# Patient Record
Sex: FEMALE | Race: ASIAN | NOT HISPANIC OR LATINO | Employment: FULL TIME | URBAN - METROPOLITAN AREA
[De-identification: names, ages, dates, MRNs, and addresses within clinical notes are randomized per-mention and may not be internally consistent; named-entity substitution may affect disease eponyms.]

---

## 2017-01-10 ENCOUNTER — ALLSCRIPTS OFFICE VISIT (OUTPATIENT)
Dept: OTHER | Facility: OTHER | Age: 47
End: 2017-01-10

## 2017-02-28 ENCOUNTER — HOSPITAL ENCOUNTER (OUTPATIENT)
Dept: RADIOLOGY | Age: 47
Discharge: HOME | End: 2017-02-28
Payer: COMMERCIAL

## 2017-02-28 DIAGNOSIS — Z12.31 ENCOUNTER FOR SCREENING MAMMOGRAM FOR MALIGNANT NEOPLASM OF BREAST: ICD-10-CM

## 2017-02-28 PROCEDURE — G0202 SCR MAMMO BI INCL CAD: HCPCS

## 2017-03-17 ENCOUNTER — GENERIC CONVERSION - ENCOUNTER (OUTPATIENT)
Dept: OTHER | Facility: OTHER | Age: 47
End: 2017-03-17

## 2017-03-28 ENCOUNTER — ALLSCRIPTS OFFICE VISIT (OUTPATIENT)
Dept: OTHER | Facility: OTHER | Age: 47
End: 2017-03-28

## 2017-05-02 ENCOUNTER — ALLSCRIPTS OFFICE VISIT (OUTPATIENT)
Dept: OTHER | Facility: OTHER | Age: 47
End: 2017-05-02

## 2017-05-02 ENCOUNTER — LAB REQUISITION (OUTPATIENT)
Dept: LAB | Facility: HOSPITAL | Age: 47
End: 2017-05-02
Payer: COMMERCIAL

## 2017-05-02 DIAGNOSIS — Z01.419 ENCOUNTER FOR GYNECOLOGICAL EXAMINATION WITHOUT ABNORMAL FINDING: ICD-10-CM

## 2017-05-02 PROCEDURE — 88175 CYTOPATH C/V AUTO FLUID REDO: CPT | Performed by: PHYSICIAN ASSISTANT

## 2017-05-02 PROCEDURE — 87624 HPV HI-RISK TYP POOLED RSLT: CPT | Performed by: PHYSICIAN ASSISTANT

## 2017-05-04 LAB — HPV RRNA GENITAL QL NAA+PROBE: NORMAL

## 2017-05-08 LAB
LAB AP GYN PRIMARY INTERPRETATION: NORMAL
Lab: NORMAL

## 2017-06-23 ENCOUNTER — TRANSCRIBE ORDERS (OUTPATIENT)
Dept: ADMINISTRATIVE | Facility: HOSPITAL | Age: 47
End: 2017-06-23

## 2017-06-23 DIAGNOSIS — M25.552 LEFT HIP PAIN: Primary | ICD-10-CM

## 2017-07-01 DIAGNOSIS — R73.9 HYPERGLYCEMIA: ICD-10-CM

## 2017-07-01 DIAGNOSIS — E78.5 HYPERLIPIDEMIA: ICD-10-CM

## 2017-07-01 DIAGNOSIS — I10 ESSENTIAL (PRIMARY) HYPERTENSION: ICD-10-CM

## 2017-07-01 DIAGNOSIS — M19.90 OSTEOARTHRITIS: ICD-10-CM

## 2017-07-11 ENCOUNTER — HOSPITAL ENCOUNTER (OUTPATIENT)
Dept: RADIOLOGY | Facility: HOSPITAL | Age: 47
Discharge: HOME | End: 2017-07-11
Payer: COMMERCIAL

## 2017-07-11 DIAGNOSIS — M25.552 LEFT HIP PAIN: ICD-10-CM

## 2017-07-11 PROCEDURE — 77002 NEEDLE LOCALIZATION BY XRAY: CPT

## 2017-07-11 PROCEDURE — 20610 DRAIN/INJ JOINT/BURSA W/O US: CPT

## 2017-07-11 RX ORDER — METHYLPREDNISOLONE ACETATE 80 MG/ML
80 INJECTION, SUSPENSION INTRA-ARTICULAR; INTRALESIONAL; INTRAMUSCULAR; SOFT TISSUE
Status: COMPLETED | OUTPATIENT
Start: 2017-07-11 | End: 2017-07-11

## 2017-07-11 RX ORDER — LIDOCAINE HYDROCHLORIDE 10 MG/ML
20 INJECTION, SOLUTION INFILTRATION; PERINEURAL
Status: COMPLETED | OUTPATIENT
Start: 2017-07-11 | End: 2017-07-11

## 2017-07-11 RX ORDER — BUPIVACAINE HYDROCHLORIDE 2.5 MG/ML
30 INJECTION, SOLUTION EPIDURAL; INFILTRATION; INTRACAUDAL
Status: COMPLETED | OUTPATIENT
Start: 2017-07-11 | End: 2017-07-11

## 2017-07-11 RX ADMIN — METHYLPREDNISOLONE ACETATE 80 MG: 80 INJECTION, SUSPENSION INTRA-ARTICULAR; INTRALESIONAL; INTRAMUSCULAR; SOFT TISSUE at 15:45

## 2017-07-11 RX ADMIN — IOHEXOL 3 ML: 300 INJECTION, SOLUTION INTRAVENOUS at 15:45

## 2017-07-11 RX ADMIN — LIDOCAINE HYDROCHLORIDE 2 ML: 10 INJECTION, SOLUTION INFILTRATION; PERINEURAL at 15:45

## 2017-07-11 RX ADMIN — BUPIVACAINE HYDROCHLORIDE 3 ML: 2.5 INJECTION, SOLUTION EPIDURAL; INFILTRATION; INTRACAUDAL at 15:45

## 2017-07-12 ENCOUNTER — GENERIC CONVERSION - ENCOUNTER (OUTPATIENT)
Dept: OTHER | Facility: OTHER | Age: 47
End: 2017-07-12

## 2017-07-26 ENCOUNTER — APPOINTMENT (OUTPATIENT)
Dept: LAB | Facility: HOSPITAL | Age: 47
End: 2017-07-26
Payer: COMMERCIAL

## 2017-07-26 DIAGNOSIS — E78.5 HYPERLIPIDEMIA: ICD-10-CM

## 2017-07-26 DIAGNOSIS — I10 ESSENTIAL (PRIMARY) HYPERTENSION: ICD-10-CM

## 2017-07-26 DIAGNOSIS — R73.9 HYPERGLYCEMIA: ICD-10-CM

## 2017-07-26 LAB
ALBUMIN SERPL BCP-MCNC: 3.5 G/DL (ref 3.5–5)
ALP SERPL-CCNC: 54 U/L (ref 46–116)
ALT SERPL W P-5'-P-CCNC: 88 U/L (ref 12–78)
ANION GAP SERPL CALCULATED.3IONS-SCNC: 7 MMOL/L (ref 4–13)
AST SERPL W P-5'-P-CCNC: 54 U/L (ref 5–45)
BILIRUB SERPL-MCNC: 0.59 MG/DL (ref 0.2–1)
BUN SERPL-MCNC: 10 MG/DL (ref 5–25)
CALCIUM SERPL-MCNC: 9.5 MG/DL (ref 8.3–10.1)
CHLORIDE SERPL-SCNC: 102 MMOL/L (ref 100–108)
CHOLEST SERPL-MCNC: 137 MG/DL (ref 50–200)
CO2 SERPL-SCNC: 29 MMOL/L (ref 21–32)
CREAT SERPL-MCNC: 0.7 MG/DL (ref 0.6–1.3)
EST. AVERAGE GLUCOSE BLD GHB EST-MCNC: 163 MG/DL
GFR SERPL CREATININE-BSD FRML MDRD: 105 ML/MIN/1.73SQ M
GLUCOSE P FAST SERPL-MCNC: 117 MG/DL (ref 65–99)
HBA1C MFR BLD: 7.3 % (ref 4.2–6.3)
HDLC SERPL-MCNC: 28 MG/DL (ref 40–60)
LDLC SERPL DIRECT ASSAY-MCNC: 88 MG/DL (ref 0–100)
POTASSIUM SERPL-SCNC: 3.5 MMOL/L (ref 3.5–5.3)
PROT SERPL-MCNC: 7.7 G/DL (ref 6.4–8.2)
SODIUM SERPL-SCNC: 138 MMOL/L (ref 136–145)
TRIGL SERPL-MCNC: 179 MG/DL

## 2017-07-26 PROCEDURE — 80061 LIPID PANEL: CPT

## 2017-07-26 PROCEDURE — 83036 HEMOGLOBIN GLYCOSYLATED A1C: CPT

## 2017-07-26 PROCEDURE — 36415 COLL VENOUS BLD VENIPUNCTURE: CPT

## 2017-07-26 PROCEDURE — 83721 ASSAY OF BLOOD LIPOPROTEIN: CPT

## 2017-07-26 PROCEDURE — 80053 COMPREHEN METABOLIC PANEL: CPT

## 2017-08-15 ENCOUNTER — ALLSCRIPTS OFFICE VISIT (OUTPATIENT)
Dept: OTHER | Facility: OTHER | Age: 47
End: 2017-08-15

## 2018-01-13 VITALS
HEIGHT: 64 IN | SYSTOLIC BLOOD PRESSURE: 122 MMHG | BODY MASS INDEX: 35.68 KG/M2 | RESPIRATION RATE: 14 BRPM | HEART RATE: 72 BPM | WEIGHT: 209 LBS | DIASTOLIC BLOOD PRESSURE: 82 MMHG

## 2018-01-13 VITALS
DIASTOLIC BLOOD PRESSURE: 72 MMHG | SYSTOLIC BLOOD PRESSURE: 124 MMHG | BODY MASS INDEX: 36.02 KG/M2 | HEIGHT: 64 IN | WEIGHT: 211 LBS

## 2018-01-14 VITALS
HEART RATE: 80 BPM | DIASTOLIC BLOOD PRESSURE: 93 MMHG | WEIGHT: 209 LBS | BODY MASS INDEX: 35.68 KG/M2 | HEIGHT: 64 IN | SYSTOLIC BLOOD PRESSURE: 143 MMHG

## 2018-01-15 VITALS
WEIGHT: 207 LBS | RESPIRATION RATE: 14 BRPM | HEIGHT: 64 IN | SYSTOLIC BLOOD PRESSURE: 120 MMHG | BODY MASS INDEX: 35.34 KG/M2 | DIASTOLIC BLOOD PRESSURE: 82 MMHG | HEART RATE: 72 BPM

## 2018-01-15 NOTE — RESULT NOTES
Verified Results  FL GUIDED NEEDLE PLAC BX/ASP/INJ [id number] 12:53PM Denys Sandy    Order Number: [id number]   Performing Comments: Please do a fluoroscopic guided intra-articular injection of corticosteroid into the left hip joint   - Patient Instructions: TUESDAY, DECEMBER 13, 2016 @ 1:00 PM   [address] 15 MINUTES EARLY  BRING INSURANCE CARD, PHOTO ID AND ORDER  Test Name Result Flag Reference   FL GUIDED NEEDLE PLAC BX/ASP/INJ (Report)     LEFT HIP INJECTION     INDICATION: Labral tear and left hip pain     COMPARISON: MRI dated 11/1/2016     IMAGES: 5     FLUOROSCOPY TIME: 0 57 seconds     FINDINGS:     After the risks and benefits of the procedure were thoroughly explained, informed consent was obtained  The patient was prepped and draped in the usual sterile fashion  1% lidocaine solution was utilized for local anesthesia  Intermittent fluoroscopy    was utilized for placement a 20 gauge 3 5 inch spinal needle within the left hip joint  After positioning was confirmed with 3 mL of Omnipaque 300, a solution comprised of 1 mL 80 mg/mL Depomedrol, 2 mL of 0 25% Sensorcaine and 2 mL 1% Xylocaine  was    injected into the left hip joint  The patient tolerated the procedure well  There were no complications  This procedure was performed by Marcia Sawyer PA-C, under my direct supervision  IMPRESSION:     Technically successful left hip steroid injection         Workstation performed: [id number]     Signed by:   Sami White MD   12/13/16

## 2018-01-23 ENCOUNTER — ALLSCRIPTS OFFICE VISIT (OUTPATIENT)
Dept: OTHER | Facility: OTHER | Age: 48
End: 2018-01-23

## 2018-01-23 DIAGNOSIS — M25.552 PAIN IN LEFT HIP: ICD-10-CM

## 2018-01-23 LAB — HBA1C MFR BLD HPLC: 6.1 %

## 2018-01-24 NOTE — PROGRESS NOTES
Assessment   1  Controlled diabetes mellitus type II without complication (930 17) (Z68 4)   2  Hip pain, left (719 45) (M25 552)   3  Fatty liver (571 8) (K76 0)   4  Hypertension (401 9) (I10)   5  Hyperlipidemia (272 4) (E78 5)      1  Diabetes mellitus-A1c had climbed up to 7 3 although the patient had prior corticosteroid injection  She remains on metformin 2 grams daily  A1c done in the office today 6 1  She knows about annual ophthalmology exam   Urine for microalbumin ordered prior to next visit     2  Left hip pain-prior MRI done a urine a quarter ago showed large degenerative tear of the left superior acetabulum labrum with mild left DJD  Also has bilateral trochanteric bursitis  Had 2 injections in the hip joint in IR with relief of pain  Exercise group wonders if she does not have some weakness of associated muscles  I am concerned with her worsening range of motion, difficulty increasing pain  Rule out progressive DJD  Rule out avascular necrosis  Recommended she have repeat MRI of her hip with repeat evaluation by orthopedic physician     3 hypertension-essential-the cough with an ACE inhibitor  On chlorthalidone supplemental potassium  On higher dose of amlodipine and BP much improved  Also improved with less use of NSAID-because of her diabetes would prefer angiotensin receptor blocker-may want to consider at next visit switching to an angiotensin receptor blocker IN THE FUTURE WITH HER HISTORY OF HYPERTENSION     #4 elevated liver enzymes-likely in the basis of fatty liver documented on CAT scan  Hepatitis C antibody negative  Hepatitis B surface antibody consistent with prior vaccines-for repeat labs prior to next visit     #5 hyperlipidemia with mixed dyslipidemia-no strong family history of vascular disease but she does of hypertension, fatty liver diabetes  Not a smoker  On atorvastatin    Also on Lovaza 3 grams daily-for repeat prior to next visit     #6 arthralgia the second MCP bilaterally and MTP bilaterally  X-ray shows no bony lytic disease  Antinuclear antibody, rheumatoid factor normal  Hepatitis C antibody negative  Hepatitis B surface antibody positive consistent with vaccine  Lyme titer negative  Anti-CCP antibody, anti-SSA and SSB antibody all normal  Has no involvement of large joints other than her hip which may be a DJD issue  Has some elevation of his sedimentation rate and CRP above his can also be influenced by the fatty liver  Prior C-reactive protein was 19 and prior sedimentation rate 34 with normal up to 20  There was some concern about psoriasis as she has some scaling areas of the posterior neck that responded to T-cell shampoo  This could easily represent psoriatic joint disease-as noted that this point which she treats the area of her posterior neck small joint arthralgias of the hand and feet improve  7   Previously noted diffuse rash-dermatology felt some degree of latex allergy     8  History of preeclampsia     9  Carpal tunnel syndrome-monitor     10  Allergies-had used antihistamine and Proventil inhaler in the past               MEDICAL REGIMEN:         Estrogen ring for birth control, Claritin 10 milligrams daily as needed, Proventil inhaler as needed, chlorthalidone 25 milligrams daily, amlodipine 5 milligrams b i d , metformin 2 grams daily, atorvastatin 10 milligrams daily, potassium chloride -10 milliequivalents b i d , Lovaza 3 grams daily          Scheduled for MRI of hip an appointment with orthopedic physician  Appointment in several months with prior chemistry profile cholesterol profile A1c urine for microalbumin-consider repeat inflammatory markers in the future       Plan   Controlled diabetes mellitus type II without complication    · Hemoglobin A1c- POC; Status:Complete;   Done: 90CTV5670 01:50PM  Controlled diabetes mellitus type II without complication, Hyperlipidemia, Hypertension    · (1) CHOLESTEROL, TOTAL; Status:Active;  Requested for:01May2018;    · (1) COMPREHENSIVE METABOLIC PANEL; Status:Active; Requested for:01May2018;    · (1) HDL,DIRECT; Status:Active; Requested for:01May2018;    · (1) HEMOGLOBIN A1C; Status:Active; Requested for:01May2018;    · (1) LDL,DIRECT; Status:Active; Requested for:01May2018;    · (1) MICROALBUMIN CREATININE RATIO, RANDOM URINE; Status:Active; Requested for:01May2018;    · (1) TRIGLYCERIDE; Status:Active; Requested for:01May2018; Hip pain, left    · * MRI HIP LEFT WO CONTRAST; Status:Active; Requested DJA:[id number];       Repeat MRI of the hip and evaluation by orthopedic group  Continue current dose of metformin  Obtain lab work prior to next visit       History of Present Illness   HPI: She is having ongoing issues with her left hip  She has significant difficulty when trying to dress and bring her left leg up  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  As noted history MRI showed a large degenerative tear of the left superior acetabulum with mild left DJD  She also has bilateral trochanteric bursitis  She had an injection of the left hip on 2 occasions with definite improvement  She recently went to an exercise regimen and they were talking about some component related to surrounding muscle issues-this may be the case but she clearly has significant decrease in range of motion of her left hip on exam  She is also having increasing pain which is becoming more of an issue  There was a concern about additional pathology-specifically does this all represent ongoing significant issues with torn labrum verses new pathology  Rule out avascular necrosis  Because this possibility I recommended repeat MRI of her hip and formal evaluation by orthopedic physician   is trying to follow appropriate diet a low physical activity limited because of her left hip issues  She also has separate joint pain as described below   Small joint disease of the MCP MTP most recently improved when she treats the skin area of her posterior scalp which may represent psoriasis  She denies prolonged morning stiffness but has issues with the left hip area as noted   in the office today significantly improved at 6 1  She is on metformin 2 grams daily  Her prior visit A1c was 7 3 of though this may been influenced by corticosteroid injection  We elected at this point to continue 2 grams of metformin and repeat labs prior to her next visit  has known hypertension  BP adequately controlled on current regimen  Avoiding salt and decongestants  Denies hematemesis pounding of her heart sweats and headache      Review of Systems   Complete-Female:      Constitutional: No fever, no chills, feels well, no tiredness, no recent weight gain or weight loss  Eyes: No complaints of eye pain, no red eyes, no eyesight problems, no discharge, no dry eyes, no itching of eyes  ENT: no complaints of earache, no loss of hearing, no nose bleeds, no nasal discharge, no sore throat, no hoarseness  Cardiovascular: No complaints of slow heart rate, no fast heart rate, no chest pain, no palpitations, no leg claudication, no lower extremity edema  Respiratory: No complaints of shortness of breath, no wheezing, no cough, no SOB on exertion, no orthopnea, no PND  Gastrointestinal: diarrhea-- and-- bloody stools, but-- no abdominal pain,-- no nausea,-- no vomiting-- and-- no constipation  Genitourinary: No complaints of dysuria, no incontinence, no pelvic pain, no dysmenorrhea, no vaginal discharge or bleeding  Musculoskeletal: arthralgias,-- limb pain-- and-- Significant issues with use of left hip, but-- no joint swelling,-- no myalgias,-- no joint stiffness-- and-- no limb swelling  Integumentary: No complaints of skin rash or lesions, no itching, no skin wounds, no breast pain or lump        Neurological: No complaints of headache, no confusion, no convulsions, no numbness, no dizziness or fainting, no tingling, no limb weakness, no difficulty walking  Psychiatric: Not suicidal, no sleep disturbance, no anxiety or depression, no change in personality, no emotional problems  Endocrine: No complaints of proptosis, no hot flashes, no muscle weakness, no deepening of the voice, no feelings of weakness  Hematologic/Lymphatic: No complaints of swollen glands, no swollen glands in the neck, does not bleed easily, does not bruise easily  Active Problems   1  Abnormal blood chemistry test (790 6) (R79 9)   2  Arthritis (716 90) (M19 90)   3  Asthma, chronic (493 90) (J45 909)   4  Chronic eczema (692 9) (L30 9)   5  Controlled diabetes mellitus type II without complication (787 59) (H64 4)   6  Elevated ALT measurement (790 4) (R74 0)   7  Elevated AST (SGOT) (790 4) (R74 0)   8  Elevated liver enzymes (790 5) (R74 8)   9  Encounter for annual routine gynecological examination (V72 31) (Z01 419)   10  Encounter for screening mammogram for breast cancer (V76 12) (Z12 31)   11  Encounter for surveillance of other contraceptive (V25 49) (Z30 49)   12  Fatty liver (571 8) (K76 0)   13  Hip pain, left (719 45) (M25 552)   14  Hyperglycemia (790 29) (R73 9)   15  Hyperlipidemia (272 4) (E78 5)   16  Hypertension (401 9) (I10)   17  Pain, joint, hand (719 44) (M25 549)   18  Painful joint (719 40) (M25 50)   19  Psoriasis (696 1) (L40 9)    Past Medical History   1  History of Acute tonsillitis (463) (J03 90)   2  History of PCOS (V13 29) (Z87 42)  Active Problems And Past Medical History Reviewed: The active problems and past medical history were reviewed and updated today  Surgical History   1  History of Tonsillectomy  Surgical History Reviewed: The surgical history was reviewed and updated today  Family History   Mother    1  Family history of hypertension (V17 49) (Z82 49)  Father    2  Family history of hypertension (V17 49) (Z82 49)   3   Family history of Low serum HDL  Sister    4  Family history of hypothyroidism (V18 19) (Z83 49)    Social History    · Denied: History of Being A Social Drinker   · Denied: History of Drug Use   · Never A Smoker  Social History Reviewed: The social history was reviewed and updated today  The social history was reviewed and is unchanged  Current Meds    1  Advair Diskus 100-50 MCG/DOSE Inhalation Aerosol Powder Breath Activated; INHALE 1 PUFF     EVERY 12 HOURS; Therapy: 27USA3710 to (Last Rx:26May2017)  Requested for: 02SHC5365 Ordered   2  AmLODIPine Besylate 5 MG Oral Tablet; TAKE 1 TABLET TWICE DAILY; Therapy: 06KRM6468 to (Evaluate:20Apr2018)  Requested for: 80Onn1495; Last Rx:48Chu2813     Ordered   3  Amoxicillin 500 MG Oral Capsule; TAKE 1 CAPSULE TWICE DAILY; Therapy: 82AOR2295 to (Iveth Del Castillo)  Requested for: 87DZI9095; Last Rx:19May2017     Ordered   4  Atorvastatin Calcium 10 MG Oral Tablet; TAKE 1 TABLET DAILY AS DIRECTED; Therapy: 12SGY8890 to (Evaluate:24Nov2018)  Requested for: 35BCY3412; Last Rx:29Nov2017     Ordered   5  Chlorthalidone 25 MG Oral Tablet; TAKE 1 TABLET ONCE DAILY; Therapy: 11ERY5499 to (Evaluate:24Nov2018)  Requested for: 34TEC4854; Last Rx:29Nov2017     Ordered   6  Klor-Con 10 10 MEQ Oral Tablet Extended Release; TAKE 1 TABLET TWICE DAILY; Therapy: 50EBV5843 to (Vera Peña)  Requested for: 15XHR9282; Last Rx:29Nov2017     Ordered   7  MetFORMIN HCl - 1000 MG Oral Tablet; TAKE 1 TABLET EVERY 12 HOURS DAILY; Therapy: 39JUX2424 to 452 8137)  Requested for: 41NHS8950; Last Rx:22Jan2018     Ordered   8  MetFORMIN HCl - 500 MG Oral Tablet; TAKE 1 TABLET 3 TIMES DAILY WITH MEALS; Therapy: 86PBX4068 to (Evaluate:23Oct2016)  Requested for: 09Duq5075; Last Rx:07Ptc5690 Ordered   9  Montelukast Sodium 10 MG Oral Tablet; TAKE 1 TABLET AT BEDTIME; Therapy: 56LEL5896 to (Evaluate:17Jan2019)  Requested for: 61UXR8602;  Last Rx:22Jan2018     Ordered 10  NuvaRing 0 12-0 015 MG/24HR Vaginal Ring; PATIENT TO USE CONTINUOUSLY, NO WITHDRAWAL      BLEED, WILL NEED REFILL EVERY 3 WEEKS; Therapy: 14HLL8371 to (YZCPLZRV:01IPJ3744)  Requested for: 30Jun2017; Last Rx:29Jun2017      Ordered   11  Omega-3-acid Ethyl Esters 1 GM Oral Capsule; TAKE 2 CAPSULE Daily; Therapy: 08JVD9149 to (Last Rx:82Oxu7870)  Requested for: 70JQA9180 Ordered   12  Proventil  (90 Base) MCG/ACT Inhalation Aerosol Solution; INHALE 2 PUFFS EVERY 4-6      HOURS AS NEEDED; Therapy: 62MEY0752 to (Last Rx:99Bim3655)  Requested for: 54CEG8929 Ordered   13  Triamcinolone Acetonide 0 1 % External Ointment; APPLY SPARINGLY TO AFFECTED AREA(S) 2 TO      3 TIMES DAILY; Therapy: 15MBG7683 to (David Greer)  Requested for: 86BDZ8935; Last Rx:12Oct2016      Ordered  Medication List Reviewed: The medication list was reviewed and updated today  Allergies   1  NuvaRing RING  2  FRUIT   3  Nuts   4  Shellfish    Vitals   Vital Signs    Recorded: 84HBM5861 05:17PM Recorded: 55LLA9801 01:38PM   Heart Rate 68    Respiration 14    Systolic 362, RUE, Sitting    Diastolic 78, RUE, Sitting    BP CUFF SIZE Large    Height  5 ft 4 in   Weight  206 lb 6 oz   BMI Calculated  35 42   BSA Calculated  1 98     Physical Exam        Constitutional      General appearance: No acute distress, well appearing and well nourished  Head and Face      Head and face: Normal        Palpation of the face and sinuses: No sinus tenderness  Eyes      Conjunctiva and lids: No swelling, erythema or discharge  Pupils and irises: Equal, round, reactive to light  Ears, Nose, Mouth, and Throat      External inspection of ears and nose: Normal        Otoscopic examination: Tympanic membranes translucent with normal light reflex  Canals patent without erythema  Hearing: Normal        Nasal mucosa, septum, and turbinates: Normal without edema or erythema         Lips, teeth, and gums: Normal, good dentition  Oropharynx: Normal with no erythema, edema, exudate or lesions  Neck      Neck: Supple, symmetric, trachea midline, no masses  Thyroid: Normal, no thyromegaly  Pulmonary      Respiratory effort: No increased work of breathing or signs of respiratory distress  Percussion of chest: Normal        Palpation of chest: Normal        Auscultation of lungs: Clear to auscultation  Cardiovascular      Palpation of heart: Normal PMI, no thrills  Auscultation of heart: Normal rate and rhythm, normal S1 and S2, no murmurs  Carotid pulses: 2+ bilaterally  Abdominal aorta: Normal        Femoral pulses: 2+ bilaterally  Pedal pulses: 2+ bilaterally  Peripheral vascular exam: Normal        Examination of extremities for edema and/or varicosities: Normal        Chest      Chest: Normal        Abdomen      Abdomen: Non-tender, no masses  Liver and spleen: No hepatomegaly or splenomegaly  Examination for hernias: No hernia appreciated  Anus, perineum, and rectum: Normal sphincter tone, no masses, no prolapse  Stool sample for occult blood: Negative  Lymphatic      Palpation of lymph nodes in neck: No lymphadenopathy  Palpation of lymph nodes in axillae: No lymphadenopathy  Palpation of lymph nodes in groin: No lymphadenopathy  Palpation of lymph nodes in other areas: No lymphadenopathy  Musculoskeletal      Gait and station: Normal        Digits and nails: Normal without clubbing or cyanosis  Joints, bones, and muscles: Abnormal  -- Significant decrease in range of motion of the left hip-no active synovitis  Range of motion: Normal        Stability: Normal        Muscle strength/tone: Normal        Skin      Skin and subcutaneous tissue: Normal without rashes or lesions  Palpation of skin and subcutaneous tissue: Normal turgor         Neurologic      Cranial nerves: Cranial nerves II-XII intact  Reflexes: 2+ and symmetric  Sensation: No sensory loss  Psychiatric      Judgment and insight: Normal        Orientation to person, place, and time: Normal        Recent and remote memory: Intact         Mood and affect: Normal        Results/Data   Hemoglobin A1c- POC 87BCA7176 01:50PM Markos Briscoe      Test Name Result Flag Reference   HEMOGLOBIN A1C 6 1          Signatures    Electronically signed by : ILEANA Ceja ; Jan 23 2018  3:41PM EST                       (Author)     Electronically signed by : ILEANA Ceja ; Jan 23 2018  5:29PM EST                       (Author)

## 2018-02-06 ENCOUNTER — HOSPITAL ENCOUNTER (OUTPATIENT)
Dept: MRI IMAGING | Facility: HOSPITAL | Age: 48
Discharge: HOME | End: 2018-02-06
Payer: COMMERCIAL

## 2018-02-06 DIAGNOSIS — M25.552 PAIN IN LEFT HIP: ICD-10-CM

## 2018-02-06 PROCEDURE — 73721 MRI JNT OF LWR EXTRE W/O DYE: CPT

## 2018-02-11 ENCOUNTER — DOCUMENTATION (OUTPATIENT)
Dept: INTERNAL MEDICINE CLINIC | Facility: CLINIC | Age: 48
End: 2018-02-11

## 2018-03-05 RX ORDER — ATORVASTATIN CALCIUM 10 MG/1
1 TABLET, FILM COATED ORAL DAILY
COMMUNITY
Start: 2016-02-09 | End: 2019-03-03 | Stop reason: SDUPTHER

## 2018-03-05 RX ORDER — AMOXICILLIN 500 MG/1
1 CAPSULE ORAL 2 TIMES DAILY
COMMUNITY
Start: 2017-05-19 | End: 2018-03-06 | Stop reason: ALTCHOICE

## 2018-03-05 RX ORDER — OMEGA-3-ACID ETHYL ESTERS 1 G/1
2 CAPSULE, LIQUID FILLED ORAL DAILY
COMMUNITY
Start: 2017-02-01 | End: 2018-10-23 | Stop reason: SDUPTHER

## 2018-03-05 RX ORDER — MONTELUKAST SODIUM 10 MG/1
1 TABLET ORAL
COMMUNITY
Start: 2015-11-23 | End: 2019-02-13 | Stop reason: SDUPTHER

## 2018-03-05 RX ORDER — CHLORTHALIDONE 25 MG/1
1 TABLET ORAL DAILY
COMMUNITY
Start: 2013-03-12 | End: 2020-02-18

## 2018-03-05 RX ORDER — ALBUTEROL SULFATE 90 UG/1
2 AEROSOL, METERED RESPIRATORY (INHALATION)
COMMUNITY
Start: 2017-05-26 | End: 2020-02-27 | Stop reason: SDUPTHER

## 2018-03-05 RX ORDER — AMLODIPINE BESYLATE 5 MG/1
1 TABLET ORAL 2 TIMES DAILY
COMMUNITY
Start: 2013-05-31 | End: 2018-05-18 | Stop reason: SDUPTHER

## 2018-03-05 RX ORDER — POTASSIUM CHLORIDE 750 MG/1
1 TABLET, FILM COATED, EXTENDED RELEASE ORAL 2 TIMES DAILY
COMMUNITY
Start: 2015-11-23 | End: 2020-02-18

## 2018-03-06 ENCOUNTER — OFFICE VISIT (OUTPATIENT)
Dept: OBGYN CLINIC | Facility: HOSPITAL | Age: 48
End: 2018-03-06
Payer: COMMERCIAL

## 2018-03-06 VITALS
DIASTOLIC BLOOD PRESSURE: 87 MMHG | SYSTOLIC BLOOD PRESSURE: 133 MMHG | BODY MASS INDEX: 35 KG/M2 | HEART RATE: 94 BPM | WEIGHT: 205.03 LBS | HEIGHT: 64 IN

## 2018-03-06 DIAGNOSIS — M25.552 LEFT HIP PAIN: Primary | ICD-10-CM

## 2018-03-06 DIAGNOSIS — M16.12 PRIMARY OSTEOARTHRITIS OF LEFT HIP: ICD-10-CM

## 2018-03-06 PROCEDURE — 99213 OFFICE O/P EST LOW 20 MIN: CPT | Performed by: ORTHOPAEDIC SURGERY

## 2018-03-06 RX ORDER — ETONOGESTREL/ETHINYL ESTRADIOL .12-.015MG
RING, VAGINAL VAGINAL
COMMUNITY
Start: 2018-01-07 | End: 2018-07-12 | Stop reason: SDUPTHER

## 2018-03-06 NOTE — PROGRESS NOTES
41 y o female presents to the office for left groin and lateral hip pain  She struggles to put her shoes on  She had an injection in July 2017 which was beneficial      Review of Systems  Review of systems negative unless otherwise specified in HPI    Past Medical History  History reviewed  No pertinent past medical history  Past Surgical History  History reviewed  No pertinent surgical history  Current Medications  No current outpatient prescriptions on file prior to visit  No current facility-administered medications on file prior to visit  Recent Labs [facility])    0  Lab Value Date/Time   HCT 40 1 12/03/2015 0736   HGB 13 6 12/03/2015 0736   WBC 6 62 12/03/2015 0736   ESR 34 (H) 11/12/2016 0826   ESR 38 (H) 12/03/2015 0736   CRP 19 3 (H) 11/12/2016 0826   GLUCOSE 98 11/12/2016 0826   GLUCOSE 115 12/03/2015 0736   HGBA1C 6 1 01/23/2018 1350         Physical exam  · General: Awake, Alert, Oriented  · Eyes: Pupils equal, round and reactive to light  · Heart: regular rate and rhythm  · Lungs: No audible wheezing  · Abdomen: soft  left Lower extremity  · Patient ambulates without assistance      Imaging  X-rays of left hip were reviewed  MRI of left hip was reviewed    Procedure  None    Assessment/Plan:   41 y  o female will get another steroid injection  She will start physical therapy following the injection  We will see her back in 3 months

## 2018-03-13 ENCOUNTER — TRANSCRIBE ORDERS (OUTPATIENT)
Dept: RADIOLOGY | Facility: HOSPITAL | Age: 48
End: 2018-03-13

## 2018-03-13 ENCOUNTER — HOSPITAL ENCOUNTER (OUTPATIENT)
Dept: RADIOLOGY | Facility: HOSPITAL | Age: 48
Discharge: HOME | End: 2018-03-13
Attending: ORTHOPAEDIC SURGERY
Payer: COMMERCIAL

## 2018-03-13 DIAGNOSIS — M25.552 LEFT HIP PAIN: ICD-10-CM

## 2018-03-13 DIAGNOSIS — M16.12 PRIMARY OSTEOARTHRITIS OF LEFT HIP: ICD-10-CM

## 2018-03-13 PROCEDURE — 20610 DRAIN/INJ JOINT/BURSA W/O US: CPT

## 2018-03-13 PROCEDURE — 77002 NEEDLE LOCALIZATION BY XRAY: CPT

## 2018-03-13 RX ORDER — LIDOCAINE HYDROCHLORIDE 10 MG/ML
20 INJECTION, SOLUTION INFILTRATION; PERINEURAL
Status: COMPLETED | OUTPATIENT
Start: 2018-03-13 | End: 2018-03-13

## 2018-03-13 RX ORDER — METHYLPREDNISOLONE ACETATE 80 MG/ML
80 INJECTION, SUSPENSION INTRA-ARTICULAR; INTRALESIONAL; INTRAMUSCULAR; SOFT TISSUE
Status: COMPLETED | OUTPATIENT
Start: 2018-03-13 | End: 2018-03-13

## 2018-03-13 RX ORDER — BUPIVACAINE HYDROCHLORIDE 2.5 MG/ML
30 INJECTION, SOLUTION EPIDURAL; INFILTRATION; INTRACAUDAL
Status: COMPLETED | OUTPATIENT
Start: 2018-03-13 | End: 2018-03-13

## 2018-03-13 RX ADMIN — METHYLPREDNISOLONE ACETATE 80 MG: 80 INJECTION, SUSPENSION INTRA-ARTICULAR; INTRALESIONAL; INTRAMUSCULAR; SOFT TISSUE at 13:37

## 2018-03-13 RX ADMIN — LIDOCAINE HYDROCHLORIDE 2 ML: 10 INJECTION, SOLUTION INFILTRATION; PERINEURAL at 13:36

## 2018-03-13 RX ADMIN — BUPIVACAINE HYDROCHLORIDE 2 ML: 2.5 INJECTION, SOLUTION EPIDURAL; INFILTRATION; INTRACAUDAL at 13:35

## 2018-03-13 RX ADMIN — IOHEXOL 3 ML: 300 INJECTION, SOLUTION INTRAVENOUS at 13:05

## 2018-03-20 ENCOUNTER — EVALUATION (OUTPATIENT)
Dept: PHYSICAL THERAPY | Facility: CLINIC | Age: 48
End: 2018-03-20
Payer: COMMERCIAL

## 2018-03-20 DIAGNOSIS — M25.552 LEFT HIP PAIN: Primary | ICD-10-CM

## 2018-03-20 DIAGNOSIS — M16.12 PRIMARY OSTEOARTHRITIS OF LEFT HIP: ICD-10-CM

## 2018-03-20 PROCEDURE — G8979 MOBILITY GOAL STATUS: HCPCS | Performed by: PHYSICAL THERAPIST

## 2018-03-20 PROCEDURE — 97161 PT EVAL LOW COMPLEX 20 MIN: CPT | Performed by: PHYSICAL THERAPIST

## 2018-03-20 PROCEDURE — 97110 THERAPEUTIC EXERCISES: CPT | Performed by: PHYSICAL THERAPIST

## 2018-03-20 PROCEDURE — G8978 MOBILITY CURRENT STATUS: HCPCS | Performed by: PHYSICAL THERAPIST

## 2018-03-20 NOTE — PROGRESS NOTES
PT Evaluation     Today's date: 3/20/2018  Patient name: Uriah Murray  : 1970  MRN: [id number]  Referring provider: Russell Merchant MD  Dx:   Encounter Diagnosis     ICD-10-CM    1  Left hip pain M25 552 Ambulatory referral to Physical Therapy   2  Primary osteoarthritis of left hip M16 12 Ambulatory referral to Physical Therapy                  Assessment  Impairments: abnormal or restricted ROM and impaired physical strength    Assessment details: Patient presents with signs and symptoms of referring diagnosis and a movement impairment diagnosis of L hip IR hypomobility due to capsular tightness  She has most limitation in IR followed by ext, Ir, abduction  She also has decreased hip strength due to pain  She was able to improve ROM with mobilizations this session and would benefit from formal therapy to maximize standing/sitting tolerance and improve functional mobility  Thank you again for the kind referral    Understanding of Dx/Px/POC: good   Prognosis: good    Goals  STGs  1  Decrease pain by 20% in 2-4 weeks  2  Improve hip ROM by 10 degrees in 2-4 weeks  3  Improve hip strength by 1/3 grade in 2-4 weeks  LTGs  1  Decrease pain by 60% in 6-8 weeks  2  Improve standing sitting tolerance to >45 minutes in 6-8 weeks  3  Perform job activities without pain in 6-8 weeks  Plan  Patient would benefit from: skilled PT  Planned therapy interventions: manual therapy, therapeutic exercise, stretching, strengthening and neuromuscular re-education  Frequency: 2x week  Duration in weeks: 8  Treatment plan discussed with: patient        Subjective Evaluation    History of Present Illness  Mechanism of injury: She states she has a labral tear in her L hip according to MRI findings and psoriatic arthritis  It started a few years back when she would repetitively lift when sitting and leaning over to the R  She has quite a bit of tightness and stiffness more than pain   She also had a slip on ice a few months ago and her L hip went into abduction  She denies numbness and tingling  She notices some clicking but no giving way  She does have a history of R sciatica >10 years ago and was worse after pregnancy but it has been calmed down since just a bit difficult with lifting laundry basket     Quality of life: good    Pain  At best pain ratin  At worst pain ratin  Location: Quad, lateral hip pain    Treatments  Current treatment: injection treatment        Objective     Active Range of Motion   Left Hip   Flexion: 94 degrees with pain    Additional Active Range of Motion Details  Gait: hips in ER  Squat: Hips in ER and trunk flexion compensation    Myotomes: WNL  Hip flexion 3-/5 L Hamstring 4/5 B  Hip abd: R 4+/5 L 3-/5 pain  Hip ext: R 4/5 L 3-/5 pain  Lumbar ROM: 60 10 ext slight pull in ant hip    Slump:- SLR:50 L +  Merle:L+  Faddir: L+ Scours:+         Log Roll:limited IR    Hip distraction:-   Elys: 90 L + Sacral thrust:-    SG: anterior glide decreased pain with Ir, lateral inferior glide hypomobile decreased pain with flexion  Palpation:post greater trochanter       Passive Range of Motion   Left Hip   Flexion: 90 degrees with pain  Abduction: 14 degrees with pain  Internal rotation (90/90): 0 degrees   Internal rotation (prone): 5 and prone pain degrees with pain          Precautions: HTN, psoriatic arthritis, latex allergy    Daily Treatment Diary     Manual  3/20            L Hip belt lateral/inferior glides grade 3-4  5 min                                                                    Exercise Diary  3/20            Bike             Hip flexor ST 4 x :20            L Hip add ST 4x :20            KTC             Prone press up             Prone quad ST             Hip IR on stool             Total gym             Standing hip abd Modalities              MHP prn

## 2018-03-27 ENCOUNTER — OFFICE VISIT (OUTPATIENT)
Dept: PHYSICAL THERAPY | Facility: CLINIC | Age: 48
End: 2018-03-27
Payer: COMMERCIAL

## 2018-03-27 DIAGNOSIS — M25.552 LEFT HIP PAIN: Primary | ICD-10-CM

## 2018-03-27 DIAGNOSIS — M16.12 PRIMARY OSTEOARTHRITIS OF LEFT HIP: ICD-10-CM

## 2018-03-27 PROCEDURE — 97112 NEUROMUSCULAR REEDUCATION: CPT | Performed by: PHYSICAL THERAPIST

## 2018-03-27 PROCEDURE — 97110 THERAPEUTIC EXERCISES: CPT | Performed by: PHYSICAL THERAPIST

## 2018-03-27 PROCEDURE — 97140 MANUAL THERAPY 1/> REGIONS: CPT | Performed by: PHYSICAL THERAPIST

## 2018-03-27 NOTE — PROGRESS NOTES
Daily Note     Today's date: 3/27/2018  Patient name: Andrey Yates  : 1970  MRN: [id number]  Referring provider: Meagan Suero MD  Dx:   Encounter Diagnosis     ICD-10-CM    1  Left hip pain M25 552    2  Primary osteoarthritis of left hip M16 12                   Subjective: She states she is feeling improved flexibility and that she has not noticed more soreness  Objective: See treatment diary below      Assessment: Tolerated treatment well  Patient exhibited good technique with therapeutic exercises  Patient had some tightness with prayer stretch, but no increase in soreness throughout session  57% WB on LLE c EC  Plan: Continue per plan of care       recautions: HTN, psoriatic arthritis, latex allergy    Daily Treatment Diary     Manual  3/20 3/27           L Hip belt lateral/inferior glides grade 3-4  5 min 10 min                                                                   Exercise Diary  3/20 3/27           Bike  6 min           Hip flexor ST 4 x :20 4x :20           L Hip add ST 4x :20 4x :20           KTC  10x :05           Prone press up  2x10           Prone quad ST  4x :20           Hip IR on stool  2x20           Total gym  L22 2x15           Standing hip abd  2x10           Biodex- WS  2 min           Prayer sT  10x :05                                                                                                                                    Modalities              MHP prn

## 2018-04-03 ENCOUNTER — OFFICE VISIT (OUTPATIENT)
Dept: PHYSICAL THERAPY | Facility: CLINIC | Age: 48
End: 2018-04-03
Payer: COMMERCIAL

## 2018-04-03 DIAGNOSIS — M16.12 PRIMARY OSTEOARTHRITIS OF LEFT HIP: ICD-10-CM

## 2018-04-03 DIAGNOSIS — M25.552 LEFT HIP PAIN: Primary | ICD-10-CM

## 2018-04-03 PROCEDURE — 97140 MANUAL THERAPY 1/> REGIONS: CPT | Performed by: PHYSICAL THERAPIST

## 2018-04-03 PROCEDURE — 97110 THERAPEUTIC EXERCISES: CPT | Performed by: PHYSICAL THERAPIST

## 2018-04-03 NOTE — PROGRESS NOTES
Daily Note     Today's date: 4/3/2018  Patient name: Martell Sparks  : 1970  MRN: [id number]  Referring provider: Nancy Munoz MD  Dx:   Encounter Diagnosis     ICD-10-CM    1  Left hip pain M25 552    2  Primary osteoarthritis of left hip M16 12                   Subjective: Patient stated no significant pain prior to treatment session  Objective: See treatment diary below      Assessment: Patient demonstrated moderate difficulty with A/P weight shifting on Biodex; positive response to manual intervention  Plan: Continue per plan of care       recautions: HTN, psoriatic arthritis, latex allergy    Daily Treatment Diary     Manual  3/20 3/27 4/3          L Hip belt lateral/inferior glides grade 3-4  5 min 10 min KK                                                                  Exercise Diary  3/20 3/27 4/3          Bike  6 min 6 min          Hip flexor ST 4 x :20 4x :20 4x20"          L Hip add ST 4x :20 4x :20 4x20"          KTC  10x :05 10x :05          Prone press up  2x10 2x10          Prone quad ST  4x :20 4x20"          Hip IR on stool  2x20 2x20          Total gym  L22 2x15 L22 2x15          Standing hip abd  2x10 2x10          Biodex- WS  2 min 2' f/b 2' s/s          Prayer sT  10x :05 10x :05                                                                                                                                   Modalities              MHP prn

## 2018-04-10 DIAGNOSIS — R73.09 IMPAIRED GLUCOSE REGULATION: Primary | ICD-10-CM

## 2018-04-26 PROCEDURE — G8979 MOBILITY GOAL STATUS: HCPCS | Performed by: PHYSICAL THERAPIST

## 2018-04-26 PROCEDURE — G8978 MOBILITY CURRENT STATUS: HCPCS | Performed by: PHYSICAL THERAPIST

## 2018-05-01 ENCOUNTER — OFFICE VISIT (OUTPATIENT)
Dept: PHYSICAL THERAPY | Facility: CLINIC | Age: 48
End: 2018-05-01
Payer: COMMERCIAL

## 2018-05-01 DIAGNOSIS — M25.552 LEFT HIP PAIN: Primary | ICD-10-CM

## 2018-05-01 DIAGNOSIS — I10 ESSENTIAL (PRIMARY) HYPERTENSION: ICD-10-CM

## 2018-05-01 DIAGNOSIS — E78.5 HYPERLIPIDEMIA: ICD-10-CM

## 2018-05-01 DIAGNOSIS — M16.12 PRIMARY OSTEOARTHRITIS OF LEFT HIP: ICD-10-CM

## 2018-05-01 DIAGNOSIS — E11.9 TYPE 2 DIABETES MELLITUS WITHOUT COMPLICATIONS (HCC): ICD-10-CM

## 2018-05-01 PROCEDURE — 97112 NEUROMUSCULAR REEDUCATION: CPT | Performed by: PHYSICAL THERAPIST

## 2018-05-01 PROCEDURE — 97140 MANUAL THERAPY 1/> REGIONS: CPT | Performed by: PHYSICAL THERAPIST

## 2018-05-01 PROCEDURE — 97110 THERAPEUTIC EXERCISES: CPT | Performed by: PHYSICAL THERAPIST

## 2018-05-01 PROCEDURE — G8978 MOBILITY CURRENT STATUS: HCPCS | Performed by: PHYSICAL THERAPIST

## 2018-05-01 PROCEDURE — G8979 MOBILITY GOAL STATUS: HCPCS | Performed by: PHYSICAL THERAPIST

## 2018-05-01 PROCEDURE — 97530 THERAPEUTIC ACTIVITIES: CPT | Performed by: PHYSICAL THERAPIST

## 2018-05-01 NOTE — PROGRESS NOTES
PT Evaluation     Today's date: 2018  Patient name: Lucien Oseguera  : 1970  MRN: [id number]  Referring provider: Vianney Adhikari MD  Dx:   No diagnosis found  Assessment  Impairments: abnormal or restricted ROM and impaired physical strength    Assessment details: She has shown good progress with hip ROM, strength and pain  She still does have some capsular mobility deficits and strength deficits which are causing her difficulty standing for longer durations  She was educated in home stretching and mobilization program as she would like to progress towards independent Hep  My recommendation at this time is one more visit to further progress HEP  Thank you again for the kind referral    Understanding of Dx/Px/POC: good   Prognosis: good    Goals  STGs  1  Decrease pain by 20% in 2-4 weeks  - met  2  Improve hip ROM by 10 degrees in 2-4 weeks  -met   3  Improve hip strength by 1/3 grade in 2-4 weeks  -partially met      LTGs  1  Decrease pain by 60% in 6-8 weeks  -partially met  2  Improve standing sitting tolerance to >45 minutes in 6-8 weeks  -partially met  3  Perform job activities without pain in 6-8 weeks  -not met      Plan  Patient would benefit from: skilled PT  Planned therapy interventions: manual therapy, therapeutic exercise, stretching, strengthening and neuromuscular re-education  Frequency: 1x week  Duration in weeks: 6  Treatment plan discussed with: patient        Subjective Evaluation    History of Present Illness  Mechanism of injury: She states she has been very busy at work lately and still has difficulty standing for > 4 hours  She feels she is about 50% better  She has difficulty with getting on hands and knees  She feels she would be better if she was to consistently perform exercises at home  She will be starting to go to gym with her daughter 1-2x/week     Quality of life: good    Pain  At best pain ratin  At worst pain ratin  Location: anterior lateral Quad, lateral hip pain    Treatments  Current treatment: injection treatment        Objective     Active Range of Motion   Left Hip   Flexion: 104 degrees with pain    Right Hip   Flexion: 115 degrees     Additional Active Range of Motion Details  Gait: hips in ER  Squat: Hips in ER and trunk flexion compensation    Myotomes: WNL  Hip flexion 4/5 L Hamstring 4+/5 B  Hip abd: R 4+/5 L 4/5 pain and pull   Hip ext: R 4/5 L 4-/5 pain  Lumbar ROM: 72 15 ext    Slump:- SLR:50 L +  Merle:L+  Faddir: L+ Scour's:-        Log Roll:limited IR      Ely's: 100 L + Sacral thrust:-    SG: anterior glide decreased pain with Ir, lateral inferior glide hypomobile decreased pain with flexion  Palpation:-      Passive Range of Motion   Left Hip   Flexion: 120 degrees with pain  Abduction: 26 degrees with pain  Internal rotation (90/90): 30 degrees   Internal rotation (prone): 10 and prone pain degrees with pain

## 2018-05-01 NOTE — PROGRESS NOTES
Daily Note     Today's date: 2018  Patient name: Lisa Barrios  : 1970  MRN: [id number]  Referring provider: Luís Mathis MD  Dx:   Encounter Diagnosis     ICD-10-CM    1  Left hip pain M25 552 PT plan of care cert/re-cert   2  Primary osteoarthritis of left hip M16 12 PT plan of care cert/re-cert                  Subjective: Patient stated no significant pain prior to treatment session  Objective: See treatment diary below      Assessment: Patient demonstrated moderate difficulty with A/P weight shifting on Biodex; positive response to manual intervention  Plan: Continue per plan of care       recautions: HTN, psoriatic arthritis, latex allergy    Daily Treatment Diary     Manual  3/20 3/27 4/3 5/         L Hip belt lateral/inferior glides grade 3-4  5 min 10 min KK 10'         Hip anterior glides    2'                                                    Exercise Diary  3/20 3/27 4/3 5/1         Bike  6 min 6 min 6'         Hip flexor ST 4 x :20 4x :20 4x20" 4x :20         L Hip add ST 4x :20 4x :20 4x20" 4x :20         KTC  10x :05 10x :05 10x :05         Prone press up  2x10 2x10 2x10         Prone quad ST  4x :20 4x20" 4x :20         Hip IR on stool  2x20 2x20 2x20         Total gym  L22 2x15 L22 2x15          Standing hip abd  2x10 2x10          Biodex- WS  2 min 2' f/b 2' s/s          Prayer sT  10x :05 10x :05          Lunge L    2x 5x         Lumbar ext             Quad alt LE    2x10                                                                                           Modalities              MHP prn

## 2018-05-08 ENCOUNTER — OFFICE VISIT (OUTPATIENT)
Dept: PHYSICAL THERAPY | Facility: CLINIC | Age: 48
End: 2018-05-08
Payer: COMMERCIAL

## 2018-05-08 DIAGNOSIS — M16.12 PRIMARY OSTEOARTHRITIS OF LEFT HIP: ICD-10-CM

## 2018-05-08 DIAGNOSIS — M25.552 LEFT HIP PAIN: Primary | ICD-10-CM

## 2018-05-08 PROCEDURE — 97112 NEUROMUSCULAR REEDUCATION: CPT | Performed by: PHYSICAL THERAPIST

## 2018-05-08 PROCEDURE — 97140 MANUAL THERAPY 1/> REGIONS: CPT | Performed by: PHYSICAL THERAPIST

## 2018-05-08 PROCEDURE — G8980 MOBILITY D/C STATUS: HCPCS | Performed by: PHYSICAL THERAPIST

## 2018-05-08 PROCEDURE — 97110 THERAPEUTIC EXERCISES: CPT | Performed by: PHYSICAL THERAPIST

## 2018-05-08 PROCEDURE — G8979 MOBILITY GOAL STATUS: HCPCS | Performed by: PHYSICAL THERAPIST

## 2018-05-08 NOTE — PROGRESS NOTES
Daily Note     Today's date: 2018  Patient name: Em Vergara  : 1970  MRN: [id number]  Referring provider: Ashley Nielsen MD  Dx:   Encounter Diagnosis     ICD-10-CM    1  Left hip pain M25 552    2  Primary osteoarthritis of left hip M16 12                   Subjective: Patient stated no significant pain prior to treatment session  Objective: See treatment diary below      Assessment: Patient had improved flexion ROM to about 124 flex  She still had difficulty with L LE lunges  Plan: Continue per plan of care  Patient will be d/c to HEP and will follow up as needed       recautions: HTN, psoriatic arthritis, latex allergy    Daily Treatment Diary     Manual  3/20 3/27 4/3 5/1 5/8        L Hip belt lateral/inferior glides grade 3-4  5 min 10 min KK 10' 10'        Hip anterior glides    2' 5'                                                   Exercise Diary  3/20 3/27 4/3 5/1 5/8        Bike  6 min 6 min 6' 6'        Hip flexor ST 4 x :20 4x :20 4x20" 4x :20 4x :20        L Hip add ST 4x :20 4x :20 4x20" 4x :20 4x :20        KTC  10x :05 10x :05 10x :05         Prone press up  2x10 2x10 2x10 2x10        Prone quad ST  4x :20 4x20" 4x :20 4x :20        Hip IR on stool  2x20 2x20 2x20 2x20        Total gym  L22 2x15 L22 2x15          Standing hip abd  2x10 2x10  [id number]        Biodex- WS  2 min 2' f/b 2' s/s          Prayer sT  10x :05 10x :05          Lunge L    2x 5x 2x5        Lumbar ext     10x        Quad alt LE/UE    2x10 2x10                                                                                          Modalities              MHP prn

## 2018-05-18 DIAGNOSIS — I10 HYPERTENSION, UNSPECIFIED TYPE: Primary | ICD-10-CM

## 2018-05-18 RX ORDER — AMLODIPINE BESYLATE 5 MG/1
5 TABLET ORAL 2 TIMES DAILY
Qty: 180 TABLET | Refills: 3 | Status: SHIPPED | OUTPATIENT
Start: 2018-05-18 | End: 2019-05-13 | Stop reason: SDUPTHER

## 2018-05-29 ENCOUNTER — APPOINTMENT (OUTPATIENT)
Dept: LAB | Facility: CLINIC | Age: 48
End: 2018-05-29
Payer: COMMERCIAL

## 2018-05-29 DIAGNOSIS — E11.9 TYPE 2 DIABETES MELLITUS WITHOUT COMPLICATIONS (HCC): ICD-10-CM

## 2018-05-29 DIAGNOSIS — E78.5 HYPERLIPIDEMIA: ICD-10-CM

## 2018-05-29 DIAGNOSIS — I10 ESSENTIAL (PRIMARY) HYPERTENSION: ICD-10-CM

## 2018-05-29 LAB
ALBUMIN SERPL BCP-MCNC: 3.4 G/DL (ref 3.5–5)
ALP SERPL-CCNC: 49 U/L (ref 46–116)
ALT SERPL W P-5'-P-CCNC: 68 U/L (ref 12–78)
ANION GAP SERPL CALCULATED.3IONS-SCNC: 6 MMOL/L (ref 4–13)
AST SERPL W P-5'-P-CCNC: 39 U/L (ref 5–45)
BILIRUB SERPL-MCNC: 0.5 MG/DL (ref 0.2–1)
BUN SERPL-MCNC: 13 MG/DL (ref 5–25)
CALCIUM SERPL-MCNC: 9.2 MG/DL (ref 8.3–10.1)
CHLORIDE SERPL-SCNC: 102 MMOL/L (ref 100–108)
CHOLEST SERPL-MCNC: 117 MG/DL (ref 50–200)
CO2 SERPL-SCNC: 29 MMOL/L (ref 21–32)
CREAT SERPL-MCNC: 0.79 MG/DL (ref 0.6–1.3)
CREAT UR-MCNC: 64.5 MG/DL
EST. AVERAGE GLUCOSE BLD GHB EST-MCNC: 137 MG/DL
GFR SERPL CREATININE-BSD FRML MDRD: 90 ML/MIN/1.73SQ M
GLUCOSE P FAST SERPL-MCNC: 109 MG/DL (ref 65–99)
HBA1C MFR BLD: 6.4 % (ref 4.2–6.3)
HDLC SERPL-MCNC: 27 MG/DL (ref 40–60)
LDLC SERPL DIRECT ASSAY-MCNC: 81 MG/DL (ref 0–100)
MICROALBUMIN UR-MCNC: 5.8 MG/L (ref 0–20)
MICROALBUMIN/CREAT 24H UR: 9 MG/G CREATININE (ref 0–30)
POTASSIUM SERPL-SCNC: 3.6 MMOL/L (ref 3.5–5.3)
PROT SERPL-MCNC: 7.7 G/DL (ref 6.4–8.2)
SODIUM SERPL-SCNC: 137 MMOL/L (ref 136–145)
TRIGL SERPL-MCNC: 135 MG/DL

## 2018-05-29 PROCEDURE — 3061F NEG MICROALBUMINURIA REV: CPT | Performed by: INTERNAL MEDICINE

## 2018-05-29 PROCEDURE — 80061 LIPID PANEL: CPT

## 2018-05-29 PROCEDURE — 83721 ASSAY OF BLOOD LIPOPROTEIN: CPT

## 2018-05-29 PROCEDURE — 36415 COLL VENOUS BLD VENIPUNCTURE: CPT

## 2018-05-29 PROCEDURE — 83036 HEMOGLOBIN GLYCOSYLATED A1C: CPT

## 2018-05-29 PROCEDURE — 82570 ASSAY OF URINE CREATININE: CPT

## 2018-05-29 PROCEDURE — 80053 COMPREHEN METABOLIC PANEL: CPT

## 2018-05-29 PROCEDURE — 82043 UR ALBUMIN QUANTITATIVE: CPT

## 2018-06-04 PROBLEM — J45.909 ASTHMA, CHRONIC: Chronic | Status: ACTIVE | Noted: 2018-01-22

## 2018-06-04 PROBLEM — L40.9 PSORIASIS: Status: ACTIVE | Noted: 2017-05-02

## 2018-06-04 PROBLEM — J45.909 ASTHMA, CHRONIC: Status: ACTIVE | Noted: 2018-01-22

## 2018-06-04 PROBLEM — L40.9 PSORIASIS: Chronic | Status: ACTIVE | Noted: 2017-05-02

## 2018-06-04 PROBLEM — M16.12 PRIMARY OSTEOARTHRITIS OF LEFT HIP: Chronic | Status: ACTIVE | Noted: 2018-03-06

## 2018-06-05 ENCOUNTER — TRANSCRIBE ORDERS (OUTPATIENT)
Dept: ADMINISTRATIVE | Facility: HOSPITAL | Age: 48
End: 2018-06-05

## 2018-06-05 ENCOUNTER — OFFICE VISIT (OUTPATIENT)
Dept: INTERNAL MEDICINE CLINIC | Facility: CLINIC | Age: 48
End: 2018-06-05
Payer: COMMERCIAL

## 2018-06-05 VITALS
SYSTOLIC BLOOD PRESSURE: 128 MMHG | RESPIRATION RATE: 14 BRPM | BODY MASS INDEX: 34.96 KG/M2 | DIASTOLIC BLOOD PRESSURE: 82 MMHG | HEART RATE: 78 BPM | WEIGHT: 203.8 LBS

## 2018-06-05 DIAGNOSIS — E78.01 FAMILIAL HYPERCHOLESTEROLEMIA: ICD-10-CM

## 2018-06-05 DIAGNOSIS — Z12.31 ENCOUNTER FOR SCREENING MAMMOGRAM FOR MALIGNANT NEOPLASM OF BREAST: Primary | ICD-10-CM

## 2018-06-05 DIAGNOSIS — Z57.9 OCCUPATIONAL EXPOSURE IN WORKPLACE: Primary | ICD-10-CM

## 2018-06-05 DIAGNOSIS — E13.9 DIABETES 1.5, MANAGED AS TYPE 1 (HCC): ICD-10-CM

## 2018-06-05 DIAGNOSIS — I10 HYPERTENSION, UNSPECIFIED TYPE: ICD-10-CM

## 2018-06-05 PROCEDURE — 3074F SYST BP LT 130 MM HG: CPT | Performed by: INTERNAL MEDICINE

## 2018-06-05 PROCEDURE — 99214 OFFICE O/P EST MOD 30 MIN: CPT | Performed by: INTERNAL MEDICINE

## 2018-06-05 PROCEDURE — 3079F DIAST BP 80-89 MM HG: CPT | Performed by: INTERNAL MEDICINE

## 2018-06-05 SDOH — HEALTH STABILITY - PHYSICAL HEALTH: OCCUPATIONAL EXPOSURE TO UNSPECIFIED RISK FACTOR: Z57.9

## 2018-06-05 NOTE — PROGRESS NOTES
Assessment/Plan:  1  Diabetes mellitus-A1c now at 6 4  Remains on metformin 2 grams daily  She knows about on oral ophthalmology exam   Urine for microalbumin prior to this visit negative  2   Left hip pain-MRI shows degenerative tear this superior labrum  Also some mild DJD  Has a history of bilateral trochanteric bursitis  Had recent injection of the left hip in IR  Also had physical therapy  Overall improved -she is doing home exercises at this point  3  Hypertension-essential-cough with ACE-inhibitor  She is on chlorthalidone with supplemental potassium  Trying to switch to amlodipine and angiotensin receptor blocker  When she runs out of her chlorthalidone she will switch to amlodipine and losartan 100 milligrams daily  As noted sister small percentage of patients can developed cough with ARB if this happened she will go back to chlorthalidone and potassium supplement  4  Elevated liver enzymes-likely on the basis of fatty liver documented on CT scan  Previous hepatitis-C antibody negative  Hepatitis-B surface antibody consistent with prior vaccines  5   Occupational exposure-serum HIV and hepatitis C ordered prior to next visit a  6  Fatty liver-patient is not significant alcohol user  She knows the importance of losing weight  7  Hyperlipidemia with mixed dyslipidemia  No strong family history of vascular disease but she does have hypertension fatty liver diabetes and as well  Not a smoker  On atorvastatin  I increased her Lovaza 4 grams daily  Hopefully she will exercise and lose weight to help with her triglycerides/HDL ratio  8  Potential for vascular disease-WE DISCUSSED TODAY AND PATIENT WILL HAVE STRESS TEST AND CAROTID DOPPLER ORDERED NEXT VISIT  9  Arthralgias 2nd CP bilaterally in MTP bilaterally  X-ray shows no bony lytic disease  Antinuclear antibody, rheumatoid factor normal   Lyme titer negative  Anti CCP antibody negative    Anti SSA SSB all normal   Has known volume of large joints other than DJD with left hip  Had some elevation of her sed rate and CRP although this may be influenced by fatty liver  Prior C-reactive protein was 19 prior sed rate 34 with normal up to 20  Has known psoriasis in this may all represent psoriatic joint disease  Not an issue at present  SCHEDULE REPEAT INFLAMMATORY MARKERS NEXT VISIT  10  Previously noted diffuse rash-dermatology felt some degree of latex allergy-also felt to have some component of psoriasis  11  Allergies with mild asthma-it use antihistamine and Proventil inhaler intermittently-on Singulair  12  Carpal tunnel syndrome-monitor      MEDICAL REGIMEN:      Estrogen ring for birth control, Claritin 5 milligrams daily p r n , Singulair 10 milligrams daily, calcium 1 dose daily, Proventil inhaler as needed, discontinue chlorthalidone 25 milligrams daily when she runs out as well as potassium chloride 10 milliequivalents b i d  and beginning losartan 100 milligrams daily, metformin 2 grams daily using 500 milligram dosing, atorvastatin 10 milligrams daily, Lovaza 4 grams daily    Appointment in several months with prior chemistry profile, cholesterol profile, A1c, urine for microalbumin  SCHEDULE STRESS TEST AND CAROTID DOPPLER NEXT VISIT  SCHEDULE FOLLOW-UP INFLAMMATORY MARKERS NEXT VISIT  REVIEW ABOUT POTENTIAL PROPHYLACTIC ASPIRIN USE next visit  No problem-specific Assessment & Plan notes found for this encounter  Diagnoses and all orders for this visit:    Occupational exposure in workplace  -     Comprehensive metabolic panel; Future  -     HEMOGLOBIN A1C W/ EAG ESTIMATION; Future  -     Cholesterol, total; Future  -     Triglycerides; Future  -     HDL cholesterol; Future  -     LDL cholesterol, direct; Future  -     Hepatitis C antibody; Future  -     HIV-1 RNA, quantitative, PCR; Future    Familial hypercholesterolemia  -     Comprehensive metabolic panel; Future  -     HEMOGLOBIN A1C W/ EAG ESTIMATION;  Future  - Cholesterol, total; Future  -     Triglycerides; Future  -     HDL cholesterol; Future  -     LDL cholesterol, direct; Future  -     Hepatitis C antibody; Future  -     HIV-1 RNA, quantitative, PCR; Future    Hypertension, unspecified type  -     Comprehensive metabolic panel; Future  -     HEMOGLOBIN A1C W/ EAG ESTIMATION; Future  -     Cholesterol, total; Future  -     Triglycerides; Future  -     HDL cholesterol; Future  -     LDL cholesterol, direct; Future  -     Hepatitis C antibody; Future  -     HIV-1 RNA, quantitative, PCR; Future    Diabetes 1 5, managed as type 1 ([facility] [address] )  -     Comprehensive metabolic panel; Future  -     HEMOGLOBIN A1C W/ EAG ESTIMATION; Future  -     Cholesterol, total; Future  -     Triglycerides; Future  -     HDL cholesterol; Future  -     LDL cholesterol, direct; Future  -     Hepatitis C antibody; Future  -     HIV-1 RNA, quantitative, PCR; Future          Subjective:      Patient ID: Klarissa Yo is a 57 y o  female  Reviewed many issues today  We talked about her potential risk for vascular disease with her history of hypertension hyperlipidemia diabetes  Screening stress test and carotid Doppler recommended  We will be scheduling that next visit  She has known diabetes  Labs prior to this visit show urine for microalbumin negative LDL 81 HDL 27 triglycerides 135 cholesterol 117 A1c 6 4 fascia sugar 109 a creatinine of 0 79  Albumin borderline at 0 4  In reference to hypertension she is on chlorthalidone and amlodipine  We talked about her history of cough with Ace inhibitors  I told because of her diabetes we should substitute an angiotensin receptor blocker for her chlorthalidone  She will do that  If she develops any cough which can happen in a small percentage of patients with a RBCs she will then go back to chlorthalidone    I gave her prescription for losartan 100 milligrams daily    She had repeat MRI of her left hip showing DJD with significant tear had injection and physical therapy is overall improved that is her tear of the left superior acetabulum labrum  She had seen the orthopedic physician previously      This patient denies any systemic symptoms  Specifically there has been no evidence of fever, night sweats, significant weight loss or significant decrease in appetite  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  Repeat HIV and hepatitis Celsius ordered prior to next visit    Previously noted arthralgias of the MCPs not an issue  This may been psoriatic in are gin  She denies major joint pathology other than it  She has significant mixed dyslipidemia  She is ready on atorvastatin  I increased her Lovaza 4 grams daily  She understands the importance of weight loss and exercise  The following portions of the patient's history were reviewed and updated as appropriate: current medications, past family history, past medical history, past social history, past surgical history and problem list     Review of Systems   Constitutional: Negative  Respiratory: Negative  Cardiovascular: Negative  Gastrointestinal: Negative  Endocrine: Negative  Genitourinary: Negative  Musculoskeletal: Negative  Left hip pain   Neurological: Negative  Hematological: Negative  Psychiatric/Behavioral: Negative  Objective:      /82   Pulse 78   Resp 14   Wt 92 4 kg (203 lb 12 8 oz)   BMI 34 96 kg/m²          Physical Exam   Constitutional: She is oriented to person, place, and time  She appears well-developed and well-nourished  No distress  HENT:   Head: Normocephalic and atraumatic  Right Ear: External ear normal    Left Ear: External ear normal    Nose: Nose normal    Mouth/Throat: Oropharynx is clear and moist  No oropharyngeal exudate  Eyes: Conjunctivae and EOM are normal  Pupils are equal, round, and reactive to light  Right eye exhibits no discharge   Left eye exhibits no discharge  No scleral icterus  Neck: Normal range of motion  Neck supple  No JVD present  No tracheal deviation present  No thyromegaly present  Cardiovascular: Normal rate, regular rhythm, normal heart sounds and intact distal pulses  Exam reveals no gallop and no friction rub  No murmur heard  Pulmonary/Chest: Effort normal and breath sounds normal  No stridor  No respiratory distress  She has no wheezes  She has no rales  She exhibits no tenderness  Abdominal: Soft  Bowel sounds are normal  She exhibits no distension and no mass  There is no tenderness  There is no rebound and no guarding  Musculoskeletal: Normal range of motion  She exhibits no edema or deformity  Lymphadenopathy:     She has no cervical adenopathy  Neurological: She is alert and oriented to person, place, and time  She has normal reflexes  No cranial nerve deficit  She exhibits normal muscle tone  Coordination normal    Skin: Skin is warm and dry  No rash noted  She is not diaphoretic  No erythema  Psychiatric: She has a normal mood and affect   Her behavior is normal  Judgment and thought content normal

## 2018-06-07 NOTE — PROGRESS NOTES
Susan Youngblood  has made great functional progress with physical therapy  she was educated and updated in their home exercise program  Sonya Hines will be discharged from formal therapy due to independence in HEP but will follow up as needed

## 2018-06-12 ENCOUNTER — OFFICE VISIT (OUTPATIENT)
Dept: OBGYN CLINIC | Facility: HOSPITAL | Age: 48
End: 2018-06-12
Payer: COMMERCIAL

## 2018-06-12 VITALS
DIASTOLIC BLOOD PRESSURE: 86 MMHG | HEIGHT: 64 IN | BODY MASS INDEX: 34.78 KG/M2 | SYSTOLIC BLOOD PRESSURE: 132 MMHG | WEIGHT: 203.71 LBS | HEART RATE: 89 BPM

## 2018-06-12 DIAGNOSIS — M16.12 PRIMARY OSTEOARTHRITIS OF LEFT HIP: Primary | ICD-10-CM

## 2018-06-12 PROCEDURE — 99213 OFFICE O/P EST LOW 20 MIN: CPT | Performed by: ORTHOPAEDIC SURGERY

## 2018-06-12 NOTE — PROGRESS NOTES
57 y o female returns today for re-evaluation of her left hip, mild OA  She has responded well to an IA injection and formal PT  She notes improvement of her mobility/flexibility and feels there is still room for improvement  She has a pool at home however it is not yet set-up  Review of Systems  Review of systems negative unless otherwise specified in HPI    Past Medical History  Past Medical History:   Diagnosis Date    Hypertension     patient reports    PCOS (polycystic ovarian syndrome)        Past Surgical History  Past Surgical History:   Procedure Laterality Date    TONSILLECTOMY      last assessed: 5/3/16       Current Medications  Current Outpatient Prescriptions on File Prior to Visit   Medication Sig Dispense Refill    albuterol (PROVENTIL HFA) 90 mcg/act inhaler Inhale 2 puffs      amLODIPine (NORVASC) 5 mg tablet Take 1 tablet (5 mg total) by mouth 2 (two) times a day 180 tablet 3    atorvastatin (LIPITOR) 10 mg tablet Take 1 tablet by mouth daily      chlorthalidone 25 mg tablet Take 1 tablet by mouth daily      fluticasone-salmeterol (ADVAIR DISKUS) 100-50 mcg/dose Inhale 1 puff every 12 (twelve) hours      metFORMIN (GLUCOPHAGE) 1000 MG tablet Take 1 tablet (1,000 mg total) by mouth every 12 (twelve) hours 180 tablet 3    montelukast (SINGULAIR) 10 mg tablet Take 1 tablet by mouth      NUVARING 0 12-0 015 MG/24HR vaginal ring       omega-3-acid ethyl esters (LOVAZA) 1 g capsule Take 2 capsules by mouth daily      potassium chloride (KLOR-CON 10) 10 mEq tablet Take 1 tablet by mouth 2 (two) times a day      triamcinolone (KENALOG) 0 1 % ointment Apply topically       No current facility-administered medications on file prior to visit          Recent Labs [facility])    0  Lab Value Date/Time   HCT 40 1 12/03/2015 0736   HGB 13 6 12/03/2015 0736   WBC 6 62 12/03/2015 0736   ESR 34 (H) 11/12/2016 0826   ESR 38 (H) 12/03/2015 0736   CRP 19 3 (H) 11/12/2016 9396 GLUCOSE 98 11/12/2016 0826   GLUCOSE 115 12/03/2015 0736   HGBA1C 6 4 (H) 05/29/2018 0810   HGBA1C 6 1 01/23/2018 1350         Physical exam  · General: Awake, Alert, Oriented  · Eyes: Pupils equal, round and reactive to light  · Heart: regular rate and rhythm  · Lungs: No audible wheezing  · Abdomen: soft    Left hip:  Good STLT  Good ROM with minimal tightness/restriction at end ranges  5/5 strength with mild weakness ABDs  Mildly + pain in her groin with passive IR   NVID      Imaging  None indicated    Procedure  None indicated    Assessment/Plan:   57 y  o female with mild left hip OA  The IA injection can be repeated every 3-4 months as needed  Advised the importance of maintaining a home therapy program for stretching and strengthening    Recommend the use of her pool for therapy this Summer  WBAT  Activities as tolerated  Follow-up as indicated

## 2018-07-03 ENCOUNTER — HOSPITAL ENCOUNTER (OUTPATIENT)
Dept: RADIOLOGY | Age: 48
Discharge: HOME | End: 2018-07-03
Payer: COMMERCIAL

## 2018-07-03 DIAGNOSIS — Z12.31 ENCOUNTER FOR SCREENING MAMMOGRAM FOR MALIGNANT NEOPLASM OF BREAST: ICD-10-CM

## 2018-07-03 PROCEDURE — 77067 SCR MAMMO BI INCL CAD: CPT

## 2018-07-12 DIAGNOSIS — Z30.44 ENCOUNTER FOR SURVEILLANCE OF VAGINAL RING HORMONAL CONTRACEPTIVE DEVICE: Primary | ICD-10-CM

## 2018-07-12 RX ORDER — ETONOGESTREL/ETHINYL ESTRADIOL .12-.015MG
1 RING, VAGINAL VAGINAL
Qty: 3 EACH | Refills: 3 | Status: SHIPPED | OUTPATIENT
Start: 2018-07-12 | End: 2018-07-19 | Stop reason: SDUPTHER

## 2018-07-19 DIAGNOSIS — Z30.44 ENCOUNTER FOR SURVEILLANCE OF VAGINAL RING HORMONAL CONTRACEPTIVE DEVICE: ICD-10-CM

## 2018-07-19 RX ORDER — ETONOGESTREL/ETHINYL ESTRADIOL .12-.015MG
1 RING, VAGINAL VAGINAL
Qty: 4 EACH | Refills: 3 | Status: SHIPPED | OUTPATIENT
Start: 2018-07-19 | End: 2019-06-26 | Stop reason: SDUPTHER

## 2018-10-17 ENCOUNTER — APPOINTMENT (OUTPATIENT)
Dept: LAB | Facility: HOSPITAL | Age: 48
End: 2018-10-17
Payer: COMMERCIAL

## 2018-10-17 DIAGNOSIS — E78.01 FAMILIAL HYPERCHOLESTEROLEMIA: ICD-10-CM

## 2018-10-17 DIAGNOSIS — E13.9 DIABETES 1.5, MANAGED AS TYPE 1 (HCC): ICD-10-CM

## 2018-10-17 DIAGNOSIS — I10 HYPERTENSION, UNSPECIFIED TYPE: ICD-10-CM

## 2018-10-17 DIAGNOSIS — Z57.9 OCCUPATIONAL EXPOSURE IN WORKPLACE: ICD-10-CM

## 2018-10-17 LAB
ALBUMIN SERPL BCP-MCNC: 3.7 G/DL (ref 3.5–5)
ALP SERPL-CCNC: 48 U/L (ref 46–116)
ALT SERPL W P-5'-P-CCNC: 57 U/L (ref 12–78)
ANION GAP SERPL CALCULATED.3IONS-SCNC: 6 MMOL/L (ref 4–13)
AST SERPL W P-5'-P-CCNC: 36 U/L (ref 5–45)
BILIRUB SERPL-MCNC: 0.91 MG/DL (ref 0.2–1)
BUN SERPL-MCNC: 11 MG/DL (ref 5–25)
CALCIUM SERPL-MCNC: 9.2 MG/DL (ref 8.3–10.1)
CHLORIDE SERPL-SCNC: 100 MMOL/L (ref 100–108)
CHOLEST SERPL-MCNC: 103 MG/DL (ref 50–200)
CO2 SERPL-SCNC: 29 MMOL/L (ref 21–32)
CREAT SERPL-MCNC: 0.67 MG/DL (ref 0.6–1.3)
CREAT UR-MCNC: 42 MG/DL
EST. AVERAGE GLUCOSE BLD GHB EST-MCNC: 134 MG/DL
GFR SERPL CREATININE-BSD FRML MDRD: 106 ML/MIN/1.73SQ M
GLUCOSE P FAST SERPL-MCNC: 97 MG/DL (ref 65–99)
HBA1C MFR BLD: 6.3 % (ref 4.2–6.3)
HCV AB SER QL: NORMAL
HDLC SERPL-MCNC: 26 MG/DL (ref 40–60)
LDLC SERPL DIRECT ASSAY-MCNC: 73 MG/DL (ref 0–100)
MICROALBUMIN UR-MCNC: 7.2 MG/L (ref 0–20)
MICROALBUMIN/CREAT 24H UR: 17 MG/G CREATININE (ref 0–30)
POTASSIUM SERPL-SCNC: 3.3 MMOL/L (ref 3.5–5.3)
PROT SERPL-MCNC: 7.9 G/DL (ref 6.4–8.2)
SODIUM SERPL-SCNC: 135 MMOL/L (ref 136–145)
TRIGL SERPL-MCNC: 121 MG/DL

## 2018-10-17 PROCEDURE — 36415 COLL VENOUS BLD VENIPUNCTURE: CPT

## 2018-10-17 PROCEDURE — 83721 ASSAY OF BLOOD LIPOPROTEIN: CPT

## 2018-10-17 PROCEDURE — 86803 HEPATITIS C AB TEST: CPT

## 2018-10-17 PROCEDURE — 83036 HEMOGLOBIN GLYCOSYLATED A1C: CPT

## 2018-10-17 PROCEDURE — 82570 ASSAY OF URINE CREATININE: CPT

## 2018-10-17 PROCEDURE — 80053 COMPREHEN METABOLIC PANEL: CPT

## 2018-10-17 PROCEDURE — 87536 HIV-1 QUANT&REVRSE TRNSCRPJ: CPT

## 2018-10-17 PROCEDURE — 3061F NEG MICROALBUMINURIA REV: CPT | Performed by: INTERNAL MEDICINE

## 2018-10-17 PROCEDURE — 80061 LIPID PANEL: CPT

## 2018-10-17 PROCEDURE — 82043 UR ALBUMIN QUANTITATIVE: CPT

## 2018-10-17 SDOH — HEALTH STABILITY - PHYSICAL HEALTH: OCCUPATIONAL EXPOSURE TO UNSPECIFIED RISK FACTOR: Z57.9

## 2018-10-20 LAB
HIV1 RNA # SERPL NAA+PROBE: <20 COPIES/ML
HIV1 RNA SERPL NAA+PROBE-LOG#: NORMAL LOG10COPY/ML

## 2018-10-22 ENCOUNTER — TELEPHONE (OUTPATIENT)
Dept: INTERNAL MEDICINE CLINIC | Facility: CLINIC | Age: 48
End: 2018-10-22

## 2018-10-22 NOTE — TELEPHONE ENCOUNTER
Pt had to cancel tomorrow's appt due to health issues in the family  Already got her labs done  Asked for next available  Prefers tuesdays  Please advise

## 2018-10-23 DIAGNOSIS — E78.1 HYPERTRIGLYCERIDEMIA: Primary | ICD-10-CM

## 2018-10-23 RX ORDER — OMEGA-3-ACID ETHYL ESTERS 1 G/1
CAPSULE, LIQUID FILLED ORAL
Qty: 90 CAPSULE | Refills: 11 | Status: SHIPPED | OUTPATIENT
Start: 2018-10-23 | End: 2018-11-28 | Stop reason: SDUPTHER

## 2018-11-28 ENCOUNTER — OFFICE VISIT (OUTPATIENT)
Dept: INTERNAL MEDICINE CLINIC | Facility: CLINIC | Age: 48
End: 2018-11-28
Payer: COMMERCIAL

## 2018-11-28 VITALS
DIASTOLIC BLOOD PRESSURE: 90 MMHG | BODY MASS INDEX: 35.03 KG/M2 | HEART RATE: 78 BPM | WEIGHT: 204.2 LBS | SYSTOLIC BLOOD PRESSURE: 130 MMHG | RESPIRATION RATE: 14 BRPM

## 2018-11-28 DIAGNOSIS — I10 HYPERTENSION, UNSPECIFIED TYPE: Primary | ICD-10-CM

## 2018-11-28 DIAGNOSIS — I10 HYPERTENSION, UNSPECIFIED TYPE: ICD-10-CM

## 2018-11-28 DIAGNOSIS — E78.1 HYPERTRIGLYCERIDEMIA: ICD-10-CM

## 2018-11-28 DIAGNOSIS — E11.9 TYPE 2 DIABETES MELLITUS WITHOUT COMPLICATION, WITHOUT LONG-TERM CURRENT USE OF INSULIN (HCC): ICD-10-CM

## 2018-11-28 DIAGNOSIS — E78.01 FAMILIAL HYPERCHOLESTEROLEMIA: Primary | ICD-10-CM

## 2018-11-28 DIAGNOSIS — K76.0 FATTY LIVER: Chronic | ICD-10-CM

## 2018-11-28 DIAGNOSIS — E11.9 CONTROLLED TYPE 2 DIABETES MELLITUS WITHOUT COMPLICATION, WITHOUT LONG-TERM CURRENT USE OF INSULIN (HCC): Chronic | ICD-10-CM

## 2018-11-28 PROCEDURE — 4010F ACE/ARB THERAPY RXD/TAKEN: CPT | Performed by: INTERNAL MEDICINE

## 2018-11-28 PROCEDURE — 1036F TOBACCO NON-USER: CPT | Performed by: INTERNAL MEDICINE

## 2018-11-28 PROCEDURE — 99214 OFFICE O/P EST MOD 30 MIN: CPT | Performed by: INTERNAL MEDICINE

## 2018-11-28 RX ORDER — OMEGA-3-ACID ETHYL ESTERS 1 G/1
1 CAPSULE, LIQUID FILLED ORAL 4 TIMES DAILY
Qty: 360 CAPSULE | Refills: 3 | Status: SHIPPED | OUTPATIENT
Start: 2018-11-28 | End: 2020-04-15 | Stop reason: SDUPTHER

## 2018-11-28 RX ORDER — LOSARTAN POTASSIUM 25 MG/1
25 TABLET ORAL DAILY
Qty: 90 TABLET | Refills: 3 | Status: SHIPPED | OUTPATIENT
Start: 2018-11-28 | End: 2020-02-18 | Stop reason: SDUPTHER

## 2018-11-28 NOTE — PROGRESS NOTES
Procedures  Patient's shoes and socks removed  Right Foot/Ankle   Right Foot Inspection  Skin Exam: skin normal and skin intact no dry skin, no warmth, no callus, no erythema, no maceration, no abnormal color, no pre-ulcer, no ulcer and no callus                          Toe Exam: ROM and strength within normal limitsno swelling, no tenderness, erythema and  no right toe deformity  Sensory   Vibration: intact  Proprioception: intact   Monofilament testing: intact  Vascular  Capillary refills: < 3 seconds  The right DP pulse is 2+  Left Foot/Ankle  Left Foot Inspection  Skin Exam: skin normal and skin intactno dry skin, no warmth, no erythema, no maceration, normal color, no pre-ulcer, no ulcer and no callus                         Toe Exam: ROM and strength within normal limitsno swelling, no tenderness, no erythema and no left toe deformity                   Sensory   Vibration: intact  Proprioception: intact  Monofilament: intact  Vascular  Capillary refills: < 3 seconds  The left DP pulse is 2+  The left PT pulse is 2+  Assign Risk Category:  No deformity present; No loss of protective sensation;  No weak pulses       Risk: 0

## 2018-11-29 ENCOUNTER — TELEPHONE (OUTPATIENT)
Dept: INTERNAL MEDICINE CLINIC | Facility: CLINIC | Age: 48
End: 2018-11-29

## 2018-11-29 DIAGNOSIS — M19.90 ARTHRITIS: Primary | ICD-10-CM

## 2018-11-29 NOTE — TELEPHONE ENCOUNTER
----- Message from Bruce Ibarra MD sent at 11/28/2018  9:12 AM EST -----  She has slip for labs prior to next visit    Please add sed rate C-reactive protein with diagnosis of arthritis

## 2019-02-13 DIAGNOSIS — J45.909 CHRONIC ASTHMA, UNSPECIFIED ASTHMA SEVERITY, UNSPECIFIED WHETHER COMPLICATED, UNSPECIFIED WHETHER PERSISTENT: Primary | Chronic | ICD-10-CM

## 2019-02-13 RX ORDER — MONTELUKAST SODIUM 10 MG/1
TABLET ORAL
Qty: 90 TABLET | Refills: 0 | Status: SHIPPED | OUTPATIENT
Start: 2019-02-13 | End: 2019-05-13 | Stop reason: SDUPTHER

## 2019-03-03 DIAGNOSIS — E78.2 MIXED HYPERLIPIDEMIA: Primary | ICD-10-CM

## 2019-03-04 RX ORDER — ATORVASTATIN CALCIUM 10 MG/1
TABLET, FILM COATED ORAL
Qty: 90 TABLET | Refills: 0 | Status: SHIPPED | OUTPATIENT
Start: 2019-03-04 | End: 2019-07-08 | Stop reason: SDUPTHER

## 2019-03-28 DIAGNOSIS — R73.09 IMPAIRED GLUCOSE REGULATION: ICD-10-CM

## 2019-04-03 ENCOUNTER — APPOINTMENT (OUTPATIENT)
Dept: LAB | Facility: HOSPITAL | Age: 49
End: 2019-04-03
Payer: COMMERCIAL

## 2019-04-03 DIAGNOSIS — M19.90 ARTHRITIS: ICD-10-CM

## 2019-04-03 DIAGNOSIS — I10 HYPERTENSION, UNSPECIFIED TYPE: ICD-10-CM

## 2019-04-03 DIAGNOSIS — E11.9 TYPE 2 DIABETES MELLITUS WITHOUT COMPLICATION, WITHOUT LONG-TERM CURRENT USE OF INSULIN (HCC): ICD-10-CM

## 2019-04-03 DIAGNOSIS — E78.01 FAMILIAL HYPERCHOLESTEROLEMIA: ICD-10-CM

## 2019-04-03 LAB
ALBUMIN SERPL BCP-MCNC: 3.7 G/DL (ref 3.5–5)
ALP SERPL-CCNC: 48 U/L (ref 46–116)
ALT SERPL W P-5'-P-CCNC: 56 U/L (ref 12–78)
ANION GAP SERPL CALCULATED.3IONS-SCNC: 3 MMOL/L (ref 4–13)
AST SERPL W P-5'-P-CCNC: 34 U/L (ref 5–45)
BILIRUB SERPL-MCNC: 0.51 MG/DL (ref 0.2–1)
BUN SERPL-MCNC: 12 MG/DL (ref 5–25)
CALCIUM SERPL-MCNC: 8.7 MG/DL (ref 8.3–10.1)
CHLORIDE SERPL-SCNC: 105 MMOL/L (ref 100–108)
CHOLEST SERPL-MCNC: 128 MG/DL (ref 50–200)
CO2 SERPL-SCNC: 28 MMOL/L (ref 21–32)
CREAT SERPL-MCNC: 0.73 MG/DL (ref 0.6–1.3)
CREAT UR-MCNC: 65 MG/DL
CRP SERPL QL: 13.8 MG/L
ERYTHROCYTE [SEDIMENTATION RATE] IN BLOOD: 23 MM/HOUR (ref 0–20)
EST. AVERAGE GLUCOSE BLD GHB EST-MCNC: 131 MG/DL
GFR SERPL CREATININE-BSD FRML MDRD: 99 ML/MIN/1.73SQ M
GLUCOSE P FAST SERPL-MCNC: 100 MG/DL (ref 65–99)
HBA1C MFR BLD: 6.2 % (ref 4.2–6.3)
HDLC SERPL-MCNC: 30 MG/DL (ref 40–60)
LDLC SERPL DIRECT ASSAY-MCNC: 86 MG/DL (ref 0–100)
MICROALBUMIN UR-MCNC: 14 MG/L (ref 0–20)
MICROALBUMIN/CREAT 24H UR: 22 MG/G CREATININE (ref 0–30)
POTASSIUM SERPL-SCNC: 4.2 MMOL/L (ref 3.5–5.3)
PROT SERPL-MCNC: 7.7 G/DL (ref 6.4–8.2)
SODIUM SERPL-SCNC: 136 MMOL/L (ref 136–145)
TRIGL SERPL-MCNC: 133 MG/DL

## 2019-04-03 PROCEDURE — 82570 ASSAY OF URINE CREATININE: CPT | Performed by: INTERNAL MEDICINE

## 2019-04-03 PROCEDURE — 80053 COMPREHEN METABOLIC PANEL: CPT

## 2019-04-03 PROCEDURE — 3061F NEG MICROALBUMINURIA REV: CPT | Performed by: INTERNAL MEDICINE

## 2019-04-03 PROCEDURE — 83721 ASSAY OF BLOOD LIPOPROTEIN: CPT

## 2019-04-03 PROCEDURE — 36415 COLL VENOUS BLD VENIPUNCTURE: CPT

## 2019-04-03 PROCEDURE — 82043 UR ALBUMIN QUANTITATIVE: CPT | Performed by: INTERNAL MEDICINE

## 2019-04-03 PROCEDURE — 85652 RBC SED RATE AUTOMATED: CPT

## 2019-04-03 PROCEDURE — 86140 C-REACTIVE PROTEIN: CPT

## 2019-04-03 PROCEDURE — 80061 LIPID PANEL: CPT

## 2019-04-03 PROCEDURE — 83036 HEMOGLOBIN GLYCOSYLATED A1C: CPT

## 2019-04-09 ENCOUNTER — OFFICE VISIT (OUTPATIENT)
Dept: INTERNAL MEDICINE CLINIC | Facility: CLINIC | Age: 49
End: 2019-04-09
Payer: COMMERCIAL

## 2019-04-09 VITALS
BODY MASS INDEX: 35.75 KG/M2 | RESPIRATION RATE: 14 BRPM | SYSTOLIC BLOOD PRESSURE: 130 MMHG | DIASTOLIC BLOOD PRESSURE: 80 MMHG | WEIGHT: 209.4 LBS | HEIGHT: 64 IN | HEART RATE: 72 BPM

## 2019-04-09 DIAGNOSIS — I10 ESSENTIAL HYPERTENSION: Chronic | ICD-10-CM

## 2019-04-09 DIAGNOSIS — Z57.9 OCCUPATIONAL EXPOSURE IN WORKPLACE: ICD-10-CM

## 2019-04-09 DIAGNOSIS — Z12.11 COLON CANCER SCREENING: Primary | ICD-10-CM

## 2019-04-09 DIAGNOSIS — E11.9 CONTROLLED TYPE 2 DIABETES MELLITUS WITHOUT COMPLICATION, WITHOUT LONG-TERM CURRENT USE OF INSULIN (HCC): Chronic | ICD-10-CM

## 2019-04-09 DIAGNOSIS — E78.2 MIXED HYPERLIPIDEMIA: Chronic | ICD-10-CM

## 2019-04-09 DIAGNOSIS — M19.90 ARTHRITIS: Chronic | ICD-10-CM

## 2019-04-09 LAB
LEFT EYE DIABETIC RETINOPATHY: NORMAL
RIGHT EYE DIABETIC RETINOPATHY: NORMAL

## 2019-04-09 PROCEDURE — 99214 OFFICE O/P EST MOD 30 MIN: CPT | Performed by: INTERNAL MEDICINE

## 2019-04-09 PROCEDURE — 3072F LOW RISK FOR RETINOPATHY: CPT | Performed by: INTERNAL MEDICINE

## 2019-04-09 PROCEDURE — 1036F TOBACCO NON-USER: CPT | Performed by: INTERNAL MEDICINE

## 2019-04-09 PROCEDURE — 3075F SYST BP GE 130 - 139MM HG: CPT | Performed by: INTERNAL MEDICINE

## 2019-04-09 PROCEDURE — 3079F DIAST BP 80-89 MM HG: CPT | Performed by: INTERNAL MEDICINE

## 2019-04-09 PROCEDURE — 3008F BODY MASS INDEX DOCD: CPT | Performed by: INTERNAL MEDICINE

## 2019-04-09 SDOH — HEALTH STABILITY - PHYSICAL HEALTH: OCCUPATIONAL EXPOSURE TO UNSPECIFIED RISK FACTOR: Z57.9

## 2019-05-13 DIAGNOSIS — J45.909 CHRONIC ASTHMA, UNSPECIFIED ASTHMA SEVERITY, UNSPECIFIED WHETHER COMPLICATED, UNSPECIFIED WHETHER PERSISTENT: Chronic | ICD-10-CM

## 2019-05-13 DIAGNOSIS — I10 HYPERTENSION, UNSPECIFIED TYPE: ICD-10-CM

## 2019-05-13 RX ORDER — AMLODIPINE BESYLATE 5 MG/1
5 TABLET ORAL 2 TIMES DAILY
Qty: 180 TABLET | Refills: 3 | Status: SHIPPED | OUTPATIENT
Start: 2019-05-13 | End: 2020-02-28 | Stop reason: SDUPTHER

## 2019-05-13 RX ORDER — MONTELUKAST SODIUM 10 MG/1
10 TABLET ORAL
Qty: 90 TABLET | Refills: 3 | Status: SHIPPED | OUTPATIENT
Start: 2019-05-13 | End: 2020-02-28 | Stop reason: SDUPTHER

## 2019-06-26 DIAGNOSIS — Z30.44 ENCOUNTER FOR SURVEILLANCE OF VAGINAL RING HORMONAL CONTRACEPTIVE DEVICE: ICD-10-CM

## 2019-06-26 RX ORDER — ETONOGESTREL/ETHINYL ESTRADIOL .12-.015MG
1 RING, VAGINAL VAGINAL
Qty: 4 EACH | Refills: 3 | Status: SHIPPED | OUTPATIENT
Start: 2019-06-26 | End: 2020-04-07 | Stop reason: SDUPTHER

## 2019-07-08 DIAGNOSIS — E78.2 MIXED HYPERLIPIDEMIA: ICD-10-CM

## 2019-07-08 RX ORDER — ATORVASTATIN CALCIUM 10 MG/1
10 TABLET, FILM COATED ORAL DAILY
Qty: 90 TABLET | Refills: 0 | Status: SHIPPED | OUTPATIENT
Start: 2019-07-08 | End: 2019-10-07 | Stop reason: SDUPTHER

## 2019-10-07 DIAGNOSIS — Z12.39 BREAST CANCER SCREENING: Primary | ICD-10-CM

## 2019-10-07 DIAGNOSIS — E78.2 MIXED HYPERLIPIDEMIA: ICD-10-CM

## 2019-10-07 RX ORDER — ATORVASTATIN CALCIUM 10 MG/1
10 TABLET, FILM COATED ORAL DAILY
Qty: 90 TABLET | Refills: 2 | Status: SHIPPED | OUTPATIENT
Start: 2019-10-07 | End: 2020-07-23 | Stop reason: SDUPTHER

## 2019-12-04 ENCOUNTER — APPOINTMENT (OUTPATIENT)
Dept: LAB | Facility: HOSPITAL | Age: 49
End: 2019-12-04
Payer: COMMERCIAL

## 2019-12-04 DIAGNOSIS — I10 ESSENTIAL HYPERTENSION: Chronic | ICD-10-CM

## 2019-12-04 DIAGNOSIS — Z57.9 OCCUPATIONAL EXPOSURE IN WORKPLACE: ICD-10-CM

## 2019-12-04 DIAGNOSIS — M19.90 ARTHRITIS: Chronic | ICD-10-CM

## 2019-12-04 DIAGNOSIS — E11.9 CONTROLLED TYPE 2 DIABETES MELLITUS WITHOUT COMPLICATION, WITHOUT LONG-TERM CURRENT USE OF INSULIN (HCC): Chronic | ICD-10-CM

## 2019-12-04 DIAGNOSIS — E78.2 MIXED HYPERLIPIDEMIA: Chronic | ICD-10-CM

## 2019-12-04 LAB
ALBUMIN SERPL BCP-MCNC: 4 G/DL (ref 3.5–5)
ALP SERPL-CCNC: 54 U/L (ref 46–116)
ALT SERPL W P-5'-P-CCNC: 62 U/L (ref 12–78)
ANION GAP SERPL CALCULATED.3IONS-SCNC: 6 MMOL/L (ref 4–13)
AST SERPL W P-5'-P-CCNC: 43 U/L (ref 5–45)
BILIRUB SERPL-MCNC: 0.53 MG/DL (ref 0.2–1)
BUN SERPL-MCNC: 13 MG/DL (ref 5–25)
CALCIUM SERPL-MCNC: 9.2 MG/DL (ref 8.3–10.1)
CHLORIDE SERPL-SCNC: 107 MMOL/L (ref 100–108)
CHOLEST SERPL-MCNC: 128 MG/DL (ref 50–200)
CO2 SERPL-SCNC: 27 MMOL/L (ref 21–32)
CREAT SERPL-MCNC: 0.62 MG/DL (ref 0.6–1.3)
CREAT UR-MCNC: 24.3 MG/DL
CRP SERPL QL: 15.6 MG/L
ERYTHROCYTE [SEDIMENTATION RATE] IN BLOOD: 16 MM/HOUR (ref 0–20)
EST. AVERAGE GLUCOSE BLD GHB EST-MCNC: 151 MG/DL
GFR SERPL CREATININE-BSD FRML MDRD: 108 ML/MIN/1.73SQ M
GLUCOSE P FAST SERPL-MCNC: 128 MG/DL (ref 65–99)
HBA1C MFR BLD: 6.9 % (ref 4.2–6.3)
HBV SURFACE AG SER QL: NORMAL
HCV AB SER QL: NORMAL
HDLC SERPL-MCNC: 27 MG/DL
LDLC SERPL DIRECT ASSAY-MCNC: 80 MG/DL (ref 0–100)
MICROALBUMIN UR-MCNC: 6.3 MG/L (ref 0–20)
MICROALBUMIN/CREAT 24H UR: 26 MG/G CREATININE (ref 0–30)
POTASSIUM SERPL-SCNC: 4.1 MMOL/L (ref 3.5–5.3)
PROT SERPL-MCNC: 7.7 G/DL (ref 6.4–8.2)
SODIUM SERPL-SCNC: 140 MMOL/L (ref 136–145)
TRIGL SERPL-MCNC: 134 MG/DL

## 2019-12-04 PROCEDURE — 82570 ASSAY OF URINE CREATININE: CPT | Performed by: INTERNAL MEDICINE

## 2019-12-04 PROCEDURE — 80053 COMPREHEN METABOLIC PANEL: CPT

## 2019-12-04 PROCEDURE — 86803 HEPATITIS C AB TEST: CPT

## 2019-12-04 PROCEDURE — 36415 COLL VENOUS BLD VENIPUNCTURE: CPT

## 2019-12-04 PROCEDURE — 82043 UR ALBUMIN QUANTITATIVE: CPT | Performed by: INTERNAL MEDICINE

## 2019-12-04 PROCEDURE — 85652 RBC SED RATE AUTOMATED: CPT

## 2019-12-04 PROCEDURE — 83721 ASSAY OF BLOOD LIPOPROTEIN: CPT

## 2019-12-04 PROCEDURE — 3061F NEG MICROALBUMINURIA REV: CPT | Performed by: INTERNAL MEDICINE

## 2019-12-04 PROCEDURE — 87536 HIV-1 QUANT&REVRSE TRNSCRPJ: CPT

## 2019-12-04 PROCEDURE — 86140 C-REACTIVE PROTEIN: CPT

## 2019-12-04 PROCEDURE — 87340 HEPATITIS B SURFACE AG IA: CPT

## 2019-12-04 PROCEDURE — 80061 LIPID PANEL: CPT

## 2019-12-04 PROCEDURE — 83036 HEMOGLOBIN GLYCOSYLATED A1C: CPT

## 2019-12-04 SDOH — HEALTH STABILITY - PHYSICAL HEALTH: OCCUPATIONAL EXPOSURE TO UNSPECIFIED RISK FACTOR: Z57.9

## 2019-12-09 LAB
HIV1 RNA # SERPL NAA+PROBE: <20 COPIES/ML
HIV1 RNA SERPL NAA+PROBE-LOG#: NORMAL LOG10COPY/ML

## 2019-12-10 ENCOUNTER — OFFICE VISIT (OUTPATIENT)
Dept: INTERNAL MEDICINE CLINIC | Facility: CLINIC | Age: 49
End: 2019-12-10
Payer: COMMERCIAL

## 2019-12-10 VITALS
WEIGHT: 213 LBS | DIASTOLIC BLOOD PRESSURE: 82 MMHG | BODY MASS INDEX: 36.37 KG/M2 | SYSTOLIC BLOOD PRESSURE: 130 MMHG | RESPIRATION RATE: 14 BRPM | HEIGHT: 64 IN

## 2019-12-10 DIAGNOSIS — E11.9 CONTROLLED TYPE 2 DIABETES MELLITUS WITHOUT COMPLICATION, WITHOUT LONG-TERM CURRENT USE OF INSULIN (HCC): Chronic | ICD-10-CM

## 2019-12-10 DIAGNOSIS — G47.19 EXCESSIVE DAYTIME SLEEPINESS: Primary | ICD-10-CM

## 2019-12-10 DIAGNOSIS — E78.01 FAMILIAL HYPERCHOLESTEROLEMIA: Chronic | ICD-10-CM

## 2019-12-10 DIAGNOSIS — R06.83 SNORING: ICD-10-CM

## 2019-12-10 DIAGNOSIS — K76.0 FATTY LIVER: Chronic | ICD-10-CM

## 2019-12-10 DIAGNOSIS — I10 HYPERTENSION, UNSPECIFIED TYPE: Chronic | ICD-10-CM

## 2019-12-10 PROCEDURE — 1036F TOBACCO NON-USER: CPT | Performed by: INTERNAL MEDICINE

## 2019-12-10 PROCEDURE — 3008F BODY MASS INDEX DOCD: CPT | Performed by: INTERNAL MEDICINE

## 2019-12-10 PROCEDURE — 3075F SYST BP GE 130 - 139MM HG: CPT | Performed by: INTERNAL MEDICINE

## 2019-12-10 PROCEDURE — 3079F DIAST BP 80-89 MM HG: CPT | Performed by: INTERNAL MEDICINE

## 2019-12-10 PROCEDURE — 99214 OFFICE O/P EST MOD 30 MIN: CPT | Performed by: INTERNAL MEDICINE

## 2019-12-10 NOTE — PROGRESS NOTES
Procedures  Patient's shoes and socks removed  Right Foot/Ankle   Right Foot Inspection  Skin Exam: skin normal and skin intact no dry skin, no warmth, no callus, no erythema, no maceration, no abnormal color, no pre-ulcer, no ulcer and no callus                          Toe Exam: ROM and strength within normal limits  Sensory   Vibration: intact  Proprioception: intact   Monofilament testing: intact  Vascular  Capillary refills: < 3 seconds  The right DP pulse is 2+  The right PT pulse is 2+  Left Foot/Ankle  Left Foot Inspection  Skin Exam: skin normal and skin intactno dry skin, no warmth, no erythema, no maceration, normal color, no pre-ulcer, no ulcer and no callus                         Toe Exam: ROM and strength within normal limits                   Sensory   Vibration: intact  Proprioception: intact  Monofilament: intact  Vascular  Capillary refills: < 3 seconds  The left DP pulse is 2+  The left PT pulse is 2+  Assign Risk Category:  No deformity present; No loss of protective sensation;  No weak pulses       Risk: 0

## 2019-12-10 NOTE — PROGRESS NOTES
Assessment/Plan:    1  Health maintenance - we had previously talked about colonoscopy is a colon cancer screen with her age of 57- REVIEW NEXT VISIT  2  Symptomatology suggestive of potential sleep apnea -neck circumference 16 inches- scheduled for home sleep study  3  Hypertension-essential -cough with an ACE-inhibitor  Had some edema associated with amlodipine use which is improved with addition of angiotensin receptor blocker- she notes fatigue with 25 milligrams of losartan and  Therefore is only using 12 5 milligrams daily -may need higher dose of meds but hoping see improvement with intensification of conservative therapy  4  Diabetes mellitus -A1c 6 9  Remains on metformin 2 grams daily  She knows about annual ophthalmology exam   Urine for microalbumin negative  She is aware she may need a 2nd agent and we could potentially use glp1  Receptor agonist  5  Hypokalemia -felt related to chlorthalidone -off chlorthalidone and follow-up potassium normal  6  Elevated liver enzymes -likely on the basis of fatty liver documented on CT scan  Hepatitis-B surface antibody consistent with prior vaccine  Hepatitis  C antibody negative  Liver enzymes prior to this visit normal  7 the leg edema -felt related to amlodipine -improved with addition of angiotensin receptor blocker  8  Hyperlipidemia with mixed dyslipidemia - has risk factors of hypertension, diabetes, fatty liver  Not a smoker  On atorvastatin  On Lovaza  9  Potential for vascular disease-consider  SCHEDULE STRESS TEST AND CAROTID DOPPLER NEXT VISIT  10  Arthralgias- 2nd MCP bilaterally MTP bilaterally  X-ray showed no bony lytic disease  Antinuclear antibody, rheumatoid factor normal   Lyme titer negative  Anti CCP antibody negative  Anti SSA and SSB all normal   No issues with large joints other than DJD of her left hip  Has some elevation of her sed rate CRP old this may be influenced by fatty liver    Has improvement in her C-reactive protein at this point monitoring  Has known psoriasis and this could all represent psoriatic joint disease- she has periodic inflammatory markers  11  Previously noted diffuse rash -dermatology felt some component of latex allergy -also felt to have some component of psoriasis  12  Allergies with mild asthma-uses Claritin intermittently in Proventil inhaler intermittently - on Singulair  13  Carpal tunnel syndrome -monitor         MEDICAL REGIMEN:                                                  Amlodipine 5 milligrams daily, losartan 25 milligrams daily in the evening -use half dose on nights when she is on call, estrogen ring for birth control, Claritin 5 milligrams daily p r n , singular 10 milligrams daily, calcium 1 dose daily, Proventil inhaler as needed, metformin 2 grams daily using 500 milligram dosing, atorvastatin 10 milligrams daily and Lovaza 4 grams daily     Await results of sleep study  Appointment in several months with prior chemistry profile cholesterol profile hemoglobin A1c urine for microalbumin    BMI Counseling: Body mass index is 36 56 kg/m²  The BMI is above normal  Nutrition recommendations include reducing portion sizes and decreasing overall calorie intake  Exercise recommendations include moderate aerobic physical activity for 150 minutes/week     addendum-home sleep study shows mild sleep apnea -sleep specialist recommends this be treated- she is scheduled to meet Dr Walls    Addsreeum- patient seen at the Sleep stent her and has been started on treating for mild sleep apnea with APAP 4-20 centimeters  No problem-specific Assessment & Plan notes found for this encounter  Diagnoses and all orders for this visit:    Excessive daytime sleepiness  -     Home Study; Future    Snoring  -     Home Study; Future    Hypertension, unspecified type  -     CBC and differential; Future  -     Comprehensive metabolic panel; Future  -     HEMOGLOBIN A1C W/ EAG ESTIMATION;  Future  - Cholesterol, total; Future  -     Triglycerides; Future  -     HDL cholesterol; Future  -     LDL cholesterol, direct; Future  -     Home Study; Future    Familial hypercholesterolemia  -     CBC and differential; Future  -     Comprehensive metabolic panel; Future  -     HEMOGLOBIN A1C W/ EAG ESTIMATION; Future  -     Cholesterol, total; Future  -     Triglycerides; Future  -     HDL cholesterol; Future  -     LDL cholesterol, direct; Future  -     Home Study; Future    Controlled type 2 diabetes mellitus without complication, without long-term current use of insulin (HCC)  -     HEMOGLOBIN A1C W/ EAG ESTIMATION; Future    Fatty liver          Subjective:      Patient ID: Rory Martinez is a 54 y o  female  She returns for follow-up exam   She has been on-call frequently with medical practice  And has had much less time to exercise cetera  She is trying to reversal of this    The that is spending more time exercising etc Results for orders placed or performed in visit on 12/04/19  -Cholesterol, total       Result                      Value             Ref Range           Cholesterol                 128               50 - 200 mg/*  -Comprehensive metabolic panel       Result                      Value             Ref Range           Sodium                      140               136 - 145 mm*       Potassium                   4 1               3 5 - 5 3 mm*       Chloride                    107               100 - 108 mm*       CO2                         27                21 - 32 mmol*       ANION GAP                   6                 4 - 13 mmol/L       BUN                         13                5 - 25 mg/dL        Creatinine                  0 62              0 60 - 1 30 *       Glucose, Fasting            128 (H)           65 - 99 mg/dL       Calcium                     9 2               8 3 - 10 1 m*       AST                         43                5 - 45 U/L          ALT                         62 12 - 78 U/L         Alkaline Phosphatase        54                46 - 116 U/L        Total Protein               7 7               6 4 - 8 2 g/*       Albumin                     4 0               3 5 - 5 0 g/*       Total Bilirubin             0 53              0 20 - 1 00 *       eGFR                        108               ml/min/1 73s*  -HEMOGLOBIN A1C W/ EAG ESTIMATION       Result                      Value             Ref Range           Hemoglobin A1C              6 9 (H)           4 2 - 6 3 %         EAG                         151               mg/dl          -Triglycerides       Result                      Value             Ref Range           Triglycerides               134               <=150 mg/dL    -HDL cholesterol       Result                      Value             Ref Range           HDL, Direct                 27 (L)            >=40 mg/dL     -LDL cholesterol, direct       Result                      Value             Ref Range           LDL Direct                  80                0 - 100 mg/dl  -Hepatitis B surface antigen       Result                      Value             Ref Range           Hepatitis B Surface Ag      Non-reactive      Non-reactive*  -Hepatitis C antibody       Result                      Value             Ref Range           Hepatitis C Ab              Non-reactive      Non-reactive   -HIV-1 RNA, quantitative, PCR       Result                      Value             Ref Range           HIV-1 RNA by PCR, Qn        <20               copies/mL           HIV-1 RNA Viral Load L*     COMMENT           [id number]   -Sedimentation rate, automated       Result                      Value             Ref Range           Sed Rate                    16                0 - 20 mm/ho*  -C-reactive protein       Result                      Value             Ref Range           CRP                         15 6 (H)          <3 0 mg/L      The    She has known hypertension    BP borderline controlled at present  She knows to avoid salt and decongestants  We are considering the potential for underlying sleep apnea as dictated below Her A1c has climbed  We talked about potential use glp1 receptor agonists    Of therapy but holding off at present  She helps to intensify exercise etc       Her joint pain is about the same  She has mixed dyslipidemia  LDL adequate but she has low HDL and high triglycerides  I told her I am concerned about the potential for sleep apnea  Her neck exercises 16 inches- she has some snoring  She has difficulty losing weight  I felt she should be screened for sleep apnea and we set her up for home study  She is aware that this can make it more difficult for her to lose weight etc         This patient denies any systemic symptoms  Specifically there has been no evidence of fever, night sweats, significant weight loss or significant decrease in appetite  Her previous noted severe rash 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 an issue  She remains on her statin as well as her prescription fish oil for her mixed dyslipidemia  The following portions of the patient's history were reviewed and updated as appropriate: allergies, current medications, past family history, past medical history, past social history, past surgical history and problem list     Review of Systems   Constitutional: Negative  Respiratory: Negative  Cardiovascular: Negative  Gastrointestinal: Negative  Endocrine: Negative  Genitourinary: Negative  Musculoskeletal: Negative  Neurological: Negative  Hematological: Negative  Psychiatric/Behavioral: Negative  Objective:      Ht 5' 4" (1 626 m)   Wt 96 6 kg (213 lb)   BMI 36 56 kg/m²          Physical Exam   Constitutional: She is oriented to person, place, and time  She appears well-developed and well-nourished  No distress  HENT:   Head: Normocephalic and atraumatic     Right Ear: External ear normal    Left Ear: External ear normal    Nose: Nose normal    Mouth/Throat: Oropharynx is clear and moist  No oropharyngeal exudate  Eyes: Pupils are equal, round, and reactive to light  Conjunctivae and EOM are normal  Right eye exhibits no discharge  Left eye exhibits no discharge  No scleral icterus  Neck: Normal range of motion  Neck supple  No JVD present  No tracheal deviation present  No thyromegaly present  Cardiovascular: Normal rate, regular rhythm, normal heart sounds and intact distal pulses  Exam reveals no gallop and no friction rub  No murmur heard  Pulmonary/Chest: Effort normal and breath sounds normal  No respiratory distress  She has no wheezes  She has no rales  She exhibits no tenderness  Abdominal: Soft  Bowel sounds are normal  She exhibits no distension and no mass  There is no tenderness  There is no rebound and no guarding  Musculoskeletal: Normal range of motion  She exhibits no edema or deformity  Lymphadenopathy:     She has no cervical adenopathy  Neurological: She is alert and oriented to person, place, and time  She has normal reflexes  She displays normal reflexes  No cranial nerve deficit  She exhibits normal muscle tone  Coordination normal    Skin: Skin is warm and dry  No rash noted  No erythema  Psychiatric: She has a normal mood and affect  Her behavior is normal  Judgment and thought content normal    Vitals reviewed

## 2019-12-16 ENCOUNTER — TELEPHONE (OUTPATIENT)
Dept: SLEEP CENTER | Facility: CLINIC | Age: 49
End: 2019-12-16

## 2019-12-24 ENCOUNTER — HOSPITAL ENCOUNTER (OUTPATIENT)
Dept: RADIOLOGY | Facility: HOSPITAL | Age: 49
Discharge: HOME | End: 2019-12-24
Payer: COMMERCIAL

## 2019-12-24 VITALS — HEIGHT: 64 IN | BODY MASS INDEX: 36.37 KG/M2 | WEIGHT: 213 LBS

## 2019-12-24 DIAGNOSIS — Z12.39 BREAST CANCER SCREENING: ICD-10-CM

## 2019-12-24 PROCEDURE — 77067 SCR MAMMO BI INCL CAD: CPT

## 2020-02-04 ENCOUNTER — HOSPITAL ENCOUNTER (OUTPATIENT)
Dept: SLEEP CENTER | Facility: CLINIC | Age: 50
Discharge: HOME | End: 2020-02-04
Payer: COMMERCIAL

## 2020-02-04 DIAGNOSIS — I10 HYPERTENSION, UNSPECIFIED TYPE: ICD-10-CM

## 2020-02-04 DIAGNOSIS — R06.83 SNORING: ICD-10-CM

## 2020-02-04 DIAGNOSIS — E78.01 FAMILIAL HYPERCHOLESTEROLEMIA: ICD-10-CM

## 2020-02-04 DIAGNOSIS — G47.19 EXCESSIVE DAYTIME SLEEPINESS: ICD-10-CM

## 2020-02-04 PROCEDURE — G0399 HOME SLEEP TEST/TYPE 3 PORTA: HCPCS

## 2020-02-05 NOTE — PROGRESS NOTES
Home Sleep Study Documentation    Pre-Sleep Home Study:    Set-up and instructions performed by: Serge Betancourt    Technician performed demonstration for Patient: yes    Return demonstration performed by Patient: yes    Written instructions provided to Patient: yes    Patient signed consent form: yes        Post-Sleep Home Study:    Additional comments by Patient: none    Home Sleep Study Failed:no:    Failure reason: N/A    Reported or Detected: N/A    Scored by: Flynn Bartholomew CRT, RPSGT

## 2020-02-07 ENCOUNTER — TELEPHONE (OUTPATIENT)
Dept: SLEEP CENTER | Facility: CLINIC | Age: 50
End: 2020-02-07

## 2020-02-07 DIAGNOSIS — G47.33 OSA (OBSTRUCTIVE SLEEP APNEA): Primary | ICD-10-CM

## 2020-02-07 NOTE — TELEPHONE ENCOUNTER
----- Message from Vinicio Johnson MD sent at 2/7/2020 11:18 AM EST -----  APAP   tx
Advised patient sleep study shows mild AUGUSTO  Dr Shelly Casanova recommends treatment with APAP  APAP order is in Epic    Patient needs to schedule consult with Dr Shelly Casanova and DME set up and compliance appointment
No
Patient scheduled for consult with Dr Jeanne Natarajan and DME set up      DME appointment information emailed to [facility]
The patient is a 52y Female complaining of headache.

## 2020-02-18 DIAGNOSIS — I10 HYPERTENSION, UNSPECIFIED TYPE: ICD-10-CM

## 2020-02-18 RX ORDER — LOSARTAN POTASSIUM 25 MG/1
25 TABLET ORAL DAILY
Qty: 90 TABLET | Refills: 3 | Status: SHIPPED | OUTPATIENT
Start: 2020-02-18 | End: 2021-04-06 | Stop reason: SDUPTHER

## 2020-02-27 ENCOUNTER — DOCUMENTATION (OUTPATIENT)
Dept: OTHER | Facility: HOSPITAL | Age: 50
End: 2020-02-27

## 2020-02-27 DIAGNOSIS — J45.20 MILD INTERMITTENT ASTHMA WITHOUT COMPLICATION: Primary | ICD-10-CM

## 2020-02-27 RX ORDER — ALBUTEROL SULFATE 90 UG/1
2 AEROSOL, METERED RESPIRATORY (INHALATION) EVERY 4 HOURS PRN
Qty: 1 INHALER | Refills: 2 | Status: SHIPPED | OUTPATIENT
Start: 2020-02-27 | End: 2021-02-26

## 2020-02-28 DIAGNOSIS — I10 HYPERTENSION, UNSPECIFIED TYPE: ICD-10-CM

## 2020-02-28 DIAGNOSIS — R73.09 IMPAIRED GLUCOSE REGULATION: ICD-10-CM

## 2020-02-28 DIAGNOSIS — J45.909 CHRONIC ASTHMA, UNSPECIFIED ASTHMA SEVERITY, UNSPECIFIED WHETHER COMPLICATED, UNSPECIFIED WHETHER PERSISTENT: Chronic | ICD-10-CM

## 2020-02-28 RX ORDER — AMLODIPINE BESYLATE 5 MG/1
5 TABLET ORAL 2 TIMES DAILY
Qty: 180 TABLET | Refills: 3 | Status: SHIPPED | OUTPATIENT
Start: 2020-02-28 | End: 2020-05-20 | Stop reason: SDUPTHER

## 2020-02-28 RX ORDER — MONTELUKAST SODIUM 10 MG/1
10 TABLET ORAL
Qty: 90 TABLET | Refills: 3 | Status: SHIPPED | OUTPATIENT
Start: 2020-02-28 | End: 2020-05-20 | Stop reason: SDUPTHER

## 2020-03-31 ENCOUNTER — OFFICE VISIT (OUTPATIENT)
Dept: SLEEP CENTER | Facility: CLINIC | Age: 50
End: 2020-03-31
Payer: COMMERCIAL

## 2020-03-31 VITALS
WEIGHT: 210.6 LBS | DIASTOLIC BLOOD PRESSURE: 62 MMHG | HEIGHT: 64 IN | BODY MASS INDEX: 35.96 KG/M2 | SYSTOLIC BLOOD PRESSURE: 124 MMHG

## 2020-03-31 DIAGNOSIS — G47.33 OSA (OBSTRUCTIVE SLEEP APNEA): Primary | ICD-10-CM

## 2020-03-31 PROCEDURE — 99203 OFFICE O/P NEW LOW 30 MIN: CPT | Performed by: INTERNAL MEDICINE

## 2020-04-07 DIAGNOSIS — Z30.44 ENCOUNTER FOR SURVEILLANCE OF VAGINAL RING HORMONAL CONTRACEPTIVE DEVICE: ICD-10-CM

## 2020-04-10 ENCOUNTER — APPOINTMENT (OUTPATIENT)
Dept: LAB | Facility: CLINIC | Age: 50
End: 2020-04-10
Payer: COMMERCIAL

## 2020-04-10 ENCOUNTER — TRANSCRIBE ORDERS (OUTPATIENT)
Dept: LAB | Facility: CLINIC | Age: 50
End: 2020-04-10

## 2020-04-10 DIAGNOSIS — E11.9 CONTROLLED TYPE 2 DIABETES MELLITUS WITHOUT COMPLICATION, WITHOUT LONG-TERM CURRENT USE OF INSULIN (HCC): Chronic | ICD-10-CM

## 2020-04-10 DIAGNOSIS — I10 HYPERTENSION, UNSPECIFIED TYPE: Chronic | ICD-10-CM

## 2020-04-10 DIAGNOSIS — E78.01 FAMILIAL HYPERCHOLESTEROLEMIA: Chronic | ICD-10-CM

## 2020-04-10 LAB
ALBUMIN SERPL BCP-MCNC: 3.2 G/DL (ref 3.5–5)
ALP SERPL-CCNC: 44 U/L (ref 46–116)
ALT SERPL W P-5'-P-CCNC: 48 U/L (ref 12–78)
ANION GAP SERPL CALCULATED.3IONS-SCNC: 8 MMOL/L (ref 4–13)
AST SERPL W P-5'-P-CCNC: 32 U/L (ref 5–45)
BASOPHILS # BLD AUTO: 0.03 THOUSANDS/ΜL (ref 0–0.1)
BASOPHILS NFR BLD AUTO: 0 % (ref 0–1)
BILIRUB SERPL-MCNC: 0.51 MG/DL (ref 0.2–1)
BUN SERPL-MCNC: 7 MG/DL (ref 5–25)
CALCIUM SERPL-MCNC: 8.8 MG/DL (ref 8.3–10.1)
CHLORIDE SERPL-SCNC: 103 MMOL/L (ref 100–108)
CHOLEST SERPL-MCNC: 117 MG/DL (ref 50–200)
CO2 SERPL-SCNC: 28 MMOL/L (ref 21–32)
CREAT SERPL-MCNC: 0.71 MG/DL (ref 0.6–1.3)
EOSINOPHIL # BLD AUTO: 0.22 THOUSAND/ΜL (ref 0–0.61)
EOSINOPHIL NFR BLD AUTO: 3 % (ref 0–6)
ERYTHROCYTE [DISTWIDTH] IN BLOOD BY AUTOMATED COUNT: 13 % (ref 11.6–15.1)
EST. AVERAGE GLUCOSE BLD GHB EST-MCNC: 143 MG/DL
GFR SERPL CREATININE-BSD FRML MDRD: 102 ML/MIN/1.73SQ M
GLUCOSE P FAST SERPL-MCNC: 115 MG/DL (ref 65–99)
HBA1C MFR BLD: 6.6 %
HCT VFR BLD AUTO: 41.1 % (ref 34.8–46.1)
HDLC SERPL-MCNC: 22 MG/DL
HGB BLD-MCNC: 13.6 G/DL (ref 11.5–15.4)
IMM GRANULOCYTES # BLD AUTO: 0.02 THOUSAND/UL (ref 0–0.2)
IMM GRANULOCYTES NFR BLD AUTO: 0 % (ref 0–2)
LDLC SERPL DIRECT ASSAY-MCNC: 72 MG/DL (ref 0–100)
LYMPHOCYTES # BLD AUTO: 1.72 THOUSANDS/ΜL (ref 0.6–4.47)
LYMPHOCYTES NFR BLD AUTO: 24 % (ref 14–44)
MCH RBC QN AUTO: 30.9 PG (ref 26.8–34.3)
MCHC RBC AUTO-ENTMCNC: 33.1 G/DL (ref 31.4–37.4)
MCV RBC AUTO: 93 FL (ref 82–98)
MONOCYTES # BLD AUTO: 0.48 THOUSAND/ΜL (ref 0.17–1.22)
MONOCYTES NFR BLD AUTO: 7 % (ref 4–12)
NEUTROPHILS # BLD AUTO: 4.64 THOUSANDS/ΜL (ref 1.85–7.62)
NEUTS SEG NFR BLD AUTO: 66 % (ref 43–75)
NRBC BLD AUTO-RTO: 0 /100 WBCS
PLATELET # BLD AUTO: 283 THOUSANDS/UL (ref 149–390)
PMV BLD AUTO: 8.8 FL (ref 8.9–12.7)
POTASSIUM SERPL-SCNC: 4.5 MMOL/L (ref 3.5–5.3)
PROT SERPL-MCNC: 7.3 G/DL (ref 6.4–8.2)
RBC # BLD AUTO: 4.4 MILLION/UL (ref 3.81–5.12)
SODIUM SERPL-SCNC: 139 MMOL/L (ref 136–145)
TRIGL SERPL-MCNC: 128 MG/DL
WBC # BLD AUTO: 7.11 THOUSAND/UL (ref 4.31–10.16)

## 2020-04-10 PROCEDURE — 83036 HEMOGLOBIN GLYCOSYLATED A1C: CPT

## 2020-04-10 PROCEDURE — 36415 COLL VENOUS BLD VENIPUNCTURE: CPT

## 2020-04-10 PROCEDURE — 83721 ASSAY OF BLOOD LIPOPROTEIN: CPT

## 2020-04-10 PROCEDURE — 80053 COMPREHEN METABOLIC PANEL: CPT

## 2020-04-10 PROCEDURE — 80061 LIPID PANEL: CPT

## 2020-04-10 PROCEDURE — 85025 COMPLETE CBC W/AUTO DIFF WBC: CPT

## 2020-04-14 ENCOUNTER — OFFICE VISIT (OUTPATIENT)
Dept: INTERNAL MEDICINE CLINIC | Facility: CLINIC | Age: 50
End: 2020-04-14
Payer: COMMERCIAL

## 2020-04-14 ENCOUNTER — TELEPHONE (OUTPATIENT)
Dept: INTERNAL MEDICINE CLINIC | Facility: CLINIC | Age: 50
End: 2020-04-14

## 2020-04-14 VITALS
SYSTOLIC BLOOD PRESSURE: 128 MMHG | HEIGHT: 64 IN | BODY MASS INDEX: 36.33 KG/M2 | HEART RATE: 68 BPM | RESPIRATION RATE: 14 BRPM | DIASTOLIC BLOOD PRESSURE: 78 MMHG | WEIGHT: 212.8 LBS

## 2020-04-14 DIAGNOSIS — G47.33 OSA (OBSTRUCTIVE SLEEP APNEA): ICD-10-CM

## 2020-04-14 DIAGNOSIS — E78.01 FAMILIAL HYPERCHOLESTEROLEMIA: Chronic | ICD-10-CM

## 2020-04-14 DIAGNOSIS — K76.0 FATTY LIVER: Chronic | ICD-10-CM

## 2020-04-14 DIAGNOSIS — E11.9 CONTROLLED TYPE 2 DIABETES MELLITUS WITHOUT COMPLICATION, WITHOUT LONG-TERM CURRENT USE OF INSULIN (HCC): Chronic | ICD-10-CM

## 2020-04-14 DIAGNOSIS — I10 HYPERTENSION, UNSPECIFIED TYPE: Primary | Chronic | ICD-10-CM

## 2020-04-14 PROCEDURE — 1036F TOBACCO NON-USER: CPT | Performed by: INTERNAL MEDICINE

## 2020-04-14 PROCEDURE — 3074F SYST BP LT 130 MM HG: CPT | Performed by: INTERNAL MEDICINE

## 2020-04-14 PROCEDURE — 99214 OFFICE O/P EST MOD 30 MIN: CPT | Performed by: INTERNAL MEDICINE

## 2020-04-14 PROCEDURE — 3008F BODY MASS INDEX DOCD: CPT | Performed by: INTERNAL MEDICINE

## 2020-04-14 PROCEDURE — 3044F HG A1C LEVEL LT 7.0%: CPT | Performed by: INTERNAL MEDICINE

## 2020-04-14 PROCEDURE — 3078F DIAST BP <80 MM HG: CPT | Performed by: INTERNAL MEDICINE

## 2020-04-14 PROCEDURE — 2022F DILAT RTA XM EVC RTNOPTHY: CPT | Performed by: INTERNAL MEDICINE

## 2020-04-15 DIAGNOSIS — E78.1 HYPERTRIGLYCERIDEMIA: ICD-10-CM

## 2020-04-15 RX ORDER — OMEGA-3-ACID ETHYL ESTERS 1 G/1
1 CAPSULE, LIQUID FILLED ORAL 4 TIMES DAILY
Qty: 360 CAPSULE | Refills: 3 | Status: SHIPPED | OUTPATIENT
Start: 2020-04-15 | End: 2021-05-05 | Stop reason: SDUPTHER

## 2020-05-20 DIAGNOSIS — I10 HYPERTENSION, UNSPECIFIED TYPE: ICD-10-CM

## 2020-05-20 DIAGNOSIS — J45.909 CHRONIC ASTHMA, UNSPECIFIED ASTHMA SEVERITY, UNSPECIFIED WHETHER COMPLICATED, UNSPECIFIED WHETHER PERSISTENT: Chronic | ICD-10-CM

## 2020-05-20 DIAGNOSIS — Z30.44 ENCOUNTER FOR SURVEILLANCE OF VAGINAL RING HORMONAL CONTRACEPTIVE DEVICE: ICD-10-CM

## 2020-05-20 RX ORDER — MONTELUKAST SODIUM 10 MG/1
10 TABLET ORAL
Qty: 90 TABLET | Refills: 3 | Status: SHIPPED | OUTPATIENT
Start: 2020-05-20 | End: 2021-05-24 | Stop reason: SDUPTHER

## 2020-05-20 RX ORDER — AMLODIPINE BESYLATE 5 MG/1
5 TABLET ORAL 2 TIMES DAILY
Qty: 180 TABLET | Refills: 3 | Status: SHIPPED | OUTPATIENT
Start: 2020-05-20 | End: 2021-05-24 | Stop reason: SDUPTHER

## 2020-06-22 DIAGNOSIS — Z30.015 ENCOUNTER FOR INITIAL PRESCRIPTION OF VAGINAL RING HORMONAL CONTRACEPTIVE: Primary | ICD-10-CM

## 2020-06-22 RX ORDER — ETONOGESTREL AND ETHINYL ESTRADIOL 11.7; 2.7 MG/1; MG/1
INSERT, EXTENDED RELEASE VAGINAL
Qty: 4 EACH | Refills: 3 | Status: SHIPPED | OUTPATIENT
Start: 2020-06-22 | End: 2021-03-03 | Stop reason: SDUPTHER

## 2020-06-30 ENCOUNTER — OFFICE VISIT (OUTPATIENT)
Dept: SLEEP CENTER | Facility: CLINIC | Age: 50
End: 2020-06-30
Payer: COMMERCIAL

## 2020-06-30 VITALS
WEIGHT: 216 LBS | DIASTOLIC BLOOD PRESSURE: 72 MMHG | BODY MASS INDEX: 36.88 KG/M2 | SYSTOLIC BLOOD PRESSURE: 118 MMHG | HEIGHT: 64 IN

## 2020-06-30 DIAGNOSIS — G47.33 OSA (OBSTRUCTIVE SLEEP APNEA): Primary | ICD-10-CM

## 2020-06-30 PROCEDURE — 2022F DILAT RTA XM EVC RTNOPTHY: CPT | Performed by: INTERNAL MEDICINE

## 2020-06-30 PROCEDURE — 3074F SYST BP LT 130 MM HG: CPT | Performed by: INTERNAL MEDICINE

## 2020-06-30 PROCEDURE — 1036F TOBACCO NON-USER: CPT | Performed by: INTERNAL MEDICINE

## 2020-06-30 PROCEDURE — 99213 OFFICE O/P EST LOW 20 MIN: CPT | Performed by: INTERNAL MEDICINE

## 2020-06-30 PROCEDURE — 3078F DIAST BP <80 MM HG: CPT | Performed by: INTERNAL MEDICINE

## 2020-06-30 PROCEDURE — 3044F HG A1C LEVEL LT 7.0%: CPT | Performed by: INTERNAL MEDICINE

## 2020-06-30 PROCEDURE — 3008F BODY MASS INDEX DOCD: CPT | Performed by: INTERNAL MEDICINE

## 2020-07-21 ENCOUNTER — TELEPHONE (OUTPATIENT)
Dept: INTERNAL MEDICINE CLINIC | Facility: CLINIC | Age: 50
End: 2020-07-21

## 2020-07-21 DIAGNOSIS — M19.90 ARTHRITIS: Primary | Chronic | ICD-10-CM

## 2020-07-21 NOTE — TELEPHONE ENCOUNTER
----- Message from [employer] sent at 7/21/2020  2:22 PM EDT -----  Regarding: Non-Urgent Medical Question  Contact: [phone]  I am having some troubles with my right fourth  finger metacarpal phalangeal joint  Should I make an appointment or get some testing then come for an appointment?   Thank you,  Miriam Villa

## 2020-07-21 NOTE — TELEPHONE ENCOUNTER
Called patient   She is aware , we moved appt to 08/4 at 630AM , she has an appointment that day you offered

## 2020-07-21 NOTE — TELEPHONE ENCOUNTER
Have her go this week for sed rate C-reactive protein - no fast with diagnosis of arthritis and bring her in on July 28th at 8:30 a m

## 2020-07-23 ENCOUNTER — DOCUMENTATION (OUTPATIENT)
Dept: LABOR AND DELIVERY | Facility: HOSPITAL | Age: 50
End: 2020-07-23

## 2020-07-23 DIAGNOSIS — E78.2 MIXED HYPERLIPIDEMIA: ICD-10-CM

## 2020-07-23 RX ORDER — ATORVASTATIN CALCIUM 10 MG/1
10 TABLET, FILM COATED ORAL DAILY
Qty: 90 TABLET | Refills: 0 | Status: SHIPPED | OUTPATIENT
Start: 2020-07-23 | End: 2020-10-19

## 2020-07-24 DIAGNOSIS — N92.1 BREAKTHROUGH BLEEDING: ICD-10-CM

## 2020-07-24 DIAGNOSIS — N92.1 MENORRHAGIA WITH IRREGULAR CYCLE: ICD-10-CM

## 2020-07-24 DIAGNOSIS — N92.1 MENORRHAGIA WITH IRREGULAR CYCLE: Primary | ICD-10-CM

## 2020-07-24 DIAGNOSIS — N92.1 BREAKTHROUGH BLEEDING WITH NUVARING: ICD-10-CM

## 2020-07-24 DIAGNOSIS — N92.6 IRREGULAR MENSTRUAL BLEEDING: Primary | ICD-10-CM

## 2020-07-24 DIAGNOSIS — Z97.5 BREAKTHROUGH BLEEDING WITH NUVARING: ICD-10-CM

## 2020-07-24 RX ORDER — NORGESTIMATE AND ETHINYL ESTRADIOL 7DAYSX3 LO
1 KIT ORAL DAILY
Qty: 30 TABLET | Refills: 2 | Status: SHIPPED | OUTPATIENT
Start: 2020-07-24 | End: 2021-01-11 | Stop reason: ALTCHOICE

## 2020-07-24 RX ORDER — TRANEXAMIC ACID 650 1/1
2 TABLET ORAL 3 TIMES DAILY
Qty: 30 TABLET | Refills: 3 | Status: SHIPPED | OUTPATIENT
Start: 2020-07-24

## 2020-07-25 ENCOUNTER — APPOINTMENT (OUTPATIENT)
Dept: LAB | Facility: CLINIC | Age: 50
End: 2020-07-25
Payer: COMMERCIAL

## 2020-07-25 DIAGNOSIS — N92.1 MENORRHAGIA WITH IRREGULAR CYCLE: ICD-10-CM

## 2020-07-25 DIAGNOSIS — M19.90 ARTHRITIS: Chronic | ICD-10-CM

## 2020-07-25 DIAGNOSIS — N92.1 BREAKTHROUGH BLEEDING: ICD-10-CM

## 2020-07-25 LAB
ALBUMIN SERPL BCP-MCNC: 3.4 G/DL (ref 3.5–5)
ALP SERPL-CCNC: 42 U/L (ref 46–116)
ALT SERPL W P-5'-P-CCNC: 51 U/L (ref 12–78)
ANION GAP SERPL CALCULATED.3IONS-SCNC: 5 MMOL/L (ref 4–13)
AST SERPL W P-5'-P-CCNC: 47 U/L (ref 5–45)
BASOPHILS # BLD AUTO: 0.04 THOUSANDS/ΜL (ref 0–0.1)
BASOPHILS NFR BLD AUTO: 1 % (ref 0–1)
BILIRUB SERPL-MCNC: 0.58 MG/DL (ref 0.2–1)
BUN SERPL-MCNC: 12 MG/DL (ref 5–25)
CALCIUM SERPL-MCNC: 8.7 MG/DL (ref 8.3–10.1)
CHLORIDE SERPL-SCNC: 104 MMOL/L (ref 100–108)
CO2 SERPL-SCNC: 28 MMOL/L (ref 21–32)
CREAT SERPL-MCNC: 0.66 MG/DL (ref 0.6–1.3)
CRP SERPL QL: 14.9 MG/L
EOSINOPHIL # BLD AUTO: 0.29 THOUSAND/ΜL (ref 0–0.61)
EOSINOPHIL NFR BLD AUTO: 5 % (ref 0–6)
ERYTHROCYTE [DISTWIDTH] IN BLOOD BY AUTOMATED COUNT: 12.9 % (ref 11.6–15.1)
ERYTHROCYTE [SEDIMENTATION RATE] IN BLOOD: 19 MM/HOUR (ref 0–20)
FERRITIN SERPL-MCNC: 176 NG/ML (ref 8–388)
GFR SERPL CREATININE-BSD FRML MDRD: 105 ML/MIN/1.73SQ M
GLUCOSE SERPL-MCNC: 107 MG/DL (ref 65–140)
HCT VFR BLD AUTO: 34.7 % (ref 34.8–46.1)
HGB BLD-MCNC: 11.9 G/DL (ref 11.5–15.4)
IMM GRANULOCYTES # BLD AUTO: 0.02 THOUSAND/UL (ref 0–0.2)
IMM GRANULOCYTES NFR BLD AUTO: 0 % (ref 0–2)
IRON SATN MFR SERPL: 37 %
IRON SERPL-MCNC: 86 UG/DL (ref 50–170)
LYMPHOCYTES # BLD AUTO: 1.62 THOUSANDS/ΜL (ref 0.6–4.47)
LYMPHOCYTES NFR BLD AUTO: 25 % (ref 14–44)
MCH RBC QN AUTO: 31.6 PG (ref 26.8–34.3)
MCHC RBC AUTO-ENTMCNC: 34.3 G/DL (ref 31.4–37.4)
MCV RBC AUTO: 92 FL (ref 82–98)
MONOCYTES # BLD AUTO: 0.51 THOUSAND/ΜL (ref 0.17–1.22)
MONOCYTES NFR BLD AUTO: 8 % (ref 4–12)
NEUTROPHILS # BLD AUTO: 3.91 THOUSANDS/ΜL (ref 1.85–7.62)
NEUTS SEG NFR BLD AUTO: 61 % (ref 43–75)
NRBC BLD AUTO-RTO: 0 /100 WBCS
PLATELET # BLD AUTO: 263 THOUSANDS/UL (ref 149–390)
PMV BLD AUTO: 8.7 FL (ref 8.9–12.7)
POTASSIUM SERPL-SCNC: 4 MMOL/L (ref 3.5–5.3)
PROT SERPL-MCNC: 7.1 G/DL (ref 6.4–8.2)
RBC # BLD AUTO: 3.76 MILLION/UL (ref 3.81–5.12)
SODIUM SERPL-SCNC: 137 MMOL/L (ref 136–145)
T4 FREE SERPL-MCNC: 1.11 NG/DL (ref 0.76–1.46)
TIBC SERPL-MCNC: 232 UG/DL (ref 250–450)
TSH SERPL DL<=0.05 MIU/L-ACNC: 1.81 UIU/ML (ref 0.36–3.74)
WBC # BLD AUTO: 6.39 THOUSAND/UL (ref 4.31–10.16)

## 2020-07-25 PROCEDURE — 83540 ASSAY OF IRON: CPT

## 2020-07-25 PROCEDURE — 82728 ASSAY OF FERRITIN: CPT

## 2020-07-25 PROCEDURE — 86140 C-REACTIVE PROTEIN: CPT

## 2020-07-25 PROCEDURE — 80053 COMPREHEN METABOLIC PANEL: CPT

## 2020-07-25 PROCEDURE — 83550 IRON BINDING TEST: CPT

## 2020-07-25 PROCEDURE — 84443 ASSAY THYROID STIM HORMONE: CPT

## 2020-07-25 PROCEDURE — 36415 COLL VENOUS BLD VENIPUNCTURE: CPT

## 2020-07-25 PROCEDURE — 85652 RBC SED RATE AUTOMATED: CPT

## 2020-07-25 PROCEDURE — 84439 ASSAY OF FREE THYROXINE: CPT

## 2020-07-25 PROCEDURE — 85025 COMPLETE CBC W/AUTO DIFF WBC: CPT

## 2020-07-28 ENCOUNTER — TELEPHONE (OUTPATIENT)
Dept: INTERNAL MEDICINE CLINIC | Facility: CLINIC | Age: 50
End: 2020-07-28

## 2020-07-28 NOTE — TELEPHONE ENCOUNTER
Dr Chavez you gave me paper asking if pt wants to be seen sooner than 09/15/2020 because of lab work she had done recently was similar to before  I called patient and said she was already scheduled for 8/4 @ 630am  I did not see she was scheduled but I placed on her the schedule, she had spoken to [facility]  Please advise if this is okay and if I should cancel 9/15 appt

## 2020-08-04 ENCOUNTER — TELEPHONE (OUTPATIENT)
Dept: OBGYN CLINIC | Facility: HOSPITAL | Age: 50
End: 2020-08-04

## 2020-08-04 ENCOUNTER — OFFICE VISIT (OUTPATIENT)
Dept: INTERNAL MEDICINE CLINIC | Facility: CLINIC | Age: 50
End: 2020-08-04
Payer: COMMERCIAL

## 2020-08-04 VITALS
WEIGHT: 214 LBS | SYSTOLIC BLOOD PRESSURE: 124 MMHG | HEIGHT: 64 IN | TEMPERATURE: 98.6 F | BODY MASS INDEX: 36.54 KG/M2 | DIASTOLIC BLOOD PRESSURE: 82 MMHG | RESPIRATION RATE: 14 BRPM | HEART RATE: 72 BPM

## 2020-08-04 DIAGNOSIS — I10 HYPERTENSION, UNSPECIFIED TYPE: Chronic | ICD-10-CM

## 2020-08-04 DIAGNOSIS — M65.341 TRIGGER RING FINGER OF RIGHT HAND: Primary | ICD-10-CM

## 2020-08-04 PROCEDURE — 3074F SYST BP LT 130 MM HG: CPT | Performed by: INTERNAL MEDICINE

## 2020-08-04 PROCEDURE — 1036F TOBACCO NON-USER: CPT | Performed by: INTERNAL MEDICINE

## 2020-08-04 PROCEDURE — 3044F HG A1C LEVEL LT 7.0%: CPT | Performed by: INTERNAL MEDICINE

## 2020-08-04 PROCEDURE — 99213 OFFICE O/P EST LOW 20 MIN: CPT | Performed by: INTERNAL MEDICINE

## 2020-08-04 PROCEDURE — 3079F DIAST BP 80-89 MM HG: CPT | Performed by: INTERNAL MEDICINE

## 2020-08-04 PROCEDURE — 3008F BODY MASS INDEX DOCD: CPT | Performed by: INTERNAL MEDICINE

## 2020-08-04 PROCEDURE — 2022F DILAT RTA XM EVC RTNOPTHY: CPT | Performed by: INTERNAL MEDICINE

## 2020-08-04 NOTE — PROGRESS NOTES
Assessment/Plan:   1  Right 4th finger pain -appears to be a flexor tendinitis -trigger finger -referred to ortho for potential injection  2  Arthralgias -noted in the 2nd MCP bilaterally -MTP bilaterally  A prior x-ray showed no bony lytic disease  Antinuclear antibody, rheumatoid factor normal   Lyme titer negative  Anti CCP antibody negative  Anti SSA and SSB all normal   No issues with large joints other than DJD of her left hip  Has some elevation of her sed rate CRP but this is influenced by fatty liver  Has known psoriasis and this could all represent psoriatic joint disease  At this point monitoring  All other problems as per note of April 2020  Medical regimen as per baseline  Await opinion of Ortho  This patient will be seen at previously scheduled appointment with previously scheduled labs  No problem-specific Assessment & Plan notes found for this encounter  Diagnoses and all orders for this visit:    Trigger ring finger of right hand  -     Ambulatory referral to Hand Surgery; Future    Hypertension, unspecified type          Subjective:      Patient ID: Jarred Vidal is a 52 y o  female  She is seen today with a chief complaint of pain in the right 4th finger  She notes this when cooking -cutting vegetables etcetera  Flexion of the finger leads to locking  She has a past history of pain in the 2nd MCP bilaterally as well as the MTP bilaterally  Prior x-ray showed no bony lytic disease  Serologies were all negative  She has chronic elevation of her CRP although this may be influenced by her fatty liver  A she has surgeon a had issues with the OR because she typically uses her 1st 3 fingers more and surgery    Clinically she appears to have a trigger finger -flexor tendinitis and I feel potential injection by ortho is warranted     She has known hypertension but BP adequately controlled at present      The following portions of the patient's history were reviewed and updated as appropriate: allergies, current medications, past family history, past medical history, past social history, past surgical history and problem list     Review of Systems   Constitutional: Negative  Respiratory: Negative  Cardiovascular: Negative  Gastrointestinal: Negative  Endocrine: Negative  Genitourinary: Negative  Musculoskeletal: Negative  Pain in the right 4th flexor tendon area   Neurological: Negative  Hematological: Negative  Psychiatric/Behavioral: Negative  Objective:      Temp 98 6 °F (37 °C) (Oral)   Ht 5' 4"   Wt 97 1 kg (214 lb)   BMI 36 73 kg/m²          Physical Exam   Constitutional: She is oriented to person, place, and time  HENT:   Head: Normocephalic and atraumatic  Eyes: Conjunctivae are normal    Abdominal: Normal appearance  Musculoskeletal:         General: Tenderness present  No swelling, deformity or signs of injury  Right lower leg: No edema  Left lower leg: No edema  Comments:  Tenderness of the right 4th flexor tendon   Neurological: She is alert and oriented to person, place, and time  Skin: Skin is warm and dry  Psychiatric: Her behavior is normal  Mood, judgment and thought content normal    Vitals reviewed

## 2020-08-26 ENCOUNTER — OFFICE VISIT (OUTPATIENT)
Dept: OBGYN CLINIC | Facility: HOSPITAL | Age: 50
End: 2020-08-26
Payer: COMMERCIAL

## 2020-08-26 VITALS
WEIGHT: 214 LBS | HEIGHT: 64 IN | SYSTOLIC BLOOD PRESSURE: 130 MMHG | BODY MASS INDEX: 36.54 KG/M2 | HEART RATE: 88 BPM | DIASTOLIC BLOOD PRESSURE: 84 MMHG

## 2020-08-26 DIAGNOSIS — M65.341 TRIGGER RING FINGER OF RIGHT HAND: Primary | ICD-10-CM

## 2020-08-26 PROCEDURE — 1036F TOBACCO NON-USER: CPT | Performed by: ORTHOPAEDIC SURGERY

## 2020-08-26 PROCEDURE — 99213 OFFICE O/P EST LOW 20 MIN: CPT | Performed by: ORTHOPAEDIC SURGERY

## 2020-08-26 PROCEDURE — 20550 NJX 1 TENDON SHEATH/LIGAMENT: CPT | Performed by: ORTHOPAEDIC SURGERY

## 2020-08-26 PROCEDURE — 3008F BODY MASS INDEX DOCD: CPT | Performed by: ORTHOPAEDIC SURGERY

## 2020-08-26 PROCEDURE — 3075F SYST BP GE 130 - 139MM HG: CPT | Performed by: ORTHOPAEDIC SURGERY

## 2020-08-26 PROCEDURE — 3079F DIAST BP 80-89 MM HG: CPT | Performed by: ORTHOPAEDIC SURGERY

## 2020-08-26 PROCEDURE — 3044F HG A1C LEVEL LT 7.0%: CPT | Performed by: ORTHOPAEDIC SURGERY

## 2020-08-26 PROCEDURE — 2022F DILAT RTA XM EVC RTNOPTHY: CPT | Performed by: ORTHOPAEDIC SURGERY

## 2020-08-26 RX ORDER — BETAMETHASONE SODIUM PHOSPHATE AND BETAMETHASONE ACETATE 3; 3 MG/ML; MG/ML
3 INJECTION, SUSPENSION INTRA-ARTICULAR; INTRALESIONAL; INTRAMUSCULAR; SOFT TISSUE
Status: COMPLETED | OUTPATIENT
Start: 2020-08-26 | End: 2020-08-26

## 2020-08-26 RX ORDER — LIDOCAINE HYDROCHLORIDE 10 MG/ML
0.5 INJECTION, SOLUTION INFILTRATION; PERINEURAL
Status: COMPLETED | OUTPATIENT
Start: 2020-08-26 | End: 2020-08-26

## 2020-08-26 RX ADMIN — BETAMETHASONE SODIUM PHOSPHATE AND BETAMETHASONE ACETATE 3 MG: 3; 3 INJECTION, SUSPENSION INTRA-ARTICULAR; INTRALESIONAL; INTRAMUSCULAR; SOFT TISSUE at 10:17

## 2020-08-26 RX ADMIN — LIDOCAINE HYDROCHLORIDE 0.5 ML: 10 INJECTION, SOLUTION INFILTRATION; PERINEURAL at 10:17

## 2020-08-26 NOTE — PROGRESS NOTES
ASSESSMENT/PLAN:    Assessment:   Trigger finger right ring finger    Plan:   Discussed nonoperative management with patient  CS injection provided at time of visit without difficulty  No restrictions imposed at this time  NSAIDs/Tylenol as needed for continued pain or soreness  Return in about 6 weeks (around 10/7/2020) for Re-evaluation  To Do Next Visit:  Re-evaluation of current issue    General Discussions:     Trigger FInger: The anatomy and physiology of trigger finger was discussed with the patient today in the office  Edema and increased contact pressure within the flexor tendons at the A1 pulley can cause pain, crepitation, and limitation of function  Treatment options include resting MP blocking splints to decrease edema, oral anti-inflammatory medications, home or formal therapy exercises, up to 2 steroid injections within the tendon sheath, or surgical release  While majority of patients do respond to conservative treatment, up to 20% may require surgical release          _____________________________________________________  CHIEF COMPLAINT:  Chief Complaint   Patient presents with    Right Ring Finger - Pain, Locking         SUBJECTIVE:  Vidhi Santiago is a 52 y o  RHD female who presents with painful motion and triggering of the right ring finger x2 +months  Patient denies any specific incident of injury  She complains of pain in the right ring finger MCP joint with prolonged flexion, such as gripping the steering wheel or holding a knife to child vegetables  She says that occasionally when trying to release her , her ring finger gets locked in a flexed position  She denies any associated bruising, swelling, numbness, tingling, or feelings of instability  She is not currently taking any medications for pain  She has had no other forms of treatment in regards to this issue up to this point      PAST MEDICAL HISTORY:  Past Medical History:   Diagnosis Date    Hypertension     patient reports    PCOS (polycystic ovarian syndrome)        PAST SURGICAL HISTORY:  Past Surgical History:   Procedure Laterality Date    TONSILLECTOMY      last assessed: 5/3/16       FAMILY HISTORY:  Family History   Problem Relation Age of Onset    Hypertension Mother     Hypertension Father     Other Father         low serum HDL    Hypothyroidism Sister     Breast cancer Maternal Grandmother 61    No Known Problems Daughter     No Known Problems Son     No Known Problems Maternal Grandfather     No Known Problems Paternal Grandmother     No Known Problems Paternal Grandfather     No Known Problems Paternal Aunt     No Known Problems Paternal Aunt     No Known Problems Paternal Aunt        SOCIAL HISTORY:  Social History     Tobacco Use    Smoking status: Never Smoker    Smokeless tobacco: Never Used   Substance Use Topics    Alcohol use: No    Drug use: No       MEDICATIONS:    Current Outpatient Medications:     albuterol (Proventil HFA) 90 mcg/act inhaler, Inhale 2 puffs every 4 (four) hours as needed for wheezing, Disp: 1 Inhaler, Rfl: 2    amLODIPine (NORVASC) 5 mg tablet, Take 1 tablet (5 mg total) by mouth 2 (two) times a day, Disp: 180 tablet, Rfl: 3    atorvastatin (LIPITOR) 10 mg tablet, Take 1 tablet (10 mg total) by mouth daily, Disp: 90 tablet, Rfl: 0    Calcium-Magnesium-Vitamin D ER (Calcium 1200+D3) 600- MG-MG-UNIT TB24, Take 1 tablet by mouth daily, Disp: , Rfl:     etonogestrel-ethinyl estradiol (NUVARING) 0 12-0 015 MG/24HR vaginal ring, Insert vaginally and leave in place for 3 consecutive weeks, then place new ring, no withdrawal bleed, Disp: 4 each, Rfl: 3    losartan (COZAAR) 25 mg tablet, Take 1 tablet (25 mg total) by mouth daily, Disp: 90 tablet, Rfl: 03    metFORMIN (GLUCOPHAGE) 1000 MG tablet, Take 1 tablet (1,000 mg total) by mouth every 12 (twelve) hours, Disp: 180 tablet, Rfl: 3    montelukast (SINGULAIR) 10 mg tablet, Take 1 tablet (10 mg total) by mouth daily at bedtime, Disp: 90 tablet, Rfl: 3    norgestimate-ethinyl estradiol (ORTHO TRI-CYCLEN LO) 0 18/0 215/0 25 MG-25 MCG per tablet, Take 1 tablet by mouth daily, Disp: 30 tablet, Rfl: 2    omega-3-acid ethyl esters (LOVAZA) 1 g capsule, Take 1 capsule (1 g total) by mouth 4 (four) times a day take 3 capsules daily (total of 3 G per day), Disp: 360 capsule, Rfl: 3    Tranexamic Acid (Lysteda) 650 MG TABS, Take 2 tablets by mouth 3 (three) times a day PRN for heavy flow, Disp: 30 tablet, Rfl: 3    triamcinolone (KENALOG) 0 1 % ointment, Apply topically, Disp: , Rfl:     ALLERGIES:  Allergies   Allergen Reactions    Latex      Per patient    Nuts     Shellfish-Derived Products        REVIEW OF SYSTEMS:  Pertinent items are noted in HPI  A comprehensive review of systems was negative      LABS:  HgA1c:   Lab Results   Component Value Date    HGBA1C 6 6 (H) 04/10/2020     BMP:   Lab Results   Component Value Date    GLUCOSE 115 12/03/2015    CALCIUM 8 7 07/25/2020     12/03/2015    K 4 0 07/25/2020    CO2 28 07/25/2020     07/25/2020    BUN 12 07/25/2020    CREATININE 0 66 07/25/2020       _____________________________________________________  PHYSICAL EXAMINATION:  Vital signs: /84   Pulse 88   Ht 5' 4" (1 626 m)   Wt 97 1 kg (214 lb)   BMI 36 73 kg/m²   General: well developed and well nourished, alert, oriented times 3 and appears comfortable  Psychiatric: Normal  HEENT: Trachea Midline, No torticollis  Cardiovascular: No discernable arrhythmia  Pulmonary: No wheezing or stridor  Skin: No masses, erythema, lacerations, fluctation, ulcerations  Neurovascular: Sensation Intact to the Median, Ulnar, Radial Nerve, Motor Intact to the Median, Ulnar, Radial Nerve and Pulses Intact    MUSCULOSKELETAL EXAMINATION:  Right hand/ring finger -   No obvious anatomical deformity  Skin is warm to the touch with no signs of erythema, ecchymosis, or infection  Palpable thickening of flexor tendon with palpable nodule local to A1 pulley  Reproducible triggering on exam  Flexor tendons aligned, no subluxation  Radial, median, and ulnar motor and sensory distribution intact  2+ distal radial pulse with brisk capillary refill to the fingers    _____________________________________________________  STUDIES REVIEWED:  No Studies to review      PROCEDURES PERFORMED:  Hand/upper extremity injection: R ring A1  Date/Time: 8/26/2020 10:17 AM  Consent given by: patient  Site marked: site marked  Supporting Documentation  Indications: diagnostic, tendon swelling and pain   Procedure Details  Condition:trigger finger Location: ring finger - R ring A1   Needle size: 25 G  Ultrasound guidance: no  Approach: volar  Medications administered: 0 5 mL lidocaine 1 %; 3 mg betamethasone acetate-betamethasone sodium phosphate 6 (3-3) mg/mL    Patient tolerance: patient tolerated the procedure well with no immediate complications  Dressing:  Sterile dressing applied               Scribe Attestation    I,:   Solomon De La Garza am acting as a scribe while in the presence of the attending physician :        I,:   Yael Becker MD personally performed the services described in this documentation    as scribed in my presence :          Scribe Attestation    I,:   Solomon De La Garza am acting as a scribe while in the presence of the attending physician :        I,:   Yael Becker MD personally performed the services described in this documentation    as scribed in my presence :

## 2020-09-22 DIAGNOSIS — R73.09 IMPAIRED GLUCOSE REGULATION: ICD-10-CM

## 2020-09-27 ENCOUNTER — TRANSCRIBE ORDERS (OUTPATIENT)
Dept: LAB | Facility: CLINIC | Age: 50
End: 2020-09-27

## 2020-09-27 ENCOUNTER — APPOINTMENT (OUTPATIENT)
Dept: LAB | Facility: CLINIC | Age: 50
End: 2020-09-27
Payer: COMMERCIAL

## 2020-09-27 DIAGNOSIS — E78.01 FAMILIAL HYPERCHOLESTEROLEMIA: Chronic | ICD-10-CM

## 2020-09-27 DIAGNOSIS — E11.9 CONTROLLED TYPE 2 DIABETES MELLITUS WITHOUT COMPLICATION, WITHOUT LONG-TERM CURRENT USE OF INSULIN (HCC): Chronic | ICD-10-CM

## 2020-09-27 DIAGNOSIS — I10 HYPERTENSION, UNSPECIFIED TYPE: Chronic | ICD-10-CM

## 2020-09-27 LAB
ALBUMIN SERPL BCP-MCNC: 3.3 G/DL (ref 3.5–5)
ALP SERPL-CCNC: 40 U/L (ref 46–116)
ALT SERPL W P-5'-P-CCNC: 45 U/L (ref 12–78)
ANION GAP SERPL CALCULATED.3IONS-SCNC: 8 MMOL/L (ref 4–13)
AST SERPL W P-5'-P-CCNC: 24 U/L (ref 5–45)
BILIRUB SERPL-MCNC: 0.45 MG/DL (ref 0.2–1)
BUN SERPL-MCNC: 10 MG/DL (ref 5–25)
CALCIUM ALBUM COR SERPL-MCNC: 9.3 MG/DL (ref 8.3–10.1)
CALCIUM SERPL-MCNC: 8.7 MG/DL (ref 8.3–10.1)
CHLORIDE SERPL-SCNC: 103 MMOL/L (ref 100–108)
CHOLEST SERPL-MCNC: 116 MG/DL (ref 50–200)
CO2 SERPL-SCNC: 26 MMOL/L (ref 21–32)
CREAT SERPL-MCNC: 0.66 MG/DL (ref 0.6–1.3)
EST. AVERAGE GLUCOSE BLD GHB EST-MCNC: 146 MG/DL
GFR SERPL CREATININE-BSD FRML MDRD: 105 ML/MIN/1.73SQ M
GLUCOSE P FAST SERPL-MCNC: 123 MG/DL (ref 65–99)
HBA1C MFR BLD: 6.7 %
HDLC SERPL-MCNC: 21 MG/DL
LDLC SERPL DIRECT ASSAY-MCNC: 69 MG/DL (ref 0–100)
POTASSIUM SERPL-SCNC: 4 MMOL/L (ref 3.5–5.3)
PROT SERPL-MCNC: 7 G/DL (ref 6.4–8.2)
SODIUM SERPL-SCNC: 137 MMOL/L (ref 136–145)
TRIGL SERPL-MCNC: 153 MG/DL

## 2020-09-27 PROCEDURE — 80061 LIPID PANEL: CPT

## 2020-09-27 PROCEDURE — 83036 HEMOGLOBIN GLYCOSYLATED A1C: CPT

## 2020-09-27 PROCEDURE — 80053 COMPREHEN METABOLIC PANEL: CPT

## 2020-09-27 PROCEDURE — 36415 COLL VENOUS BLD VENIPUNCTURE: CPT

## 2020-09-27 PROCEDURE — 83721 ASSAY OF BLOOD LIPOPROTEIN: CPT

## 2020-10-05 ENCOUNTER — OFFICE VISIT (OUTPATIENT)
Dept: INTERNAL MEDICINE CLINIC | Facility: CLINIC | Age: 50
End: 2020-10-05
Payer: COMMERCIAL

## 2020-10-05 VITALS
BODY MASS INDEX: 36.33 KG/M2 | SYSTOLIC BLOOD PRESSURE: 120 MMHG | WEIGHT: 212.8 LBS | DIASTOLIC BLOOD PRESSURE: 78 MMHG | HEIGHT: 64 IN | RESPIRATION RATE: 14 BRPM | HEART RATE: 72 BPM | TEMPERATURE: 98.5 F

## 2020-10-05 DIAGNOSIS — E11.9 CONTROLLED TYPE 2 DIABETES MELLITUS WITHOUT COMPLICATION, WITHOUT LONG-TERM CURRENT USE OF INSULIN (HCC): Chronic | ICD-10-CM

## 2020-10-05 DIAGNOSIS — I65.29 STENOSIS OF CAROTID ARTERY, UNSPECIFIED LATERALITY: Primary | ICD-10-CM

## 2020-10-05 DIAGNOSIS — Z23 ENCOUNTER FOR IMMUNIZATION: ICD-10-CM

## 2020-10-05 DIAGNOSIS — I10 HYPERTENSION, UNSPECIFIED TYPE: Chronic | ICD-10-CM

## 2020-10-05 DIAGNOSIS — E78.01 FAMILIAL HYPERCHOLESTEROLEMIA: Chronic | ICD-10-CM

## 2020-10-05 DIAGNOSIS — G47.33 OSA (OBSTRUCTIVE SLEEP APNEA): Chronic | ICD-10-CM

## 2020-10-05 DIAGNOSIS — R06.00 DYSPNEA, UNSPECIFIED TYPE: ICD-10-CM

## 2020-10-05 PROCEDURE — 99214 OFFICE O/P EST MOD 30 MIN: CPT | Performed by: INTERNAL MEDICINE

## 2020-10-05 PROCEDURE — 90471 IMMUNIZATION ADMIN: CPT

## 2020-10-05 PROCEDURE — 90682 RIV4 VACC RECOMBINANT DNA IM: CPT

## 2020-10-08 ENCOUNTER — TELEPHONE (OUTPATIENT)
Dept: INTERNAL MEDICINE CLINIC | Facility: CLINIC | Age: 50
End: 2020-10-08

## 2020-10-08 DIAGNOSIS — I65.29 STENOSIS OF CAROTID ARTERY, UNSPECIFIED LATERALITY: Primary | ICD-10-CM

## 2020-10-14 DIAGNOSIS — N92.6 IRREGULAR MENSES: Primary | ICD-10-CM

## 2020-10-18 DIAGNOSIS — E78.2 MIXED HYPERLIPIDEMIA: ICD-10-CM

## 2020-10-19 RX ORDER — ATORVASTATIN CALCIUM 10 MG/1
TABLET, FILM COATED ORAL
Qty: 90 TABLET | Refills: 0 | Status: SHIPPED | OUTPATIENT
Start: 2020-10-19 | End: 2021-01-29 | Stop reason: SDUPTHER

## 2020-11-10 ENCOUNTER — HOSPITAL ENCOUNTER (OUTPATIENT)
Dept: ULTRASOUND IMAGING | Facility: HOSPITAL | Age: 50
Discharge: HOME | End: 2020-11-10
Payer: COMMERCIAL

## 2020-11-10 DIAGNOSIS — N92.6 IRREGULAR MENSES: ICD-10-CM

## 2020-11-10 PROCEDURE — 76830 TRANSVAGINAL US NON-OB: CPT

## 2020-11-10 PROCEDURE — 76856 US EXAM PELVIC COMPLETE: CPT

## 2020-11-12 ENCOUNTER — TELEPHONE (OUTPATIENT)
Dept: OBGYN CLINIC | Facility: CLINIC | Age: 50
End: 2020-11-12

## 2020-11-22 DIAGNOSIS — Z78.9 TRAVEL WITHIN LAST 14 DAYS: Primary | ICD-10-CM

## 2020-11-23 DIAGNOSIS — Z78.9 TRAVEL WITHIN LAST 14 DAYS: ICD-10-CM

## 2020-11-23 PROCEDURE — U0003 INFECTIOUS AGENT DETECTION BY NUCLEIC ACID (DNA OR RNA); SEVERE ACUTE RESPIRATORY SYNDROME CORONAVIRUS 2 (SARS-COV-2) (CORONAVIRUS DISEASE [COVID-19]), AMPLIFIED PROBE TECHNIQUE, MAKING USE OF HIGH THROUGHPUT TECHNOLOGIES AS DESCRIBED BY CMS-2020-01-R: HCPCS | Performed by: OBSTETRICS & GYNECOLOGY

## 2020-11-24 LAB — SARS-COV-2 RNA SPEC QL NAA+PROBE: NOT DETECTED

## 2020-12-08 ENCOUNTER — HOSPITAL ENCOUNTER (OUTPATIENT)
Dept: NON INVASIVE DIAGNOSTICS | Facility: CLINIC | Age: 50
Discharge: HOME | End: 2020-12-08
Payer: COMMERCIAL

## 2020-12-08 DIAGNOSIS — R06.00 DYSPNEA, UNSPECIFIED TYPE: ICD-10-CM

## 2020-12-08 DIAGNOSIS — I65.29 STENOSIS OF CAROTID ARTERY, UNSPECIFIED LATERALITY: ICD-10-CM

## 2020-12-08 LAB
CHEST PAIN STATEMENT: NORMAL
MAX DIASTOLIC BP: 96 MMHG
MAX HEART RATE: 164 BPM
MAX PREDICTED HEART RATE: 172 BPM
MAX. SYSTOLIC BP: 220 MMHG
PROTOCOL NAME: NORMAL
REASON FOR TERMINATION: NORMAL
TARGET HR FORMULA: NORMAL
TEST INDICATION: NORMAL
TIME IN EXERCISE PHASE: NORMAL

## 2020-12-08 PROCEDURE — 93017 CV STRESS TEST TRACING ONLY: CPT

## 2020-12-08 PROCEDURE — 93018 CV STRESS TEST I&R ONLY: CPT | Performed by: INTERNAL MEDICINE

## 2020-12-08 PROCEDURE — 93880 EXTRACRANIAL BILAT STUDY: CPT

## 2020-12-08 PROCEDURE — 93016 CV STRESS TEST SUPVJ ONLY: CPT | Performed by: INTERNAL MEDICINE

## 2020-12-08 PROCEDURE — 93880 EXTRACRANIAL BILAT STUDY: CPT | Performed by: SURGERY

## 2020-12-22 ENCOUNTER — IMMUNIZATIONS (OUTPATIENT)
Dept: FAMILY MEDICINE CLINIC | Facility: HOSPITAL | Age: 50
End: 2020-12-22
Payer: COMMERCIAL

## 2020-12-22 DIAGNOSIS — Z23 ENCOUNTER FOR IMMUNIZATION: ICD-10-CM

## 2020-12-22 PROCEDURE — 91300 SARS-COV-2 / COVID-19 MRNA VACCINE (PFIZER-BIONTECH) 30 MCG: CPT

## 2020-12-22 PROCEDURE — 0001A SARS-COV-2 / COVID-19 MRNA VACCINE (PFIZER-BIONTECH) 30 MCG: CPT

## 2021-01-11 ENCOUNTER — OFFICE VISIT (OUTPATIENT)
Dept: INTERNAL MEDICINE CLINIC | Facility: CLINIC | Age: 51
End: 2021-01-11
Payer: COMMERCIAL

## 2021-01-11 VITALS
DIASTOLIC BLOOD PRESSURE: 76 MMHG | SYSTOLIC BLOOD PRESSURE: 128 MMHG | HEIGHT: 64 IN | BODY MASS INDEX: 36.88 KG/M2 | TEMPERATURE: 98.9 F | RESPIRATION RATE: 14 BRPM | WEIGHT: 216 LBS | HEART RATE: 72 BPM

## 2021-01-11 DIAGNOSIS — E78.01 FAMILIAL HYPERCHOLESTEROLEMIA: Chronic | ICD-10-CM

## 2021-01-11 DIAGNOSIS — E11.9 CONTROLLED TYPE 2 DIABETES MELLITUS WITHOUT COMPLICATION, WITHOUT LONG-TERM CURRENT USE OF INSULIN (HCC): Chronic | ICD-10-CM

## 2021-01-11 DIAGNOSIS — Z12.31 ENCOUNTER FOR SCREENING MAMMOGRAM FOR BREAST CANCER: Primary | ICD-10-CM

## 2021-01-11 DIAGNOSIS — I10 ESSENTIAL HYPERTENSION: Chronic | ICD-10-CM

## 2021-01-11 LAB — SL AMB POCT HEMOGLOBIN AIC: 6.6 (ref ?–6.5)

## 2021-01-11 PROCEDURE — 99213 OFFICE O/P EST LOW 20 MIN: CPT | Performed by: INTERNAL MEDICINE

## 2021-01-11 PROCEDURE — 83036 HEMOGLOBIN GLYCOSYLATED A1C: CPT | Performed by: INTERNAL MEDICINE

## 2021-01-11 NOTE — PROGRESS NOTES
BMI Counseling: Body mass index is 37 08 kg/m²  The BMI is above normal  Nutrition recommendations include decreasing portion sizes, encouraging healthy choices of fruits and vegetables and moderation in carbohydrate intake  Exercise recommendations include moderate physical activity 150 minutes/week  Assessment/Plan:  1  Health maintenance-patient will be completing COVID vaccine  2  Recent noted  breakthrough vaginal bleeding-had lower dose of her contraceptive vaginal ring-dose adjusted and symptoms improved-she knows that this remains an issue she will follow through with her gyn-ultrasound of the pelvis had been done showing endometrium measuring about 12 millimeter in thickness and echogenic and heterogeneous without hypervascularity, mildly lobular endometrial myometrial junction  As noted she is premenopausal   3  Diabetes mellitus-hemoglobin today in the office 6 6  She knows about annual ophthalmology exam   Urine for microalbumin negative  We had previously talked about a 2nd agent such as a  receptor agonist but she wants to intensify conservative therapy-she is hoping when her situation with her call schedule improves etcetera she can focus more on exercise weight loss etcetera  4  History of elevated liver enzymes-likely on the basis of fatty liver documented on CT scan  Hepatitis-B surface antibody consistent with prior vaccine  Hepatitis-C antibody negative  Patient states she had hepatitis a vaccine in the past  5   Subclinical carotid stenosis-less than 50% stenosis on the right-will need repeat study in 2-3 years which would be the  of -    All other problems as per note of 2020      MEDICAL REGIMEN:      Nuvaring, amlodipine 5 milligrams daily, losartan 25 milligrams daily in the evening-she uses half dose and nights when she is on call, Claritin 5 milligrams daily p r n , Singulair 10 milligrams daily, calcium 1 dose daily, Proventil inhaler as needed, metformin 2 grams daily using 500 milligram dosing, atorvastatin 10 milligrams daily, Lovaza 4 grams daily,APAP at 4-20 centimeters    Appointment in several months with prior chemistry profile cholesterol profile hemoglobin A1c urine for microalbumin    Addendum-patient has had vaccine x2 for COVID  [place] COVID testing on February 9th and was normal    Addendum- patient had a normal mammogram done in March 2021- [facility] CHART NEXT VISIT    Addendum -insurance plan requests which from Lovaza to Vascepa- also still on 4 grams daily of this  No problem-specific Assessment & Plan notes found for this encounter  Diagnoses and all orders for this visit:    Encounter for screening mammogram for breast cancer  -     Mammo screening bilateral w 3d & cad; Future    Controlled type 2 diabetes mellitus without complication, without long-term current use of insulin (HCC)  -     POCT hemoglobin A1c  -     Comprehensive metabolic panel; Future  -     Hemoglobin A1C; Future  -     Cholesterol, total; Future  -     LDL cholesterol, direct; Future  -     HDL cholesterol; Future  -     Triglycerides; Future  -     Microalbumin / creatinine urine ratio    Essential hypertension  -     Comprehensive metabolic panel; Future  -     Hemoglobin A1C; Future  -     Cholesterol, total; Future  -     LDL cholesterol, direct; Future  -     HDL cholesterol; Future  -     Triglycerides; Future  -     Microalbumin / creatinine urine ratio    Familial hypercholesterolemia  -     Comprehensive metabolic panel; Future  -     Hemoglobin A1C; Future  -     Cholesterol, total; Future  -     LDL cholesterol, direct; Future  -     HDL cholesterol; Future  -     Triglycerides; Future  -     Microalbumin / creatinine urine ratio          Subjective:      Patient ID: Jurgen Guadalupe is a 52 y o  female  She returns for A1c-value today in the office 6 6  Prior value of 6 7  She remains on current dose of metformin    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 and 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 able as much to exercise etcetera  She is hoping to do this in the future  A prior visit we talked about potential addition of a  receptor agonist-for now we will continue to monitor but may need to do that at next visit  She had screening vascular studies  Stress test normal other than resting hypertension although the patient has skipped her antihypertensive therapy that day  Carotid Doppler showed less than 50% stenosis on the right and none on the left without any significant subclavian disease  She needs aggressive control risk factors  Major risk factors are hypertension hyperlipidemia diabetes    In reference to her hypertension BP appears adequately controlled at present  Avoiding salt and decongestants  She does not use frequent nonsteroidals  She did receive part 1 of COVID vaccine will be receiving part 2  This patient denies any systemic symptoms  Specifically there has been no evidence of fever, night sweats, significant weight loss or significant decrease in appetite  There was an issue of some increased vaginal bleeding  She had been on less of a dose of contraceptive vaginal ring-this is been adjusted and her symptoms resolved  She had an ultrasound of her pelvis which showed a abnormal endometrium measuring about 12 millimeters of thickness, endometrium is echogenic and heterogeneous without hypervascularity, mildly lobular endometrial myometrial junction  She says symptoms are improved at present but if it remains an issue she will be seeing her gyn      This patient denies any systemic symptoms  Specifically there has been no evidence of fever, night sweats, significant weight loss or significant decrease in appetite              The following portions of the patient's history were reviewed and updated as appropriate: allergies, current medications, past family history, past medical history, past social history, past surgical history and problem list     Review of Systems   Constitutional: Negative  HENT: Negative  Respiratory: Negative  Cardiovascular: Negative  Gastrointestinal: Negative  Endocrine: Negative  Genitourinary: Negative  Musculoskeletal: Negative  Skin: Negative  Neurological: Negative  Hematological: Negative  Psychiatric/Behavioral: Negative  Objective:      Temp 98 9 °F (37 2 °C) (Oral)   Ht 5' 4" (1 626 m)   Wt 98 kg (216 lb)   BMI 37 08 kg/m²          Physical Exam  Vitals signs reviewed  Constitutional:       General: She is not in acute distress  Appearance: Normal appearance  She is not ill-appearing, toxic-appearing or diaphoretic  HENT:      Head: Normocephalic and atraumatic  Right Ear: Tympanic membrane, ear canal and external ear normal       Left Ear: Tympanic membrane, ear canal and external ear normal       Nose: Nose normal  No congestion or rhinorrhea  Mouth/Throat:      Mouth: Mucous membranes are moist       Pharynx: Oropharynx is clear  No oropharyngeal exudate or posterior oropharyngeal erythema  Eyes:      General: No scleral icterus  Right eye: No discharge  Left eye: No discharge  Extraocular Movements: Extraocular movements intact  Pupils: Pupils are equal, round, and reactive to light  Neck:      Musculoskeletal: Normal range of motion and neck supple  No neck rigidity or muscular tenderness  Vascular: No carotid bruit  Cardiovascular:      Rate and Rhythm: Normal rate and regular rhythm  Pulses: Normal pulses  Heart sounds: Normal heart sounds  No murmur  No friction rub  No gallop  Pulmonary:      Effort: Pulmonary effort is normal  No respiratory distress  Breath sounds: Normal breath sounds  No stridor  No wheezing, rhonchi or rales  Chest:      Chest wall: No tenderness  Abdominal:      General: Abdomen is flat   Bowel sounds are normal  There is no distension  Palpations: Abdomen is soft  There is no mass  Tenderness: There is no abdominal tenderness  There is no right CVA tenderness, left CVA tenderness, guarding or rebound  Hernia: No hernia is present  Musculoskeletal: Normal range of motion  General: No swelling, tenderness, deformity or signs of injury  Right lower leg: No edema  Left lower leg: No edema  Lymphadenopathy:      Cervical: No cervical adenopathy  Skin:     General: Skin is warm and dry  Coloration: Skin is not jaundiced or pale  Findings: No bruising, erythema, lesion or rash  Neurological:      General: No focal deficit present  Mental Status: She is alert and oriented to person, place, and time  Mental status is at baseline  Cranial Nerves: No cranial nerve deficit  Sensory: No sensory deficit  Motor: No weakness  Coordination: Coordination normal       Gait: Gait normal       Deep Tendon Reflexes: Reflexes normal    Psychiatric:         Mood and Affect: Mood normal          Behavior: Behavior normal          Thought Content:  Thought content normal          Judgment: Judgment normal

## 2021-01-12 ENCOUNTER — IMMUNIZATIONS (OUTPATIENT)
Dept: FAMILY MEDICINE CLINIC | Facility: HOSPITAL | Age: 51
End: 2021-01-12

## 2021-01-12 DIAGNOSIS — Z23 ENCOUNTER FOR IMMUNIZATION: ICD-10-CM

## 2021-01-12 PROCEDURE — 0002A SARS-COV-2 / COVID-19 MRNA VACCINE (PFIZER-BIONTECH) 30 MCG: CPT

## 2021-01-12 PROCEDURE — 91300 SARS-COV-2 / COVID-19 MRNA VACCINE (PFIZER-BIONTECH) 30 MCG: CPT

## 2021-01-25 ENCOUNTER — TELEPHONE (OUTPATIENT)
Dept: INTERNAL MEDICINE CLINIC | Facility: CLINIC | Age: 51
End: 2021-01-25

## 2021-01-25 DIAGNOSIS — Z20.822 CONTACT WITH AND (SUSPECTED) EXPOSURE TO COVID-19: Primary | ICD-10-CM

## 2021-01-25 NOTE — TELEPHONE ENCOUNTER
Katey Oreilly -that is fine - please put in an order for February 8thwith diagnosis of covid exposure

## 2021-01-25 NOTE — TELEPHONE ENCOUNTER
----- Message from Lokesh Owens sent at 1/23/2021  8:32 PM EST -----  Regarding: Non-Urgent Medical Question  Contact: [phone]  Could you please put in a covid swab for me to do on February 8? I will be returning from taking my daughter back to school    Thank you,  Lokesh Owens

## 2021-01-29 ENCOUNTER — DOCUMENTATION (OUTPATIENT)
Dept: LABOR AND DELIVERY | Facility: HOSPITAL | Age: 51
End: 2021-01-29

## 2021-01-29 DIAGNOSIS — E78.2 MIXED HYPERLIPIDEMIA: ICD-10-CM

## 2021-01-29 RX ORDER — ATORVASTATIN CALCIUM 10 MG/1
10 TABLET, FILM COATED ORAL DAILY
Qty: 90 TABLET | Refills: 3 | Status: SHIPPED | OUTPATIENT
Start: 2021-01-29 | End: 2021-08-13 | Stop reason: SDUPTHER

## 2021-02-08 DIAGNOSIS — Z20.822 CONTACT WITH AND (SUSPECTED) EXPOSURE TO COVID-19: ICD-10-CM

## 2021-02-08 PROCEDURE — U0003 INFECTIOUS AGENT DETECTION BY NUCLEIC ACID (DNA OR RNA); SEVERE ACUTE RESPIRATORY SYNDROME CORONAVIRUS 2 (SARS-COV-2) (CORONAVIRUS DISEASE [COVID-19]), AMPLIFIED PROBE TECHNIQUE, MAKING USE OF HIGH THROUGHPUT TECHNOLOGIES AS DESCRIBED BY CMS-2020-01-R: HCPCS | Performed by: INTERNAL MEDICINE

## 2021-02-08 PROCEDURE — U0005 INFEC AGEN DETEC AMPLI PROBE: HCPCS | Performed by: INTERNAL MEDICINE

## 2021-02-09 LAB — SARS-COV-2 RNA RESP QL NAA+PROBE: NEGATIVE

## 2021-02-10 ENCOUNTER — TELEPHONE (OUTPATIENT)
Dept: INTERNAL MEDICINE CLINIC | Facility: CLINIC | Age: 51
End: 2021-02-10

## 2021-03-03 DIAGNOSIS — Z30.015 ENCOUNTER FOR INITIAL PRESCRIPTION OF VAGINAL RING HORMONAL CONTRACEPTIVE: ICD-10-CM

## 2021-03-03 RX ORDER — ETONOGESTREL AND ETHINYL ESTRADIOL 11.7; 2.7 MG/1; MG/1
INSERT, EXTENDED RELEASE VAGINAL
Qty: 4 EACH | Refills: 3 | Status: SHIPPED | OUTPATIENT
Start: 2021-03-03 | End: 2021-03-08 | Stop reason: SDUPTHER

## 2021-03-08 DIAGNOSIS — Z30.015 ENCOUNTER FOR INITIAL PRESCRIPTION OF VAGINAL RING HORMONAL CONTRACEPTIVE: ICD-10-CM

## 2021-03-08 RX ORDER — ETONOGESTREL AND ETHINYL ESTRADIOL 11.7; 2.7 MG/1; MG/1
INSERT, EXTENDED RELEASE VAGINAL
Qty: 4 EACH | Refills: 3 | Status: SHIPPED | OUTPATIENT
Start: 2021-03-08 | End: 2021-12-09 | Stop reason: SDUPTHER

## 2021-03-27 ENCOUNTER — HOSPITAL ENCOUNTER (OUTPATIENT)
Dept: MAMMOGRAPHY | Facility: HOSPITAL | Age: 51
Discharge: HOME | End: 2021-03-27
Payer: COMMERCIAL

## 2021-03-27 VITALS — HEIGHT: 64 IN | BODY MASS INDEX: 36.37 KG/M2 | WEIGHT: 213 LBS

## 2021-03-27 DIAGNOSIS — Z12.31 ENCOUNTER FOR SCREENING MAMMOGRAM FOR BREAST CANCER: ICD-10-CM

## 2021-03-27 PROCEDURE — 77063 BREAST TOMOSYNTHESIS BI: CPT

## 2021-03-27 PROCEDURE — 77067 SCR MAMMO BI INCL CAD: CPT

## 2021-04-06 ENCOUNTER — DOCUMENTATION (OUTPATIENT)
Dept: OBGYN CLINIC | Facility: CLINIC | Age: 51
End: 2021-04-06

## 2021-04-06 DIAGNOSIS — I10 HYPERTENSION, UNSPECIFIED TYPE: ICD-10-CM

## 2021-04-06 RX ORDER — LOSARTAN POTASSIUM 25 MG/1
25 TABLET ORAL DAILY
Qty: 90 TABLET | Refills: 3 | Status: SHIPPED | OUTPATIENT
Start: 2021-04-06 | End: 2021-04-09 | Stop reason: SDUPTHER

## 2021-04-09 DIAGNOSIS — I10 HYPERTENSION, UNSPECIFIED TYPE: ICD-10-CM

## 2021-04-09 RX ORDER — LOSARTAN POTASSIUM 25 MG/1
25 TABLET ORAL DAILY
Qty: 90 TABLET | Refills: 3 | Status: SHIPPED | OUTPATIENT
Start: 2021-04-09 | End: 2022-04-26

## 2021-05-05 DIAGNOSIS — E78.1 HYPERTRIGLYCERIDEMIA: ICD-10-CM

## 2021-05-05 RX ORDER — OMEGA-3-ACID ETHYL ESTERS 1 G/1
1 CAPSULE, LIQUID FILLED ORAL 4 TIMES DAILY
Qty: 360 CAPSULE | Refills: 3 | Status: SHIPPED | OUTPATIENT
Start: 2021-05-05 | End: 2021-05-11

## 2021-05-11 DIAGNOSIS — E78.2 MIXED HYPERLIPIDEMIA: Primary | ICD-10-CM

## 2021-05-11 RX ORDER — ICOSAPENT ETHYL 1000 MG/1
1 CAPSULE ORAL 4 TIMES DAILY
Qty: 360 CAPSULE | Refills: 3 | Status: SHIPPED | OUTPATIENT
Start: 2021-05-11 | End: 2022-02-08 | Stop reason: SDUPTHER

## 2021-05-11 NOTE — TELEPHONE ENCOUNTER
PLEASE SIGN OFF ON NEW MEDICATION AND THEN UPDATE PT'S MEDICAL REGIMEN SO THAT IT REFLECTS THE CHANGE WE ARE MAKING DUE TO PT'S INSURANCE

## 2021-05-18 ENCOUNTER — APPOINTMENT (OUTPATIENT)
Dept: LAB | Facility: CLINIC | Age: 51
End: 2021-05-18
Payer: COMMERCIAL

## 2021-05-18 ENCOUNTER — TRANSCRIBE ORDERS (OUTPATIENT)
Dept: LAB | Facility: CLINIC | Age: 51
End: 2021-05-18

## 2021-05-18 DIAGNOSIS — E11.9 CONTROLLED TYPE 2 DIABETES MELLITUS WITHOUT COMPLICATION, WITHOUT LONG-TERM CURRENT USE OF INSULIN (HCC): Chronic | ICD-10-CM

## 2021-05-18 DIAGNOSIS — I10 ESSENTIAL HYPERTENSION: Chronic | ICD-10-CM

## 2021-05-18 DIAGNOSIS — E78.01 FAMILIAL HYPERCHOLESTEROLEMIA: Chronic | ICD-10-CM

## 2021-05-18 LAB
ALBUMIN SERPL BCP-MCNC: 3.4 G/DL (ref 3.5–5)
ALP SERPL-CCNC: 45 U/L (ref 46–116)
ALT SERPL W P-5'-P-CCNC: 53 U/L (ref 12–78)
ANION GAP SERPL CALCULATED.3IONS-SCNC: 10 MMOL/L (ref 4–13)
AST SERPL W P-5'-P-CCNC: 37 U/L (ref 5–45)
BILIRUB SERPL-MCNC: 0.62 MG/DL (ref 0.2–1)
BUN SERPL-MCNC: 10 MG/DL (ref 5–25)
CALCIUM ALBUM COR SERPL-MCNC: 9.2 MG/DL (ref 8.3–10.1)
CALCIUM SERPL-MCNC: 8.7 MG/DL (ref 8.3–10.1)
CHLORIDE SERPL-SCNC: 104 MMOL/L (ref 100–108)
CHOLEST SERPL-MCNC: 109 MG/DL (ref 50–200)
CO2 SERPL-SCNC: 26 MMOL/L (ref 21–32)
CREAT SERPL-MCNC: 0.71 MG/DL (ref 0.6–1.3)
CREAT UR-MCNC: 54.1 MG/DL
EST. AVERAGE GLUCOSE BLD GHB EST-MCNC: 146 MG/DL
GFR SERPL CREATININE-BSD FRML MDRD: 100 ML/MIN/1.73SQ M
GLUCOSE P FAST SERPL-MCNC: 139 MG/DL (ref 65–99)
HBA1C MFR BLD: 6.7 %
HDLC SERPL-MCNC: 23 MG/DL
LDLC SERPL DIRECT ASSAY-MCNC: 63 MG/DL (ref 0–100)
MICROALBUMIN UR-MCNC: 11.1 MG/L (ref 0–20)
MICROALBUMIN/CREAT 24H UR: 21 MG/G CREATININE (ref 0–30)
POTASSIUM SERPL-SCNC: 4 MMOL/L (ref 3.5–5.3)
PROT SERPL-MCNC: 7.2 G/DL (ref 6.4–8.2)
SODIUM SERPL-SCNC: 140 MMOL/L (ref 136–145)
TRIGL SERPL-MCNC: 183 MG/DL

## 2021-05-18 PROCEDURE — 83721 ASSAY OF BLOOD LIPOPROTEIN: CPT

## 2021-05-18 PROCEDURE — 80053 COMPREHEN METABOLIC PANEL: CPT

## 2021-05-18 PROCEDURE — 82570 ASSAY OF URINE CREATININE: CPT | Performed by: INTERNAL MEDICINE

## 2021-05-18 PROCEDURE — 82043 UR ALBUMIN QUANTITATIVE: CPT | Performed by: INTERNAL MEDICINE

## 2021-05-18 PROCEDURE — 83036 HEMOGLOBIN GLYCOSYLATED A1C: CPT

## 2021-05-18 PROCEDURE — 36415 COLL VENOUS BLD VENIPUNCTURE: CPT

## 2021-05-18 PROCEDURE — 80061 LIPID PANEL: CPT

## 2021-05-21 ENCOUNTER — OFFICE VISIT (OUTPATIENT)
Dept: INTERNAL MEDICINE CLINIC | Facility: CLINIC | Age: 51
End: 2021-05-21
Payer: COMMERCIAL

## 2021-05-21 VITALS
BODY MASS INDEX: 36.33 KG/M2 | RESPIRATION RATE: 14 BRPM | HEIGHT: 64 IN | WEIGHT: 212.8 LBS | SYSTOLIC BLOOD PRESSURE: 118 MMHG | HEART RATE: 72 BPM | DIASTOLIC BLOOD PRESSURE: 78 MMHG

## 2021-05-21 DIAGNOSIS — E78.01 FAMILIAL HYPERCHOLESTEROLEMIA: Primary | Chronic | ICD-10-CM

## 2021-05-21 DIAGNOSIS — E11.9 CONTROLLED TYPE 2 DIABETES MELLITUS WITHOUT COMPLICATION, WITHOUT LONG-TERM CURRENT USE OF INSULIN (HCC): Chronic | ICD-10-CM

## 2021-05-21 DIAGNOSIS — R74.8 ELEVATED LIVER ENZYMES: Chronic | ICD-10-CM

## 2021-05-21 DIAGNOSIS — I10 ESSENTIAL HYPERTENSION: Chronic | ICD-10-CM

## 2021-05-21 DIAGNOSIS — G47.33 OSA (OBSTRUCTIVE SLEEP APNEA): Chronic | ICD-10-CM

## 2021-05-21 PROCEDURE — 99214 OFFICE O/P EST MOD 30 MIN: CPT | Performed by: INTERNAL MEDICINE

## 2021-05-21 NOTE — PROGRESS NOTES
Assessment/Plan:    1  Health maintenance-overdue for colonoscopy the patient states she will hopefully be proceeding with this   2  Previously noted breakthrough vaginal bleeding- she had a lower dose of contraceptive vaginal ring  Dose adjusted and much improved  Ultrasound of the pelvis showed endometrium measuring 12 millimeters in thickness and echogenic and heterogeneous without hypervascularity, mildly lobular endometrial myometrial junction  She is premenopausal   Not an issue at present  3  Diabetes mellitus - hemoglobin A1c 6 7  She does but annual ophthalmology exam   Urine for microalbumin negative in order to get prior to next visit  Her schedule will be changing with assistance of other physicians in August   I told her if by next visit we are not improving we need to add a 2nd agent such as Trulicity or Ozempic as  receptor agonist could potentially help with her weight, diabetes, elevated triglycerides  4  History of elevated liver enzymes -likely the basis of fatty liver documented a CT scan  Hepatitis-B surface antibody positive consistent with prior vaccine  Hepatitis-C antibody negative  Patient states she had hepatitis a vaccine in the past   Liver enzymes prior to this visit normal  5  Subclinical carotid stenosis - less than 50% of the right -will need repeat study 2-3 years after her last which would be the  of -  6  Hypertension -adequate control on current regimen  Had cough with ACE-inhibitor  Had some edema social with amlodipine which is improved with addition of angiotensin receptor blocker  BP also improved with treatment of her sleep apnea -continuing current regimen  7  Hyperlipidemia with mixed dyslipidemia  Risk factors are hypertension diabetes  Not a smoker  She is on atorvastatin prescription fish oil  Triglycerides- HDL ratio elevated which will lower with weight loss etcetera    Could consider a fibrate but is noted we will be potentially proceeding with GL P 1 receptor agonist for treatment of her diabetes which would also help with this issue   8  General care-we talked about the addition of vitamin-D prophylactically for various reasons - she will use 2000 units a day   a  All other problems as per note of October 2020       MEDICAL REGIMEN:      Nuvaring,A a amlodipine 5 milligrams b i d , losartan 25 milligrams in the evening using 1/2 of a 50 milligram dose, Claritin 5 milligrams daily p r n , Singulair 10 milligrams daily, calcium 1 dose daily, vitamin D3 / 2000 units a day, Proventil inhaler as needed, metformin 2 grams daily using 1000 milligram dosing, atorvastatin 10 milligrams daily, Vascepa 4 grams daily, APAP at 4-20 centimeters     Appointment several months with prior chemistry profile cholesterol profile A1c urine for microalbumin  No problem-specific Assessment & Plan notes found for this encounter  Diagnoses and all orders for this visit:    Familial hypercholesterolemia  -     Comprehensive metabolic panel; Future  -     Hemoglobin A1C; Future  -     Triglycerides; Future  -     LDL cholesterol, direct; Future  -     HDL cholesterol; Future  -     Cholesterol, total; Future  -     Microalbumin / creatinine urine ratio; Future    Essential hypertension  -     Comprehensive metabolic panel; Future  -     Hemoglobin A1C; Future  -     Triglycerides; Future  -     LDL cholesterol, direct; Future  -     HDL cholesterol; Future  -     Cholesterol, total; Future  -     Microalbumin / creatinine urine ratio; Future    Controlled type 2 diabetes mellitus without complication, without long-term current use of insulin (HCC)  -     Comprehensive metabolic panel; Future  -     Hemoglobin A1C; Future  -     Triglycerides; Future  -     LDL cholesterol, direct; Future  -     HDL cholesterol; Future  -     Cholesterol, total; Future  -     Microalbumin / creatinine urine ratio;  Future    AUGUSTO (obstructive sleep apnea)    Elevated liver enzymes          Subjective:      Patient ID: Yasir Moreland is a 54 y o  female  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  She is on call frequently  She is not able to exercise like she should  A1c is stable at 6 7 liver enzymes are normal fasting sugar 139 BUN 10 with a creatinine of 0 71  She has significant elevation of her triglycerides - HDL ratio with a triglycerides 183 and HDL of 23  She is already on statin therapy with acceptable LDL and 4 grams of fish oil  Prescription plan recently switched her from Lovaza 2 [employer] of  Reviewed that elevated triglycerides- HDL ratio  Can lead to ongoing vascular disease  We talked about addition of  receptor agonist to help with her A1c and weight loss -elevated triglycerides  As of August she will have 2 new physicians helping her with her schedule wants to try another round of seeing how she does on current therapy    She has not had any further vaginal bleeding  She has known hypertension  BP adequately controlled on current regimen  Avoiding salt and decongestants She is not using frequent nonsteroidals  She has known sleep apnea remains on therapy with CPAP    Previously noted vaginal bleeding is not an issue with adjustment of her contraceptive vaginal ring dose  This patient denies any systemic symptoms  Specifically there has been no evidence of fever, night sweats, significant weight loss or significant decrease in appetite        Most recent labs reviewed in detail Results for orders placed or performed in visit on 21  -Comprehensive metabolic panel       Result                      Value             Ref Range           Sodium                      140               136 - 145 mm*       Potassium                   4 0               3 5 - 5 3 mm*       Chloride                    104               100 - 108 mm*       CO2                         26                21 - 32 mmol*       ANION GAP 10                4 - 13 mmol/L       BUN                         10                5 - 25 mg/dL        Creatinine                  0 71              0 60 - 1 30 *       Glucose, Fasting            139 (H)           65 - 99 mg/dL       Calcium                     8 7               8 3 - 10 1 m*       Corrected Calcium           9 2               8 3 - 10 1 m*       AST                         37                5 - 45 U/L          ALT                         53                12 - 78 U/L         Alkaline Phosphatase        45 (L)            46 - 116 U/L        Total Protein               7 2               6 4 - 8 2 g/*       Albumin                     3 4 (L)           3 5 - 5 0 g/*       Total Bilirubin             0 62              0 20 - 1 00 *       eGFR                        100               ml/min/1 73s*  -Hemoglobin A1C       Result                      Value             Ref Range           Hemoglobin A1C              6 7 (H)           Normal 3 8-5*       EAG                         146               mg/dl          -Cholesterol, total       Result                      Value             Ref Range           Cholesterol                 109               50 - 200 mg/*  -LDL cholesterol, direct       Result                      Value             Ref Range           LDL Direct                  63                0 - 100 mg/dl  -HDL cholesterol       Result                      Value             Ref Range           HDL, Direct                 23 (L)            >=40 mg/dL     -Triglycerides       Result                      Value             Ref Range           Triglycerides               183 (H)           <=150 mg/dL      She had a mammogram done in March which was benign  She has yet to proceed with colonoscopy knows she needs to do that  She mentioned that again today  As noted she had vaccination for COVID without issues  Review of Systems   Constitutional: Negative  HENT: Negative  Respiratory: Negative  Cardiovascular: Negative  Gastrointestinal: Negative  Endocrine: Negative  Genitourinary: Negative  Musculoskeletal: Negative  Skin: Negative  Neurological: Negative  Hematological: Negative  Psychiatric/Behavioral: Negative  Objective:      Ht 5' 4" (1 626 m)   Wt 96 5 kg (212 lb 12 8 oz)   BMI 36 53 kg/m²          Physical Exam  Vitals signs reviewed  Constitutional:       General: She is not in acute distress  Appearance: Normal appearance  She is obese  She is not ill-appearing, toxic-appearing or diaphoretic  HENT:      Head: Normocephalic and atraumatic  Right Ear: Ear canal and external ear normal       Left Ear: Ear canal and external ear normal       Nose: Nose normal  No congestion or rhinorrhea  Mouth/Throat:      Mouth: Mucous membranes are moist       Pharynx: Oropharynx is clear  No oropharyngeal exudate or posterior oropharyngeal erythema  Eyes:      General: No scleral icterus  Right eye: No discharge  Left eye: No discharge  Extraocular Movements: Extraocular movements intact  Pupils: Pupils are equal, round, and reactive to light  Neck:      Musculoskeletal: Normal range of motion and neck supple  No neck rigidity or muscular tenderness  Vascular: No carotid bruit  Cardiovascular:      Rate and Rhythm: Normal rate and regular rhythm  Pulses: Normal pulses  Heart sounds: Normal heart sounds  No murmur  No friction rub  No gallop  Pulmonary:      Effort: Pulmonary effort is normal  No respiratory distress  Breath sounds: Normal breath sounds  No stridor  No wheezing, rhonchi or rales  Chest:      Chest wall: No tenderness  Abdominal:      General: Abdomen is flat  Bowel sounds are normal  There is no distension  Palpations: Abdomen is soft  There is no mass  Tenderness: There is no abdominal tenderness   There is no right CVA tenderness, left CVA tenderness, guarding or rebound  Hernia: No hernia is present  Musculoskeletal: Normal range of motion  General: No swelling, tenderness, deformity or signs of injury  Right lower leg: No edema  Left lower leg: No edema  Lymphadenopathy:      Cervical: No cervical adenopathy  Skin:     General: Skin is warm and dry  Coloration: Skin is not jaundiced or pale  Findings: No bruising, erythema, lesion or rash  Comments: Benign nevi   Neurological:      General: No focal deficit present  Mental Status: She is alert and oriented to person, place, and time  Mental status is at baseline  Cranial Nerves: No cranial nerve deficit  Sensory: No sensory deficit  Motor: No weakness  Coordination: Coordination normal       Gait: Gait normal       Deep Tendon Reflexes: Reflexes normal    Psychiatric:         Mood and Affect: Mood normal          Behavior: Behavior normal          Thought Content:  Thought content normal          Judgment: Judgment normal

## 2021-05-24 DIAGNOSIS — J45.909 CHRONIC ASTHMA, UNSPECIFIED ASTHMA SEVERITY, UNSPECIFIED WHETHER COMPLICATED, UNSPECIFIED WHETHER PERSISTENT: Chronic | ICD-10-CM

## 2021-05-24 DIAGNOSIS — I10 HYPERTENSION, UNSPECIFIED TYPE: ICD-10-CM

## 2021-05-24 RX ORDER — AMLODIPINE BESYLATE 5 MG/1
5 TABLET ORAL 2 TIMES DAILY
Qty: 180 TABLET | Refills: 3 | Status: SHIPPED | OUTPATIENT
Start: 2021-05-24 | End: 2022-02-08 | Stop reason: SDUPTHER

## 2021-05-24 RX ORDER — MONTELUKAST SODIUM 10 MG/1
10 TABLET ORAL
Qty: 90 TABLET | Refills: 3 | Status: SHIPPED | OUTPATIENT
Start: 2021-05-24 | End: 2022-02-08 | Stop reason: SDUPTHER

## 2021-06-29 ENCOUNTER — OFFICE VISIT (OUTPATIENT)
Dept: SLEEP CENTER | Facility: CLINIC | Age: 51
End: 2021-06-29
Payer: COMMERCIAL

## 2021-06-29 VITALS
SYSTOLIC BLOOD PRESSURE: 124 MMHG | HEIGHT: 64 IN | HEART RATE: 87 BPM | DIASTOLIC BLOOD PRESSURE: 82 MMHG | BODY MASS INDEX: 36.37 KG/M2 | WEIGHT: 213 LBS

## 2021-06-29 DIAGNOSIS — G47.33 OSA (OBSTRUCTIVE SLEEP APNEA): Primary | ICD-10-CM

## 2021-06-29 PROCEDURE — 99213 OFFICE O/P EST LOW 20 MIN: CPT | Performed by: INTERNAL MEDICINE

## 2021-06-29 NOTE — PROGRESS NOTES
Progress Note - Sleep Center   Vidhi Santiago OCV:[id number] MRN: [id number]      Reason for Visit:  54 y  o female here for annual follow-up    Assessment:  Doing well on current therapy of APAP 4-20  cm for mild AUGUSTO (MARYA=6 0)  Plan:  Continue same  The patient will purchase a travel CPAP for her nights on call  Follow up: One year    History of Present Illness:  History of AUGUSTO on PAP therapy  Fully compliant and deriving benefit  Review of Systems      Genitourinary hot flashes at night   Cardiology none   Gastrointestinal none   Neurology none   Constitutional none   Integumentary none   Psychiatry none   Musculoskeletal none   Pulmonary snoring   ENT none   Endocrine none   Hematological none         I have reviewed and updated the review of systems as necessary      Historical Information    Past Medical History:   Past Medical History:   Diagnosis Date    Hypertension     patient reports    PCOS (polycystic ovarian syndrome)          Past Surgical History:   Past Surgical History:   Procedure Laterality Date    TONSILLECTOMY      last assessed: 5/3/16       Social History:   Social History     Socioeconomic History    Marital status: /Civil Union     Spouse name: None    Number of children: None    Years of education: None    Highest education level: None   Occupational History    None   Tobacco Use    Smoking status: Never Smoker    Smokeless tobacco: Never Used   Substance and Sexual Activity    Alcohol use: No    Drug use: No    Sexual activity: None   Other Topics Concern    None   Social History Narrative    None     Social Determinants of Health     Financial Resource Strain:     Difficulty of Paying Living Expenses:    Food Insecurity:     Worried About Running Out of Food in the Last Year:     Ran Out of Food in the Last Year:    Transportation Needs:     Lack of Transportation (Medical):      Lack of Transportation (Non-Medical):    Physical Activity:     Days of Exercise per Week:     Minutes of Exercise per Session:    Stress:     Feeling of Stress :    Social Connections:     Frequency of Communication with Friends and Family:     Frequency of Social Gatherings with Friends and Family:     Attends Hinduism Services:     Active Member of Clubs or Organizations:     Attends Club or Organization Meetings:     Marital Status:    Intimate Partner Violence:     Fear of Current or Ex-Partner:     Emotionally Abused:     Physically Abused:     Sexually Abused:        Family History:   Family History   Problem Relation Age of Onset    Hypertension Mother     Hypertension Father     Other Father         low serum HDL    Hypothyroidism Sister     Breast cancer Maternal Grandmother 61    No Known Problems Daughter     No Known Problems Son     No Known Problems Maternal Grandfather     No Known Problems Paternal Grandmother     No Known Problems Paternal Grandfather     No Known Problems Paternal Aunt     No Known Problems Paternal Aunt     No Known Problems Paternal Aunt        Medications/Allergies:      Current Outpatient Medications:     amLODIPine (NORVASC) 5 mg tablet, Take 1 tablet (5 mg total) by mouth 2 (two) times a day, Disp: 180 tablet, Rfl: 3    atorvastatin (LIPITOR) 10 mg tablet, Take 1 tablet (10 mg total) by mouth daily, Disp: 90 tablet, Rfl: 3    Calcium-Magnesium-Vitamin D ER (Calcium 1200+D3) 600- MG-MG-UNIT TB24, Take 1 tablet by mouth daily, Disp: , Rfl:     etonogestrel-ethinyl estradiol (NUVARING) 0 12-0 015 MG/24HR vaginal ring, Insert vaginally and leave in place for 3 consecutive weeks, then place new ring, no withdrawal bleed, Disp: 4 each, Rfl: 3    Icosapent Ethyl (Vascepa) 1 g CAPS, Take 1 capsule (1 g total) by mouth 4 (four) times a day, Disp: 360 capsule, Rfl: 3    losartan (COZAAR) 25 mg tablet, Take 1 tablet (25 mg total) by mouth daily, Disp: 90 tablet, Rfl: 03    metFORMIN (GLUCOPHAGE) 1000 MG tablet, Take 1 tablet (1,000 mg total) by mouth every 12 (twelve) hours, Disp: 180 tablet, Rfl: 3    montelukast (SINGULAIR) 10 mg tablet, Take 1 tablet (10 mg total) by mouth daily at bedtime, Disp: 90 tablet, Rfl: 3    triamcinolone (KENALOG) 0 1 % ointment, Apply topically, Disp: , Rfl:     Tranexamic Acid (Lysteda) 650 MG TABS, Take 2 tablets by mouth 3 (three) times a day PRN for heavy flow (Patient not taking: Reported on 6/29/2021), Disp: 30 tablet, Rfl: 3          Objective      Vital Signs:   Vitals:    06/29/21 0926   BP: 124/82   Pulse: 87     [place] Sleepiness Scale: Total score: 4        Physical Exam:    General: Alert, appropriate, cooperative, overweight    Head: NC/AT    Skin: Warm, dry    Neuro: No motor abnormalities, cranial nerves appear intact    Extremity: No clubbing, cyanosis      DME Provider: [facility]        Counseling / Coordination of Care   I have spent  15 minutes with the patient today in which greater than 50% of this time was spent in counseling/coordination of care regarding: equipment and compliance  We discussed the risks and benefits of Respironics Dream Station use in light of the recent report of potentially toxic substances released from the foam in the machine  Board Certified Sleep Specialist    Portions of the record may have been created with voice recognition software  Occasional wrong word or "sound a like" substitutions may have occurred due to the inherent limitations of voice recognition software  Read the chart carefully and recognize, using context, where substitutions have occurred

## 2021-08-01 ENCOUNTER — DOCUMENTATION (OUTPATIENT)
Dept: OBGYN CLINIC | Facility: CLINIC | Age: 51
End: 2021-08-01

## 2021-08-01 DIAGNOSIS — R73.09 IMPAIRED GLUCOSE REGULATION: ICD-10-CM

## 2021-09-17 ENCOUNTER — APPOINTMENT (OUTPATIENT)
Dept: LAB | Facility: CLINIC | Age: 51
End: 2021-09-17
Payer: COMMERCIAL

## 2021-09-17 DIAGNOSIS — E78.01 FAMILIAL HYPERCHOLESTEROLEMIA: Chronic | ICD-10-CM

## 2021-09-17 DIAGNOSIS — E11.9 CONTROLLED TYPE 2 DIABETES MELLITUS WITHOUT COMPLICATION, WITHOUT LONG-TERM CURRENT USE OF INSULIN (HCC): Chronic | ICD-10-CM

## 2021-09-17 DIAGNOSIS — I10 ESSENTIAL HYPERTENSION: Chronic | ICD-10-CM

## 2021-09-17 LAB
ALBUMIN SERPL BCP-MCNC: 3.9 G/DL (ref 3.5–5)
ALP SERPL-CCNC: 42 U/L (ref 46–116)
ALT SERPL W P-5'-P-CCNC: 52 U/L (ref 12–78)
ANION GAP SERPL CALCULATED.3IONS-SCNC: 10 MMOL/L (ref 4–13)
AST SERPL W P-5'-P-CCNC: 35 U/L (ref 5–45)
BILIRUB SERPL-MCNC: 0.5 MG/DL (ref 0.2–1)
BUN SERPL-MCNC: 13 MG/DL (ref 5–25)
CALCIUM SERPL-MCNC: 9 MG/DL (ref 8.3–10.1)
CHLORIDE SERPL-SCNC: 105 MMOL/L (ref 100–108)
CHOLEST SERPL-MCNC: 107 MG/DL (ref 50–200)
CO2 SERPL-SCNC: 27 MMOL/L (ref 21–32)
CREAT SERPL-MCNC: 0.78 MG/DL (ref 0.6–1.3)
CREAT UR-MCNC: 137 MG/DL
EST. AVERAGE GLUCOSE BLD GHB EST-MCNC: 134 MG/DL
GFR SERPL CREATININE-BSD FRML MDRD: 90 ML/MIN/1.73SQ M
GLUCOSE P FAST SERPL-MCNC: 123 MG/DL (ref 65–99)
HBA1C MFR BLD: 6.3 %
HDLC SERPL-MCNC: 21 MG/DL
LDLC SERPL DIRECT ASSAY-MCNC: 53 MG/DL (ref 0–100)
MICROALBUMIN UR-MCNC: 79.3 MG/L (ref 0–20)
MICROALBUMIN/CREAT 24H UR: 58 MG/G CREATININE (ref 0–30)
POTASSIUM SERPL-SCNC: 4.4 MMOL/L (ref 3.5–5.3)
PROT SERPL-MCNC: 7.4 G/DL (ref 6.4–8.2)
SODIUM SERPL-SCNC: 142 MMOL/L (ref 136–145)
TRIGL SERPL-MCNC: 152 MG/DL

## 2021-09-17 PROCEDURE — 80061 LIPID PANEL: CPT

## 2021-09-17 PROCEDURE — 82570 ASSAY OF URINE CREATININE: CPT

## 2021-09-17 PROCEDURE — 83036 HEMOGLOBIN GLYCOSYLATED A1C: CPT

## 2021-09-17 PROCEDURE — 80053 COMPREHEN METABOLIC PANEL: CPT

## 2021-09-17 PROCEDURE — 36415 COLL VENOUS BLD VENIPUNCTURE: CPT

## 2021-09-17 PROCEDURE — 82043 UR ALBUMIN QUANTITATIVE: CPT

## 2021-09-17 PROCEDURE — 83721 ASSAY OF BLOOD LIPOPROTEIN: CPT

## 2021-09-24 ENCOUNTER — OFFICE VISIT (OUTPATIENT)
Dept: INTERNAL MEDICINE CLINIC | Facility: CLINIC | Age: 51
End: 2021-09-24
Payer: COMMERCIAL

## 2021-09-24 VITALS
WEIGHT: 207.4 LBS | SYSTOLIC BLOOD PRESSURE: 128 MMHG | DIASTOLIC BLOOD PRESSURE: 72 MMHG | BODY MASS INDEX: 35.6 KG/M2 | RESPIRATION RATE: 12 BRPM | HEART RATE: 68 BPM

## 2021-09-24 DIAGNOSIS — I10 ESSENTIAL HYPERTENSION: Primary | Chronic | ICD-10-CM

## 2021-09-24 DIAGNOSIS — E11.9 CONTROLLED TYPE 2 DIABETES MELLITUS WITHOUT COMPLICATION, WITHOUT LONG-TERM CURRENT USE OF INSULIN (HCC): Chronic | ICD-10-CM

## 2021-09-24 DIAGNOSIS — E78.01 FAMILIAL HYPERCHOLESTEROLEMIA: Chronic | ICD-10-CM

## 2021-09-24 DIAGNOSIS — G47.33 OSA (OBSTRUCTIVE SLEEP APNEA): Chronic | ICD-10-CM

## 2021-09-24 DIAGNOSIS — K76.0 FATTY LIVER: Chronic | ICD-10-CM

## 2021-09-24 PROCEDURE — 99214 OFFICE O/P EST MOD 30 MIN: CPT | Performed by: INTERNAL MEDICINE

## 2021-09-24 NOTE — PROGRESS NOTES
Assessment/Plan:  1  Health maintenance -patient will be receiving influenza vaccine at her place of appointment  2  Health maintenance -reviewed the literature regarding COVID booster  3  Health maintenance-overdue for colonoscopy and knows she should proceed with this   4  Previously noted breakthrough vaginal bleeding -had a lower dose of contraceptive vaginal ring  Dose adjusted and much improved  Ultrasound of the pelvis showed endometrium measuring 12 millimeters in thickness and echogenic and heterogeneous without hypervascularity duct, mildly lobular endometrial myometrial junction  She is premenopausal   Not an issue at present  5 diabetes mellitus - hemoglobin A1c improved to  6 2  She has eye annual ophthalmology exam   Now with mild microalbuminuria  We again talked about addition of  receptor agonist to aid with this as well as her weight as well as her hyperlipidemia which is very much weight related  She will be reviewing literature on that and make a decision by next visit pending how well she is able to intensify other conservative measures  6  Mixed hyperlipidemia - on atorvastatin prescription fish oil  Triglycerides - HDL ratio still remained significantly   Could consider a fibrate but as noted also consider   receptor agonist   For repeat prior to next visit  7  Subclinical carotid stenosis - less than 50% of the right  Will need repeat studies 3 years after last which would be due in year   8  History of elevated liver enzymes -likely the basis of fatty liver documented on prior imaging  Hepatitis-B surface antibody positive consistent with prior vaccine  Hepatitis-C antibody negative  Patient states she had hepatitis a vaccine in the past   Liver enzymes prior to this visit normal   For repeat prior to next visit    I also reviewed with her literature regarding ultrasound elastase studies and this could be performed in the future should her enzymes again be climbing with potential development of GRIDER  9  Obstructive sleep apnea -mild -recently saw pulmonary in continues on APAP -4-20 centimeters  10  Hypertension -essential had cough with an ACE-inhibitor  But some edema social with amlodipine which was improved with addition of angiotensin receptor blocker  She notes fatigue on higher dose of losartan  11  Microalbuminuria- hope to see improvement with weight loss and continued control of her diabetes  Could consider increasing dose of losartan although as noted she felt fatigue on higher dose previously  12  Flexor tendinitis of the finger -much improved post injection-monitor  13  Arthralgias - noted in the 2nd MCP bilaterally  MTP bilaterally  Prior x-ray showed no bony lytic disease  Antinuclear antibody, rheumatoid factor normal   Lyme titer negative  Anti CCP antibody negative  Anti SSA and SSB all normal   No issues with large joints other than DJD of her left hip  Has some elevation of her sed rate and CRP but this may be influenced by fatty liver  Has known psoriasis in this could always represent psoriatic joint disease -at this  Point monitoring  14  Previously noted diffuse rash -dermatology felt some component of latex allergy -also felt to have some component of psoriasis  15  Allergies with mild asthma-uses Claritin intermittently in Proventil inhaler intermittently - on Singulair  16   Carpal tunnel syndrome -monitor        Medical regimen  Nuvaring, Amlodipine 5 milligrams b i d , losartan 25 milligrams in the evening using 1/2 of the 50 milligram dose, Claritin 5 milligrams daily p r n , Singulair 10 milligrams daily, calcium 1 dose daily, vitamin D3 / 2000 units a day, Proventil inhaler as needed, metformin 2 grams daily using 1000 milligram dosing, atorvastatin 10 milligrams daily, Vascepa  4 grams daily, APAP at 4-20 centimeters     Appointment several months prior chemistry profile cholesterol profile A1c urine for microalbumin CBC Addendum- patient called on October the 19th that she is having intermittent GI symptoms with diarrhea 8- this is likely related to her metformin  She wants to proceed with GLP1 receptor agonist -at this point we will decrease her metformin from 2 grams a day down to 1 gram a day -she will call back in 2 weeks regarding GI status -stable then we will then proceed with adding GLP! Addendum- patient contacted the office on  the GI symptoms are improved- she will continue low dose of metformin we will now add  receptor agonist-will initially try for Trulicity 5 06 milligrams subQ once weekly - pending her prescription plan we may have to switch to Ozempic    Addendum- patient was in touch with the office on a -GI symptoms are not tolerable with fasting sugar the 120+ range- she will continue metformin 1 gram daily and increase her Trulicity to 1 5 milligrams subcu weekly using the 1 5 milligram/ 0 5 milliliters dose     No problem-specific Assessment & Plan notes found for this encounter  There are no diagnoses linked to this encounter  Subjective:      Patient ID: Madyson Pollock is a 54 y o  female  We reviewed several issues  She will be obtaining influenza vaccine  We reviewed the current literature regarding booster for COVID vaccine  She saw Dr Kingsley Estrada follow-up and remains on APAP at 4-20 centimeters for her mild obstructive sleep apnea  Her A1c has dropped from 6 7 down to 6 2  She is trying to exercise more  She remains overweight with significant abnormalities of the triglycerides - HDL axis  She also has a history of fatty liver  We talked about additional therapy including  receptor agonist   We talked about these previously  This would help facilitate weight loss etcetera  We reviewed occasional GI side effects  This point she wants to try 1 distal time see how she does on her current regimen    If she is unimproved she is willing to consider these  I also gave her information on SGLT inhibitors  She has some associated microalbuminuria  It is hoped that further control of her diabetes with weight loss will help this  She is on low-dose angiotensin receptor blocker  This point she prefers not increase that but we will continue to monitor and make a decision pending her overall clinical course    She has known hypertension  BP adequately controlled on current regimen  Avoiding salt and decongestants  Not using frequent nonsteroidals    She is a practicing physician    She feels her work schedule is slightly lighter in hopes to increase conservative therapy for treatment of her weight, diabetes etcetera  Results for orders placed or performed in visit on 09/17/21  -Comprehensive metabolic panel       Result                      Value             Ref Range           Sodium                      142               136 - 145 mm*       Potassium                   4 4               3 5 - 5 3 mm*       Chloride                    105               100 - 108 mm*       CO2                         27                21 - 32 mmol*       ANION GAP                   10                4 - 13 mmol/L       BUN                         13                5 - 25 mg/dL        Creatinine                  0 78              0 60 - 1 30 *       Glucose, Fasting            123 (H)           65 - 99 mg/dL       Calcium                     9 0               8 3 - 10 1 m*       AST                         35                5 - 45 U/L          ALT                         52                12 - 78 U/L         Alkaline Phosphatase        42 (L)            46 - 116 U/L        Total Protein               7 4               6 4 - 8 2 g/*       Albumin                     3 9               3 5 - 5 0 g/*       Total Bilirubin             0 50              0 20 - 1 00 *       eGFR                        90                ml/min/1 73s*  -Hemoglobin A1C       Result Value             Ref Range           Hemoglobin A1C              6 3 (H)           Normal 3 8-5*       EAG                         134               mg/dl          -Triglycerides       Result                      Value             Ref Range           Triglycerides               152 (H)           <=150 mg/dL    -LDL cholesterol, direct       Result                      Value             Ref Range           LDL Direct                  53                0 - 100 mg/dl  -HDL cholesterol       Result                      Value             Ref Range           HDL, Direct                 21 (L)            >=40 mg/dL     -Cholesterol, total       Result                      Value             Ref Range           Cholesterol                 107               50 - 200 mg/*  -Microalbumin / creatinine urine ratio       Result                      Value             Ref Range           Creatinine, Ur              137 0             mg/dL               Microalbum  ,U,Random        79 3 (H)          0 0 - 20 0 m*       Microalb Creat Ratio        58 (H)            0 - 30 mg/g *      This patient denies any systemic symptoms  Specifically there has been no evidence of fever, night sweats, significant weight loss or significant decrease in appetite  The following portions of the patient's history were reviewed and updated as appropriate: allergies, current medications, past family history, past medical history, past social history, past surgical history and problem list     Review of Systems   Constitutional: Negative  HENT: Negative  Respiratory: Negative  Cardiovascular: Negative  Gastrointestinal: Negative  Endocrine: Negative  Genitourinary: Negative  Musculoskeletal: Negative  Skin: Negative  Neurological: Negative  Hematological: Negative  Psychiatric/Behavioral: Negative  Objective: There were no vitals taken for this visit  Physical Exam  Vitals reviewed  Constitutional:       General: She is not in acute distress  Appearance: Normal appearance  She is obese  She is not ill-appearing, toxic-appearing or diaphoretic  HENT:      Head: Normocephalic and atraumatic  Right Ear: Tympanic membrane, ear canal and external ear normal       Left Ear: Tympanic membrane, ear canal and external ear normal       Nose: Nose normal  No congestion or rhinorrhea  Mouth/Throat:      Mouth: Mucous membranes are moist       Pharynx: Oropharynx is clear  No oropharyngeal exudate or posterior oropharyngeal erythema  Eyes:      General: No scleral icterus  Right eye: No discharge  Left eye: No discharge  Extraocular Movements: Extraocular movements intact  Conjunctiva/sclera: Conjunctivae normal       Pupils: Pupils are equal, round, and reactive to light  Neck:      Vascular: No carotid bruit  Cardiovascular:      Rate and Rhythm: Normal rate and regular rhythm  Pulses: Normal pulses  Heart sounds: Normal heart sounds  Pulmonary:      Effort: Pulmonary effort is normal  No respiratory distress  Breath sounds: Normal breath sounds  No stridor  No wheezing, rhonchi or rales  Chest:      Chest wall: No tenderness  Abdominal:      General: Abdomen is flat  Bowel sounds are normal  There is no distension  Palpations: Abdomen is soft  There is no mass  Tenderness: There is no abdominal tenderness  There is no right CVA tenderness, left CVA tenderness, guarding or rebound  Hernia: No hernia is present  Musculoskeletal:         General: No swelling, tenderness, deformity or signs of injury  Normal range of motion  Cervical back: Normal range of motion and neck supple  No rigidity  No muscular tenderness  Right lower leg: No edema  Left lower leg: No edema  Comments: No active synovitis   Lymphadenopathy:      Cervical: No cervical adenopathy  Skin:     General: Skin is warm  Coloration: Skin is not jaundiced or pale  Findings: No bruising, erythema, lesion or rash  Neurological:      General: No focal deficit present  Mental Status: She is alert and oriented to person, place, and time  Mental status is at baseline  Cranial Nerves: No cranial nerve deficit  Sensory: No sensory deficit  Motor: No weakness  Coordination: Coordination normal       Gait: Gait normal       Deep Tendon Reflexes: Reflexes normal    Psychiatric:         Mood and Affect: Mood normal          Behavior: Behavior normal          Thought Content:  Thought content normal

## 2021-10-19 ENCOUNTER — TELEPHONE (OUTPATIENT)
Dept: INTERNAL MEDICINE CLINIC | Facility: CLINIC | Age: 51
End: 2021-10-19

## 2021-10-28 ENCOUNTER — TELEPHONE (OUTPATIENT)
Dept: INTERNAL MEDICINE CLINIC | Facility: CLINIC | Age: 51
End: 2021-10-28

## 2021-10-28 DIAGNOSIS — E11.9 CONTROLLED TYPE 2 DIABETES MELLITUS WITHOUT COMPLICATION, WITHOUT LONG-TERM CURRENT USE OF INSULIN (HCC): Primary | ICD-10-CM

## 2021-10-28 RX ORDER — DULAGLUTIDE 0.75 MG/.5ML
0.75 INJECTION, SOLUTION SUBCUTANEOUS WEEKLY
Qty: 7 ML | Refills: 3 | Status: SHIPPED | OUTPATIENT
Start: 2021-10-28 | End: 2021-11-03

## 2021-11-03 RX ORDER — DULAGLUTIDE 0.75 MG/.5ML
0.75 INJECTION, SOLUTION SUBCUTANEOUS WEEKLY
Qty: 7 ML | Refills: 3 | Status: SHIPPED | OUTPATIENT
Start: 2021-11-03 | End: 2021-12-15 | Stop reason: SDUPTHER

## 2021-12-14 ENCOUNTER — TELEPHONE (OUTPATIENT)
Dept: INTERNAL MEDICINE CLINIC | Facility: CLINIC | Age: 51
End: 2021-12-14

## 2021-12-14 DIAGNOSIS — E11.9 CONTROLLED TYPE 2 DIABETES MELLITUS WITHOUT COMPLICATION, WITHOUT LONG-TERM CURRENT USE OF INSULIN (HCC): Primary | ICD-10-CM

## 2021-12-15 RX ORDER — DULAGLUTIDE 1.5 MG/.5ML
INJECTION, SOLUTION SUBCUTANEOUS
Qty: 3 ML | Refills: 3 | Status: SHIPPED | OUTPATIENT
Start: 2021-12-15 | End: 2022-02-08 | Stop reason: SDUPTHER

## 2022-01-04 ENCOUNTER — IMMUNIZATIONS (OUTPATIENT)
Dept: FAMILY MEDICINE CLINIC | Facility: HOSPITAL | Age: 52
End: 2022-01-04

## 2022-01-04 DIAGNOSIS — Z23 ENCOUNTER FOR IMMUNIZATION: Primary | ICD-10-CM

## 2022-01-04 PROCEDURE — 0064A COVID-19 MODERNA VACC 0.25 ML BOOSTER: CPT

## 2022-01-04 PROCEDURE — 91306 COVID-19 MODERNA VACC 0.25 ML BOOSTER: CPT

## 2022-02-03 ENCOUNTER — APPOINTMENT (OUTPATIENT)
Dept: LAB | Facility: CLINIC | Age: 52
End: 2022-02-03
Payer: COMMERCIAL

## 2022-02-03 DIAGNOSIS — I10 ESSENTIAL HYPERTENSION: Chronic | ICD-10-CM

## 2022-02-03 DIAGNOSIS — E11.9 CONTROLLED TYPE 2 DIABETES MELLITUS WITHOUT COMPLICATION, WITHOUT LONG-TERM CURRENT USE OF INSULIN (HCC): Chronic | ICD-10-CM

## 2022-02-03 DIAGNOSIS — E78.01 FAMILIAL HYPERCHOLESTEROLEMIA: Chronic | ICD-10-CM

## 2022-02-03 LAB
ALBUMIN SERPL BCP-MCNC: 3.6 G/DL (ref 3.5–5)
ALP SERPL-CCNC: 37 U/L (ref 46–116)
ALT SERPL W P-5'-P-CCNC: 38 U/L (ref 12–78)
ANION GAP SERPL CALCULATED.3IONS-SCNC: 6 MMOL/L (ref 4–13)
AST SERPL W P-5'-P-CCNC: 30 U/L (ref 5–45)
BASOPHILS # BLD AUTO: 0.03 THOUSANDS/ΜL (ref 0–0.1)
BASOPHILS NFR BLD AUTO: 1 % (ref 0–1)
BILIRUB SERPL-MCNC: 0.64 MG/DL (ref 0.2–1)
BUN SERPL-MCNC: 11 MG/DL (ref 5–25)
CALCIUM SERPL-MCNC: 9 MG/DL (ref 8.3–10.1)
CHLORIDE SERPL-SCNC: 102 MMOL/L (ref 100–108)
CHOLEST SERPL-MCNC: 107 MG/DL
CO2 SERPL-SCNC: 28 MMOL/L (ref 21–32)
CREAT SERPL-MCNC: 0.73 MG/DL (ref 0.6–1.3)
CREAT UR-MCNC: 65.2 MG/DL
EOSINOPHIL # BLD AUTO: 0.13 THOUSAND/ΜL (ref 0–0.61)
EOSINOPHIL NFR BLD AUTO: 2 % (ref 0–6)
ERYTHROCYTE [DISTWIDTH] IN BLOOD BY AUTOMATED COUNT: 13.2 % (ref 11.6–15.1)
EST. AVERAGE GLUCOSE BLD GHB EST-MCNC: 117 MG/DL
GFR SERPL CREATININE-BSD FRML MDRD: 97 ML/MIN/1.73SQ M
GLUCOSE P FAST SERPL-MCNC: 112 MG/DL (ref 65–99)
HBA1C MFR BLD: 5.7 %
HCT VFR BLD AUTO: 40.5 % (ref 34.8–46.1)
HDLC SERPL-MCNC: 25 MG/DL
HGB BLD-MCNC: 13.2 G/DL (ref 11.5–15.4)
IMM GRANULOCYTES # BLD AUTO: 0.04 THOUSAND/UL (ref 0–0.2)
IMM GRANULOCYTES NFR BLD AUTO: 1 % (ref 0–2)
LDLC SERPL DIRECT ASSAY-MCNC: 63 MG/DL (ref 0–100)
LYMPHOCYTES # BLD AUTO: 1.26 THOUSANDS/ΜL (ref 0.6–4.47)
LYMPHOCYTES NFR BLD AUTO: 20 % (ref 14–44)
MCH RBC QN AUTO: 30.3 PG (ref 26.8–34.3)
MCHC RBC AUTO-ENTMCNC: 32.6 G/DL (ref 31.4–37.4)
MCV RBC AUTO: 93 FL (ref 82–98)
MICROALBUMIN UR-MCNC: 13.8 MG/L (ref 0–20)
MICROALBUMIN/CREAT 24H UR: 21 MG/G CREATININE (ref 0–30)
MONOCYTES # BLD AUTO: 0.5 THOUSAND/ΜL (ref 0.17–1.22)
MONOCYTES NFR BLD AUTO: 8 % (ref 4–12)
NEUTROPHILS # BLD AUTO: 4.4 THOUSANDS/ΜL (ref 1.85–7.62)
NEUTS SEG NFR BLD AUTO: 68 % (ref 43–75)
NRBC BLD AUTO-RTO: 0 /100 WBCS
PLATELET # BLD AUTO: 299 THOUSANDS/UL (ref 149–390)
PMV BLD AUTO: 8.9 FL (ref 8.9–12.7)
POTASSIUM SERPL-SCNC: 3.9 MMOL/L (ref 3.5–5.3)
PROT SERPL-MCNC: 7.7 G/DL (ref 6.4–8.2)
RBC # BLD AUTO: 4.36 MILLION/UL (ref 3.81–5.12)
SODIUM SERPL-SCNC: 136 MMOL/L (ref 136–145)
TRIGL SERPL-MCNC: 121 MG/DL
WBC # BLD AUTO: 6.36 THOUSAND/UL (ref 4.31–10.16)

## 2022-02-03 PROCEDURE — 83036 HEMOGLOBIN GLYCOSYLATED A1C: CPT

## 2022-02-03 PROCEDURE — 83721 ASSAY OF BLOOD LIPOPROTEIN: CPT

## 2022-02-03 PROCEDURE — 80053 COMPREHEN METABOLIC PANEL: CPT

## 2022-02-03 PROCEDURE — 82043 UR ALBUMIN QUANTITATIVE: CPT

## 2022-02-03 PROCEDURE — 82570 ASSAY OF URINE CREATININE: CPT

## 2022-02-03 PROCEDURE — 36415 COLL VENOUS BLD VENIPUNCTURE: CPT

## 2022-02-03 PROCEDURE — 85025 COMPLETE CBC W/AUTO DIFF WBC: CPT

## 2022-02-03 PROCEDURE — 80061 LIPID PANEL: CPT

## 2022-02-08 ENCOUNTER — OFFICE VISIT (OUTPATIENT)
Dept: INTERNAL MEDICINE CLINIC | Facility: CLINIC | Age: 52
End: 2022-02-08
Payer: COMMERCIAL

## 2022-02-08 VITALS
OXYGEN SATURATION: 97 % | DIASTOLIC BLOOD PRESSURE: 82 MMHG | RESPIRATION RATE: 15 BRPM | WEIGHT: 205.2 LBS | BODY MASS INDEX: 35.03 KG/M2 | SYSTOLIC BLOOD PRESSURE: 126 MMHG | HEART RATE: 102 BPM | HEIGHT: 64 IN

## 2022-02-08 DIAGNOSIS — I10 HYPERTENSION, UNSPECIFIED TYPE: ICD-10-CM

## 2022-02-08 DIAGNOSIS — E11.9 CONTROLLED TYPE 2 DIABETES MELLITUS WITHOUT COMPLICATION, WITHOUT LONG-TERM CURRENT USE OF INSULIN (HCC): ICD-10-CM

## 2022-02-08 DIAGNOSIS — R73.09 IMPAIRED GLUCOSE REGULATION: ICD-10-CM

## 2022-02-08 DIAGNOSIS — G47.33 OSA (OBSTRUCTIVE SLEEP APNEA): Chronic | ICD-10-CM

## 2022-02-08 DIAGNOSIS — Z23 ENCOUNTER FOR IMMUNIZATION: ICD-10-CM

## 2022-02-08 DIAGNOSIS — Z13.29 SCREENING FOR THYROID DISORDER: ICD-10-CM

## 2022-02-08 DIAGNOSIS — J45.909 CHRONIC ASTHMA, UNSPECIFIED ASTHMA SEVERITY, UNSPECIFIED WHETHER COMPLICATED, UNSPECIFIED WHETHER PERSISTENT: Chronic | ICD-10-CM

## 2022-02-08 DIAGNOSIS — Z12.11 SCREENING FOR COLON CANCER: Primary | ICD-10-CM

## 2022-02-08 DIAGNOSIS — E78.2 MIXED HYPERLIPIDEMIA: ICD-10-CM

## 2022-02-08 DIAGNOSIS — E55.9 VITAMIN D DEFICIENCY: ICD-10-CM

## 2022-02-08 PROCEDURE — 99396 PREV VISIT EST AGE 40-64: CPT | Performed by: INTERNAL MEDICINE

## 2022-02-08 RX ORDER — AMLODIPINE BESYLATE 5 MG/1
5 TABLET ORAL 2 TIMES DAILY
Qty: 180 TABLET | Refills: 3 | Status: SHIPPED | OUTPATIENT
Start: 2022-02-08

## 2022-02-08 RX ORDER — ATORVASTATIN CALCIUM 10 MG/1
10 TABLET, FILM COATED ORAL DAILY
Qty: 90 TABLET | Refills: 3 | Status: SHIPPED | OUTPATIENT
Start: 2022-02-08

## 2022-02-08 RX ORDER — ICOSAPENT ETHYL 1000 MG/1
1 CAPSULE ORAL 4 TIMES DAILY
Qty: 360 CAPSULE | Refills: 3 | Status: SHIPPED | OUTPATIENT
Start: 2022-02-08

## 2022-02-08 RX ORDER — DULAGLUTIDE 1.5 MG/.5ML
INJECTION, SOLUTION SUBCUTANEOUS
Qty: 3 ML | Refills: 3 | Status: SHIPPED | OUTPATIENT
Start: 2022-02-08 | End: 2022-06-15 | Stop reason: SDUPTHER

## 2022-02-08 RX ORDER — MONTELUKAST SODIUM 10 MG/1
10 TABLET ORAL
Qty: 90 TABLET | Refills: 3 | Status: SHIPPED | OUTPATIENT
Start: 2022-02-08

## 2022-02-18 ENCOUNTER — DOCUMENTATION (OUTPATIENT)
Dept: LABOR AND DELIVERY | Facility: HOSPITAL | Age: 52
End: 2022-02-18

## 2022-02-18 DIAGNOSIS — J01.90 ACUTE BACTERIAL SINUSITIS: Primary | ICD-10-CM

## 2022-02-18 DIAGNOSIS — B96.89 ACUTE BACTERIAL SINUSITIS: Primary | ICD-10-CM

## 2022-02-18 RX ORDER — AZITHROMYCIN 250 MG/1
TABLET, FILM COATED ORAL
Qty: 6 TABLET | Refills: 0 | Status: SHIPPED | OUTPATIENT
Start: 2022-02-18 | End: 2022-02-22

## 2022-05-04 ENCOUNTER — TELEPHONE (OUTPATIENT)
Dept: GASTROENTEROLOGY | Facility: CLINIC | Age: 52
End: 2022-05-04

## 2022-05-04 NOTE — TELEPHONE ENCOUNTER
Called patient regarding her 7/12 appointment with Dr Supriya Cook  Due to change in provider's schedule, we need to reschedule her 7/12 appointment   Patient said to cancel the 7/12 appointment, she will call back to reschedule her appointment, because she was driving and had no access to her schedule

## 2022-05-12 ENCOUNTER — HOSPITAL ENCOUNTER (OUTPATIENT)
Dept: MAMMOGRAPHY | Facility: HOSPITAL | Age: 52
Discharge: HOME | End: 2022-05-12
Payer: COMMERCIAL

## 2022-05-12 VITALS — HEIGHT: 64 IN | WEIGHT: 205.25 LBS | BODY MASS INDEX: 35.04 KG/M2

## 2022-05-12 DIAGNOSIS — Z12.31 ENCOUNTER FOR SCREENING MAMMOGRAM FOR MALIGNANT NEOPLASM OF BREAST: ICD-10-CM

## 2022-05-12 PROCEDURE — 77063 BREAST TOMOSYNTHESIS BI: CPT

## 2022-05-12 PROCEDURE — 77067 SCR MAMMO BI INCL CAD: CPT

## 2022-06-15 DIAGNOSIS — E11.9 CONTROLLED TYPE 2 DIABETES MELLITUS WITHOUT COMPLICATION, WITHOUT LONG-TERM CURRENT USE OF INSULIN (HCC): ICD-10-CM

## 2022-06-15 RX ORDER — DULAGLUTIDE 1.5 MG/.5ML
INJECTION, SOLUTION SUBCUTANEOUS
Qty: 3 ML | Refills: 3 | Status: SHIPPED | OUTPATIENT
Start: 2022-06-15

## 2022-06-28 DIAGNOSIS — U07.1 COVID: Primary | ICD-10-CM

## 2022-06-28 RX ORDER — METHYLPREDNISOLONE 4 MG/1
TABLET ORAL
Qty: 21 TABLET | Refills: 0 | Status: SHIPPED | OUTPATIENT
Start: 2022-06-28 | End: 2022-09-26

## 2022-06-30 ENCOUNTER — DOCUMENTATION (OUTPATIENT)
Dept: OBGYN CLINIC | Facility: CLINIC | Age: 52
End: 2022-06-30

## 2022-06-30 ENCOUNTER — DOCUMENTATION (OUTPATIENT)
Dept: LABOR AND DELIVERY | Facility: HOSPITAL | Age: 52
End: 2022-06-30

## 2022-06-30 DIAGNOSIS — J06.9 UPPER RESPIRATORY TRACT INFECTION, UNSPECIFIED TYPE: Primary | ICD-10-CM

## 2022-06-30 RX ORDER — AZITHROMYCIN 250 MG/1
TABLET, FILM COATED ORAL
Qty: 6 TABLET | Refills: 0 | Status: SHIPPED | OUTPATIENT
Start: 2022-06-30 | End: 2022-07-04

## 2022-09-17 ENCOUNTER — APPOINTMENT (OUTPATIENT)
Dept: LAB | Facility: CLINIC | Age: 52
End: 2022-09-17
Payer: COMMERCIAL

## 2022-09-17 DIAGNOSIS — I10 HYPERTENSION, UNSPECIFIED TYPE: ICD-10-CM

## 2022-09-17 DIAGNOSIS — R73.09 IMPAIRED GLUCOSE REGULATION: ICD-10-CM

## 2022-09-17 DIAGNOSIS — Z12.11 SCREENING FOR COLON CANCER: ICD-10-CM

## 2022-09-17 DIAGNOSIS — E55.9 VITAMIN D DEFICIENCY: ICD-10-CM

## 2022-09-17 DIAGNOSIS — Z13.29 SCREENING FOR THYROID DISORDER: ICD-10-CM

## 2022-09-17 DIAGNOSIS — Z23 ENCOUNTER FOR IMMUNIZATION: ICD-10-CM

## 2022-09-17 DIAGNOSIS — E11.9 CONTROLLED TYPE 2 DIABETES MELLITUS WITHOUT COMPLICATION, WITHOUT LONG-TERM CURRENT USE OF INSULIN (HCC): ICD-10-CM

## 2022-09-17 DIAGNOSIS — J45.909 CHRONIC ASTHMA, UNSPECIFIED ASTHMA SEVERITY, UNSPECIFIED WHETHER COMPLICATED, UNSPECIFIED WHETHER PERSISTENT: Chronic | ICD-10-CM

## 2022-09-17 DIAGNOSIS — E78.2 MIXED HYPERLIPIDEMIA: ICD-10-CM

## 2022-09-17 LAB
25(OH)D3 SERPL-MCNC: 45 NG/ML (ref 30–100)
ALBUMIN SERPL BCP-MCNC: 3.9 G/DL (ref 3.5–5)
ALP SERPL-CCNC: 33 U/L (ref 34–104)
ALT SERPL W P-5'-P-CCNC: 28 U/L (ref 7–52)
ANION GAP SERPL CALCULATED.3IONS-SCNC: 6 MMOL/L (ref 4–13)
AST SERPL W P-5'-P-CCNC: 24 U/L (ref 13–39)
BASOPHILS # BLD AUTO: 0.03 THOUSANDS/ΜL (ref 0–0.1)
BASOPHILS NFR BLD AUTO: 1 % (ref 0–1)
BILIRUB SERPL-MCNC: 0.48 MG/DL (ref 0.2–1)
BUN SERPL-MCNC: 13 MG/DL (ref 5–25)
CALCIUM SERPL-MCNC: 9.1 MG/DL (ref 8.4–10.2)
CHLORIDE SERPL-SCNC: 104 MMOL/L (ref 96–108)
CHOLEST SERPL-MCNC: 93 MG/DL
CO2 SERPL-SCNC: 27 MMOL/L (ref 21–32)
CREAT SERPL-MCNC: 0.64 MG/DL (ref 0.6–1.3)
EOSINOPHIL # BLD AUTO: 0.22 THOUSAND/ΜL (ref 0–0.61)
EOSINOPHIL NFR BLD AUTO: 4 % (ref 0–6)
ERYTHROCYTE [DISTWIDTH] IN BLOOD BY AUTOMATED COUNT: 13.2 % (ref 11.6–15.1)
EST. AVERAGE GLUCOSE BLD GHB EST-MCNC: 126 MG/DL
GFR SERPL CREATININE-BSD FRML MDRD: 104 ML/MIN/1.73SQ M
GLUCOSE P FAST SERPL-MCNC: 121 MG/DL (ref 65–99)
HBA1C MFR BLD: 6 %
HCT VFR BLD AUTO: 39 % (ref 34.8–46.1)
HDLC SERPL-MCNC: 21 MG/DL
HGB BLD-MCNC: 13 G/DL (ref 11.5–15.4)
IMM GRANULOCYTES # BLD AUTO: 0.02 THOUSAND/UL (ref 0–0.2)
IMM GRANULOCYTES NFR BLD AUTO: 0 % (ref 0–2)
LDLC SERPL CALC-MCNC: 33 MG/DL (ref 0–100)
LYMPHOCYTES # BLD AUTO: 1.3 THOUSANDS/ΜL (ref 0.6–4.47)
LYMPHOCYTES NFR BLD AUTO: 22 % (ref 14–44)
MCH RBC QN AUTO: 30.7 PG (ref 26.8–34.3)
MCHC RBC AUTO-ENTMCNC: 33.3 G/DL (ref 31.4–37.4)
MCV RBC AUTO: 92 FL (ref 82–98)
MONOCYTES # BLD AUTO: 0.41 THOUSAND/ΜL (ref 0.17–1.22)
MONOCYTES NFR BLD AUTO: 7 % (ref 4–12)
NEUTROPHILS # BLD AUTO: 3.83 THOUSANDS/ΜL (ref 1.85–7.62)
NEUTS SEG NFR BLD AUTO: 66 % (ref 43–75)
NRBC BLD AUTO-RTO: 0 /100 WBCS
PLATELET # BLD AUTO: 261 THOUSANDS/UL (ref 149–390)
PMV BLD AUTO: 8.9 FL (ref 8.9–12.7)
POTASSIUM SERPL-SCNC: 3.9 MMOL/L (ref 3.5–5.3)
PROT SERPL-MCNC: 7 G/DL (ref 6.4–8.4)
RBC # BLD AUTO: 4.24 MILLION/UL (ref 3.81–5.12)
SODIUM SERPL-SCNC: 137 MMOL/L (ref 135–147)
TRIGL SERPL-MCNC: 194 MG/DL
TSH SERPL DL<=0.05 MIU/L-ACNC: 2.77 UIU/ML (ref 0.45–4.5)
WBC # BLD AUTO: 5.81 THOUSAND/UL (ref 4.31–10.16)

## 2022-09-17 PROCEDURE — 85025 COMPLETE CBC W/AUTO DIFF WBC: CPT

## 2022-09-17 PROCEDURE — 80053 COMPREHEN METABOLIC PANEL: CPT

## 2022-09-17 PROCEDURE — 83036 HEMOGLOBIN GLYCOSYLATED A1C: CPT

## 2022-09-17 PROCEDURE — 80061 LIPID PANEL: CPT

## 2022-09-17 PROCEDURE — 82306 VITAMIN D 25 HYDROXY: CPT

## 2022-09-17 PROCEDURE — 84443 ASSAY THYROID STIM HORMONE: CPT

## 2022-09-17 PROCEDURE — 36415 COLL VENOUS BLD VENIPUNCTURE: CPT

## 2022-09-26 ENCOUNTER — OFFICE VISIT (OUTPATIENT)
Dept: INTERNAL MEDICINE CLINIC | Facility: CLINIC | Age: 52
End: 2022-09-26
Payer: COMMERCIAL

## 2022-09-26 VITALS
HEIGHT: 64 IN | HEART RATE: 87 BPM | DIASTOLIC BLOOD PRESSURE: 78 MMHG | RESPIRATION RATE: 15 BRPM | SYSTOLIC BLOOD PRESSURE: 126 MMHG | OXYGEN SATURATION: 98 % | WEIGHT: 200 LBS | BODY MASS INDEX: 34.15 KG/M2

## 2022-09-26 DIAGNOSIS — I73.00 RAYNAUD'S PHENOMENON WITHOUT GANGRENE: Primary | ICD-10-CM

## 2022-09-26 DIAGNOSIS — E61.2 MAGNESIUM DEFICIENCY: ICD-10-CM

## 2022-09-26 DIAGNOSIS — E11.9 CONTROLLED TYPE 2 DIABETES MELLITUS WITHOUT COMPLICATION, WITHOUT LONG-TERM CURRENT USE OF INSULIN (HCC): ICD-10-CM

## 2022-09-26 DIAGNOSIS — I10 HYPERTENSION, UNSPECIFIED TYPE: ICD-10-CM

## 2022-09-26 DIAGNOSIS — E78.01 FAMILIAL HYPERCHOLESTEROLEMIA: ICD-10-CM

## 2022-09-26 PROCEDURE — 99214 OFFICE O/P EST MOD 30 MIN: CPT | Performed by: INTERNAL MEDICINE

## 2022-09-26 RX ORDER — CALCIUM CARBONATE 300MG(750)
1 TABLET,CHEWABLE ORAL DAILY
COMMUNITY

## 2022-09-26 NOTE — PROGRESS NOTES
Assessment/Plan:    #Raynauds  -reports cold intolerence and discoloration in hands nad feet  -will obtain ESR, CRP, iron panel    #DM  -a1c at 6 and trending up  -remains on metformin and trulicity and will continue current dosing  -encouraged to increase cardiac exercise  -seeing Dr Gabriel Jiang for annual diabetic eye exam    #HLD  -LDL LDL 33 HDL 21  -low HDL on vascepa  -remains on atorvastatin  -2020 carotid US with <50% stenosis right carotid artery  -2020 stress test normal     #Submucosal Fibroid  -on nuvaring for bleeding and seeing gynecology  -using tranexamic acid if bleeding is severe  -bleeding has remained stable without issues    #PCOS  -will continue metformin    #AUGUSTO  -on APAP 4-20cm     #Left Hip Labrum Tear  -noted on MRI and underwent previous injection with relief  -uses alieve for flares on occasion  -has done PT in the past  -received an injection about 1 year ago and will reconsider repeat if symptoms worsen  -encouraged her to do PT exercises taught to her  -has been loosing weight and lost 5lbs     #HTN  -BP stable on amlodipine, losartan and will continbue    #Asthma/Eczema  -on singulair as needed  -has albuterol inhaler as needed    #COVID  -infection in June and now resolved  -did not require paxlovid     #Health Maintenance  -routine labs and followup 6 months  -covid bivalent booster to consider in December or later  -TDAP pending and script given, gets every 5 years at work  -is a gynecologist  -mammogram up to date 2022  -colonoscopy pending    No problem-specific Assessment & Plan notes found for this encounter  Diagnoses and all orders for this visit:    Raynaud's phenomenon without gangrene  -     CBC and differential; Future  -     Comprehensive metabolic panel; Future  -     Iron, TIBC and Ferritin Panel; Future  -     Sedimentation rate, automated; Future  -     C-reactive protein; Future  -     JAYESH Screen w/ Reflex to Titer/Pattern;  Future    Controlled type 2 diabetes mellitus without complication, without long-term current use of insulin (HCC)  -     CBC and differential; Future  -     Comprehensive metabolic panel; Future  -     Hemoglobin A1C; Future    Hypertension, unspecified type  -     CBC and differential; Future  -     Comprehensive metabolic panel; Future    Familial hypercholesterolemia  -     CBC and differential; Future  -     Comprehensive metabolic panel; Future  -     Lipid Panel with Direct LDL reflex; Future    Magnesium deficiency  -     Magnesium;  Future    Other orders  -     Magnesium 400 MG TABS; Take 1 tablet by mouth daily            Current Outpatient Medications:     Magnesium 400 MG TABS, Take 1 tablet by mouth daily, Disp: , Rfl:     amLODIPine (NORVASC) 5 mg tablet, Take 1 tablet (5 mg total) by mouth 2 (two) times a day, Disp: 180 tablet, Rfl: 3    atorvastatin (LIPITOR) 10 mg tablet, Take 1 tablet (10 mg total) by mouth daily, Disp: 90 tablet, Rfl: 3    Calcium-Magnesium-Vitamin D ER (Calcium 1200+D3) 600- MG-MG-UNIT TB24, Take 1 tablet by mouth daily, Disp: , Rfl:     Dulaglutide (Trulicity) 1 5 ST/9 6AR SOPN, inject 0 5 milliliters subcu weekly-dispense 3 milliliters with 3 refills, Disp: 3 mL, Rfl: 3    etonogestrel-ethinyl estradiol (NUVARING) 0 12-0 015 MG/24HR vaginal ring, Insert vaginally and leave in place for 3 consecutive weeks, then place new ring, no withdrawal bleed, Disp: 4 each, Rfl: 3    Icosapent Ethyl (Vascepa) 1 g CAPS, Take 1 capsule (1 g total) by mouth 4 (four) times a day, Disp: 360 capsule, Rfl: 3    losartan (COZAAR) 25 mg tablet, TAKE ONE TABLET BY MOUTH EVERY DAY, Disp: 90 tablet, Rfl: 3    metFORMIN (GLUCOPHAGE) 500 mg tablet, Take 1 tablet (500 mg total) by mouth every 12 (twelve) hours, Disp: 180 tablet, Rfl: 3    montelukast (SINGULAIR) 10 mg tablet, Take 1 tablet (10 mg total) by mouth daily at bedtime, Disp: 90 tablet, Rfl: 3    triamcinolone (KENALOG) 0 1 % ointment, Apply topically, Disp: , Rfl: Subjective:      Patient ID: Berna Roberson is a 50 y o  female  HPI    Patient presents for routine checkup  Denies any recent hospitalizations or surgeries  States that she has been exercising and lost approximately 5 lb  She will try to refine her diet and eat healthier  She has been eating some red meat  Her LDL is 33 and HDL 21 with triglycerides of 194  She will continue with atorvastatin and has see above  Her TSH is stable at 2 77 and vitamin-D was 45  Her A1c is now up to 6  She remains on metformin and Trulicity  She will increase her cardiac exercise  She reports that she has been stationary due to long hours at work  She reports that she is feeling cold intolerance as well as Raynaud's in her fingers and toes  We will obtain an ESR as well as a CRP and an JAYESH and an iron panel  She had only chronic back in June and has since recovered without any need for medications  She has a colonoscopy appointment scheduled  She will return to care in 6 months  The following portions of the patient's history were reviewed and updated as appropriate: allergies, current medications, past family history, past medical history, past social history, past surgical history and problem list     Review of Systems   Constitutional: Negative for activity change, appetite change, chills, fatigue and fever  HENT: Negative for congestion, ear pain, facial swelling, hearing loss, sore throat, tinnitus and trouble swallowing  Eyes: Negative for photophobia and visual disturbance  Respiratory: Negative for cough, shortness of breath and wheezing  Cardiovascular: Negative for chest pain, palpitations and leg swelling  Gastrointestinal: Negative for abdominal distention, abdominal pain, blood in stool, constipation, diarrhea, nausea and vomiting  Endocrine: Positive for cold intolerance  Genitourinary: Negative for difficulty urinating, dysuria and pelvic pain     Musculoskeletal: Negative for arthralgias, back pain, gait problem, joint swelling, myalgias, neck pain and neck stiffness  Skin: Positive for color change (raynauds in hands and feet)  Negative for rash and wound  Neurological: Negative for dizziness, tremors, light-headedness and headaches  Objective:      /78   Pulse 87   Resp 15   Ht 5' 4" (1 626 m)   Wt 90 7 kg (200 lb)   SpO2 98%   BMI 34 33 kg/m²          Physical Exam  Vitals reviewed  Constitutional:       Appearance: Normal appearance  She is well-developed  She is obese  HENT:      Head: Normocephalic and atraumatic  Right Ear: Tympanic membrane, ear canal and external ear normal  There is no impacted cerumen  Left Ear: Tympanic membrane, ear canal and external ear normal  There is no impacted cerumen  Nose: Nose normal  No congestion or rhinorrhea  Mouth/Throat:      Pharynx: Oropharynx is clear  No oropharyngeal exudate or posterior oropharyngeal erythema  Eyes:      Conjunctiva/sclera: Conjunctivae normal       Pupils: Pupils are equal, round, and reactive to light  Neck:      Thyroid: No thyromegaly  Vascular: No JVD  Cardiovascular:      Rate and Rhythm: Normal rate and regular rhythm  Pulses: Normal pulses  Heart sounds: Normal heart sounds  No murmur heard  Pulmonary:      Effort: Pulmonary effort is normal  No respiratory distress  Breath sounds: Normal breath sounds  No stridor  No wheezing, rhonchi or rales  Abdominal:      General: Bowel sounds are normal  There is no distension  Palpations: Abdomen is soft  There is no mass  Tenderness: There is no abdominal tenderness  There is no guarding or rebound  Musculoskeletal:         General: Tenderness (left hip) present  Normal range of motion  Cervical back: Normal range of motion and neck supple  Right lower leg: No edema  Left lower leg: No edema  Lymphadenopathy:      Cervical: No cervical adenopathy     Skin: General: Skin is warm and dry  Findings: No erythema or rash  Neurological:      Mental Status: She is alert and oriented to person, place, and time  Deep Tendon Reflexes: Reflexes are normal and symmetric  This note was completed in part utilizing [employer] fluency direct voice recognition software  Grammatical errors, random word insertion, spelling mistakes, and incomplete sentences may be an occasional consequence of the system secondary to software limitations, ambient noise and hardware issues  At the time of dictation, efforts were made to edit, clarify and /or correct errors  Please read the chart carefully and recognize, using context, where substitutions have occurred  If you have any questions or concerns about the context, text or information contained within the body of this dictation, please contact myself, the provider, for further clarification

## 2022-10-10 DIAGNOSIS — Z30.015 ENCOUNTER FOR INITIAL PRESCRIPTION OF VAGINAL RING HORMONAL CONTRACEPTIVE: ICD-10-CM

## 2022-10-11 DIAGNOSIS — Z30.015 ENCOUNTER FOR INITIAL PRESCRIPTION OF VAGINAL RING HORMONAL CONTRACEPTIVE: ICD-10-CM

## 2022-10-11 RX ORDER — ETONOGESTREL AND ETHINYL ESTRADIOL 11.7; 2.7 MG/1; MG/1
INSERT, EXTENDED RELEASE VAGINAL
Qty: 4 EACH | Refills: 0 | Status: SHIPPED | OUTPATIENT
Start: 2022-10-11 | End: 2022-10-11

## 2022-10-11 RX ORDER — ETONOGESTREL AND ETHINYL ESTRADIOL 11.7; 2.7 MG/1; MG/1
INSERT, EXTENDED RELEASE VAGINAL
Qty: 4 EACH | Refills: 0 | Status: SHIPPED | OUTPATIENT
Start: 2022-10-11

## 2022-11-12 DIAGNOSIS — E11.9 CONTROLLED TYPE 2 DIABETES MELLITUS WITHOUT COMPLICATION, WITHOUT LONG-TERM CURRENT USE OF INSULIN (HCC): ICD-10-CM

## 2022-11-14 DIAGNOSIS — N92.0 MENORRHAGIA WITH REGULAR CYCLE: Primary | ICD-10-CM

## 2022-11-14 RX ORDER — DULAGLUTIDE 1.5 MG/.5ML
INJECTION, SOLUTION SUBCUTANEOUS
Qty: 3 ML | Refills: 0 | Status: SHIPPED | OUTPATIENT
Start: 2022-11-14 | End: 2022-11-18 | Stop reason: SDUPTHER

## 2022-11-14 RX ORDER — TRANEXAMIC ACID 650 MG/1
1300 TABLET ORAL 3 TIMES DAILY
Qty: 15 TABLET | Refills: 2 | Status: SHIPPED | OUTPATIENT
Start: 2022-11-14 | End: 2022-11-19

## 2022-11-18 ENCOUNTER — CONSULT (OUTPATIENT)
Dept: GASTROENTEROLOGY | Facility: CLINIC | Age: 52
End: 2022-11-18

## 2022-11-18 VITALS
SYSTOLIC BLOOD PRESSURE: 120 MMHG | HEIGHT: 64 IN | WEIGHT: 149 LBS | DIASTOLIC BLOOD PRESSURE: 82 MMHG | TEMPERATURE: 98.8 F | BODY MASS INDEX: 25.44 KG/M2

## 2022-11-18 DIAGNOSIS — K76.0 FATTY LIVER: Chronic | ICD-10-CM

## 2022-11-18 DIAGNOSIS — E11.9 CONTROLLED TYPE 2 DIABETES MELLITUS WITHOUT COMPLICATION, WITHOUT LONG-TERM CURRENT USE OF INSULIN (HCC): ICD-10-CM

## 2022-11-18 DIAGNOSIS — Z12.11 COLON CANCER SCREENING: Primary | ICD-10-CM

## 2022-11-18 DIAGNOSIS — E11.9 CONTROLLED TYPE 2 DIABETES MELLITUS WITHOUT COMPLICATION, WITHOUT LONG-TERM CURRENT USE OF INSULIN (HCC): Chronic | ICD-10-CM

## 2022-11-18 PROBLEM — M25.552 LEFT HIP PAIN: Status: RESOLVED | Noted: 2018-03-06 | Resolved: 2022-11-18

## 2022-11-18 RX ORDER — DULAGLUTIDE 1.5 MG/.5ML
INJECTION, SOLUTION SUBCUTANEOUS
Qty: 12 ML | Refills: 3 | Status: SHIPPED | OUTPATIENT
Start: 2022-11-18 | End: 2023-04-11

## 2022-11-18 NOTE — PROGRESS NOTES
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  She has diabetes, dyslipidemia, hypertension  1  Colon cancer screening  -average risk for colonoscopy so will arrange    2  Fatty liver  - with history of DM dyslipidemia  - FIB 4 score low suggestive of no advanced fibrosis  -monitor LFTs annually and if become elevated should see us    3  Controlled type 2 diabetes mellitus without complication, without long-term current use of insulin (HCC)    Follow-up as needed      ___________________________________________________    HPI:  50year old female referred for colonoscopy  Denies family history of colon cancer  She reports long standing IBS, diet controlled    Reports being lactose intolerance  Never had a colonoscopy  Denies any GI symptoms  She has fatty liver and diabetes and dyslipidemia      She had tonsillectomy a few years ago  Neg cigs and no alcohol  She has DM- once a week medicine  REVIEW OF SYSTEMS:    CONSTITUTIONAL: Denies any fever, chills, rigors, and weight loss  HEENT: No earache or tinnitus  Denies hearing loss or visual disturbances  CARDIOVASCULAR: No chest pain or palpitations  RESPIRATORY: Denies any cough, hemoptysis, shortness of breath or dyspnea on exertion  GASTROINTESTINAL: As noted in the History of Present Illness  GENITOURINARY: No problems with urination  Denies any hematuria or dysuria  NEUROLOGIC: No dizziness or vertigo, denies headaches  MUSCULOSKELETAL: Denies any muscle or joint pain  SKIN: Denies skin rashes or itching  ENDOCRINE: Denies excessive thirst  Denies intolerance to heat or cold  PSYCHOSOCIAL: Denies depression or anxiety  Denies any recent memory loss         Historical Information   Past Medical History:   Diagnosis Date   • Allergic     latex, some tree nuts, seasonal • Arthritis     psoriatic arthritis   • Diabetes mellitus ([facility] [address] )     gestational   • Hypertension     patient reports   • Obesity    • PCOS (polycystic ovarian syndrome)      Past Surgical History:   Procedure Laterality Date   • TONSILLECTOMY      last assessed: 5/3/16     Social History   Social History     Substance and Sexual Activity   Alcohol Use No     Social History     Substance and Sexual Activity   Drug Use No     Social History     Tobacco Use   Smoking Status Never   Smokeless Tobacco Never     Family History   Problem Relation Age of Onset   • Hypertension Mother    • Hyperlipidemia Mother    • Hypertension Father    • Other Father         low serum HDL   • Hyperlipidemia Father    • Hypothyroidism Sister    • Breast cancer Maternal Grandmother 60   • No Known Problems Daughter    • No Known Problems Son    • Stroke Maternal Grandfather    • No Known Problems Paternal Grandmother    • No Known Problems Paternal Grandfather    • No Known Problems Paternal Aunt    • No Known Problems Paternal Aunt    • No Known Problems Paternal Aunt    • Asthma Sister        Meds/Allergies       Current Outpatient Medications:   •  amLODIPine (NORVASC) 5 mg tablet  •  atorvastatin (LIPITOR) 10 mg tablet  •  Calcium-Magnesium-Vitamin D - MG-MG-UNIT TB24  •  Dulaglutide (Trulicity) 1 5 NF/8 9WE SOPN  •  etonogestrel-ethinyl estradiol (NuvaRing) 0 12-0 015 MG/24HR vaginal ring  •  Icosapent Ethyl (Vascepa) 1 g CAPS  •  losartan (COZAAR) 25 mg tablet  •  Magnesium 400 MG TABS  •  metFORMIN (GLUCOPHAGE) 500 mg tablet  •  montelukast (SINGULAIR) 10 mg tablet  •  Tranexamic Acid 650 MG TABS  •  triamcinolone (KENALOG) 0 1 % ointment    Allergies   Allergen Reactions   • Latex      Per patient   • Nuts - Food Allergy    • Shellfish-Derived Products - Food Allergy            Objective     Blood pressure 120/82, temperature 98 8 °F (37 1 °C), temperature source Tympanic, height 5' 4" (1 626 m), weight 67 6 kg (149 lb)  Body mass index is 25 58 kg/m²  PHYSICAL EXAM:      General Appearance:   Alert, cooperative, no distress   HEENT:   Normocephalic, atraumatic, anicteric      Neck:  Supple, symmetrical, trachea midline   Lungs:   Clear to auscultation bilaterally; no rales, rhonchi or wheezing; respirations unlabored    Heart[de-identified]   Regular rate and rhythm; no murmur, rub, or gallop  Abdomen:   Soft, non-tender, non-distended; normal bowel sounds; no masses, no organomegaly    Genitalia:   Deferred    Rectal:   Deferred    Extremities:  No cyanosis, clubbing or edema    Pulses:  2+ and symmetric    Skin:  No jaundice, rashes, or lesions    Lymph nodes:  No palpable cervical lymphadenopathy        Lab Results:   No visits with results within 1 Day(s) from this visit     Latest known visit with results is:   Appointment on 09/17/2022   Component Date Value   • WBC 09/17/2022 5 81    • RBC 09/17/2022 4 24    • Hemoglobin 09/17/2022 13 0    • Hematocrit 09/17/2022 39 0    • MCV 09/17/2022 92    • MCH 09/17/2022 30 7    • MCHC 09/17/2022 33 3    • RDW 09/17/2022 13 2    • MPV 09/17/2022 8 9    • Platelets 25/47/1589 261    • nRBC 09/17/2022 0    • Neutrophils Relative 09/17/2022 66    • Immat GRANS % 09/17/2022 0    • Lymphocytes Relative 09/17/2022 22    • Monocytes Relative 09/17/2022 7    • Eosinophils Relative 09/17/2022 4    • Basophils Relative 09/17/2022 1    • Neutrophils Absolute 09/17/2022 3 83    • Immature Grans Absolute 09/17/2022 0 02    • Lymphocytes Absolute 09/17/2022 1 30    • Monocytes Absolute 09/17/2022 0 41    • Eosinophils Absolute 09/17/2022 0 22    • Basophils Absolute 09/17/2022 0 03    • Hemoglobin A1C 09/17/2022 6 0 (H)    • EAG 09/17/2022 126    • Sodium 09/17/2022 137    • Potassium 09/17/2022 3 9    • Chloride 09/17/2022 104    • CO2 09/17/2022 27    • ANION GAP 09/17/2022 6    • BUN 09/17/2022 13    • Creatinine 09/17/2022 0 64    • Glucose, Fasting 09/17/2022 121 (H)    • Calcium 09/17/2022 9 1 • AST 09/17/2022 24    • ALT 09/17/2022 28    • Alkaline Phosphatase 09/17/2022 33 (L)    • Total Protein 09/17/2022 7 0    • Albumin 09/17/2022 3 9    • Total Bilirubin 09/17/2022 0 48    • eGFR 09/17/2022 104    • TSH 3RD GENERATON 09/17/2022 2 770    • Vit D, 25-Hydroxy 09/17/2022 45 0    • Cholesterol 09/17/2022 93    • Triglycerides 09/17/2022 194 (H)    • HDL, Direct 09/17/2022 21 (L)    • LDL Calculated 09/17/2022 33        Fibrosis-4 (FIB-4) Score is:  87    Indication for testing Absence of advanced fibrosis Presence of advanced fibrosis Indeterminate result   NAFLD <1 30 >2 67 1 30-2 67   Hepatitis C <1 45 >3 25 1 45-3 25   Hepatitis B <1 00 >2 65 1 00-2 65     FIB-4 Score Component Values:  Component Value Date   Age: 50 y o  AST: 24 U/L 9/17/2022   Platelet: 821 Thousands/uL 9/17/2022   ALT: 28 U/L 9/17/2022     Radiology Results:   No results found

## 2022-11-18 NOTE — PATIENT INSTRUCTIONS
Scheduled date of colonoscopy (as of today):  2/7/23  Physician performing colonoscopy:  Dr Medrano [place]  Location of colonoscopy:   [facility]  Bowel prep reviewed with patient:  Dulcolax/Miralax, diabetic  Instructions reviewed with patient by:  Clearances:  n/a

## 2022-12-14 DIAGNOSIS — Z30.015 ENCOUNTER FOR INITIAL PRESCRIPTION OF VAGINAL RING HORMONAL CONTRACEPTIVE: ICD-10-CM

## 2022-12-15 RX ORDER — ETONOGESTREL AND ETHINYL ESTRADIOL 11.7; 2.7 MG/1; MG/1
INSERT, EXTENDED RELEASE VAGINAL
Qty: 4 EACH | Refills: 0 | Status: SHIPPED | OUTPATIENT
Start: 2022-12-15

## 2022-12-22 DIAGNOSIS — J06.9 UPPER RESPIRATORY TRACT INFECTION, UNSPECIFIED TYPE: Primary | ICD-10-CM

## 2022-12-22 RX ORDER — AZITHROMYCIN 250 MG/1
TABLET, FILM COATED ORAL
Qty: 6 TABLET | Refills: 0 | Status: SHIPPED | OUTPATIENT
Start: 2022-12-22 | End: 2022-12-26

## 2023-01-31 DIAGNOSIS — E78.2 MIXED HYPERLIPIDEMIA: ICD-10-CM

## 2023-02-01 RX ORDER — ATORVASTATIN CALCIUM 10 MG/1
TABLET, FILM COATED ORAL
Qty: 90 TABLET | Refills: 0 | Status: SHIPPED | OUTPATIENT
Start: 2023-02-01

## 2023-02-06 DIAGNOSIS — Z30.015 ENCOUNTER FOR INITIAL PRESCRIPTION OF VAGINAL RING HORMONAL CONTRACEPTIVE: ICD-10-CM

## 2023-02-06 RX ORDER — ETONOGESTREL AND ETHINYL ESTRADIOL 11.7; 2.7 MG/1; MG/1
INSERT, EXTENDED RELEASE VAGINAL
Qty: 4 EACH | Refills: 4 | Status: SHIPPED | OUTPATIENT
Start: 2023-02-06 | End: 2023-03-21 | Stop reason: SDUPTHER

## 2023-02-07 ENCOUNTER — ANESTHESIA (OUTPATIENT)
Dept: GASTROENTEROLOGY | Facility: AMBULARY SURGERY CENTER | Age: 53
End: 2023-02-07

## 2023-02-07 ENCOUNTER — HOSPITAL ENCOUNTER (OUTPATIENT)
Dept: GASTROENTEROLOGY | Facility: AMBULARY SURGERY CENTER | Age: 53
Setting detail: OUTPATIENT SURGERY
Discharge: HOME | End: 2023-02-07
Attending: INTERNAL MEDICINE

## 2023-02-07 ENCOUNTER — ANESTHESIA EVENT (OUTPATIENT)
Dept: GASTROENTEROLOGY | Facility: AMBULARY SURGERY CENTER | Age: 53
End: 2023-02-07

## 2023-02-07 VITALS
BODY MASS INDEX: 32.44 KG/M2 | RESPIRATION RATE: 18 BRPM | HEIGHT: 64 IN | TEMPERATURE: 97.3 F | DIASTOLIC BLOOD PRESSURE: 77 MMHG | SYSTOLIC BLOOD PRESSURE: 120 MMHG | HEART RATE: 81 BPM | OXYGEN SATURATION: 100 % | WEIGHT: 190 LBS

## 2023-02-07 DIAGNOSIS — Z12.11 COLON CANCER SCREENING: ICD-10-CM

## 2023-02-07 LAB
EXT PREGNANCY TEST URINE: NEGATIVE
EXT. CONTROL: NORMAL
GLUCOSE SERPL-MCNC: 98 MG/DL (ref 65–140)

## 2023-02-07 RX ORDER — SODIUM CHLORIDE, SODIUM LACTATE, POTASSIUM CHLORIDE, CALCIUM CHLORIDE 600; 310; 30; 20 MG/100ML; MG/100ML; MG/100ML; MG/100ML
INJECTION, SOLUTION INTRAVENOUS CONTINUOUS PRN
Status: DISCONTINUED | OUTPATIENT
Start: 2023-02-07 | End: 2023-02-07

## 2023-02-07 RX ORDER — PROPOFOL 10 MG/ML
INJECTION, EMULSION INTRAVENOUS AS NEEDED
Status: DISCONTINUED | OUTPATIENT
Start: 2023-02-07 | End: 2023-02-07

## 2023-02-07 RX ORDER — SODIUM CHLORIDE, SODIUM LACTATE, POTASSIUM CHLORIDE, CALCIUM CHLORIDE 600; 310; 30; 20 MG/100ML; MG/100ML; MG/100ML; MG/100ML
50 INJECTION, SOLUTION INTRAVENOUS CONTINUOUS
Status: DISCONTINUED | OUTPATIENT
Start: 2023-02-07 | End: 2023-02-11 | Stop reason: HOSPADM

## 2023-02-07 RX ADMIN — PROPOFOL 30 MG: 10 INJECTION, EMULSION INTRAVENOUS at 09:15

## 2023-02-07 RX ADMIN — PROPOFOL 20 MG: 10 INJECTION, EMULSION INTRAVENOUS at 09:18

## 2023-02-07 RX ADMIN — PROPOFOL 30 MG: 10 INJECTION, EMULSION INTRAVENOUS at 09:13

## 2023-02-07 RX ADMIN — PROPOFOL 30 MG: 10 INJECTION, EMULSION INTRAVENOUS at 09:06

## 2023-02-07 RX ADMIN — PROPOFOL 30 MG: 10 INJECTION, EMULSION INTRAVENOUS at 09:10

## 2023-02-07 RX ADMIN — PROPOFOL 20 MG: 10 INJECTION, EMULSION INTRAVENOUS at 09:20

## 2023-02-07 RX ADMIN — PROPOFOL 20 MG: 10 INJECTION, EMULSION INTRAVENOUS at 09:22

## 2023-02-07 RX ADMIN — SODIUM CHLORIDE, SODIUM LACTATE, POTASSIUM CHLORIDE, AND CALCIUM CHLORIDE: .6; .31; .03; .02 INJECTION, SOLUTION INTRAVENOUS at 08:55

## 2023-02-07 RX ADMIN — PROPOFOL 30 MG: 10 INJECTION, EMULSION INTRAVENOUS at 09:07

## 2023-02-07 RX ADMIN — PROPOFOL 100 MG: 10 INJECTION, EMULSION INTRAVENOUS at 09:05

## 2023-02-07 NOTE — ANESTHESIA PREPROCEDURE EVALUATION
Procedure:  COLONOSCOPY    Relevant Problems   CARDIO   (+) Hyperlipidemia   (+) Hypertension      ENDO   (+) Controlled diabetes mellitus type II without complication (HCC)      GI/HEPATIC   (+) Fatty liver      MUSCULOSKELETAL   (+) Arthritis   (+) Primary osteoarthritis of left hip      PULMONARY   (+) Asthma, chronic   (+) AUGUSTO (obstructive sleep apnea)        Physical Exam    Airway    Mallampati score: II  TM Distance: >3 FB  Neck ROM: full     Dental       Cardiovascular      Pulmonary      Other Findings        Anesthesia Plan  ASA Score- 2     Anesthesia Type- IV sedation with anesthesia with ASA Monitors  Additional Monitors:   Airway Plan:           Plan Factors-    Chart reviewed  Induction- intravenous  Postoperative Plan-     Informed Consent- Anesthetic plan and risks discussed with patient  I personally reviewed this patient with the CRNA  Discussed and agreed on the Anesthesia Plan with the CRNA  Rubens Aguilar

## 2023-02-07 NOTE — ANESTHESIA POSTPROCEDURE EVALUATION
Post-Op Assessment Note    CV Status:  Stable    Pain management: adequate     Mental Status:  Alert and awake   Hydration Status:  Euvolemic   PONV Controlled:  Controlled   Airway Patency:  Patent      Post Op Vitals Reviewed: Yes      Staff: CRNA         No notable events documented      BP   132/78   Temp      Pulse  72   Resp 14   SpO2 98

## 2023-02-07 NOTE — H&P
History and Physical - [facility]  Ramandeep Willis 50 y o  female MRN: [id number]        HPI: Ramandeep Willis is a 50y o  year old female who presents for colonoscopy for colon cancer screening  REVIEW OF SYSTEMS: Per the HPI, and otherwise unremarkable      Historical Information   Past Medical History:   Diagnosis Date   • Allergic     latex, some tree nuts, seasonal   • Arthritis     psoriatic arthritis   • CPAP (continuous positive airway pressure) dependence    • Diabetes mellitus (HCC)     gestational   • Hypertension     patient reports   • Obesity    • PCOS (polycystic ovarian syndrome)    • Sleep apnea      Past Surgical History:   Procedure Laterality Date   • TONSILLECTOMY      last assessed: 5/3/16     Social History   Social History     Substance and Sexual Activity   Alcohol Use No     Social History     Substance and Sexual Activity   Drug Use No     Social History     Tobacco Use   Smoking Status Never   Smokeless Tobacco Never     Family History   Problem Relation Age of Onset   • Hypertension Mother    • Hyperlipidemia Mother    • Hypertension Father    • Other Father         low serum HDL   • Hyperlipidemia Father    • Hypothyroidism Sister    • Breast cancer Maternal Grandmother 60   • No Known Problems Daughter    • No Known Problems Son    • Stroke Maternal Grandfather    • No Known Problems Paternal Grandmother    • No Known Problems Paternal Grandfather    • No Known Problems Paternal Aunt    • No Known Problems Paternal Aunt    • No Known Problems Paternal Aunt    • Asthma Sister        Meds/Allergies       Current Outpatient Medications:   •  amLODIPine (NORVASC) 5 mg tablet  •  atorvastatin (LIPITOR) 10 mg tablet  •  Calcium-Magnesium-Vitamin D - MG-MG-UNIT TB24  •  Dulaglutide (Trulicity) 1 5 AP/5 8KM SOPN  •  Icosapent Ethyl (Vascepa) 1 g CAPS  •  losartan (COZAAR) 25 mg tablet  •  Magnesium 400 MG TABS  •  metFORMIN (GLUCOPHAGE) 500 mg tablet  • montelukast (SINGULAIR) 10 mg tablet  •  etonogestrel-ethinyl estradiol (NuvaRing) 0 12-0 015 MG/24HR vaginal ring  •  triamcinolone (KENALOG) 0 1 % ointment    Current Facility-Administered Medications:   •  lactated ringers infusion, 50 mL/hr, Intravenous, Continuous    Allergies   Allergen Reactions   • Latex      Per patient   • Nuts - Food Allergy    • Shellfish-Derived Products - Food Allergy        Objective     /75   Pulse 85   Temp 98 4 °F (36 9 °C) (Temporal)   Resp 16   Ht 5' 3 75" (1 619 m)   Wt 86 2 kg (190 lb)   SpO2 100%   BMI 32 87 kg/m²       PHYSICAL EXAM    Gen: NAD  Head: NCAT  CV: RRR  CHEST: Clear  ABD: soft, NT/ND  EXT: no edema      ASSESSMENT/PLAN:  This is a 50y o  year old female here for colonoscopy, and she is stable and optimized for her procedure

## 2023-02-18 DIAGNOSIS — I10 HYPERTENSION, UNSPECIFIED TYPE: ICD-10-CM

## 2023-02-20 RX ORDER — AMLODIPINE BESYLATE 5 MG/1
TABLET ORAL
Qty: 180 TABLET | Refills: 0 | Status: SHIPPED | OUTPATIENT
Start: 2023-02-20

## 2023-03-09 DIAGNOSIS — L65.9 HAIR LOSS: Primary | ICD-10-CM

## 2023-03-09 DIAGNOSIS — I73.00 RAYNAUD'S PHENOMENON WITHOUT GANGRENE: ICD-10-CM

## 2023-03-15 ENCOUNTER — ESTABLISHED COMPREHENSIVE EXAM (OUTPATIENT)
Dept: URBAN - METROPOLITAN AREA CLINIC 6 | Facility: CLINIC | Age: 53
End: 2023-03-15

## 2023-03-15 DIAGNOSIS — E11.9: ICD-10-CM

## 2023-03-15 DIAGNOSIS — H43.391: ICD-10-CM

## 2023-03-15 LAB
LEFT EYE DIABETIC RETINOPATHY: NORMAL
RIGHT EYE DIABETIC RETINOPATHY: NORMAL

## 2023-03-15 PROCEDURE — 92014 COMPRE OPH EXAM EST PT 1/>: CPT

## 2023-03-15 ASSESSMENT — VISUAL ACUITY
OU_CC: J1+
OD_CC: 20/30+2
OS_CC: 20/25

## 2023-03-15 ASSESSMENT — TONOMETRY
OD_IOP_MMHG: 17
OS_IOP_MMHG: 17

## 2023-03-18 ENCOUNTER — APPOINTMENT (OUTPATIENT)
Dept: LAB | Facility: CLINIC | Age: 53
End: 2023-03-18

## 2023-03-18 DIAGNOSIS — I10 HYPERTENSION, UNSPECIFIED TYPE: ICD-10-CM

## 2023-03-18 DIAGNOSIS — E78.01 FAMILIAL HYPERCHOLESTEROLEMIA: ICD-10-CM

## 2023-03-18 DIAGNOSIS — L65.9 HAIR LOSS: ICD-10-CM

## 2023-03-18 DIAGNOSIS — I73.00 RAYNAUD'S PHENOMENON WITHOUT GANGRENE: ICD-10-CM

## 2023-03-18 DIAGNOSIS — E61.2 MAGNESIUM DEFICIENCY: ICD-10-CM

## 2023-03-18 DIAGNOSIS — E11.9 CONTROLLED TYPE 2 DIABETES MELLITUS WITHOUT COMPLICATION, WITHOUT LONG-TERM CURRENT USE OF INSULIN (HCC): ICD-10-CM

## 2023-03-18 LAB
ALBUMIN SERPL BCP-MCNC: 3.7 G/DL (ref 3.5–5)
ALP SERPL-CCNC: 34 U/L (ref 34–104)
ALT SERPL W P-5'-P-CCNC: 31 U/L (ref 7–52)
ANION GAP SERPL CALCULATED.3IONS-SCNC: 7 MMOL/L (ref 4–13)
AST SERPL W P-5'-P-CCNC: 30 U/L (ref 13–39)
BASOPHILS # BLD AUTO: 0.02 THOUSANDS/ÂΜL (ref 0–0.1)
BASOPHILS NFR BLD AUTO: 0 % (ref 0–1)
BILIRUB SERPL-MCNC: 0.58 MG/DL (ref 0.2–1)
BUN SERPL-MCNC: 12 MG/DL (ref 5–25)
CALCIUM SERPL-MCNC: 8.9 MG/DL (ref 8.4–10.2)
CHLORIDE SERPL-SCNC: 105 MMOL/L (ref 96–108)
CHOLEST SERPL-MCNC: 92 MG/DL
CO2 SERPL-SCNC: 24 MMOL/L (ref 21–32)
CREAT SERPL-MCNC: 0.62 MG/DL (ref 0.6–1.3)
CRP SERPL QL: 10.2 MG/L
EOSINOPHIL # BLD AUTO: 0.17 THOUSAND/ÂΜL (ref 0–0.61)
EOSINOPHIL NFR BLD AUTO: 3 % (ref 0–6)
ERYTHROCYTE [DISTWIDTH] IN BLOOD BY AUTOMATED COUNT: 13.5 % (ref 11.6–15.1)
ERYTHROCYTE [SEDIMENTATION RATE] IN BLOOD: 24 MM/HOUR (ref 0–29)
EST. AVERAGE GLUCOSE BLD GHB EST-MCNC: 117 MG/DL
FERRITIN SERPL-MCNC: 111 NG/ML (ref 8–388)
GFR SERPL CREATININE-BSD FRML MDRD: 105 ML/MIN/1.73SQ M
GLUCOSE P FAST SERPL-MCNC: 105 MG/DL (ref 65–99)
HBA1C MFR BLD: 5.7 %
HCT VFR BLD AUTO: 38.7 % (ref 34.8–46.1)
HDLC SERPL-MCNC: 23 MG/DL
HGB BLD-MCNC: 12.8 G/DL (ref 11.5–15.4)
IMM GRANULOCYTES # BLD AUTO: 0.01 THOUSAND/UL (ref 0–0.2)
IMM GRANULOCYTES NFR BLD AUTO: 0 % (ref 0–2)
IRON SERPL-MCNC: 76 UG/DL (ref 50–170)
LDLC SERPL CALC-MCNC: 38 MG/DL (ref 0–100)
LYMPHOCYTES # BLD AUTO: 1.21 THOUSANDS/ÂΜL (ref 0.6–4.47)
LYMPHOCYTES NFR BLD AUTO: 24 % (ref 14–44)
MAGNESIUM SERPL-MCNC: 2.2 MG/DL (ref 1.9–2.7)
MCH RBC QN AUTO: 30.1 PG (ref 26.8–34.3)
MCHC RBC AUTO-ENTMCNC: 33.1 G/DL (ref 31.4–37.4)
MCV RBC AUTO: 91 FL (ref 82–98)
MONOCYTES # BLD AUTO: 0.41 THOUSAND/ÂΜL (ref 0.17–1.22)
MONOCYTES NFR BLD AUTO: 8 % (ref 4–12)
NEUTROPHILS # BLD AUTO: 3.21 THOUSANDS/ÂΜL (ref 1.85–7.62)
NEUTS SEG NFR BLD AUTO: 65 % (ref 43–75)
NRBC BLD AUTO-RTO: 0 /100 WBCS
PLATELET # BLD AUTO: 257 THOUSANDS/UL (ref 149–390)
PMV BLD AUTO: 9 FL (ref 8.9–12.7)
POTASSIUM SERPL-SCNC: 4 MMOL/L (ref 3.5–5.3)
PROT SERPL-MCNC: 7.1 G/DL (ref 6.4–8.4)
RBC # BLD AUTO: 4.25 MILLION/UL (ref 3.81–5.12)
SODIUM SERPL-SCNC: 136 MMOL/L (ref 135–147)
T4 FREE SERPL-MCNC: 1.16 NG/DL (ref 0.76–1.46)
TIBC SERPL-MCNC: 278 UG/DL (ref 250–450)
TRIGL SERPL-MCNC: 157 MG/DL
TSH SERPL DL<=0.05 MIU/L-ACNC: 2.7 UIU/ML (ref 0.45–4.5)
URATE SERPL-MCNC: 4.1 MG/DL (ref 2–7.5)
WBC # BLD AUTO: 5.03 THOUSAND/UL (ref 4.31–10.16)

## 2023-03-19 LAB
ANA SER QL IA: NEGATIVE
B BURGDOR IGG+IGM SER-ACNC: <0.2 AI
RHEUMATOID FACT SER QL LA: NEGATIVE

## 2023-03-20 NOTE — PROGRESS NOTES
Assessment/Plan   Problem List Items Addressed This Visit        Respiratory    Asthma, chronic (Chronic)    Relevant Medications    montelukast (SINGULAIR) 10 mg tablet   Other Visit Diagnoses     Encounter for initial prescription of vaginal ring hormonal contraceptive        Relevant Medications    etonogestrel-ethinyl estradiol (NuvaRing) 0 12-0 015 MG/24HR vaginal ring          Discussion    All questions have been answered to her satisfaction  RTO for APE or sooner if needed  Pap done  Up to date on mammo and colonoscopy  We did review UV prolapse which pt notes is currently asx  She does not have the current desire to trial pelvic floor PT  She is having some limited ROM in her hip due to arthritis and will plan to manage that prior to commitment to PT for pelvic floor  Subjective     HPI   Berna Roberson is a 50 y o  female who presents for annual well woman exam    Pt uses nuve ring continuously and does not get regular menstrual cycle  She did have an episode a few years back with significant hemorrhaging and BTB but that has not recurred since  We did review BP today and estrogen use  Pt notes BP is typically more well controlled than it is today  Pt does note some BETTY, although denies dysuria or other urinary complaints  (+) SBEs - no breast masses, asymmetry, nipple discharge or bleeding, changes in skin of breast, or breast tenderness bilaterally    No abd/pelvic pain or HAs; No problematic menopausal symptoms, she feels like they are well controlled on the Nuva ring  Pt is sexually active in a mutually monog/ sexual relationship; She declines sti/hiv/hep testing; Feels safe at home    Contraception: Nuva ring  (+) PCP for routine Bw/care; Last Pap - 5/2/17 WNL neg HPV   History of abnormal Pap smear: denies   Last mammo - 5/12/22 BIRADS 1   Last colonoscopy - 2/7/23 f/u 7 years    Review of Systems   Constitutional: Negative  Respiratory: Negative  Gastrointestinal: Negative  Endocrine: Negative  Genitourinary: Negative          The following portions of the patient's history were reviewed and updated as appropriate: allergies, current medications, past family history, past medical history, past social history, past surgical history and problem list          OB History        2    Para   2    Term   2            AB        Living           SAB        IAB        Ectopic        Multiple        Live Births                     Past Medical History:   Diagnosis Date   • Allergic     latex, some tree nuts, seasonal   • Arthritis     psoriatic arthritis   • Asthma    • CPAP (continuous positive airway pressure) dependence    • Diabetes mellitus ([facility] [address] )     gestational   • Gestational diabetes    • Hypertension     patient reports   • Obesity    • PCOS (polycystic ovarian syndrome)    • Sleep apnea    • Varicella        Past Surgical History:   Procedure Laterality Date   • TONSILLECTOMY      last assessed: 5/3/16       Family History   Problem Relation Age of Onset   • Hypertension Mother    • Hyperlipidemia Mother    • Hypertension Father    • Other Father         low serum HDL   • Hyperlipidemia Father    • Hypothyroidism Sister    • Breast cancer Maternal Grandmother    • No Known Problems Daughter    • No Known Problems Son    • Stroke Maternal Grandfather    • No Known Problems Paternal Grandmother    • No Known Problems Paternal Grandfather    • No Known Problems Paternal Aunt    • No Known Problems Paternal Aunt    • No Known Problems Paternal Aunt    • Asthma Sister    • Diabetes Sister    • Thyroid disease Sister        Social History     Socioeconomic History   • Marital status: /Civil Union     Spouse name: Not on file   • Number of children: Not on file   • Years of education: Not on file   • Highest education level: Not on file   Occupational History   • Not on file   Tobacco Use   • Smoking status: Never   • Smokeless tobacco: Never   Vaping Use   • Vaping Use: Never used   Substance and Sexual Activity   • Alcohol use: No   • Drug use: No   • Sexual activity: Yes     Partners: Male     Birth control/protection: Ring   Other Topics Concern   • Not on file   Social History Narrative   • Not on file     Social Determinants of Health     Financial Resource Strain: Not on file   Food Insecurity: Not on file   Transportation Needs: Not on file   Physical Activity: Not on file   Stress: Not on file   Social Connections: Not on file   Intimate Partner Violence: Not on file   Housing Stability: Not on file         Current Outpatient Medications:   •  amLODIPine (NORVASC) 5 mg tablet, TAKE ONE TABLET BY MOUTH 2 TIMES A DAY, Disp: 180 tablet, Rfl: 0  •  atorvastatin (LIPITOR) 10 mg tablet, TAKE ONE TABLET BY MOUTH DAILY, Disp: 90 tablet, Rfl: 0  •  Calcium-Magnesium-Vitamin D - MG-MG-UNIT TB24, Take 1 tablet by mouth daily, Disp: , Rfl:   •  Dulaglutide (Trulicity) 1 5 IR/2 9ZL SOPN, inject 0 5 milliliters subcu weekly-dispense 3 milliliters with 3 refills, Disp: 12 mL, Rfl: 3  •  etonogestrel-ethinyl estradiol (NuvaRing) 0 12-0 015 MG/24HR vaginal ring, INSERT 1 RING VAGINALLY LEAVE IN FOR 3 WEEKS REPLACE RING WITH NO WITHDRAWL BLEED, Disp: 4 each, Rfl: 4  •  Icosapent Ethyl (Vascepa) 1 g CAPS, Take 1 capsule (1 g total) by mouth 4 (four) times a day, Disp: 360 capsule, Rfl: 3  •  losartan (COZAAR) 25 mg tablet, TAKE ONE TABLET BY MOUTH EVERY DAY (Patient taking differently: Pt taking 1/2 tab daily), Disp: 90 tablet, Rfl: 3  •  Magnesium 400 MG TABS, Take 1 tablet by mouth daily, Disp: , Rfl:   •  metFORMIN (GLUCOPHAGE) 500 mg tablet, Take 1 tablet (500 mg total) by mouth every 12 (twelve) hours, Disp: 180 tablet, Rfl: 3  •  montelukast (SINGULAIR) 10 mg tablet, Take 1 tablet (10 mg total) by mouth daily at bedtime, Disp: 90 tablet, Rfl: 3  •  triamcinolone (KENALOG) 0 1 % ointment, Apply topically (Patient not taking: Reported on 11/18/2022), Disp: , Rfl:     Allergies   Allergen Reactions   • Latex      Per patient   • Nuts - Food Allergy    • Shellfish-Derived Products - Food Allergy        Objective   Vitals:    03/21/23 1021   BP: 148/82   BP Location: Left arm   Patient Position: Sitting   Cuff Size: Large   Weight: 88 9 kg (196 lb)   Height: 5' 4" (1 626 m)     Physical Exam  Vitals reviewed  HENT:      Head: Normocephalic and atraumatic  Cardiovascular:      Rate and Rhythm: Normal rate and regular rhythm  Pulmonary:      Effort: Pulmonary effort is normal       Breath sounds: Normal breath sounds  Chest:   Breasts:     Breasts are symmetrical       Right: No swelling, bleeding, inverted nipple, mass, nipple discharge, skin change or tenderness  Left: No swelling, bleeding, inverted nipple, mass, nipple discharge, skin change or tenderness  Abdominal:      General: Abdomen is flat  Bowel sounds are normal       Palpations: Abdomen is soft  Tenderness: There is no abdominal tenderness  There is no right CVA tenderness, left CVA tenderness or guarding  Genitourinary:     General: Normal vulva  Pubic Area: No rash  Labia:         Right: No rash, tenderness, lesion or injury  Left: No rash, tenderness, lesion or injury  Urethra: No prolapse, urethral pain, urethral swelling or urethral lesion  Vagina: Normal  No signs of injury and foreign body  No vaginal discharge or erythema  Cervix: Friability present  Uterus: Normal        Adnexa: Right adnexa normal and left adnexa normal       Comments: Grade 2 UV prolapse noted with cervix approx 1-2 inches from the vaginal introitus   Musculoskeletal:      Cervical back: Neck supple  Lymphadenopathy:      Upper Body:      Right upper body: No axillary adenopathy  Left upper body: No axillary adenopathy  Skin:     General: Skin is warm and dry     Neurological:      Mental Status: She is alert and oriented to person, place, and time    Psychiatric:         Mood and Affect: Mood normal          Behavior: Behavior normal          Thought Content: Thought content normal          Judgment: Judgment normal          There are no Patient Instructions on file for this visit

## 2023-03-21 ENCOUNTER — OFFICE VISIT (OUTPATIENT)
Dept: OBGYN CLINIC | Facility: CLINIC | Age: 53
End: 2023-03-21

## 2023-03-21 VITALS
DIASTOLIC BLOOD PRESSURE: 82 MMHG | SYSTOLIC BLOOD PRESSURE: 148 MMHG | WEIGHT: 196 LBS | HEIGHT: 64 IN | BODY MASS INDEX: 33.46 KG/M2

## 2023-03-21 DIAGNOSIS — Z30.015 ENCOUNTER FOR INITIAL PRESCRIPTION OF VAGINAL RING HORMONAL CONTRACEPTIVE: ICD-10-CM

## 2023-03-21 DIAGNOSIS — J45.909 CHRONIC ASTHMA, UNSPECIFIED ASTHMA SEVERITY, UNSPECIFIED WHETHER COMPLICATED, UNSPECIFIED WHETHER PERSISTENT: Chronic | ICD-10-CM

## 2023-03-21 DIAGNOSIS — Z01.419 ROUTINE GYNECOLOGICAL EXAMINATION: Primary | ICD-10-CM

## 2023-03-21 LAB — CCP AB SER IA-ACNC: 2.3

## 2023-03-21 RX ORDER — MONTELUKAST SODIUM 10 MG/1
10 TABLET ORAL
Qty: 90 TABLET | Refills: 3 | Status: SHIPPED | OUTPATIENT
Start: 2023-03-21

## 2023-03-21 RX ORDER — ETONOGESTREL AND ETHINYL ESTRADIOL 11.7; 2.7 MG/1; MG/1
INSERT, EXTENDED RELEASE VAGINAL
Qty: 4 EACH | Refills: 4 | Status: SHIPPED | OUTPATIENT
Start: 2023-03-21

## 2023-03-23 LAB
HPV HR 12 DNA CVX QL NAA+PROBE: NEGATIVE
HPV16 DNA CVX QL NAA+PROBE: NEGATIVE
HPV18 DNA CVX QL NAA+PROBE: NEGATIVE

## 2023-03-25 LAB
LAB AP GYN PRIMARY INTERPRETATION: NORMAL
Lab: NORMAL

## 2023-03-26 ENCOUNTER — DOCUMENTATION (OUTPATIENT)
Dept: LABOR AND DELIVERY | Facility: HOSPITAL | Age: 53
End: 2023-03-26

## 2023-03-26 DIAGNOSIS — R73.09 IMPAIRED GLUCOSE REGULATION: Primary | ICD-10-CM

## 2023-05-17 ENCOUNTER — HOSPITAL ENCOUNTER (OUTPATIENT)
Dept: RADIOLOGY | Age: 53
Discharge: HOME | End: 2023-05-17

## 2023-05-17 VITALS — BODY MASS INDEX: 33.8 KG/M2 | WEIGHT: 198 LBS | HEIGHT: 64 IN

## 2023-05-17 DIAGNOSIS — Z12.31 ENCOUNTER FOR SCREENING MAMMOGRAM FOR MALIGNANT NEOPLASM OF BREAST: ICD-10-CM

## 2023-08-04 DIAGNOSIS — Z30.015 ENCOUNTER FOR INITIAL PRESCRIPTION OF VAGINAL RING HORMONAL CONTRACEPTIVE: ICD-10-CM

## 2023-08-04 RX ORDER — ETONOGESTREL AND ETHINYL ESTRADIOL 11.7; 2.7 MG/1; MG/1
INSERT, EXTENDED RELEASE VAGINAL
Qty: 4 EACH | Refills: 4 | Status: SHIPPED | OUTPATIENT
Start: 2023-08-04

## 2023-09-06 ENCOUNTER — APPOINTMENT (OUTPATIENT)
Dept: PHYSICAL THERAPY | Facility: CLINIC | Age: 53
End: 2023-09-06
Payer: COMMERCIAL

## 2023-09-15 ENCOUNTER — OFFICE VISIT (OUTPATIENT)
Dept: PHYSICAL THERAPY | Facility: CLINIC | Age: 53
End: 2023-09-15
Payer: COMMERCIAL

## 2023-09-15 DIAGNOSIS — M16.12 PRIMARY OSTEOARTHRITIS OF LEFT HIP: Primary | Chronic | ICD-10-CM

## 2023-09-15 PROCEDURE — 97110 THERAPEUTIC EXERCISES: CPT | Performed by: PHYSICAL THERAPIST

## 2023-09-15 PROCEDURE — 97161 PT EVAL LOW COMPLEX 20 MIN: CPT | Performed by: PHYSICAL THERAPIST

## 2023-09-15 NOTE — PROGRESS NOTES
PT Evaluation        Today's date: 9/15/2023  Patient name: Nick Rome  : 1970  MRN: [id number]  Referring provider: Han Bojorquez PT  Dx:   Encounter Diagnosis   Name Primary? • Primary osteoarthritis of left hip Yes                   Assessment  Impairments: abnormal or restricted ROM and impaired physical strength    Assessment details: Patient presents with signs and symptoms L hip OA and LBP a movement impairment diagnosis of L hip IR/ext hypomobility due to capsular/myofascial tightness. This is why she did have improved gait, pain and mobility after hip flexor stretching. She also does have deficits in lumbar extension, WB tolerance,  And decreased hip strength. Once her mechanics are improved this should be able to help her gait. She was able to improve ROM with mobilizations this session and would benefit from formal therapy to maximize standing/sitting tolerance and improve functional mobility. Thank you again for the kind referral.   Understanding of Dx/Px/POC: good   Prognosis: good    Goals  STGs  1. Decrease pain by 20% in 2-4 weeks. 2. Improve hip ROM by 10 degrees in 2-4 weeks. 3. Improve hip strength by 1/3 grade in 2-4 weeks. LTGs  1. Decrease pain by 60% in 6-8 weeks. 2. Improve standing sitting tolerance to >45 minutes in 6-8 weeks. 3. Perform job activities without pain in 6-8 weeks. Plan  She was evaluated as a direct access patient so will not need an MD script for the first 30 days, if subsequent treatment is needed we can get signed POC. Patient would benefit from: skilled PT and would benefit from 2x/week but can only do 1x/week. Since this is the case and her exercise performance would be most in the beginning of the morning and at the end of the day it would benefit to teach her family how to perform manuals.    Planned therapy interventions: manual therapy, therapeutic exercise, stretching, strengthening and neuromuscular re-education  Frequency: 1x week  Duration in weeks: 8  Treatment plan discussed with: patient        Subjective Evaluation    History of Present Illness    She states she strains it and she puts more pressure on the R foot. It aches c lying on the R side  She is also getting back pain and she doesn't stand upright. Lying supine is uncomfortable. She finds herself leaning forwards quite a bit. It feels more stiff. If she gets up to walk she feels hunched over. 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 She states she feels like she limps and doesn't lift the leg up as much. Hx of injury: She had an old labral tear in her L hip according to MRI findings and psoriatic arthritis. It started a >7 years back when she would repetitively lift when sitting and leaning over to the R pulling backpack. She denies numbness and tingling.   Quality of life: good    Pain  At best pain ratin  At worst pain ratin  Location: across the back, L lateral hip pain and anterior thigh  quality: fatigue, stiffness, burning  Better: sleeping on L side, moving- walking, be upright- doorway stretches, motrin  Worse: sleeping on R side, walking uneven ground, uphill, sleeping at bed in hospital, sitting long times, sit to stand, AM stiffness, after work  - prolonged standing- back    Treatments  Current treatment: injection treatment - many years        Objective    Sensation:-  Myotomes:-    Slump: -SLR:  L painful to perform Maylin Calender: L+  Faddir: L+       Log Roll:L+  Hip distraction: + felt better   Ely’s:L 85 + R 90       Observation: Less WB on LLE  Gait: L foot in ER, poor hip ext, forwards lean  Squat: Hips an feet in ER and trunk flexion compensation, , L early heel rise    SG: anterior glide decreased pain with Ir, lateral inferior glide hypomobile decreased pain with flexion  Palpation:post greater trochanter   Lumbar AROM L R Strength L R   Flex  Min conway  Flex 5 5   Ext 5x groin pain max conway  Ext 5 5   rotation Min  conway [facility] Hip A/PROM        Flex 80 /  / Flex 2p 4-   ER at 90   ER     IR at 80 0/ (prone) 5  IR     Abd 10p 20tight Abd     Ext Unable to lie supine knee straight  Ext             Knee A/PROM   Ankle     Flex   DF 5 5   Ext   PF                   Precautions: HTN, psoriatic arthritis, latex allergy, DM    Daily Treatment Diary     Manuals 9/15            L LAD             L hip inf/lateral glide belt gr 3-4             Exercise Diary              Hip abd supine table nv            bike             L Hip add ST             KTC             Prone press up             Prone quad ST 4x :20            Hip IR on stool 10x            Total gym when atilio              lumbar ext 10x                                       Therapeutic act             squats Form nv                         Neuro re-ed             Thoracic ext c rot                                       LTR 10x

## 2023-09-18 ENCOUNTER — OFFICE VISIT (OUTPATIENT)
Dept: PHYSICAL THERAPY | Facility: CLINIC | Age: 53
End: 2023-09-18
Payer: COMMERCIAL

## 2023-09-18 DIAGNOSIS — M16.12 PRIMARY OSTEOARTHRITIS OF LEFT HIP: Primary | ICD-10-CM

## 2023-09-18 PROCEDURE — 97110 THERAPEUTIC EXERCISES: CPT | Performed by: PHYSICAL THERAPIST

## 2023-09-18 PROCEDURE — 97112 NEUROMUSCULAR REEDUCATION: CPT | Performed by: PHYSICAL THERAPIST

## 2023-09-18 PROCEDURE — 97140 MANUAL THERAPY 1/> REGIONS: CPT | Performed by: PHYSICAL THERAPIST

## 2023-09-18 NOTE — PROGRESS NOTES
Daily Note     Today's date: 2023  Patient name: Kavya Day  : 1970  MRN: [id number]  Referring provider: Jenelle Ruelas, PT  Dx:   Encounter Diagnosis     ICD-10-CM    1. Primary osteoarthritis of left hip  M16.12                      Subjective: She states she walked quite a lot at [place] and she felt better than she did last year. Objective: See treatment diary below      Assessment: Tolerated treatment well. She did have discomfort c R LTR. She felt looser after joint mobility. Will assess if SG is needed nv. Patient would benefit from continued PT      Plan: Continue per plan of care.       Precautions: HTN, psoriatic arthritis, latex allergy, DM    Daily Treatment Diary     Manuals 9/15 9/18           L LAD  5'           L hip inf/lateral glide belt gr 3-4  5'           Exercise Diary              Hip abd supine table nv 30x           bike             L Hip add ST  3x;20           KTC             Prone press up  10x           Prone quad ST 4x :20 6x :20           Hip IR on stool 10x 20x           Total gym when atilio              lumbar ext 10x  20x                                     Therapeutic act             squats Form nv 10x UE support                        Neuro re-ed             Thoracic ext c rot  10x :10                                     LTR 10x 10x

## 2023-09-26 ENCOUNTER — APPOINTMENT (OUTPATIENT)
Dept: PHYSICAL THERAPY | Facility: CLINIC | Age: 53
End: 2023-09-26
Payer: COMMERCIAL

## 2023-10-04 ENCOUNTER — OFFICE VISIT (OUTPATIENT)
Dept: PHYSICAL THERAPY | Facility: CLINIC | Age: 53
End: 2023-10-04
Payer: COMMERCIAL

## 2023-10-04 DIAGNOSIS — M16.12 PRIMARY OSTEOARTHRITIS OF LEFT HIP: Primary | ICD-10-CM

## 2023-10-04 PROCEDURE — 97110 THERAPEUTIC EXERCISES: CPT | Performed by: PHYSICAL THERAPIST

## 2023-10-04 PROCEDURE — 97140 MANUAL THERAPY 1/> REGIONS: CPT | Performed by: PHYSICAL THERAPIST

## 2023-10-04 PROCEDURE — 97112 NEUROMUSCULAR REEDUCATION: CPT | Performed by: PHYSICAL THERAPIST

## 2023-10-04 NOTE — PROGRESS NOTES
Daily Note     Today's date: 10/4/2023  Patient name: Meg Fontana  : 1970  MRN: [id number]  Referring provider: Umm Brumfield, PT  Dx:   Encounter Diagnosis     ICD-10-CM    1. Primary osteoarthritis of left hip  M16.12                      Subjective: She states she was working on squats but gave more R knee pain. She finds standing a lot makes it worse c surgeries. she walked quite a lot at [employer] and she felt better than she did last year. Objective: See treatment diary below      Assessment: Tolerated treatment well. She states R LTR. She felt looser after hip abd mobs. She was sore after LAD today. We tried to add in L SG to help posture and will continue to progress c less WB exercises as needed. Will see if R hip flexor stretches can help her knee pain. Patient would benefit from continued PT      Plan: Continue per plan of care.       Precautions: HTN, psoriatic arthritis, latex allergy, DM    Daily Treatment Diary     Manuals 9/15 9/18 10/4          L LAD  5' 5'          L hip inf/lateral glide belt gr 3-4  5' 5'          Exercise Diary              Hip abd supine table nv 30x           bike             L Hip add ST  3x;20 3x :20          KTC   10x :05          Prone press up  10x 2x10          Prone quad ST 4x :20 6x :20 supine 4x :20          Hip IR on stool 10x 20x 20x          Total gym when atilio              lumbar ext 10x  20x 20x          L SG at wall   10x :05                       Therapeutic act             squats Form nv 10x UE support held                       Neuro re-ed             Thoracic ext c rot  10x :10 10x :10                                    LTR 10x 10x 10x

## 2023-10-07 ENCOUNTER — APPOINTMENT (OUTPATIENT)
Dept: LAB | Facility: CLINIC | Age: 53
End: 2023-10-07
Payer: COMMERCIAL

## 2023-10-07 DIAGNOSIS — I10 HYPERTENSION, UNSPECIFIED TYPE: ICD-10-CM

## 2023-10-07 DIAGNOSIS — E11.9 CONTROLLED TYPE 2 DIABETES MELLITUS WITHOUT COMPLICATION, WITHOUT LONG-TERM CURRENT USE OF INSULIN (HCC): ICD-10-CM

## 2023-10-07 DIAGNOSIS — E78.2 MIXED HYPERLIPIDEMIA: ICD-10-CM

## 2023-10-07 DIAGNOSIS — J45.909 CHRONIC ASTHMA, UNSPECIFIED ASTHMA SEVERITY, UNSPECIFIED WHETHER COMPLICATED, UNSPECIFIED WHETHER PERSISTENT: Chronic | ICD-10-CM

## 2023-10-07 DIAGNOSIS — R73.09 IMPAIRED GLUCOSE REGULATION: ICD-10-CM

## 2023-10-07 LAB
ALBUMIN SERPL BCP-MCNC: 3.9 G/DL (ref 3.5–5)
ALP SERPL-CCNC: 37 U/L (ref 34–104)
ALT SERPL W P-5'-P-CCNC: 32 U/L (ref 7–52)
ANION GAP SERPL CALCULATED.3IONS-SCNC: 6 MMOL/L
AST SERPL W P-5'-P-CCNC: 33 U/L (ref 13–39)
BASOPHILS # BLD AUTO: 0.04 THOUSANDS/ÂΜL (ref 0–0.1)
BASOPHILS NFR BLD AUTO: 1 % (ref 0–1)
BILIRUB SERPL-MCNC: 0.7 MG/DL (ref 0.2–1)
BUN SERPL-MCNC: 12 MG/DL (ref 5–25)
CALCIUM SERPL-MCNC: 8.9 MG/DL (ref 8.4–10.2)
CHLORIDE SERPL-SCNC: 103 MMOL/L (ref 96–108)
CHOLEST SERPL-MCNC: 86 MG/DL
CO2 SERPL-SCNC: 27 MMOL/L (ref 21–32)
CREAT SERPL-MCNC: 0.71 MG/DL (ref 0.6–1.3)
EOSINOPHIL # BLD AUTO: 0.21 THOUSAND/ÂΜL (ref 0–0.61)
EOSINOPHIL NFR BLD AUTO: 3 % (ref 0–6)
ERYTHROCYTE [DISTWIDTH] IN BLOOD BY AUTOMATED COUNT: 13.3 % (ref 11.6–15.1)
EST. AVERAGE GLUCOSE BLD GHB EST-MCNC: 131 MG/DL
GFR SERPL CREATININE-BSD FRML MDRD: 98 ML/MIN/1.73SQ M
GLUCOSE P FAST SERPL-MCNC: 104 MG/DL (ref 65–99)
HBA1C MFR BLD: 6.2 %
HCT VFR BLD AUTO: 40.2 % (ref 34.8–46.1)
HDLC SERPL-MCNC: 23 MG/DL
HGB BLD-MCNC: 13.3 G/DL (ref 11.5–15.4)
IMM GRANULOCYTES # BLD AUTO: 0.03 THOUSAND/UL (ref 0–0.2)
IMM GRANULOCYTES NFR BLD AUTO: 0 % (ref 0–2)
LDLC SERPL CALC-MCNC: 32 MG/DL (ref 0–100)
LYMPHOCYTES # BLD AUTO: 1.28 THOUSANDS/ÂΜL (ref 0.6–4.47)
LYMPHOCYTES NFR BLD AUTO: 19 % (ref 14–44)
MCH RBC QN AUTO: 30.6 PG (ref 26.8–34.3)
MCHC RBC AUTO-ENTMCNC: 33.1 G/DL (ref 31.4–37.4)
MCV RBC AUTO: 93 FL (ref 82–98)
MONOCYTES # BLD AUTO: 0.55 THOUSAND/ÂΜL (ref 0.17–1.22)
MONOCYTES NFR BLD AUTO: 8 % (ref 4–12)
NEUTROPHILS # BLD AUTO: 4.57 THOUSANDS/ÂΜL (ref 1.85–7.62)
NEUTS SEG NFR BLD AUTO: 69 % (ref 43–75)
NRBC BLD AUTO-RTO: 0 /100 WBCS
PLATELET # BLD AUTO: 272 THOUSANDS/UL (ref 149–390)
PMV BLD AUTO: 8.8 FL (ref 8.9–12.7)
POTASSIUM SERPL-SCNC: 4.1 MMOL/L (ref 3.5–5.3)
PROT SERPL-MCNC: 7.2 G/DL (ref 6.4–8.4)
RBC # BLD AUTO: 4.34 MILLION/UL (ref 3.81–5.12)
SODIUM SERPL-SCNC: 136 MMOL/L (ref 135–147)
TRIGL SERPL-MCNC: 154 MG/DL
WBC # BLD AUTO: 6.68 THOUSAND/UL (ref 4.31–10.16)

## 2023-10-07 PROCEDURE — 83036 HEMOGLOBIN GLYCOSYLATED A1C: CPT

## 2023-10-07 PROCEDURE — 80061 LIPID PANEL: CPT

## 2023-10-07 PROCEDURE — 36415 COLL VENOUS BLD VENIPUNCTURE: CPT

## 2023-10-07 PROCEDURE — 85025 COMPLETE CBC W/AUTO DIFF WBC: CPT

## 2023-10-07 PROCEDURE — 80053 COMPREHEN METABOLIC PANEL: CPT

## 2023-10-13 ENCOUNTER — OFFICE VISIT (OUTPATIENT)
Dept: PHYSICAL THERAPY | Facility: CLINIC | Age: 53
End: 2023-10-13
Payer: COMMERCIAL

## 2023-10-13 DIAGNOSIS — M16.12 PRIMARY OSTEOARTHRITIS OF LEFT HIP: Primary | ICD-10-CM

## 2023-10-13 PROCEDURE — 97110 THERAPEUTIC EXERCISES: CPT | Performed by: PHYSICAL THERAPIST

## 2023-10-13 PROCEDURE — 97140 MANUAL THERAPY 1/> REGIONS: CPT | Performed by: PHYSICAL THERAPIST

## 2023-10-16 ENCOUNTER — OFFICE VISIT (OUTPATIENT)
Dept: PHYSICAL THERAPY | Facility: CLINIC | Age: 53
End: 2023-10-16
Payer: COMMERCIAL

## 2023-10-16 ENCOUNTER — OFFICE VISIT (OUTPATIENT)
Dept: INTERNAL MEDICINE CLINIC | Facility: CLINIC | Age: 53
End: 2023-10-16
Payer: COMMERCIAL

## 2023-10-16 VITALS
TEMPERATURE: 98.8 F | OXYGEN SATURATION: 98 % | HEART RATE: 101 BPM | BODY MASS INDEX: 33.47 KG/M2 | SYSTOLIC BLOOD PRESSURE: 132 MMHG | DIASTOLIC BLOOD PRESSURE: 84 MMHG | WEIGHT: 195 LBS

## 2023-10-16 DIAGNOSIS — E78.2 MIXED HYPERLIPIDEMIA: ICD-10-CM

## 2023-10-16 DIAGNOSIS — I65.23 CAROTID STENOSIS, ASYMPTOMATIC, BILATERAL: ICD-10-CM

## 2023-10-16 DIAGNOSIS — E55.9 VITAMIN D DEFICIENCY: ICD-10-CM

## 2023-10-16 DIAGNOSIS — Z23 FLU VACCINE NEED: ICD-10-CM

## 2023-10-16 DIAGNOSIS — E53.8 VITAMIN B12 DEFICIENCY: ICD-10-CM

## 2023-10-16 DIAGNOSIS — E11.9 CONTROLLED TYPE 2 DIABETES MELLITUS WITHOUT COMPLICATION, WITHOUT LONG-TERM CURRENT USE OF INSULIN (HCC): Primary | ICD-10-CM

## 2023-10-16 DIAGNOSIS — G47.33 OSA (OBSTRUCTIVE SLEEP APNEA): ICD-10-CM

## 2023-10-16 DIAGNOSIS — M16.12 PRIMARY OSTEOARTHRITIS OF LEFT HIP: Primary | ICD-10-CM

## 2023-10-16 DIAGNOSIS — I10 HYPERTENSION, UNSPECIFIED TYPE: ICD-10-CM

## 2023-10-16 PROCEDURE — 97140 MANUAL THERAPY 1/> REGIONS: CPT | Performed by: PHYSICAL THERAPIST

## 2023-10-16 PROCEDURE — 97110 THERAPEUTIC EXERCISES: CPT | Performed by: PHYSICAL THERAPIST

## 2023-10-16 PROCEDURE — 99214 OFFICE O/P EST MOD 30 MIN: CPT | Performed by: INTERNAL MEDICINE

## 2023-10-16 PROCEDURE — 90471 IMMUNIZATION ADMIN: CPT | Performed by: INTERNAL MEDICINE

## 2023-10-16 PROCEDURE — 90686 IIV4 VACC NO PRSV 0.5 ML IM: CPT | Performed by: INTERNAL MEDICINE

## 2023-10-16 PROCEDURE — 97112 NEUROMUSCULAR REEDUCATION: CPT | Performed by: PHYSICAL THERAPIST

## 2023-10-16 RX ORDER — DULAGLUTIDE 4.5 MG/.5ML
4.5 INJECTION, SOLUTION SUBCUTANEOUS
Qty: 6 ML | Refills: 3 | Status: SHIPPED | OUTPATIENT
Start: 2023-10-16

## 2023-10-16 NOTE — PROGRESS NOTES
Assessment/Plan:    #Raynauds  -reports cold intolerence and discoloration in hands nad feet  -ESR, iron panel, JAYESH, RF, CCP, lyme all normal  -CRP at 10.2 but likely due to weight    #Psoriasis  -flares on occasion and using triamcinolone prn  -defers seeing dermatology for now     #Onychomycosis  -treated with penlac     #DM  -a1c at 6.2 and trending up due to going back on sugar instead of sugar substitute  -remains on metformin and trulicity  -increase trulicity to loose weight to max weight, to consider ozempic or wegovy in the future if weight if she would like to loose more weight  -encouraged to increase cardiac exercise  -seeing Dr Jack Todd for annual diabetic eye exam    #HLD  -LDL 32 HDL 23 triglycerides 154  -low HDL on vascepa  -remains on atorvastatin  -will increase cardiac exercise  -denies alcohol  -2020 carotid US with <50% stenosis right carotid artery and will repeat  -2020 stress test normal  -obtain ECHO     #Submucosal Fibroid  -on nuvaring for bleeding and seeing gynecology  -using tranexamic acid if bleeding is severe  -bleeding has remained stable without issues  -2020 pelvic US with thickened endometrial lining and hopeful that this is will improve with menopause    #PCOS  -remains on metformin and will cotninue    #AUGUSTO  -on APAP 4-20cm  -is compliant with it every night  -seeing Dr Isael Brandon  -is using fluid chamber and humidifier but still with dry mouth and nose     #Left Hip Labrum Tear  -noted on MRI and underwent previous injection with relief  -uses alieve for flares on occasion  -has done PT in the past  -received an injection about 1 year ago and will reconsider repeat if symptoms worsen  -encouraged her to do PT exercises taught to her  -has been having pain in the hip and will resume stretching again     #HTN  -BP stable on amlodipine, losartan    #Asthma/Eczema  -on singulair as needed  -has albuterol inhaler as needed     #COVID  -infection in June 2022 and now resolved  -did not require paxlovid     #Health Maintenance  -routine labs and followup 6 months  -covid vaccine to consider booster at [address] pending and script given, gets every 5 years at work  -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 Diagnoses and all orders for this visit:    Controlled type 2 diabetes mellitus without complication, without long-term current use of insulin (HCC)  -     dulaglutide (Trulicity) 4.5 JW/0.5GG injection; Inject 0.5 mL (4.5 mg total) under the skin every 7 days  -     CBC and differential; Future  -     Comprehensive metabolic panel; Future  -     Hemoglobin A1C; Future    Hypertension, unspecified type  -     CBC and differential; Future  -     Comprehensive metabolic panel; Future  -     Echo complete w/ contrast if indicated; Future    Mixed hyperlipidemia  -     CBC and differential; Future  -     Comprehensive metabolic panel; Future  -     Lipid Panel with Direct LDL reflex; Future    Vitamin D deficiency  -     CBC and differential; Future  -     Comprehensive metabolic panel; Future  -     Vitamin D 25 hydroxy; Future    AUGUSTO (obstructive sleep apnea)  -     CBC and differential; Future  -     Comprehensive metabolic panel; Future    Vitamin B12 deficiency  -     Vitamin B12; Future    Carotid stenosis, asymptomatic, bilateral  -     VAS carotid complete study;  Future    Flu vaccine need  -     influenza vaccine, quadrivalent, 0.5 mL, preservative-free, for adult and pediatric patients 6 mos+ (AFLURIA, FLUARIX, FLULAVAL, FLUZONE)            Current Outpatient Medications:     amLODIPine (NORVASC) 5 mg tablet, Take 1 tablet (5 mg total) by mouth 2 (two) times a day, Disp: 180 tablet, Rfl: 3    atorvastatin (LIPITOR) 10 mg tablet, Take 1 tablet (10 mg total) by mouth daily, Disp: 90 tablet, Rfl: 3    Calcium-Magnesium-Vitamin D - MG-MG-UNIT TB24, Take 1 tablet by mouth daily, Disp: , Rfl:     ciclopirox (PENLAC) 8 % solution, Apply topically daily at bedtime, Disp: 6.6 mL, Rfl: 6    dulaglutide (Trulicity) 4.5 BP/2.8HU injection, Inject 0.5 mL (4.5 mg total) under the skin every 7 days, Disp: 6 mL, Rfl: 3    etonogestrel-ethinyl estradiol (NuvaRing) 0.12-0.015 MG/24HR vaginal ring, INSERT 1 RING VAGINALLY LEAVE IN FOR 3 WEEKS REPLACE RING WITH NO WITHDRAWL BLEED, Disp: 4 each, Rfl: 4    Icosapent Ethyl (Vascepa) 1 g CAPS, Take 1 capsule (1 g total) by mouth 4 (four) times a day, Disp: 360 capsule, Rfl: 3    losartan (COZAAR) 25 mg tablet, Take 0.5 tablets (12.5 mg total) by mouth daily Pt taking 1/2 tab daily, Disp: 90 tablet, Rfl: 1    Magnesium 400 MG TABS, Take 1 tablet by mouth daily, Disp: , Rfl:     metFORMIN (GLUCOPHAGE) 500 mg tablet, Take 1 tablet (500 mg total) by mouth every 12 (twelve) hours, Disp: 180 tablet, Rfl: 3    montelukast (SINGULAIR) 10 mg tablet, Take 1 tablet (10 mg total) by mouth daily at bedtime, Disp: 90 tablet, Rfl: 3    triamcinolone (KENALOG) 0.1 % ointment, Apply topically, Disp: , Rfl:     Subjective:      Patient ID: Sonya Ordaz is a 48 y.o. female. HPI    Patient presents for routine checkup. Denies any recent hospitalizations or surgeries. Her A1c is trending up to 6.2. She remains on metformin and Trulicity. We will increase her Trulicity to 4.5 mg. She has been cutting out sugar substitutes and has resolved her sugars went up a little bit. He still well controlled. She will keep up with cardiac exercise however she has been having left hip labrum tear pain. She is working with physical therapy gradual improvement. She will receive the flu vaccine today. She is due for carotid Doppler due to carotid stenosis previously. We will obtain an echo due to her history of hypertension and hyperlipidemia. Her LDL is 32 and HDL 23 with triglycerides 154.   She remains on Vascepa as well as atorvastatin. She will continue with this. She has obstructive sleep apnea and is compliant with her CPAP machine. She will return to care in 6 months. The following portions of the patient's history were reviewed and updated as appropriate: allergies, current medications, past family history, past medical history, past social history, past surgical history and problem list.    Review of Systems   Constitutional:  Negative for activity change, appetite change, chills, fatigue and fever. HENT:  Negative for congestion, ear pain, facial swelling, hearing loss, sore throat, tinnitus and trouble swallowing. Eyes:  Negative for photophobia and visual disturbance. Respiratory:  Negative for cough, shortness of breath and wheezing. Cardiovascular:  Negative for chest pain and leg swelling. Gastrointestinal:  Negative for abdominal distention, abdominal pain, blood in stool, constipation, diarrhea, nausea and vomiting. Genitourinary:  Negative for difficulty urinating, dysuria and pelvic pain. Musculoskeletal:  Positive for arthralgias (left hip) and back pain. Negative for gait problem, joint swelling, myalgias, neck pain and neck stiffness. Skin:  Negative for rash and wound. Neurological:  Negative for dizziness, tremors, light-headedness and headaches. Objective:      /84 (BP Location: Left arm, Patient Position: Sitting, Cuff Size: Standard)   Pulse 101   Temp 98.8 °F (37.1 °C)   Wt 88.5 kg (195 lb)   SpO2 98%   BMI 33.47 kg/m²          Physical Exam  Vitals reviewed. Constitutional:       Appearance: Normal appearance. She is well-developed. HENT:      Head: Normocephalic and atraumatic. Right Ear: Tympanic membrane, ear canal and external ear normal. There is no impacted cerumen. Left Ear: Tympanic membrane, ear canal and external ear normal. There is no impacted cerumen. Nose: Nose normal. No congestion.       Mouth/Throat:      Pharynx: Oropharynx is clear. No oropharyngeal exudate or posterior oropharyngeal erythema. Eyes:      Conjunctiva/sclera: Conjunctivae normal.      Pupils: Pupils are equal, round, and reactive to light. Neck:      Thyroid: No thyromegaly. Vascular: No JVD. Cardiovascular:      Rate and Rhythm: Normal rate and regular rhythm. Pulses: Normal pulses. Heart sounds: Normal heart sounds. No murmur heard. Pulmonary:      Effort: Pulmonary effort is normal. No respiratory distress. Breath sounds: Normal breath sounds. No stridor. No wheezing or rhonchi. Abdominal:      General: Bowel sounds are normal. There is no distension. Palpations: Abdomen is soft. There is no mass. Tenderness: There is no abdominal tenderness. There is no guarding or rebound. Musculoskeletal:         General: Tenderness (lower back, left hip) present. Normal range of motion. Cervical back: Normal range of motion and neck supple. No rigidity or tenderness. Right lower leg: No edema. Left lower leg: No edema. Lymphadenopathy:      Cervical: No cervical adenopathy. Skin:     General: Skin is warm and dry. Findings: No erythema or rash. Neurological:      Mental Status: She is alert and oriented to person, place, and time. Mental status is at baseline. Deep Tendon Reflexes: Reflexes are normal and symmetric. Psychiatric:         Mood and Affect: Mood normal.         Behavior: Behavior normal.         Thought Content: Thought content normal.         Judgment: Judgment normal.           This note was completed in part utilizing [employer] direct voice recognition software. Grammatical errors, random word insertion, spelling mistakes, and incomplete sentences may be an occasional consequence of the system secondary to software limitations, ambient noise and hardware issues. At the time of dictation, efforts were made to edit, clarify and /or correct errors.   Please read the chart carefully and recognize, using context, where substitutions have occurred. If you have any questions or concerns about the context, text or information contained within the body of this dictation, please contact myself, the provider, for further clarification.

## 2023-10-16 NOTE — PROGRESS NOTES
Daily Note     Today's date: 10/16/2023  Patient name: Ariella Palma  : 1970  MRN: [id number]  Referring provider: Salima Stafford, PT  Dx:   Encounter Diagnosis     ICD-10-CM    1. Primary osteoarthritis of left hip  M16.12                      Subjective: She still feels steady progress and she did do better walking this year at her son's birthday than last year and was able to dance for a bit at a wedding . Objective: See treatment diary below      Assessment: Tolerated treatment well. She had limited hip extension still and she was progressed c squats on TG c good tolerance. Consider modified pigeon st. Patient would benefit from continued PT      Plan: Continue per plan of care.       Precautions: HTN, psoriatic arthritis, latex allergy, DM    Daily Treatment Diary     Manuals 9/15 9/18 10/4 10/13 10/16        L LAD  5' 5' 7' 5'        Prone anterior glide gr 4 c IR ROM    7' 5'        L hip inf/lateral glide belt gr 3-4  5' 5'          Exercise Diary              Hip abd supine table nv 30x           bike             L Hip add ST  3x;20 3x :20           KTC   10x :05          Prone press up  10x 2x10          Prone quad ST 4x :20 6x :20 supine 4x :20 5x :20 5x :20        Hip IR on stool 10x 20x 20x 20x 10x        Total gym when atilio    nv L 18 40x         lumbar ext 10x  20x 20x 20x 20x        L SG at wall   10x :05 10x :05 10x :05        Hip ext standing and prone    2x10 2x10        Therapeutic act             squats Form nv 10x UE support held                       Neuro re-ed             Thoracic ext c rot  10x :10 10x :10                                    LTR 10x 10x 10x 10x 10x

## 2023-10-24 ENCOUNTER — OFFICE VISIT (OUTPATIENT)
Dept: PHYSICAL THERAPY | Facility: CLINIC | Age: 53
End: 2023-10-24
Payer: COMMERCIAL

## 2023-10-24 DIAGNOSIS — M16.12 PRIMARY OSTEOARTHRITIS OF LEFT HIP: Primary | ICD-10-CM

## 2023-10-24 PROCEDURE — 97110 THERAPEUTIC EXERCISES: CPT | Performed by: PHYSICAL THERAPIST

## 2023-10-24 PROCEDURE — 97140 MANUAL THERAPY 1/> REGIONS: CPT | Performed by: PHYSICAL THERAPIST

## 2023-10-24 PROCEDURE — 97112 NEUROMUSCULAR REEDUCATION: CPT | Performed by: PHYSICAL THERAPIST

## 2023-10-24 NOTE — PROGRESS NOTES
PT Re-Evaluation        Today's date: 10/24/2023  Patient name: Casie Meraz  : 1970  MRN: [id number]  Referring provider: Davy Garcia PT  Dx:   Encounter Diagnosis   Name Primary? Primary osteoarthritis of left hip Yes                   Assessment    Casie Meraz has demonstrated overall improvement in ROM, strength, function, and a reduction in pain. Currently, she has made steady progress towards her goals, but continues to remain limited with restricted ROM and impaired physical strength, decreased squatting form, decreased lumbar mobility. At this time, skilled physical therapy is warranted to address the remaining impairments and aide in return to squatting s pain, sitting for longer tolerance s pain. Limiting factors to therapy demands of work and prolonged positioning. and she  demonstrates good motivation. Goals  STGs  1. Decrease pain by 20% in 2-4 weeks. - met  2. Improve hip ROM by 10 degrees in 2-4 weeks. - met  3. Improve hip strength by 1/3 grade in 2-4 weeks. - met      LTGs  1. Decrease pain by 60% in 6-8 weeks. 2. Improve standing sitting tolerance to >45 minutes in 6-8 weeks. 3. Perform job activities without pain in 6-8 weeks. Plan    Patient would benefit from: skilled PT and would benefit from 2x/week but can only do 1x/week. Since this is the case and her exercise performance would be most in the beginning of the morning and at the end of the day it would benefit to teach her family how to perform manuals. Planned therapy interventions: manual therapy, therapeutic exercise, stretching, strengthening and neuromuscular re-education  Frequency: 1x week  Duration in weeks: 8  Treatment plan discussed with: patient        Subjective Evaluation    History of Present Illness    She states overall she is better. She did go to a conference and sat for long periods.  She states overall she has been better since starting but does have difficulty sitting in the car for prolonged periods. She may benefit from using a pillow to be more upright. Hx of injury:  She states she strains it and she puts more pressure on the R foot. It aches c lying on the R side  She is also getting back pain and she doesn't stand upright. Lying supine is uncomfortable. She finds herself leaning forwards quite a bit. It feels more stiff. If she gets up to walk she feels hunched over. 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 She states she feels like she limps and doesn't lift the leg up as much.        Pain  At best pain ratin  At worst pain ratin  Location: across the back, L lateral hip pain and anterior thigh  quality: fatigue, stiffness, burning  Better: sleeping on L side, moving- walking, be upright- doorway stretches, motrin  Worse: sleeping on R side, walking uneven ground, uphill, sleeping at bed in hospital, sitting long times, sit to stand, AM stiffness, after work  - prolonged standing- back    Treatments  Current treatment: injection treatment - many years        Objective    Sensation:-  Myotomes:-    Slump: -SLR:  L painful to perform Lynder Gear: L+  Faddir: L+       Log Roll:L+  Hip distraction: + felt better   Ely’s:L 85 + R 90       Observation: Less WB on LLE  Gait: L foot in ER, poor hip ext, forwards lean  Squat: form cues to bend backwards  without pain       SG: anterior glide decreased pain with Ir, lateral inferior glide hypomobile decreased pain with flexion  Palpation:post greater trochanter   Lumbar AROM L R Strength L R   Flex  Min conway  Flex 5 5   Ext 5x groin pain max conway  Ext 5 5   rotation Min  conway WFL      Hip A/PROM        Flex 90 /  / Flex 4 4   ER at 90 60  ER     IR at 90 0/ (prone) 5  IR     Abd 15 20tight Abd     Ext Unable to lie supine knee straight  Ext             Knee A/PROM   Ankle     Flex   DF 5 5   Ext   PF                      Precautions: HTN, psoriatic arthritis, latex allergy, DM    Daily Treatment Diary Manuals 9/15 9/18 10/4 10/13 10/16 10/24       L LAD  5' 11' 7' 5' 5'       Prone anterior glide gr 4 c IR ROM    7' 5' 5'       L hip inf/lateral glide belt gr 3-4  5' 5'          Exercise Diary              Hip abd supine table nv 30x           bike             L Hip add ST  3x;20 3x :20           KTC   10x :05          Prone press up  10x 2x10          Prone quad ST 4x :20 6x :20 supine 4x :20 5x :20 5x :20 5x :20       Hip IR on stool 10x 20x 20x 20x 10x 10x       Total gym when atilio    nv L 18 40x L 18 40x        lumbar ext 10x  20x 20x 20x 20x 20x       L SG at wall   10x :05 10x :05 10x :05 10x :05       Hip ext standing and prone    2x10 2x10 2x10       Therapeutic act             squats Form nv 10x UE support held                       Neuro re-ed             Thoracic ext c rot  10x :10 10x :10                                    LTR 10x 10x 10x 10x 10x 10x

## 2023-11-01 ENCOUNTER — OFFICE VISIT (OUTPATIENT)
Dept: PHYSICAL THERAPY | Facility: CLINIC | Age: 53
End: 2023-11-01
Payer: COMMERCIAL

## 2023-11-01 DIAGNOSIS — M16.12 PRIMARY OSTEOARTHRITIS OF LEFT HIP: Primary | ICD-10-CM

## 2023-11-01 PROCEDURE — 97110 THERAPEUTIC EXERCISES: CPT | Performed by: PHYSICAL THERAPIST

## 2023-11-01 PROCEDURE — 97140 MANUAL THERAPY 1/> REGIONS: CPT | Performed by: PHYSICAL THERAPIST

## 2023-11-01 NOTE — PROGRESS NOTES
Daily Note     Today's date: 2023  Patient name: Haylie Larsen  : 1970  MRN: [id number]  Referring provider: Ramandeep Nino PT  Dx:   Encounter Diagnosis     ICD-10-CM    1. Primary osteoarthritis of left hip  M16.12                      Subjective: She states she was sitting a long time in the last few days but she notices her recovery is not as long. Objective: See treatment diary below      Assessment: Tolerated treatment well. Worked on hip IR ROM and hip ext ROM and she had improved motion c anterior hip glide. Will progress c this as tolerable. Consider lumbar mobs nv as well. Patient would benefit from continued PT      Plan: Continue per plan of care.       Precautions: HTN, psoriatic arthritis, latex allergy, DM    Daily Treatment Diary     Manuals 9/15 9/18 10/4 10/13 10/16 10/24 11/1      L LAD  5' 5' 7' 5' 5       Prone anterior glide gr 4 c IR ROM    7' 5' 5 10      L hip inf/lateral glide belt gr 3-4  5' 5'          Exercise Diary              Hip abd supine table nv 30x           bike             L Hip add ST  3x;20 3x :20           KTC   10x :05          Prone press up  10x 2x10          Prone quad ST 4x :20 6x :20 supine 4x :20 5x :20 5x :20 5x :20       Hip IR on stool c ant glide belt 10x 20x 20x 20x 10x 10x 3'      Total gym L 18 when atilio    nv L 18 40x L 18 40x L18 40x       lumbar ext 10x  20x 20x 20x 20x 20x 20x      L SG at wall   10x :05 10x :05 10x :05 10x :05       Hip ext standing and prone    2x10 2x10 2x10 2x10      Therapeutic act             squats Form nv 10x UE support held    10x pain                   Neuro re-ed             Thoracic ext c rot  10x :10 10x :10    10x :10                                LTR 10x 10x 10x 10x 10x 10x 10x

## 2023-11-10 ENCOUNTER — OFFICE VISIT (OUTPATIENT)
Dept: PHYSICAL THERAPY | Facility: CLINIC | Age: 53
End: 2023-11-10
Payer: COMMERCIAL

## 2023-11-10 DIAGNOSIS — M16.12 PRIMARY OSTEOARTHRITIS OF LEFT HIP: Primary | ICD-10-CM

## 2023-11-10 PROCEDURE — 97110 THERAPEUTIC EXERCISES: CPT | Performed by: PHYSICAL THERAPIST

## 2023-11-10 PROCEDURE — 97140 MANUAL THERAPY 1/> REGIONS: CPT | Performed by: PHYSICAL THERAPIST

## 2023-11-10 NOTE — PROGRESS NOTES
Daily Note     Today's date: 11/10/2023  Patient name: Lino Hernández  : 1970  MRN: [id number]  Referring provider: Jacque Flores, PT  Dx:   Encounter Diagnosis     ICD-10-CM    1. Primary osteoarthritis of left hip  M16.12                      Subjective: She states she had to sleep in a hospital bed last night and felt stiff after this. She does feel gradual improvement of ROM and her son performed mobs at home which did help. Objective: See treatment diary below      Assessment: Tolerated treatment well. Added in lumbar mobs to see if this could help her overall mobility. Will keep progressing as tolerable. Consider sustained lumbar ext on table nv. Patient would benefit from continued PT      Plan: Continue per plan of care.       Precautions: HTN, psoriatic arthritis, latex allergy, DM    Daily Treatment Diary     Manuals 9/15 9/18 10/4 10/13 10/16 10/24 11/1 11/10     L LAD  5' 5' 7' 5' 5       Prone PA glide lumbar gr 3-4        5'     Prone anterior glide gr 4 c IR ROM    7' 5' 5 10 8'     L hip inf/lateral glide belt gr 3-4  5' 5'          Exercise Diary              Sustained lumbar ext             bike        5 min     L Hip add ST  3x;20 3x :20           KTC   10x :05          Prone press up  10x 2x10     2x10     Prone quad ST 4x :20 6x :20 supine 4x :20 5x :20 5x :20 5x :20       Hip IR on stool c ant glide belt 10x 20x 20x 20x 10x 10x 3' 30x     L Hip abd and IR on stool        20x     Total gym L 18 when atilio    nv L 18 40x L 18 40x L18 40x L 20 40x      lumbar ext 10x  20x 20x 20x 20x 20x 20x 20x     L SG at wall   10x :05 10x :05 10x :05 10x :05       Hip ext standing and prone    2x10 2x10 2x10 2x10 2x10     Therapeutic act             squats Form nv 10x UE support held    10x pain 10x                  Neuro re-ed             Thoracic ext c rot  10x :10 10x :10    10x :10 10x :10                               LTR 10x 10x 10x 10x 10x 10x 10x 10x

## 2023-11-13 ENCOUNTER — HOSPITAL ENCOUNTER (OUTPATIENT)
Dept: VASCULAR ULTRASOUND | Facility: HOSPITAL | Age: 53
Discharge: HOME | End: 2023-11-13
Payer: COMMERCIAL

## 2023-11-13 DIAGNOSIS — I65.23 CAROTID STENOSIS, ASYMPTOMATIC, BILATERAL: ICD-10-CM

## 2023-11-13 PROCEDURE — 93880 EXTRACRANIAL BILAT STUDY: CPT | Performed by: SURGERY

## 2023-11-13 PROCEDURE — 93880 EXTRACRANIAL BILAT STUDY: CPT

## 2023-11-16 ENCOUNTER — APPOINTMENT (OUTPATIENT)
Dept: PHYSICAL THERAPY | Facility: CLINIC | Age: 53
End: 2023-11-16
Payer: COMMERCIAL

## 2023-11-21 ENCOUNTER — HOSPITAL ENCOUNTER (OUTPATIENT)
Dept: NON INVASIVE DIAGNOSTICS | Facility: CLINIC | Age: 53
Discharge: HOME | End: 2023-11-21
Payer: COMMERCIAL

## 2023-11-21 VITALS
HEART RATE: 80 BPM | SYSTOLIC BLOOD PRESSURE: 132 MMHG | WEIGHT: 195 LBS | HEIGHT: 64 IN | DIASTOLIC BLOOD PRESSURE: 84 MMHG | BODY MASS INDEX: 33.29 KG/M2

## 2023-11-21 DIAGNOSIS — I10 HYPERTENSION, UNSPECIFIED TYPE: ICD-10-CM

## 2023-11-21 LAB
AORTIC ROOT: 2.9 CM
APICAL FOUR CHAMBER EJECTION FRACTION: 65 %
ASCENDING AORTA: 2.7 CM
E WAVE DECELERATION TIME: 210 MS
E/A RATIO: 1.01
FRACTIONAL SHORTENING: 32 (ref 28–44)
INTERVENTRICULAR SEPTUM IN DIASTOLE (PARASTERNAL SHORT AXIS VIEW): 1.2 CM
INTERVENTRICULAR SEPTUM: 1.2 CM (ref 0.6–1.1)
LAAS-AP2: 15.9 CM2
LAAS-AP4: 14.5 CM2
LEFT ATRIUM SIZE: 3.4 CM
LEFT ATRIUM VOLUME (MOD BIPLANE): 47 ML
LEFT ATRIUM VOLUME INDEX (MOD BIPLANE): 24.2 ML/M2
LEFT INTERNAL DIMENSION IN SYSTOLE: 2.6 CM (ref 2.1–4)
LEFT VENTRICULAR INTERNAL DIMENSION IN DIASTOLE: 3.8 CM (ref 3.5–6)
LEFT VENTRICULAR POSTERIOR WALL IN END DIASTOLE: 1.2 CM
LEFT VENTRICULAR STROKE VOLUME: 36 ML
LVSV (TEICH): 36 ML
MV E'TISSUE VEL-SEP: 11 CM/S
MV PEAK A VEL: 0.73 M/S
MV PEAK E VEL: 74 CM/S
MV STENOSIS PRESSURE HALF TIME: 61 MS
MV VALVE AREA P 1/2 METHOD: 3.61
RA PRESSURE ESTIMATED: 3 MMHG
RIGHT ATRIUM AREA SYSTOLE A4C: 8.6 CM2
RIGHT VENTRICLE ID DIMENSION: 3.1 CM
RV PSP: 35 MMHG
SL CV LEFT ATRIUM LENGTH A2C: 4.7 CM
SL CV LV EF: 65
SL CV PED ECHO LEFT VENTRICLE DIASTOLIC VOLUME (MOD BIPLANE) 2D: 61 ML
SL CV PED ECHO LEFT VENTRICLE SYSTOLIC VOLUME (MOD BIPLANE) 2D: 25 ML
TR MAX PG: 32 MMHG
TR PEAK VELOCITY: 2.8 M/S
TRICUSPID ANNULAR PLANE SYSTOLIC EXCURSION: 2 CM
TRICUSPID VALVE PEAK REGURGITATION VELOCITY: 2.81 M/S

## 2023-11-21 PROCEDURE — 93306 TTE W/DOPPLER COMPLETE: CPT

## 2023-11-21 PROCEDURE — 93306 TTE W/DOPPLER COMPLETE: CPT | Performed by: INTERNAL MEDICINE

## 2023-11-29 ENCOUNTER — OFFICE VISIT (OUTPATIENT)
Dept: PHYSICAL THERAPY | Facility: CLINIC | Age: 53
End: 2023-11-29
Payer: COMMERCIAL

## 2023-11-29 DIAGNOSIS — M16.12 PRIMARY OSTEOARTHRITIS OF LEFT HIP: Primary | ICD-10-CM

## 2023-11-29 DIAGNOSIS — I10 HYPERTENSION, UNSPECIFIED TYPE: ICD-10-CM

## 2023-11-29 PROCEDURE — 97112 NEUROMUSCULAR REEDUCATION: CPT | Performed by: PHYSICAL THERAPIST

## 2023-11-29 PROCEDURE — 97140 MANUAL THERAPY 1/> REGIONS: CPT | Performed by: PHYSICAL THERAPIST

## 2023-11-29 PROCEDURE — 97110 THERAPEUTIC EXERCISES: CPT | Performed by: PHYSICAL THERAPIST

## 2023-11-29 RX ORDER — LOSARTAN POTASSIUM 25 MG/1
25 TABLET ORAL DAILY
Qty: 90 TABLET | Refills: 3 | Status: SHIPPED | OUTPATIENT
Start: 2023-11-29 | End: 2023-12-04 | Stop reason: SDUPTHER

## 2023-11-29 NOTE — PROGRESS NOTES
PT Re-Evaluation        Today's date: 2023  Patient name: Casie Meraz  : 1970  MRN: [id number]  Referring provider: Davy Garcia PT  Dx:   Encounter Diagnosis   Name Primary? Primary osteoarthritis of left hip Yes                   Assessment    Casie Meraz has demonstrated overall improvement in hip IR ROM, strength, function, and a reduction in pain. Currently, she has made steady progress towards her goals, but continues to remain limited with restricted ROM and impaired physical strength, decreased squatting form, decreased lumbar mobility. At this time, skilled physical therapy is warranted to address the remaining impairments and aide in return to squatting s pain, sitting for longer tolerance s pain. We would also hope to improve her walking tolerance during this time. We reviewed proper positioning and compensations during stretching which proved to be very helpful for her posture. Limiting factors to therapy demands of work and prolonged positioning. and she  demonstrates good motivation. Goals  STGs  1. Decrease pain by 20% in 2-4 weeks. - met  2. Improve hip ROM by 10 degrees in 2-4 weeks. - met  3. Improve hip strength by 1/3 grade in 2-4 weeks. - met      LTGs  1. Decrease pain by 60% in 6-8 weeks. 2. Improve standing sitting tolerance to >45 minutes in 6-8 weeks. 3. Perform job activities without pain in 6-8 weeks. - partially met  4. Walking tolerance to 25 mins in 8 weeks. Plan    Patient would benefit from: skilled PT and would benefit from 2x/week but can only do 1x/week. We will focus more on end ROM stretching since her pain is lessened.    Planned therapy interventions: manual therapy, therapeutic exercise, stretching, strengthening and neuromuscular re-education  Frequency: 1x week  Duration in weeks: 8  Treatment plan discussed with: patient        Subjective Evaluation    History of Present Illness    She states she gets stiffness when she does several loads of laundry. She states her knee feels pretty good. She overall feels better c her gait and endurance. She is actually able to sleep on her R side. Hx of injury:  She states she strains it and she puts more pressure on the R foot. It aches c lying on the R side  She is also getting back pain and she doesn't stand upright. Lying supine is uncomfortable. She finds herself leaning forwards quite a bit. It feels more stiff. If she gets up to walk she feels hunched over. 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 She states she feels like she limps and doesn't lift the leg up as much.        Pain  At best pain ratin  At worst pain ratin  Location: across the back, L lateral hip pain and anterior thigh  quality: fatigue, stiffness, muscle ache  Better: sleeping on L side, moving- walking, be upright- doorway stretches, motrin  Worse: sleeping on R side- can spend longer, walking uneven ground- better, uphill- better, sleeping at bed in hospital, sitting long times, sit to stand, AM stiffness, after work  - prolonged standing- back    Treatments  Current treatment: injection treatment - many years        Objective    Sensation:-  Myotomes:-    Slump: -SLR:  L painful to perform Angella Guard: L+  Faddir: L+       Log Roll:L+  Hip distraction: + felt better   Ely’s:L 85 + R 90       Observation: Less WB on LLE  Gait: L foot in ER, poor hip ext, forwards lean  Squat: good stance and no pain slightly limited depth       SG: anterior glide decreased pain with Ir, lateral inferior glide hypomobile decreased pain with flexion  Palpation:post greater trochanter   Unable to perform standing pigeon st modified  Lumbar AROM L R Strength L R   Flex  Min conway  Flex 5 5   Ext 5x groin pain max conway  Ext 5 5   rotation WFL Mod conway       Hip A/PROM        Flex 90 /  / Flex 4+ 4+   ER at 90 60  ER     IR at 90 5/ (prone) 5  IR     Abd 12 24tight Abd     Ext Able to lie 75 supine quad  Ext Knee A/PROM   Ankle     Flex   DF 5 5   Ext   PF                  Plan: Continue per plan of care.       Precautions: HTN, psoriatic arthritis, latex allergy, DM    Daily Treatment Diary     Manuals 9/15 9/18 10/4 10/13 10/16 10/24 11/1 11/10 11/29    L LAD  5' 5' 7' 5' 5       Prone PA glide lumbar gr 3-4        5' 5'    Prone anterior glide gr 4 c IR ROM and quad st    7' 5' 5 10 8' 8'    L hip inf/lateral glide belt gr 3-4  5' 5'          Exercise Diary              Sustained lumbar ext         8'    bike        5 min 5 min    L Hip add ST  3x;20 3x :20           Seated table piriformis st         3x :20    Prone press up  10x 2x10     2x10 2x10    Prone quad ST 4x :20 6x :20 supine 4x :20 5x :20 5x :20 5x :20       Hip IR on stool c ant glide belt 10x 20x 20x 20x 10x 10x 3' 30x     L Hip abd and IR on stool        20x     Total gym L 18 when atilio    nv L 18 40x L 18 40x L18 40x L 20 40x      lumbar ext 10x  20x 20x 20x 20x 20x 20x 20x 20x    L SG at wall   10x :05 10x :05 10x :05 10x :05       Hip ext standing and prone    2x10 2x10 2x10 2x10 2x10 20x    Therapeutic act             squats Form nv 10x UE support held    10x pain 10x 10x                 Neuro re-ed             Thoracic ext c rot  10x :10 10x :10    10x :10 10x :10                               LTR 10x 10x 10x 10x 10x 10x 10x 10x

## 2023-12-04 DIAGNOSIS — I10 HYPERTENSION, UNSPECIFIED TYPE: ICD-10-CM

## 2023-12-04 RX ORDER — LOSARTAN POTASSIUM 25 MG/1
25 TABLET ORAL DAILY
Qty: 90 TABLET | Refills: 3 | Status: SHIPPED | OUTPATIENT
Start: 2023-12-04

## 2023-12-08 ENCOUNTER — OFFICE VISIT (OUTPATIENT)
Dept: PHYSICAL THERAPY | Facility: CLINIC | Age: 53
End: 2023-12-08
Payer: COMMERCIAL

## 2023-12-08 DIAGNOSIS — M16.12 PRIMARY OSTEOARTHRITIS OF LEFT HIP: Primary | ICD-10-CM

## 2023-12-08 PROCEDURE — 97140 MANUAL THERAPY 1/> REGIONS: CPT | Performed by: PHYSICAL THERAPIST

## 2023-12-08 PROCEDURE — 97112 NEUROMUSCULAR REEDUCATION: CPT | Performed by: PHYSICAL THERAPIST

## 2023-12-08 PROCEDURE — 97110 THERAPEUTIC EXERCISES: CPT | Performed by: PHYSICAL THERAPIST

## 2023-12-08 NOTE — PROGRESS NOTES
Daily Note     Today's date: 2023  Patient name: Samantha Rodriguez  : 1970  MRN: [id number]  Referring provider: Braxton Gregory, PT  Dx:   Encounter Diagnosis     ICD-10-CM    1. Primary osteoarthritis of left hip  M16.12                      Subjective: She states she did not have pain after doing the bike last visit in her thigh. She also notes her squats are improving. Objective: See treatment diary below      Assessment: Tolerated treatment well. She still had significant hip stretch c lumbar ext in prolonged hold. She was progressed c SL LP c good tolerance. Patient would benefit from continued PT      Plan: Continue per plan of care.       Precautions: HTN, psoriatic arthritis, latex allergy, DM    Daily Treatment Diary     Manuals 9/15 9/18 10/4 10/13 10/16 10/24 11/1 11/10 11/29 12/8   L LAD  5' 5' 7' 5' 5       Prone PA glide lumbar gr 3-4        5' 5' 5'   Prone anterior glide gr 4 c IR ROM and quad st    7' 5' 5 10 8' 8' 8'   L hip inf/lateral glide belt gr 3-4  5' 5'          Exercise Diary              Sustained lumbar ext         8' 8'   bike        5 min 5 min 6 min   L Hip add ST  3x;20 3x :20           Seated table piriformis st         3x :20 3x :20 standing   Prone press up  10x 2x10     2x10 2x10 2x10   Prone quad ST 4x :20 6x :20 supine 4x :20 5x :20 5x :20 5x :20    4x :20   Hip IR on stool c ant glide belt 10x 20x 20x 20x 10x 10x 3' 30x     L Hip abd and IR on stool        20x     SL LP #50          30x    lumbar ext 10x  20x 20x 20x 20x 20x 20x 20x 20x 20x   L SG at wall   10x :05 10x :05 10x :05 10x :05       Hip ext standing and prone    2x10 2x10 2x10 2x10 2x10 20x 20x   Therapeutic act             squats Form nv 10x UE support held    10x pain 10x 10x 10x                Neuro re-ed             Thoracic ext c rot  10x :10 10x :10    10x :10 10x :10  10x :10                             LTR 10x 10x 10x 10x 10x 10x 10x 10x

## 2023-12-14 ENCOUNTER — APPOINTMENT (OUTPATIENT)
Dept: PHYSICAL THERAPY | Facility: CLINIC | Age: 53
End: 2023-12-14
Payer: COMMERCIAL

## 2023-12-14 DIAGNOSIS — J06.9 UPPER RESPIRATORY INFECTION, ACUTE: Primary | ICD-10-CM

## 2023-12-14 DIAGNOSIS — M16.12 PRIMARY OSTEOARTHRITIS OF LEFT HIP: Primary | ICD-10-CM

## 2023-12-14 RX ORDER — AZITHROMYCIN 250 MG/1
TABLET, FILM COATED ORAL
Qty: 6 TABLET | Refills: 0 | Status: SHIPPED | OUTPATIENT
Start: 2023-12-14 | End: 2023-12-18

## 2023-12-14 NOTE — PROGRESS NOTES
Daily Note     Today's date: 2023  Patient name: Kari Erazo  : 1970  MRN: [id number]  Referring provider: Young Ferrer, PT  Dx:   Encounter Diagnosis     ICD-10-CM    1. Primary osteoarthritis of left hip  M16.12                      Subjective: She states she did not have pain after doing the bike last visit in her thigh. She also notes her squats are improving.       Objective: See treatment diary below      Assessment: Tolerated treatment well. She still had significant hip stretch c lumbar ext in prolonged hold. She was progressed c SL LP c good tolerance. Patient would benefit from continued PT      Plan: Continue per plan of care.      Precautions: HTN, psoriatic arthritis, latex allergy, DM    Daily Treatment Diary     Manuals 9/15 9/18 10/4 10/13 10/16 10/24 11/1 11/10 11/29 12   L LAD  5' 5' 7' 5' 5       Prone PA glide lumbar gr 3-4        5' 5' 5'   Prone anterior glide gr 4 c IR ROM and quad st    7' 5' 5 10 8' 8' 8'   L hip inf/lateral glide belt gr 3-4  5' 5'          Exercise Diary              Sustained lumbar ext         8' 8'   bike        5 min 5 min 6 min   L Hip add ST  3x;20 3x :20           Seated table piriformis st         3x :20 3x :20 standing   Prone press up  10x 2x10     2x10 2x10 2x10   Prone quad ST 4x :20 6x :20 supine 4x :20 5x :20 5x :20 5x :20    4x :20   Hip IR on stool c ant glide belt 10x 20x 20x 20x 10x 10x 3' 30x     L Hip abd and IR on stool        20x     SL LP #50          30x    lumbar ext 10x  20x 20x 20x 20x 20x 20x 20x 20x 20x   L SG at wall   10x :05 10x :05 10x :05 10x :05       Hip ext standing and prone    2x10 2x10 2x10 2x10 2x10 20x 20x   Therapeutic act             squats Form nv 10x UE support held    10x pain 10x 10x 10x                Neuro re-ed             Thoracic ext c rot  10x :10 10x :10    10x :10 10x :10  10x :10                             LTR 10x 10x 10x 10x 10x 10x 10x 10x

## 2023-12-27 ENCOUNTER — OFFICE VISIT (OUTPATIENT)
Dept: PHYSICAL THERAPY | Facility: CLINIC | Age: 53
End: 2023-12-27
Payer: COMMERCIAL

## 2023-12-27 DIAGNOSIS — M16.12 PRIMARY OSTEOARTHRITIS OF LEFT HIP: Primary | ICD-10-CM

## 2023-12-27 PROCEDURE — 97110 THERAPEUTIC EXERCISES: CPT | Performed by: PHYSICAL THERAPIST

## 2023-12-27 PROCEDURE — 97112 NEUROMUSCULAR REEDUCATION: CPT | Performed by: PHYSICAL THERAPIST

## 2023-12-27 PROCEDURE — 97140 MANUAL THERAPY 1/> REGIONS: CPT | Performed by: PHYSICAL THERAPIST

## 2023-12-27 NOTE — PROGRESS NOTES
Daily Note     Today's date: 2023  Patient name: Kari Erazo  : 1970  MRN: [id number]  Referring provider: Young Ferrer, PT  Dx:   Encounter Diagnosis     ICD-10-CM    1. Primary osteoarthritis of left hip  M16.12                      Subjective: She states she felt more stretch c prone prolonged stretch and has been trying this at home. She does feel some tightness in her lumbar spine still as she was sick recently.       Objective: See treatment diary below      Assessment: Tolerated treatment well. Will RE nv. She felt better after prone prolonged ext. Patient would benefit from continued PT      Plan: Continue per plan of care.      Precautions: HTN, psoriatic arthritis, latex allergy, DM    Daily Treatment Diary     Manuals 12/27 9/18 10/4 10/13 10/16 10/24 11/1 11/10 11/29 12   L LAD  5' 5' 7' 5' 5       Prone PA glide lumbar gr 3-4 5'/ 10x c glide       5' 5' 5'   Prone anterior glide gr 4 c IR ROM and quad st 8'   7' 5' 5 10 8' 8' 8'   L hip inf/lateral glide belt gr 3-4  5' 5'          Exercise Diary              Sustained lumbar ext 8'        8' 8'   bike 6'       5 min 5 min 6 min   L Hip add ST  3x;20 3x :20           Seated table piriformis st 3x :20        3x :20 3x :20 standing   Prone press up 2x10 10x 2x10     2x10 2x10 2x10   Prone quad ST 4x :20 6x :20 supine 4x :20 5x :20 5x :20 5x :20    4x :20   Hip IR on stool c ant glide belt  20x 20x 20x 10x 10x 3' 30x     L Hip abd and IR on stool        20x     SL LP #50 30x         30x    lumbar ext 20x  20x 20x 20x 20x 20x 20x 20x 20x 20x   L SG at wall   10x :05 10x :05 10x :05 10x :05       Hip ext standing and prone 10x   2x10 2x10 2x10 2x10 2x10 20x 20x   Therapeutic act             squats  10x UE support held    10x pain 10x 10x 10x                Neuro re-ed             Thoracic ext c rot 10x :10 10x :10 10x :10    10x :10 10x :10  10x :10                             LTR 10x 10x 10x 10x 10x 10x 10x 10x

## 2024-01-03 DIAGNOSIS — N93.8 DUB (DYSFUNCTIONAL UTERINE BLEEDING): Primary | ICD-10-CM

## 2024-01-08 ENCOUNTER — EVALUATION (OUTPATIENT)
Dept: PHYSICAL THERAPY | Facility: CLINIC | Age: 54
End: 2024-01-08
Payer: COMMERCIAL

## 2024-01-08 DIAGNOSIS — M16.12 PRIMARY OSTEOARTHRITIS OF LEFT HIP: Primary | ICD-10-CM

## 2024-01-08 PROCEDURE — 97110 THERAPEUTIC EXERCISES: CPT | Performed by: PHYSICAL THERAPIST

## 2024-01-08 PROCEDURE — 97530 THERAPEUTIC ACTIVITIES: CPT | Performed by: PHYSICAL THERAPIST

## 2024-01-08 PROCEDURE — 97112 NEUROMUSCULAR REEDUCATION: CPT | Performed by: PHYSICAL THERAPIST

## 2024-01-08 PROCEDURE — 97140 MANUAL THERAPY 1/> REGIONS: CPT | Performed by: PHYSICAL THERAPIST

## 2024-01-08 NOTE — PROGRESS NOTES
PT Re-Evaluation        Today's date: 2024  Patient name: Kari Erazo  : 1970  MRN: [id number]  Referring provider: Young Ferrer, PT  Dx:   Encounter Diagnosis   Name Primary?    Primary osteoarthritis of left hip Yes                   Assessment    Kari Erazo has demonstrated gradual improvement in hip IR/abd  ROM, strength, sleep and function. Currently, she has made steady progress towards her goals, but continues to remain limited with restricted ROM and impaired physical strength, decreased squatting form, decreased lumbar mobility. At this time, skilled physical therapy is warranted to address the remaining impairments and aide in return to squatting s pain, sitting for longer tolerance s pain.  She has also been progressing c her abdominal strength and she would like to transition to independent exercise program. However she is still benefitting from manual stretching, mobilizations, and skilled exercise progression to maximize safety and limit flare ups in her function.  Limiting factors to therapy demands of work and prolonged positioning.  and she  demonstrates good motivation.      Goals  STGs  1. Decrease pain by 20% in 2-4 weeks. - met  2. Improve hip ROM by 10 degrees in 2-4 weeks.  - met  3. Improve hip strength by 1/3 grade in 2-4 weeks.- met      LTGs  1. Decrease pain by 60% in 6-8 weeks.  2. Improve standing sitting tolerance to >45 minutes in 6-8 weeks.   3. Perform job activities without pain in 6-8 weeks.  - partially met  4. Walking tolerance to 25 mins in 8 weeks. - partially met      Plan    Patient would benefit from: skilled PT and would benefit from 2x/week but can only do 1x/week. We will focus more on end ROM stretching since her pain is lessened.   Planned therapy interventions: manual therapy, therapeutic exercise, stretching, strengthening and neuromuscular re-education  Frequency: 1x week  Duration in weeks: 8  Treatment plan discussed with:  patient        Subjective Evaluation    History of Present Illness      She states she gets L anterior thigh tightness after shoveling. She states it is better c sleeping on her side and can stay there longer. She feels she is exercising more and still does feel benefit from PT and stretching. She does still think she is not walking fully upright.            Hx of injury:  She states she strains it and she puts more pressure on the R foot. It aches c lying on the R side  She is also getting back pain and she doesn't stand upright. Lying supine is uncomfortable. She finds herself leaning forwards quite a bit. It feels more stiff. If she gets up to walk she feels hunched over. 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 She states she feels like she limps and doesn't lift the leg up as much.       Pain  At best pain ratin  At worst pain ratin  Location: across the back, L lateral hip pain and anterior thigh  quality: fatigue, stiffness, muscle ache  Better: sleeping on L side, moving- walking, be upright- doorway stretches, motrin  Worse: sleeping on R side- can spend longer, walking uneven ground- better, uphill- better, sleeping at bed in hospital, sitting long times, sit to stand, AM stiffness, after work  - prolonged standing- back    Treatments  Current treatment: injection treatment - many years        Objective    Sensation:-  Myotomes:-    Slump: -SLR:  L painful to perform Merle: L+  Faddir: L+       Log Roll:L+  Hip distraction: + felt better   Ely’s:L 85 + R 90       Observation: Less WB on LLE  Gait: L foot in ER, poor hip ext, forwards lean  Squat: good stance and good depth s R knee pain       SG: anterior glide decreased pain with Ir, lateral inferior glide hypomobile decreased pain with flexion  Palpation:post greater trochanter     She was able to perform standing pigeon st modified- improved  Lumbar AROM L R Strength L R   Flex  Min conway  Flex 5 5   Ext 5x groin  pain max conway  Ext 5 5   sb Min conway  Mod conway      rotation WFL Mod conway       Hip A/PROM        Flex 95 /95  / Flex 4+ 4+   ER at 90 47  ER     IR at 90 -6/ (prone) 5  IR     Abd 23 24tight Abd     Ext   Ext             Knee A/PROM   Ankle     Flex   DF 5 5   Ext   PF                  Plan: Continue per plan of care.      Precautions: HTN, psoriatic arthritis, latex allergy, DM  Daily Treatment Diary     Manuals 12/27 1/8 10/4 10/13 10/16 10/24 11/1 11/10 11/29 12/8   L LAD   5' 7' 5' 5       Prone PA glide lumbar gr 3-4 5'/ 10x c glide 5'/10x c glide      5' 5' 5'   Prone anterior glide gr 4 c IR ROM and quad st 8' 6'  7' 5' 5 10 8' 8' 8'   L hip inf/lateral glide belt gr 3-4   5'          Exercise Diary              Sustained lumbar ext 8' 8'       8' 8'   bike 6' 6'      5 min 5 min 6 min   L Hip add ST  3x;20 3x :20           Seated table piriformis st 3x :20        3x :20 3x :20 standing   Prone press up 2x10 10x2 2x10     2x10 2x10 2x10   Prone quad ST 4x :20 6x :20 supine 4x :20 5x :20 5x :20 5x :20    4x :20   Hip IR on stool c ant glide belt   20x 20x 10x 10x 3' 30x     L Hip abd and IR on stool  20x      20x     SL LP #50 30x 20x #45        30x    lumbar ext 20x  20x 20x 20x 20x 20x 20x 20x 20x 20x   R SB c arm overhead  10x :05           Hip ext standing and prone 10x 10x  2x10 2x10 2x10 2x10 2x10 20x 20x   Therapeutic act             squats  10x  held    10x pain 10x 10x 10x   TG 22  40x           Neuro re-ed             Thoracic ext c rot 10x :10 10x :10 10x :10    10x :10 10x :10  10x :10                             LTR 10x 10x 10x 10x 10x 10x 10x 10x

## 2024-01-16 ENCOUNTER — APPOINTMENT (OUTPATIENT)
Dept: PHYSICAL THERAPY | Facility: CLINIC | Age: 54
End: 2024-01-16
Payer: COMMERCIAL

## 2024-01-24 ENCOUNTER — HOSPITAL ENCOUNTER (OUTPATIENT)
Dept: ULTRASOUND IMAGING | Facility: HOSPITAL | Age: 54
Discharge: HOME | End: 2024-01-24
Payer: COMMERCIAL

## 2024-01-24 ENCOUNTER — OFFICE VISIT (OUTPATIENT)
Dept: PHYSICAL THERAPY | Facility: CLINIC | Age: 54
End: 2024-01-24
Payer: COMMERCIAL

## 2024-01-24 DIAGNOSIS — M16.12 PRIMARY OSTEOARTHRITIS OF LEFT HIP: Primary | ICD-10-CM

## 2024-01-24 DIAGNOSIS — N93.8 DUB (DYSFUNCTIONAL UTERINE BLEEDING): ICD-10-CM

## 2024-01-24 PROCEDURE — 97140 MANUAL THERAPY 1/> REGIONS: CPT | Performed by: PHYSICAL THERAPIST

## 2024-01-24 PROCEDURE — 97110 THERAPEUTIC EXERCISES: CPT | Performed by: PHYSICAL THERAPIST

## 2024-01-24 PROCEDURE — 76830 TRANSVAGINAL US NON-OB: CPT

## 2024-01-24 PROCEDURE — 76856 US EXAM PELVIC COMPLETE: CPT

## 2024-01-24 PROCEDURE — 97112 NEUROMUSCULAR REEDUCATION: CPT | Performed by: PHYSICAL THERAPIST

## 2024-01-24 NOTE — PROGRESS NOTES
Daily Note     Today's date: 2024  Patient name: Kari Erazo  : 1970  MRN: [id number]  Referring provider: Young Ferrer, PT  Dx:   Encounter Diagnosis     ICD-10-CM    1. Primary osteoarthritis of left hip  M16.12                      Subjective: She did have to do repetitive bending which did bother her. She does feel she is gradually stretching c her crunches and is getting more motion.       Objective: See treatment diary below      Assessment: Tolerated treatment well. She did have decreased ROM c prone press up and we emphasized this stretch c hip IR stretch.  Patient would benefit from continued PT      Plan: Continue per plan of care.      Precautions: HTN, psoriatic arthritis, latex allergy, DM  Daily Treatment Diary     Manuals 12/27 1/8 1/24 10/13 10/16 10/24 11/1 11/10 11/29 12   L LAD    7' 5' 5       Prone PA glide lumbar gr 3-4 5'/ 10x c glide 5'/10x c glide 5'     5' 5' 5'   Prone anterior glide gr 4 c IR ROM and quad st 8' 6' 6' 7' 5' 5 10 8' 8' 8'   L hip inf/lateral glide belt gr 3-4             Exercise Diary              Sustained lumbar ext 8' 8' nv      8' 8'   bike 6' 6' 6'     5 min 5 min 6 min   L Hip add ST  3x;20 3x :20           Seated table piriformis st 3x :20        3x :20 3x :20 standing   Prone press up 2x10 10x2 2x10     2x10 2x10 2x10   Prone quad ST 4x :20 6x :20 supine 4x :20 5x :20 5x :20 5x :20    4x :20   Hip IR on stool c ant glide belt   20x 20x 10x 10x 3' 30x     L Hip abd and IR on stool  20x      20x     SL LP #50 30x 20x #45 20x #50       30x    lumbar ext 20x  20x 20x 20x 20x 20x 20x 20x 20x 20x   R SB c arm overhead  10x :05 10x :05          Hip ext standing and prone 10x 10x 10x 2x10 2x10 2x10 2x10 2x10 20x 20x   Therapeutic act             squats  10x  nv    10x pain 10x 10x 10x   TG 22  40x 40x          Neuro re-ed             Thoracic ext c rot 10x :10 10x :10     10x :10 10x :10  10x :10                             LTR 10x 10x 10x 10x 10x 10x  10x 10x

## 2024-02-02 ENCOUNTER — OFFICE VISIT (OUTPATIENT)
Dept: PHYSICAL THERAPY | Facility: CLINIC | Age: 54
End: 2024-02-02
Payer: COMMERCIAL

## 2024-02-02 DIAGNOSIS — M16.12 PRIMARY OSTEOARTHRITIS OF LEFT HIP: Primary | ICD-10-CM

## 2024-02-02 PROCEDURE — 97140 MANUAL THERAPY 1/> REGIONS: CPT | Performed by: PHYSICAL THERAPIST

## 2024-02-02 PROCEDURE — 97112 NEUROMUSCULAR REEDUCATION: CPT | Performed by: PHYSICAL THERAPIST

## 2024-02-02 PROCEDURE — 97110 THERAPEUTIC EXERCISES: CPT | Performed by: PHYSICAL THERAPIST

## 2024-02-02 NOTE — PROGRESS NOTES
Daily Note     Today's date: 2024  Patient name: Kari Erazo  : 1970  MRN: [id number]  Referring provider: Young Ferrer, PT  Dx:   Encounter Diagnosis     ICD-10-CM    1. Primary osteoarthritis of left hip  M16.12                      Subjective: She did have to stand quite a lot in a procedure and had more hip pain due to this.       Objective: See treatment diary below      Assessment: Tolerated treatment well. Found that she had post trochanter tenderness so STM performed and then stretched hip flexor and then performed IR stretch in prone which seemed to help.  She was shown how to perform stm for this as well.  Patient would benefit from continued PT      Plan: Continue per plan of care.      Precautions: HTN, psoriatic arthritis, latex allergy, DM  Daily Treatment Diary     Manuals 12/27 1/8 1/24 2/2 10/16 10/24 11/1 11/10 11/29 12/8   R s/l L hip ext stretch    5' 5' 5       Prone PA glide lumbar gr 3-4 5'/ 10x c glide 5'/10x c glide      5' 5' 5'   Prone anterior glide gr 4 c IR ROM and quad st 8' 6' 6' 6' medial glide on trochanter 5' 5 10 8' 8' 8'   L Post trochanter stm in s/l    4'          L hip inf/lateral glide belt gr 3-4             Exercise Diary              Sustained lumbar ext 8' 8' nv 8'     8' 8'   bike 6' 6' 6' 6'    5 min 5 min 6 min   L Hip add ST  3x;20 3x :20  3x :20         Seated table piriformis st 3x :20        3x :20 3x :20 standing   Prone press up 2x10 10x2 2x10 2x10    2x10 2x10 2x10   Ball stm to post troch    2'         Prone quad ST 4x :20 6x :20 supine 4x :20 5x :20 5x :20 5x :20    4x :20   Hip IR on stool c ant glide belt   20x 20x 10x 10x 3' 30x     L Hip abd and IR on stool  20x      20x     SL LP #50 30x 20x #45 20x #50 20x #50      30x    lumbar ext 20x  20x 20x 20x 20x 20x 20x 20x 20x 20x   R SB c arm overhead  10x :05 10x :05          Hip ext standing and prone 10x 10x 10x 2x10 2x10 2x10 2x10 2x10 20x 20x   Therapeutic act             squats  10x  nv  10x   10x pain 10x 10x 10x   TG 22  40x 40x 40x         Neuro re-ed             Thoracic ext c rot 10x :10 10x :10  10x :10   10x :10 10x :10  10x :10                             LTR 10x 10x 10x  10x 10x 10x 10x

## 2024-02-08 ENCOUNTER — OFFICE VISIT (OUTPATIENT)
Dept: PHYSICAL THERAPY | Facility: CLINIC | Age: 54
End: 2024-02-08
Payer: COMMERCIAL

## 2024-02-08 DIAGNOSIS — M16.12 PRIMARY OSTEOARTHRITIS OF LEFT HIP: Primary | ICD-10-CM

## 2024-02-08 PROCEDURE — 97140 MANUAL THERAPY 1/> REGIONS: CPT | Performed by: PHYSICAL THERAPIST

## 2024-02-08 PROCEDURE — 97110 THERAPEUTIC EXERCISES: CPT | Performed by: PHYSICAL THERAPIST

## 2024-02-08 PROCEDURE — 97112 NEUROMUSCULAR REEDUCATION: CPT | Performed by: PHYSICAL THERAPIST

## 2024-02-08 NOTE — PROGRESS NOTES
PT Re-Evaluation and discharge note       Today's date: 2024  Patient name: Kari Erazo  : 1970  MRN: [id number]  Referring provider: Young Ferrer, PT  Dx:   Encounter Diagnosis   Name Primary?    Primary osteoarthritis of left hip Yes                   Assessment    Kari Erazo has demonstrated gradual improvement in hip ER ROM, improved myofascial tension, walking tolerance, and improved dressing independently. Currently, she has made steady progress towards her goals, but continues to remain limited with restricted ROM in hip, decreased strength, postural deficits decreased lumbar mobility. At this time, skilled physical therapy could be helpful, however she is able to transition to an independent HEP at this time.  Limiting factors to therapy demands of work and prolonged positioning.  and she  demonstrates good motivation.      Goals  STGs  1. Decrease pain by 20% in 2-4 weeks. - met  2. Improve hip ROM by 10 degrees in 2-4 weeks.  - met  3. Improve hip strength by 1/3 grade in 2-4 weeks.- met      LTGs  1. Decrease pain by 60% in 6-8 weeks.  2. Improve standing sitting tolerance to >45 minutes in 6-8 weeks.   3. Perform job activities without pain in 6-8 weeks.  - partially met  4. Walking tolerance to 25 mins in 8 weeks. - partially met      Plan    Patient would benefit from: skilled PT and would benefit from 2x/week but can only do 1x/week. We will focus more on end ROM stretching since her pain is lessened.   Planned therapy interventions: manual therapy, therapeutic exercise, stretching, strengthening and neuromuscular re-education  Frequency: 1x week  Duration in weeks: 8  Treatment plan discussed with: patient        Subjective Evaluation    History of Present Illness      She states she still gets stiffness in her hip but was able to go to the gym with her family on Saturday. She would still like to reach her foot to put on sock and shoe. Overall she feels 85% since beginning PT.  She does get some pain c lying on the R side.            Hx of injury:  She states she strains it and she puts more pressure on the R foot. It aches c lying on the R side  She is also getting back pain and she doesn't stand upright. Lying supine is uncomfortable. She finds herself leaning forwards quite a bit. It feels more stiff. If she gets up to walk she feels hunched over. 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 She states she feels like she limps and doesn't lift the leg up as much.       Pain  At best pain ratin  At worst pain ratin  Location: across the back, L lateral hip pain and anterior thigh  quality: fatigue, stiffness, muscle ache  Better: sleeping on L side, moving- walking, be upright- doorway stretches, motrin  Worse: walking uneven ground- better, uphill- better, sleeping at bed in hospital, sitting long times, sit to stand, AM stiffness, after work  - prolonged standing- back    Treatments  Current treatment: injection treatment - many years        Objective    Sensation:-  Myotomes:-    Slump: -SLR:  L 10 reps Merle: L+  Faddir: L+       Log Roll:L+  Hip distraction: + felt better   Ely’s:L 85 + R 90       Observation: Less WB on LLE  Gait: L foot in ER, poor hip ext, forwards lean  Squat: good stance and        SG: anterior glide decreased pain with Ir, lateral inferior glide hypomobile decreased pain with flexion  Palpation:post greater trochanter  better- minimal ttp    She was able to perform standing pigeon st modified- improved  Lumbar AROM L R Strength L R   Flex  Min conway  Flex 4 5   Ext 5x groin pain max conway  Ext 4+ 5   sb Min conway  Mod conway      rotation WFL Mod conway       Hip A/PROM        Flex 107 /90  / Flex 4 4+   ER at 90 55  ER     IR at 90 -21/ (prone) 5  IR     Abd 23 24tight Abd 4    Ext   Ext Poor Rom 2-            Knee A/PROM   Ankle     Flex   DF 5 5   Ext   PF            Precautions: HTN, psoriatic arthritis, latex allergy, DM  Daily  Treatment Diary       Manuals 12/27 1/8 1/24 2/2 2/8 10/24 11/1 11/10 11/29 12/8   R s/l L hip ext stretch    5'  5       LAD     5'        Prone anterior glide gr 4 c IR ROM and quad st 8' 6' 6' 6' medial glide on trochanter  5 10 8' 8' 8'   L Post trochanter stm in s/l    4'  3'        L hip inf/lateral glide belt gr 3-4             Exercise Diary              Sustained lumbar ext 8' 8' nv 8' 8'    8' 8'   bike 6' 6' 6' 6' 6'   5 min 5 min 6 min   L Hip add ST  3x;20 3x :20  3x :20         SLR     2x10    3x :20 3x :20 standing   Prone press up 2x10 10x2 2x10 2x10 2x10   2x10 2x10 2x10   Ball stm to post troch    2'         Prone quad ST 4x :20 6x :20 supine 4x :20 5x :20 5x :20 5x :20    4x :20   Hip IR on stool c ant glide belt   20x 20x 10x 10x 3' 30x     L Hip abd and IR on stool  20x      20x     SL LP #50 30x 20x #45 20x #50 20x #50 20 #55     30x    lumbar ext 20x  20x 20x 20x 20x 20x 20x 20x 20x 20x   R SB c arm overhead  10x :05 10x :05          Hip ext standing and prone 10x 10x 10x 2x10 2x10 2x10 2x10 2x10 20x 20x   Therapeutic act             squats  10x  nv 10x   10x pain 10x 10x 10x   TG 22  40x 40x 40x         Neuro re-ed             Thoracic ext c rot 10x :10 10x :10  10x :10   10x :10 10x :10  10x :10                             LTR 10x 10x 10x  10x 10x 10x 10x

## 2024-02-13 ENCOUNTER — APPOINTMENT (OUTPATIENT)
Dept: PHYSICAL THERAPY | Facility: CLINIC | Age: 54
End: 2024-02-13
Payer: COMMERCIAL

## 2024-02-21 ENCOUNTER — APPOINTMENT (OUTPATIENT)
Dept: PHYSICAL THERAPY | Facility: CLINIC | Age: 54
End: 2024-02-21
Payer: COMMERCIAL

## 2024-03-04 ENCOUNTER — OFFICE VISIT (OUTPATIENT)
Dept: INTERNAL MEDICINE CLINIC | Facility: CLINIC | Age: 54
End: 2024-03-04
Payer: COMMERCIAL

## 2024-03-04 VITALS
WEIGHT: 192 LBS | OXYGEN SATURATION: 98 % | DIASTOLIC BLOOD PRESSURE: 80 MMHG | BODY MASS INDEX: 32.78 KG/M2 | RESPIRATION RATE: 16 BRPM | HEIGHT: 64 IN | HEART RATE: 91 BPM | TEMPERATURE: 97.8 F | SYSTOLIC BLOOD PRESSURE: 140 MMHG

## 2024-03-04 DIAGNOSIS — J45.20 MILD INTERMITTENT CHRONIC ASTHMA WITHOUT COMPLICATION: Chronic | ICD-10-CM

## 2024-03-04 DIAGNOSIS — G47.33 OSA (OBSTRUCTIVE SLEEP APNEA): Chronic | ICD-10-CM

## 2024-03-04 DIAGNOSIS — I10 PRIMARY HYPERTENSION: Chronic | ICD-10-CM

## 2024-03-04 DIAGNOSIS — E11.9 CONTROLLED TYPE 2 DIABETES MELLITUS WITHOUT COMPLICATION, WITHOUT LONG-TERM CURRENT USE OF INSULIN (HCC): Chronic | ICD-10-CM

## 2024-03-04 DIAGNOSIS — E78.01 FAMILIAL HYPERCHOLESTEROLEMIA: Primary | Chronic | ICD-10-CM

## 2024-03-04 DIAGNOSIS — L40.9 PSORIASIS: Chronic | ICD-10-CM

## 2024-03-04 DIAGNOSIS — S73.192S TEAR OF LEFT ACETABULAR LABRUM, SEQUELA: ICD-10-CM

## 2024-03-04 DIAGNOSIS — Z71.9 HEALTH COUNSELING: ICD-10-CM

## 2024-03-04 DIAGNOSIS — Z79.899 ENCOUNTER FOR LONG-TERM CURRENT USE OF MEDICATION: ICD-10-CM

## 2024-03-04 PROBLEM — M65.341 TRIGGER RING FINGER OF RIGHT HAND: Status: RESOLVED | Noted: 2020-08-04 | Resolved: 2024-03-04

## 2024-03-04 PROBLEM — I77.9 MILD CAROTID ARTERY DISEASE (HCC): Status: ACTIVE | Noted: 2024-03-04

## 2024-03-04 PROBLEM — D21.9 FIBROID: Status: ACTIVE | Noted: 2024-03-04

## 2024-03-04 PROBLEM — S73.192A TEAR OF LEFT ACETABULAR LABRUM: Status: ACTIVE | Noted: 2024-03-04

## 2024-03-04 PROBLEM — E66.09 CLASS 1 OBESITY DUE TO EXCESS CALORIES WITH SERIOUS COMORBIDITY AND BODY MASS INDEX (BMI) OF 32.0 TO 32.9 IN ADULT: Status: ACTIVE | Noted: 2024-03-04

## 2024-03-04 PROBLEM — U07.1 COVID-19 VIRUS INFECTION: Status: ACTIVE | Noted: 2024-03-04

## 2024-03-04 PROBLEM — U07.1 COVID-19 VIRUS INFECTION: Status: RESOLVED | Noted: 2024-03-04 | Resolved: 2024-03-04

## 2024-03-04 PROBLEM — E66.811 CLASS 1 OBESITY DUE TO EXCESS CALORIES WITH SERIOUS COMORBIDITY AND BODY MASS INDEX (BMI) OF 32.0 TO 32.9 IN ADULT: Status: ACTIVE | Noted: 2024-03-04

## 2024-03-04 PROBLEM — E28.2 PCOS (POLYCYSTIC OVARIAN SYNDROME): Status: ACTIVE | Noted: 2024-03-04

## 2024-03-04 PROCEDURE — 99214 OFFICE O/P EST MOD 30 MIN: CPT | Performed by: INTERNAL MEDICINE

## 2024-03-04 NOTE — ASSESSMENT & PLAN NOTE
Lab Results   Component Value Date    HGBA1C 6.2 (H) 10/07/2023     On metformin 500 mg bid and Trulicity 4.5 mg weekly.

## 2024-03-04 NOTE — PROGRESS NOTES
Assessment/Plan:    Hypertension  BP controlled, on amlodipine 5 mg bid and losartan 25 mg daily.    Hyperlipidemia  TG remains elevated. On atorvastatin 10 mg, Vascepa.    Controlled diabetes mellitus type II without complication (HCC)    Lab Results   Component Value Date    HGBA1C 6.2 (H) 10/07/2023     On metformin 500 mg bid and Trulicity 4.5 mg weekly.    Psoriasis  Uses steroid cream prn.    AUGUSTO (obstructive sleep apnea)  Using CPAP.  Sees sleep.    Asthma, chronic  No recent exacerbation.  On daily Singulair.    Tear of left acetabular labrum  Goes to physical therapy, follow by Ortho.    Mild carotid artery disease (HCC)  Reviewed ultrasound.    Fibroid  On Nuvaring.    PCOS (polycystic ovarian syndrome)  On metformin.    Class 1 obesity due to excess calories with serious comorbidity and body mass index (BMI) of 32.0 to 32.9 in adult  Recommend strengthening, core and toning exercises.     Diagnoses and all orders for this visit:    Familial hypercholesterolemia  -     Comprehensive metabolic panel; Future  -     Lipid panel; Future    Controlled type 2 diabetes mellitus without complication, without long-term current use of insulin (HCC)  -     Albumin / creatinine urine ratio; Future  -     Hemoglobin A1C; Future    Primary hypertension  -     CBC and differential; Future  -     TSH, 3rd generation with Free T4 reflex; Future    AUGUSTO (obstructive sleep apnea)    Psoriasis  -     Vitamin D 25 hydroxy; Future    Encounter for long-term current use of medication  -     Vitamin B12; Future    Health counseling  Comments:  Updated.    Tear of left acetabular labrum, sequela    Mild intermittent chronic asthma without complication      Follow up in 5 months or as needed.      Subjective:      Patient ID: Kari Erazo is a 53 y.o. female here to establish care.    She is feeling well, no new complaints.  She had completed physical therapy for her hip.  She has had this issue for several years.  She is planning  "to go to the gym regularly.    She does not drink coffee, drinks tea and decaf iced tea regularly.    She reports no asthma symptoms, does not use an inhaler.  She does take Singulair for her allergies and eczema.    She reports fasting sugars have been good.  She uses her Trulicity regularly, on metformin.      The following portions of the patient's history were reviewed and updated as appropriate: allergies, current medications, past family history, past medical history, past social history, past surgical history, and problem list.    Review of Systems   Constitutional:  Negative for activity change, appetite change and fatigue.   HENT:  Negative for congestion, ear pain and postnasal drip.    Eyes:  Negative for visual disturbance.   Respiratory:  Negative for cough and shortness of breath.    Cardiovascular:  Negative for chest pain and leg swelling.   Gastrointestinal:  Negative for abdominal pain, constipation and diarrhea.   Genitourinary:  Negative for dysuria, frequency and urgency.   Musculoskeletal:  Negative for arthralgias and myalgias.   Skin:  Negative for rash and wound.   Neurological:  Negative for dizziness, numbness and headaches.   Hematological:  Does not bruise/bleed easily.   Psychiatric/Behavioral:  Negative for confusion. The patient is not nervous/anxious.          Objective:      /80 (BP Location: Left arm, Patient Position: Sitting, Cuff Size: Standard)   Pulse 91   Temp 97.8 °F (36.6 °C) (Tympanic)   Resp 16   Ht 5' 4\" (1.626 m)   Wt 87.1 kg (192 lb)   SpO2 98%   BMI 32.96 kg/m²          Physical Exam  Vitals and nursing note reviewed.   Constitutional:       General: She is not in acute distress.     Appearance: She is well-developed.   HENT:      Head: Normocephalic and atraumatic.      Right Ear: Tympanic membrane, ear canal and external ear normal.      Left Ear: Tympanic membrane, ear canal and external ear normal.      Mouth/Throat:      Mouth: Mucous membranes are " moist.   Eyes:      Pupils: Pupils are equal, round, and reactive to light.   Cardiovascular:      Rate and Rhythm: Normal rate and regular rhythm.      Heart sounds: Normal heart sounds.   Pulmonary:      Effort: Pulmonary effort is normal.      Breath sounds: Normal breath sounds. No wheezing.   Abdominal:      General: Bowel sounds are normal.      Palpations: Abdomen is soft.   Musculoskeletal:         General: No swelling.      Right lower leg: No edema.      Left lower leg: No edema.   Skin:     General: Skin is warm.      Findings: No rash.   Neurological:      General: No focal deficit present.      Mental Status: She is alert and oriented to person, place, and time.   Psychiatric:         Mood and Affect: Mood and affect normal. Mood is not anxious or depressed.         Behavior: Behavior normal.           Labs & imaging results reviewed with patient.

## 2024-03-15 ENCOUNTER — APPOINTMENT (OUTPATIENT)
Dept: LAB | Facility: CLINIC | Age: 54
End: 2024-03-15
Payer: COMMERCIAL

## 2024-03-15 DIAGNOSIS — E78.2 MIXED HYPERLIPIDEMIA: ICD-10-CM

## 2024-03-15 DIAGNOSIS — E11.9 CONTROLLED TYPE 2 DIABETES MELLITUS WITHOUT COMPLICATION, WITHOUT LONG-TERM CURRENT USE OF INSULIN (HCC): Chronic | ICD-10-CM

## 2024-03-15 DIAGNOSIS — I10 PRIMARY HYPERTENSION: Chronic | ICD-10-CM

## 2024-03-15 DIAGNOSIS — Z79.899 ENCOUNTER FOR LONG-TERM CURRENT USE OF MEDICATION: ICD-10-CM

## 2024-03-15 DIAGNOSIS — E78.01 FAMILIAL HYPERCHOLESTEROLEMIA: Chronic | ICD-10-CM

## 2024-03-15 DIAGNOSIS — L40.9 PSORIASIS: Chronic | ICD-10-CM

## 2024-03-15 LAB
25(OH)D3 SERPL-MCNC: 90.8 NG/ML (ref 30–100)
ALBUMIN SERPL BCP-MCNC: 4 G/DL (ref 3.5–5)
ALP SERPL-CCNC: 38 U/L (ref 34–104)
ALT SERPL W P-5'-P-CCNC: 25 U/L (ref 7–52)
ANION GAP SERPL CALCULATED.3IONS-SCNC: 7 MMOL/L (ref 4–13)
AST SERPL W P-5'-P-CCNC: 25 U/L (ref 13–39)
BASOPHILS # BLD AUTO: 0.03 THOUSANDS/ÂΜL (ref 0–0.1)
BASOPHILS NFR BLD AUTO: 1 % (ref 0–1)
BILIRUB SERPL-MCNC: 0.51 MG/DL (ref 0.2–1)
BUN SERPL-MCNC: 14 MG/DL (ref 5–25)
CALCIUM SERPL-MCNC: 9 MG/DL (ref 8.4–10.2)
CHLORIDE SERPL-SCNC: 104 MMOL/L (ref 96–108)
CHOLEST SERPL-MCNC: 82 MG/DL
CO2 SERPL-SCNC: 26 MMOL/L (ref 21–32)
CREAT SERPL-MCNC: 0.67 MG/DL (ref 0.6–1.3)
CREAT UR-MCNC: 46.4 MG/DL
EOSINOPHIL # BLD AUTO: 0.17 THOUSAND/ÂΜL (ref 0–0.61)
EOSINOPHIL NFR BLD AUTO: 3 % (ref 0–6)
ERYTHROCYTE [DISTWIDTH] IN BLOOD BY AUTOMATED COUNT: 13.2 % (ref 11.6–15.1)
EST. AVERAGE GLUCOSE BLD GHB EST-MCNC: 126 MG/DL
GFR SERPL CREATININE-BSD FRML MDRD: 102 ML/MIN/1.73SQ M
GLUCOSE P FAST SERPL-MCNC: 108 MG/DL (ref 65–99)
HBA1C MFR BLD: 6 %
HCT VFR BLD AUTO: 41.6 % (ref 34.8–46.1)
HDLC SERPL-MCNC: 24 MG/DL
HGB BLD-MCNC: 13.7 G/DL (ref 11.5–15.4)
IMM GRANULOCYTES # BLD AUTO: 0.03 THOUSAND/UL (ref 0–0.2)
IMM GRANULOCYTES NFR BLD AUTO: 1 % (ref 0–2)
LDLC SERPL CALC-MCNC: 32 MG/DL (ref 0–100)
LYMPHOCYTES # BLD AUTO: 1.2 THOUSANDS/ÂΜL (ref 0.6–4.47)
LYMPHOCYTES NFR BLD AUTO: 20 % (ref 14–44)
MCH RBC QN AUTO: 30 PG (ref 26.8–34.3)
MCHC RBC AUTO-ENTMCNC: 32.9 G/DL (ref 31.4–37.4)
MCV RBC AUTO: 91 FL (ref 82–98)
MICROALBUMIN UR-MCNC: 30.2 MG/L
MICROALBUMIN/CREAT 24H UR: 65 MG/G CREATININE (ref 0–30)
MONOCYTES # BLD AUTO: 0.46 THOUSAND/ÂΜL (ref 0.17–1.22)
MONOCYTES NFR BLD AUTO: 8 % (ref 4–12)
NEUTROPHILS # BLD AUTO: 4.25 THOUSANDS/ÂΜL (ref 1.85–7.62)
NEUTS SEG NFR BLD AUTO: 67 % (ref 43–75)
NRBC BLD AUTO-RTO: 0 /100 WBCS
PLATELET # BLD AUTO: 284 THOUSANDS/UL (ref 149–390)
PMV BLD AUTO: 9 FL (ref 8.9–12.7)
POTASSIUM SERPL-SCNC: 3.9 MMOL/L (ref 3.5–5.3)
PROT SERPL-MCNC: 7.5 G/DL (ref 6.4–8.4)
RBC # BLD AUTO: 4.56 MILLION/UL (ref 3.81–5.12)
SODIUM SERPL-SCNC: 137 MMOL/L (ref 135–147)
TRIGL SERPL-MCNC: 129 MG/DL
TSH SERPL DL<=0.05 MIU/L-ACNC: 2.33 UIU/ML (ref 0.45–4.5)
VIT B12 SERPL-MCNC: 322 PG/ML (ref 180–914)
WBC # BLD AUTO: 6.14 THOUSAND/UL (ref 4.31–10.16)

## 2024-03-15 PROCEDURE — 80061 LIPID PANEL: CPT

## 2024-03-15 PROCEDURE — 85025 COMPLETE CBC W/AUTO DIFF WBC: CPT

## 2024-03-15 PROCEDURE — 80053 COMPREHEN METABOLIC PANEL: CPT

## 2024-03-15 PROCEDURE — 82306 VITAMIN D 25 HYDROXY: CPT

## 2024-03-15 PROCEDURE — 83036 HEMOGLOBIN GLYCOSYLATED A1C: CPT

## 2024-03-15 PROCEDURE — 82570 ASSAY OF URINE CREATININE: CPT

## 2024-03-15 PROCEDURE — 82043 UR ALBUMIN QUANTITATIVE: CPT

## 2024-03-15 PROCEDURE — 82607 VITAMIN B-12: CPT

## 2024-03-15 PROCEDURE — 36415 COLL VENOUS BLD VENIPUNCTURE: CPT

## 2024-03-15 PROCEDURE — 84443 ASSAY THYROID STIM HORMONE: CPT

## 2024-04-02 ENCOUNTER — ESTABLISHED COMPREHENSIVE EXAM (OUTPATIENT)
Dept: URBAN - METROPOLITAN AREA CLINIC 6 | Facility: CLINIC | Age: 54
End: 2024-04-02

## 2024-04-02 DIAGNOSIS — Z01.00: ICD-10-CM

## 2024-04-02 DIAGNOSIS — H52.13: ICD-10-CM

## 2024-04-02 PROCEDURE — 92015 DETERMINE REFRACTIVE STATE: CPT

## 2024-04-02 PROCEDURE — 92014 COMPRE OPH EXAM EST PT 1/>: CPT

## 2024-04-02 ASSESSMENT — VISUAL ACUITY
OU_CC: J2
OS_CC: 20/25
OD_CC: 20/25

## 2024-04-02 ASSESSMENT — TONOMETRY
OS_IOP_MMHG: 19
OD_IOP_MMHG: 18

## 2024-04-26 DIAGNOSIS — J06.9 UPPER RESPIRATORY TRACT INFECTION, UNSPECIFIED TYPE: Primary | ICD-10-CM

## 2024-04-26 RX ORDER — AZITHROMYCIN 250 MG/1
TABLET, FILM COATED ORAL
Qty: 6 TABLET | Refills: 0 | Status: SHIPPED | OUTPATIENT
Start: 2024-04-26 | End: 2024-04-30

## 2024-05-14 DIAGNOSIS — E78.2 MIXED HYPERLIPIDEMIA: ICD-10-CM

## 2024-05-14 RX ORDER — ATORVASTATIN CALCIUM 10 MG/1
10 TABLET, FILM COATED ORAL DAILY
Qty: 90 TABLET | Refills: 3 | Status: SHIPPED | OUTPATIENT
Start: 2024-05-14

## 2024-05-14 RX ORDER — ICOSAPENT ETHYL 0.5 G/1
1 CAPSULE ORAL 4 TIMES DAILY
Qty: 180 CAPSULE | Refills: 3 | Status: SHIPPED | OUTPATIENT
Start: 2024-05-14

## 2024-05-20 ENCOUNTER — HOSPITAL ENCOUNTER (OUTPATIENT)
Dept: RADIOLOGY | Age: 54
Discharge: HOME | End: 2024-05-20
Payer: COMMERCIAL

## 2024-05-20 VITALS — WEIGHT: 192 LBS | HEIGHT: 64 IN | BODY MASS INDEX: 32.78 KG/M2

## 2024-05-20 DIAGNOSIS — Z12.31 VISIT FOR SCREENING MAMMOGRAM: ICD-10-CM

## 2024-05-20 PROCEDURE — 77063 BREAST TOMOSYNTHESIS BI: CPT

## 2024-05-20 PROCEDURE — 77067 SCR MAMMO BI INCL CAD: CPT

## 2024-06-10 DIAGNOSIS — I10 HYPERTENSION, UNSPECIFIED TYPE: ICD-10-CM

## 2024-06-10 DIAGNOSIS — E78.2 MIXED HYPERLIPIDEMIA: ICD-10-CM

## 2024-06-10 DIAGNOSIS — E11.9 CONTROLLED TYPE 2 DIABETES MELLITUS WITHOUT COMPLICATION, WITHOUT LONG-TERM CURRENT USE OF INSULIN (HCC): ICD-10-CM

## 2024-06-10 RX ORDER — ICOSAPENT ETHYL 1 G/1
1 CAPSULE ORAL 4 TIMES DAILY
Qty: 360 CAPSULE | Refills: 4 | Status: SHIPPED | OUTPATIENT
Start: 2024-06-10

## 2024-06-10 RX ORDER — AMLODIPINE BESYLATE 5 MG/1
5 TABLET ORAL 2 TIMES DAILY
Qty: 180 TABLET | Refills: 3 | Status: SHIPPED | OUTPATIENT
Start: 2024-06-10

## 2024-06-15 ENCOUNTER — PATIENT MESSAGE (OUTPATIENT)
Dept: INTERNAL MEDICINE CLINIC | Facility: CLINIC | Age: 54
End: 2024-06-15

## 2024-06-15 DIAGNOSIS — R73.09 IMPAIRED GLUCOSE REGULATION: ICD-10-CM

## 2024-06-15 DIAGNOSIS — J45.909 CHRONIC ASTHMA, UNSPECIFIED ASTHMA SEVERITY, UNSPECIFIED WHETHER COMPLICATED, UNSPECIFIED WHETHER PERSISTENT: Chronic | ICD-10-CM

## 2024-06-17 RX ORDER — MONTELUKAST SODIUM 10 MG/1
10 TABLET ORAL
Qty: 90 TABLET | Refills: 0 | Status: SHIPPED | OUTPATIENT
Start: 2024-06-17

## 2024-07-30 PROBLEM — G47.33 OSA (OBSTRUCTIVE SLEEP APNEA): Status: ACTIVE | Noted: 2024-07-30

## 2024-08-11 ENCOUNTER — APPOINTMENT (OUTPATIENT)
Dept: LAB | Facility: CLINIC | Age: 54
End: 2024-08-11

## 2024-08-11 DIAGNOSIS — Z00.8 HEALTH EXAMINATION IN POPULATION SURVEY: ICD-10-CM

## 2024-08-11 LAB
CHOLEST SERPL-MCNC: 89 MG/DL
EST. AVERAGE GLUCOSE BLD GHB EST-MCNC: 128 MG/DL
HBA1C MFR BLD: 6.1 %
HDLC SERPL-MCNC: 23 MG/DL
LDLC SERPL CALC-MCNC: 24 MG/DL (ref 0–100)
NONHDLC SERPL-MCNC: 66 MG/DL
TRIGL SERPL-MCNC: 210 MG/DL

## 2024-08-11 PROCEDURE — 80061 LIPID PANEL: CPT

## 2024-08-11 PROCEDURE — 83036 HEMOGLOBIN GLYCOSYLATED A1C: CPT

## 2024-08-11 PROCEDURE — 36415 COLL VENOUS BLD VENIPUNCTURE: CPT

## 2024-08-19 ENCOUNTER — OFFICE VISIT (OUTPATIENT)
Dept: INTERNAL MEDICINE CLINIC | Facility: CLINIC | Age: 54
End: 2024-08-19
Payer: COMMERCIAL

## 2024-08-19 VITALS
WEIGHT: 195.6 LBS | DIASTOLIC BLOOD PRESSURE: 90 MMHG | SYSTOLIC BLOOD PRESSURE: 152 MMHG | HEART RATE: 60 BPM | TEMPERATURE: 96.4 F | OXYGEN SATURATION: 98 % | HEIGHT: 64 IN | BODY MASS INDEX: 33.39 KG/M2

## 2024-08-19 DIAGNOSIS — E78.01 FAMILIAL HYPERCHOLESTEROLEMIA: Chronic | ICD-10-CM

## 2024-08-19 DIAGNOSIS — E53.8 VITAMIN B12 DEFICIENCY: ICD-10-CM

## 2024-08-19 DIAGNOSIS — J45.20 MILD INTERMITTENT CHRONIC ASTHMA WITHOUT COMPLICATION: Chronic | ICD-10-CM

## 2024-08-19 DIAGNOSIS — E66.09 CLASS 1 OBESITY DUE TO EXCESS CALORIES WITH SERIOUS COMORBIDITY AND BODY MASS INDEX (BMI) OF 32.0 TO 32.9 IN ADULT: ICD-10-CM

## 2024-08-19 DIAGNOSIS — I10 PRIMARY HYPERTENSION: Chronic | ICD-10-CM

## 2024-08-19 DIAGNOSIS — D21.9 FIBROID: ICD-10-CM

## 2024-08-19 DIAGNOSIS — I10 HYPERTENSION, UNSPECIFIED TYPE: ICD-10-CM

## 2024-08-19 DIAGNOSIS — Z00.00 HEALTH MAINTENANCE EXAMINATION: ICD-10-CM

## 2024-08-19 DIAGNOSIS — E11.9 CONTROLLED TYPE 2 DIABETES MELLITUS WITHOUT COMPLICATION, WITHOUT LONG-TERM CURRENT USE OF INSULIN (HCC): Primary | Chronic | ICD-10-CM

## 2024-08-19 DIAGNOSIS — S73.192S TEAR OF LEFT ACETABULAR LABRUM, SEQUELA: ICD-10-CM

## 2024-08-19 DIAGNOSIS — G47.33 OSA ON CPAP: ICD-10-CM

## 2024-08-19 PROCEDURE — 99396 PREV VISIT EST AGE 40-64: CPT | Performed by: INTERNAL MEDICINE

## 2024-08-19 PROCEDURE — 99214 OFFICE O/P EST MOD 30 MIN: CPT | Performed by: INTERNAL MEDICINE

## 2024-08-19 RX ORDER — AMLODIPINE BESYLATE 10 MG/1
10 TABLET ORAL DAILY
Status: SHIPPED
Start: 2024-08-19

## 2024-08-19 RX ORDER — 1.1% SODIUM FLUORIDE PRESCRIPTION DENTAL CREAM 5 MG/G
CREAM DENTAL
COMMUNITY
Start: 2024-08-02

## 2024-08-19 NOTE — PROGRESS NOTES
Diabetic Foot Exam    Patient's shoes and socks removed.    Right Foot/Ankle   Right Foot Inspection  Skin Exam: skin normal and skin intact. No dry skin, no warmth, no callus, no erythema, no maceration, no abnormal color, no pre-ulcer, no ulcer and no callus.     Toe Exam: ROM and strength within normal limits.     Sensory   Monofilament testing: intact    Vascular  Capillary refills: < 3 seconds  The right DP pulse is 2+.     Left Foot/Ankle  Left Foot Inspection  Skin Exam: skin normal and skin intact. No dry skin, no warmth, no erythema, no maceration, normal color, no pre-ulcer, no ulcer and no callus.     Toe Exam: ROM and strength within normal limits.     Sensory   Monofilament testing: intact    Vascular  Capillary refills: < 3 seconds  The left DP pulse is 2+.     Assign Risk Category  No deformity present  No loss of protective sensation  No weak pulses  Risk: 0        Ambulatory Visit  Name: Kari Erazo      : 1970      MRN: [id number]  Encounter Provider: Melissa Matthew MD  Encounter Date: 2024   Encounter department: [facility] INTERNAL MEDICINE    Assessment & Plan   1. Controlled type 2 diabetes mellitus without complication, without long-term current use of insulin (HCC)  Assessment & Plan:    Lab Results   Component Value Date    HGBA1C 6.1 (H) 2024     Restarted Trulicity a few weeks ago, did not have it for a few months due to lack of availability.  Taking metformin 500 mg bid. A1c remains stable.    Instructed to stop Mg, may improve loose stools.  Orders:  -     Hemoglobin A1C; Future; Expected date: 2025  2. Primary hypertension  Assessment & Plan:  Repeat BP remains elevated. Monitor at home/work.  Instructed to take amlodipine 10 mg in AM (instead of bid) and losartan 25 mg in PM.  Orders:  -     TSH, 3rd generation with Free T4 reflex; Future; Expected date: 2025  3. Familial hypercholesterolemia  Assessment & Plan:  TG remains elevated,  LDL very low.  On Vascepa, atorvastatin 10 mg.  Orders:  -     Comprehensive metabolic panel; Future; Expected date: 01/06/2025  -     Lipid panel; Future; Expected date: 01/06/2025  4. AUGUSTO on CPAP  Assessment & Plan:  Using CPAP.  Per sleep.  5. Tear of left acetabular labrum, sequela  Assessment & Plan:  Improved.  6. Mild intermittent chronic asthma without complication  Assessment & Plan:  On Singulair.  7. Class 1 obesity due to excess calories with serious comorbidity and body mass index (BMI) of 32.0 to 32.9 in adult  Assessment & Plan:  Stable weight.  8. Vitamin B12 deficiency  Assessment & Plan:  Start daily B12.  Orders:  -     CBC and differential; Future; Expected date: 01/06/2025  -     Vitamin B12; Future; Expected date: 01/06/2025  9. Hypertension, unspecified type  Assessment & Plan:  Repeat BP remains elevated. Monitor at home/work.  Instructed to take amlodipine 10 mg in AM (instead of bid) and losartan 25 mg in PM.  Orders:  -     amLODIPine (NORVASC) 10 mg tablet; Take 1 tablet (10 mg total) by mouth daily  10. Fibroid  Assessment & Plan:  On Nuvaring.  11. Health maintenance examination  Comments:  Updated.    Follow up in 5 months or as needed.       History of Present Illness     She reports restarting Trulicity a few weeks ago, did not have it for several months since it was not available.  Writer to increase her metformin to 1500 a day but developed increased diarrhea.  She reports loose stools have improved since she returned to her 500 mg twice a day.  She does take a magnesium daily.  She noticed eating raw salads cause loose stools.  Denies any nausea, vomiting, abdominal pain or cramping.  She had noticed that her fasting sugars improved from 1 30-1 40s to 90s to 100s since restarting Trulicity.    She reports she was on call last night, thinks that is why her blood pressure is high.    She continues to experience occasional breakthrough bleeding with the Nuvaring, not yet in  "menopause.        Review of Systems   Constitutional:  Negative for appetite change and fatigue.   HENT:  Negative for congestion, ear pain and postnasal drip.    Eyes:  Negative for visual disturbance.   Respiratory:  Negative for cough and shortness of breath.    Cardiovascular:  Negative for chest pain and leg swelling.   Gastrointestinal:  Positive for diarrhea. Negative for abdominal pain, constipation and nausea.   Genitourinary:  Negative for dysuria, frequency and urgency.   Musculoskeletal:  Negative for arthralgias and myalgias.   Skin:  Negative for rash and wound.   Neurological:  Negative for dizziness, numbness and headaches.   Hematological:  Does not bruise/bleed easily.   Psychiatric/Behavioral:  Negative for confusion. The patient is not nervous/anxious.        Objective     /90   Pulse 60   Temp (!) 96.4 °F (35.8 °C)   Ht 5' 4\" (1.626 m)   Wt 88.7 kg (195 lb 9.6 oz)   SpO2 98%   BMI 33.57 kg/m²     Physical Exam  Vitals and nursing note reviewed.   Constitutional:       General: She is not in acute distress.     Appearance: She is well-developed.   HENT:      Head: Normocephalic and atraumatic.      Mouth/Throat:      Mouth: Mucous membranes are moist.   Eyes:      Pupils: Pupils are equal, round, and reactive to light.   Cardiovascular:      Rate and Rhythm: Normal rate and regular rhythm.      Pulses: no weak pulses.           Dorsalis pedis pulses are 2+ on the right side and 2+ on the left side.      Heart sounds: Normal heart sounds.   Pulmonary:      Effort: Pulmonary effort is normal.      Breath sounds: Normal breath sounds. No wheezing.   Abdominal:      General: Bowel sounds are normal.      Palpations: Abdomen is soft.   Musculoskeletal:         General: No swelling.      Right lower leg: No edema.      Left lower leg: No edema.   Feet:      Right foot:      Skin integrity: No ulcer, skin breakdown, erythema, warmth, callus or dry skin.      Left foot:      Skin integrity: " No ulcer, skin breakdown, erythema, warmth, callus or dry skin.   Skin:     General: Skin is warm.      Findings: No rash.   Neurological:      General: No focal deficit present.      Mental Status: She is alert and oriented to person, place, and time.   Psychiatric:         Mood and Affect: Mood and affect normal. Mood is not anxious or depressed.         Behavior: Behavior normal.       Administrative Statements       Labs & imaging results reviewed with patient.

## 2024-08-19 NOTE — ASSESSMENT & PLAN NOTE
Lab Results   Component Value Date    HGBA1C 6.1 (H) 08/11/2024     Restarted Trulicity a few weeks ago, did not have it for a few months due to lack of availability.  Taking metformin 500 mg bid. A1c remains stable.    Instructed to stop Mg, may improve loose stools.

## 2024-08-19 NOTE — ASSESSMENT & PLAN NOTE
Repeat BP remains elevated. Monitor at home/work.  Instructed to take amlodipine 10 mg in AM (instead of bid) and losartan 25 mg in PM.

## 2024-08-22 DIAGNOSIS — Z30.015 ENCOUNTER FOR INITIAL PRESCRIPTION OF VAGINAL RING HORMONAL CONTRACEPTIVE: ICD-10-CM

## 2024-08-22 RX ORDER — ETONOGESTREL AND ETHINYL ESTRADIOL VAGINAL RING .015; .12 MG/D; MG/D
RING VAGINAL
Qty: 4 EACH | Refills: 4 | Status: SHIPPED | OUTPATIENT
Start: 2024-08-22

## 2024-09-01 DIAGNOSIS — J45.909 CHRONIC ASTHMA, UNSPECIFIED ASTHMA SEVERITY, UNSPECIFIED WHETHER COMPLICATED, UNSPECIFIED WHETHER PERSISTENT: Chronic | ICD-10-CM

## 2024-09-01 DIAGNOSIS — R73.09 IMPAIRED GLUCOSE REGULATION: ICD-10-CM

## 2024-09-02 RX ORDER — MONTELUKAST SODIUM 10 MG/1
10 TABLET ORAL
Qty: 90 TABLET | Refills: 1 | Status: SHIPPED | OUTPATIENT
Start: 2024-09-02

## 2024-10-21 ENCOUNTER — DOCUMENTATION (OUTPATIENT)
Dept: OBGYN CLINIC | Facility: CLINIC | Age: 54
End: 2024-10-21

## 2024-10-21 DIAGNOSIS — J06.9 UPPER RESPIRATORY TRACT INFECTION, UNSPECIFIED TYPE: Primary | ICD-10-CM

## 2024-10-21 RX ORDER — AZITHROMYCIN 250 MG/1
TABLET, FILM COATED ORAL
Qty: 6 TABLET | Refills: 0 | Status: SHIPPED | OUTPATIENT
Start: 2024-10-21 | End: 2024-10-25

## 2024-10-28 NOTE — PROGRESS NOTES
Assessment & Plan   Problem List Items Addressed This Visit    None  Visit Diagnoses       Well woman exam    -  Primary    Adenomyosis                Discussion    All questions have been answered to her satisfaction  RTO for APE or sooner if needed  Pap deferred.   Mammo up to date.   Continue ring for now, consider stopping to assess menopausal state.         Subjective     HPI   Kari Erazo is a 54 y.o. female who presents for annual well woman exam.   Pt uses the BC ring continuously to limit menorrhagia.   Had a recent pelvic US done which showed findings c/w adenomyosis.   She notes some spotting if she is late changing her ring.   We did reviewed risks/benefits.   BP up this am-pt notes her MIL is ill in the hospital. Notes her BP typically better controlled.     No vulvar itch/burn; No vaginal itch/burn; No abn discharge or odor;     Pt had URI last week and did note some BETTY. Declines desire for pelvic lizett PT at this point but aware it is an option.     No concerning breast masses, asymmetry, nipple discharge or bleeding, changes in skin of breast, or breast tenderness bilaterally    48 year old sister with new diagnosis of DCIS and LCIS. She has not yet had genetic testing.     No abd/pelvic pain or HAs;     No problematic menopausal symptoms at this point.    Pt is sexually active in a mutually monog/ sexual relationship; No issues with intercourse; She declines sti/hiv/hep testing; Feels safe at home  Current contraception: nuva ring    (+) PCP for routine Bw/care;    Last Pap : 3/21/23 WNL neg HPV   History of abnormal Pap smear: denies   Mammo:05/20/24 BIRADs 1   Abnormal mammo: denies   Colonoscopy:02/23 f/u 7 years     Review of Systems   Constitutional: Negative.    Respiratory: Negative.     Gastrointestinal: Negative.    Endocrine: Negative.    Genitourinary: Negative.        The following portions of the patient's history were reviewed and updated as appropriate: allergies, current  medications, past family history, past medical history, past social history, past surgical history, and problem list.         OB History          2    Para   2    Term   2            AB        Living   2         SAB        IAB        Ectopic        Multiple        Live Births   2                 Past Medical History:   Diagnosis Date    Allergic     latex, some tree nuts, seasonal    Arthritis     psoriatic arthritis    Asthma     CPAP (continuous positive airway pressure) dependence     Diabetes mellitus (HCC)     gestational    Gestational diabetes     Hypertension     patient reports    Obesity     PCOS (polycystic ovarian syndrome)     Sleep apnea     Varicella        Past Surgical History:   Procedure Laterality Date    TONSILLECTOMY      last assessed: 5/3/16       Family History   Problem Relation Age of Onset    Hypertension Mother     Hyperlipidemia Mother     Hypertension Father     Other Father         low serum HDL    Hyperlipidemia Father     Hypothyroidism Sister     Asthma Sister     Diabetes Sister     Thyroid disease Sister     Breast cancer Sister 48    No Known Problems Daughter     No Known Problems Son     Breast cancer Maternal Grandmother 79    Stroke Maternal Grandfather     No Known Problems Paternal Grandmother     No Known Problems Paternal Grandfather     No Known Problems Paternal Aunt     No Known Problems Paternal Aunt     No Known Problems Paternal Aunt        Social History     Socioeconomic History    Marital status: /Civil Union     Spouse name: Not on file    Number of children: Not on file    Years of education: Not on file    Highest education level: Not on file   Occupational History    Not on file   Tobacco Use    Smoking status: Never    Smokeless tobacco: Never   Vaping Use    Vaping status: Never Used   Substance and Sexual Activity    Alcohol use: No    Drug use: No    Sexual activity: Yes     Partners: Male     Birth control/protection: Ring   Other  Topics Concern    Not on file   Social History Narrative        2 children son in college, daughter finished college    MD -OB/gyne     Social Determinants of Health     Financial Resource Strain: Not on file   Food Insecurity: Not on file   Transportation Needs: Not on file   Physical Activity: Not on file   Stress: Not on file   Social Connections: Not on file   Intimate Partner Violence: Not on file   Housing Stability: Not on file         Current Outpatient Medications:     amLODIPine (NORVASC) 10 mg tablet, Take 1 tablet (10 mg total) by mouth daily, Disp: , Rfl:     atorvastatin (LIPITOR) 10 mg tablet, Take 1 tablet (10 mg total) by mouth daily, Disp: 90 tablet, Rfl: 3    Calcium-Magnesium-Vitamin D - MG-MG-UNIT TB24, Take 1 tablet by mouth daily, Disp: , Rfl:     dulaglutide (Trulicity) 4.5 MG/0.5ML injection, Inject 0.5 mL (4.5 mg total) under the skin every 7 days, Disp: 6 mL, Rfl: 3    etonogestrel-ethinyl estradiol (NuvaRing) 0.12-0.015 MG/24HR vaginal ring, INSERT 1 RING VAGINALLY LEAVE IN FOR 3 WEEKS REPLACE RING WITH NO WITHDRAWL BLEED, Disp: 4 each, Rfl: 4    Icosapent Ethyl (Vascepa) 1 g CAPS, Take 1 capsule (1 g total) by mouth 4 (four) times a day, Disp: 360 capsule, Rfl: 4    losartan (COZAAR) 25 mg tablet, Take 1 tablet (25 mg total) by mouth daily, Disp: 90 tablet, Rfl: 3    Magnesium 400 MG TABS, Take 1 tablet by mouth daily, Disp: , Rfl:     metFORMIN (GLUCOPHAGE) 500 mg tablet, Take 1 tablet (500 mg total) by mouth every 12 (twelve) hours, Disp: 180 tablet, Rfl: 1    montelukast (SINGULAIR) 10 mg tablet, Take 1 tablet (10 mg total) by mouth daily at bedtime, Disp: 90 tablet, Rfl: 1    SF 5000 Plus 1.1 % CREA, , Disp: , Rfl:     triamcinolone (KENALOG) 0.1 % ointment, Apply topically (Patient not taking: Reported on 10/29/2024), Disp: , Rfl:     Allergies   Allergen Reactions    Latex      Per patient    Nuts - Food Allergy     Shellfish-Derived Products - Food Allergy   "      Objective   Vitals:    10/29/24 0723   BP: 152/96   BP Location: Left arm   Patient Position: Sitting   Cuff Size: Standard   Weight: 88.9 kg (196 lb)   Height: 5' 4\" (1.626 m)     Physical Exam  Vitals reviewed.   HENT:      Head: Normocephalic and atraumatic.   Cardiovascular:      Rate and Rhythm: Normal rate and regular rhythm.   Pulmonary:      Effort: Pulmonary effort is normal.      Breath sounds: Normal breath sounds.   Chest:   Breasts:     Breasts are symmetrical.      Right: No swelling, bleeding, inverted nipple, mass, nipple discharge, skin change or tenderness.      Left: No swelling, bleeding, inverted nipple, mass, nipple discharge, skin change or tenderness.   Abdominal:      General: Abdomen is flat. Bowel sounds are normal.      Palpations: Abdomen is soft.      Tenderness: There is no abdominal tenderness. There is no right CVA tenderness, left CVA tenderness or guarding.   Genitourinary:     General: Normal vulva.      Pubic Area: No rash.       Labia:         Right: No rash, tenderness, lesion or injury.         Left: No rash, tenderness, lesion or injury.       Urethra: No prolapse, urethral pain, urethral swelling or urethral lesion.      Vagina: Normal. No signs of injury and foreign body. No vaginal discharge or erythema.      Cervix: Normal.      Uterus: Normal.       Adnexa: Right adnexa normal and left adnexa normal.      Comments: Few nabothian cysts noted, minimal prolapse recognized today. Otherwise normal vaginal/pelvic exam.   Musculoskeletal:      Cervical back: Neck supple.   Lymphadenopathy:      Upper Body:      Right upper body: No axillary adenopathy.      Left upper body: No axillary adenopathy.   Skin:     General: Skin is warm and dry.   Neurological:      Mental Status: She is alert and oriented to person, place, and time.   Psychiatric:         Mood and Affect: Mood normal.         Behavior: Behavior normal.         Thought Content: Thought content normal.         " Judgment: Judgment normal.         There are no Patient Instructions on file for this visit.

## 2024-10-29 ENCOUNTER — ANNUAL EXAM (OUTPATIENT)
Dept: OBGYN CLINIC | Facility: CLINIC | Age: 54
End: 2024-10-29
Payer: COMMERCIAL

## 2024-10-29 VITALS
WEIGHT: 196 LBS | HEIGHT: 64 IN | BODY MASS INDEX: 33.46 KG/M2 | SYSTOLIC BLOOD PRESSURE: 152 MMHG | DIASTOLIC BLOOD PRESSURE: 96 MMHG

## 2024-10-29 DIAGNOSIS — Z01.419 WELL WOMAN EXAM: Primary | ICD-10-CM

## 2024-10-29 DIAGNOSIS — N80.03 ADENOMYOSIS: ICD-10-CM

## 2024-10-29 PROCEDURE — S0612 ANNUAL GYNECOLOGICAL EXAMINA: HCPCS | Performed by: PHYSICIAN ASSISTANT

## 2024-11-04 ENCOUNTER — TELEPHONE (OUTPATIENT)
Age: 54
End: 2024-11-04

## 2024-11-04 NOTE — TELEPHONE ENCOUNTER
Message sent via [employer].   Hello need a refill of trulicity sen to [facility] please,  Thank you

## 2024-11-05 ENCOUNTER — TELEPHONE (OUTPATIENT)
Age: 54
End: 2024-11-05

## 2024-11-05 DIAGNOSIS — E11.9 CONTROLLED TYPE 2 DIABETES MELLITUS WITHOUT COMPLICATION, WITHOUT LONG-TERM CURRENT USE OF INSULIN (HCC): ICD-10-CM

## 2024-11-05 NOTE — TELEPHONE ENCOUNTER
PA TRULICITY 4.5 MG/0.5 ML SUBMITTED     to [employer]    via    []CMM-KEY:    [x]Surescripts-Case ID # [id number]   []Availity-Auth ID #  NDC #    []Faxed to plan   []Other website    []Phone call Case ID #      Office notes sent, clinical questions answered. Awaiting determination    Turnaround time for your insurance to make a decision on your Prior Authorization can take 7-21 business days.

## 2024-12-01 DIAGNOSIS — I10 HYPERTENSION, UNSPECIFIED TYPE: ICD-10-CM

## 2024-12-03 RX ORDER — LOSARTAN POTASSIUM 25 MG/1
25 TABLET ORAL DAILY
Qty: 90 TABLET | Refills: 1 | Status: SHIPPED | OUTPATIENT
Start: 2024-12-03

## 2024-12-05 ENCOUNTER — PATIENT MESSAGE (OUTPATIENT)
Dept: INTERNAL MEDICINE CLINIC | Facility: CLINIC | Age: 54
End: 2024-12-05

## 2024-12-09 ENCOUNTER — APPOINTMENT (OUTPATIENT)
Dept: LAB | Facility: CLINIC | Age: 54
End: 2024-12-09
Payer: COMMERCIAL

## 2024-12-09 DIAGNOSIS — R60.1 GENERALIZED EDEMA: ICD-10-CM

## 2024-12-09 DIAGNOSIS — R60.1 GENERALIZED EDEMA: Primary | ICD-10-CM

## 2024-12-09 LAB
ALBUMIN SERPL BCG-MCNC: 3.9 G/DL (ref 3.5–5)
ALP SERPL-CCNC: 33 U/L (ref 34–104)
ALT SERPL W P-5'-P-CCNC: 27 U/L (ref 7–52)
ANION GAP SERPL CALCULATED.3IONS-SCNC: 6 MMOL/L (ref 4–13)
AST SERPL W P-5'-P-CCNC: 25 U/L (ref 13–39)
BASOPHILS # BLD AUTO: 0.04 THOUSANDS/ÂΜL (ref 0–0.1)
BASOPHILS NFR BLD AUTO: 1 % (ref 0–1)
BILIRUB SERPL-MCNC: 0.59 MG/DL (ref 0.2–1)
BNP SERPL-MCNC: 36 PG/ML (ref 0–100)
BUN SERPL-MCNC: 15 MG/DL (ref 5–25)
CALCIUM SERPL-MCNC: 9 MG/DL (ref 8.4–10.2)
CHLORIDE SERPL-SCNC: 105 MMOL/L (ref 96–108)
CO2 SERPL-SCNC: 25 MMOL/L (ref 21–32)
CREAT SERPL-MCNC: 0.91 MG/DL (ref 0.6–1.3)
CRP SERPL QL: 10.7 MG/L
EOSINOPHIL # BLD AUTO: 0.2 THOUSAND/ÂΜL (ref 0–0.61)
EOSINOPHIL NFR BLD AUTO: 3 % (ref 0–6)
ERYTHROCYTE [DISTWIDTH] IN BLOOD BY AUTOMATED COUNT: 14 % (ref 11.6–15.1)
ERYTHROCYTE [SEDIMENTATION RATE] IN BLOOD: 21 MM/HOUR (ref 0–29)
GFR SERPL CREATININE-BSD FRML MDRD: 72 ML/MIN/1.73SQ M
GLUCOSE SERPL-MCNC: 106 MG/DL (ref 65–140)
HCT VFR BLD AUTO: 35.2 % (ref 34.8–46.1)
HGB BLD-MCNC: 12.3 G/DL (ref 11.5–15.4)
IMM GRANULOCYTES # BLD AUTO: 0.04 THOUSAND/UL (ref 0–0.2)
IMM GRANULOCYTES NFR BLD AUTO: 1 % (ref 0–2)
LYMPHOCYTES # BLD AUTO: 1.2 THOUSANDS/ÂΜL (ref 0.6–4.47)
LYMPHOCYTES NFR BLD AUTO: 17 % (ref 14–44)
MAGNESIUM SERPL-MCNC: 1.9 MG/DL (ref 1.9–2.7)
MCH RBC QN AUTO: 32 PG (ref 26.8–34.3)
MCHC RBC AUTO-ENTMCNC: 34.9 G/DL (ref 31.4–37.4)
MCV RBC AUTO: 92 FL (ref 82–98)
MONOCYTES # BLD AUTO: 0.49 THOUSAND/ÂΜL (ref 0.17–1.22)
MONOCYTES NFR BLD AUTO: 7 % (ref 4–12)
NEUTROPHILS # BLD AUTO: 5.3 THOUSANDS/ÂΜL (ref 1.85–7.62)
NEUTS SEG NFR BLD AUTO: 71 % (ref 43–75)
NRBC BLD AUTO-RTO: 0 /100 WBCS
PLATELET # BLD AUTO: 214 THOUSANDS/UL (ref 149–390)
PMV BLD AUTO: 8.7 FL (ref 8.9–12.7)
POTASSIUM SERPL-SCNC: 3.6 MMOL/L (ref 3.5–5.3)
PROT SERPL-MCNC: 7 G/DL (ref 6.4–8.4)
RBC # BLD AUTO: 3.84 MILLION/UL (ref 3.81–5.12)
SODIUM SERPL-SCNC: 136 MMOL/L (ref 135–147)
TSH SERPL DL<=0.05 MIU/L-ACNC: 3.31 UIU/ML (ref 0.45–4.5)
WBC # BLD AUTO: 7.27 THOUSAND/UL (ref 4.31–10.16)

## 2024-12-09 PROCEDURE — 85025 COMPLETE CBC W/AUTO DIFF WBC: CPT

## 2024-12-09 PROCEDURE — 80053 COMPREHEN METABOLIC PANEL: CPT

## 2024-12-09 PROCEDURE — 36415 COLL VENOUS BLD VENIPUNCTURE: CPT

## 2024-12-09 PROCEDURE — 84439 ASSAY OF FREE THYROXINE: CPT

## 2024-12-09 PROCEDURE — 85652 RBC SED RATE AUTOMATED: CPT

## 2024-12-09 PROCEDURE — 84443 ASSAY THYROID STIM HORMONE: CPT

## 2024-12-09 PROCEDURE — 83880 ASSAY OF NATRIURETIC PEPTIDE: CPT

## 2024-12-09 PROCEDURE — 83735 ASSAY OF MAGNESIUM: CPT

## 2024-12-09 PROCEDURE — 86140 C-REACTIVE PROTEIN: CPT

## 2024-12-09 PROCEDURE — 83036 HEMOGLOBIN GLYCOSYLATED A1C: CPT

## 2024-12-09 RX ORDER — METHYLPREDNISOLONE 4 MG/1
TABLET ORAL
Qty: 1 TABLET | Refills: 0 | Status: SHIPPED | OUTPATIENT
Start: 2024-12-09

## 2024-12-09 RX ORDER — CHLORTHALIDONE 25 MG/1
25 TABLET ORAL DAILY
Qty: 30 TABLET | Refills: 0 | Status: SHIPPED | OUTPATIENT
Start: 2024-12-09

## 2024-12-10 ENCOUNTER — RESULTS FOLLOW-UP (OUTPATIENT)
Dept: OBGYN CLINIC | Facility: CLINIC | Age: 54
End: 2024-12-10

## 2024-12-10 LAB
EST. AVERAGE GLUCOSE BLD GHB EST-MCNC: 117 MG/DL
HBA1C MFR BLD: 5.7 %
T4 FREE SERPL-MCNC: 1.06 NG/DL (ref 0.61–1.12)

## 2024-12-27 DIAGNOSIS — M19.90 ARTHRITIS: Chronic | ICD-10-CM

## 2024-12-27 DIAGNOSIS — L40.9 PSORIASIS: Primary | Chronic | ICD-10-CM

## 2025-01-05 DIAGNOSIS — I10 HYPERTENSION, UNSPECIFIED TYPE: ICD-10-CM

## 2025-01-06 ENCOUNTER — TELEPHONE (OUTPATIENT)
Age: 55
End: 2025-01-06

## 2025-01-07 RX ORDER — CHLORTHALIDONE 25 MG/1
25 TABLET ORAL DAILY
Qty: 30 TABLET | Refills: 5 | Status: SHIPPED | OUTPATIENT
Start: 2025-01-07

## 2025-01-16 DIAGNOSIS — R60.1 GENERALIZED EDEMA: ICD-10-CM

## 2025-01-16 DIAGNOSIS — I73.00 RAYNAUD'S PHENOMENON WITHOUT GANGRENE: Primary | ICD-10-CM

## 2025-01-16 RX ORDER — PREDNISONE 5 MG/1
TABLET ORAL
Qty: 30 TABLET | Refills: 0 | Status: SHIPPED | OUTPATIENT
Start: 2025-01-16 | End: 2025-01-30

## 2025-01-20 ENCOUNTER — OFFICE VISIT (OUTPATIENT)
Dept: INTERNAL MEDICINE CLINIC | Facility: CLINIC | Age: 55
End: 2025-01-20
Payer: COMMERCIAL

## 2025-01-20 VITALS
WEIGHT: 193 LBS | TEMPERATURE: 97.3 F | SYSTOLIC BLOOD PRESSURE: 164 MMHG | BODY MASS INDEX: 32.95 KG/M2 | HEIGHT: 64 IN | OXYGEN SATURATION: 99 % | RESPIRATION RATE: 14 BRPM | DIASTOLIC BLOOD PRESSURE: 86 MMHG | HEART RATE: 86 BPM

## 2025-01-20 DIAGNOSIS — E66.811 CLASS 1 OBESITY DUE TO EXCESS CALORIES WITH SERIOUS COMORBIDITY AND BODY MASS INDEX (BMI) OF 32.0 TO 32.9 IN ADULT: ICD-10-CM

## 2025-01-20 DIAGNOSIS — L40.9 PSORIASIS: Primary | Chronic | ICD-10-CM

## 2025-01-20 DIAGNOSIS — J45.20 MILD INTERMITTENT CHRONIC ASTHMA WITHOUT COMPLICATION: Chronic | ICD-10-CM

## 2025-01-20 DIAGNOSIS — E78.01 FAMILIAL HYPERCHOLESTEROLEMIA: Chronic | ICD-10-CM

## 2025-01-20 DIAGNOSIS — G47.33 OSA ON CPAP: ICD-10-CM

## 2025-01-20 DIAGNOSIS — E11.9 CONTROLLED TYPE 2 DIABETES MELLITUS WITHOUT COMPLICATION, WITHOUT LONG-TERM CURRENT USE OF INSULIN (HCC): Chronic | ICD-10-CM

## 2025-01-20 DIAGNOSIS — E66.09 CLASS 1 OBESITY DUE TO EXCESS CALORIES WITH SERIOUS COMORBIDITY AND BODY MASS INDEX (BMI) OF 32.0 TO 32.9 IN ADULT: ICD-10-CM

## 2025-01-20 DIAGNOSIS — E53.8 VITAMIN B12 DEFICIENCY: ICD-10-CM

## 2025-01-20 DIAGNOSIS — I10 PRIMARY HYPERTENSION: Chronic | ICD-10-CM

## 2025-01-20 PROCEDURE — 99214 OFFICE O/P EST MOD 30 MIN: CPT | Performed by: INTERNAL MEDICINE

## 2025-01-20 NOTE — ASSESSMENT & PLAN NOTE
Lab Results   Component Value Date    HGBA1C 5.7 (H) 12/09/2024     A1c improved.  Continue Trulicity 4.5 mg weekly, metformin 500 mg bid.  Discussed decreasing metformin to daily if fasting sugars low.  Orders:  •  Hemoglobin A1C; Future  •  Albumin / creatinine urine ratio; Future

## 2025-01-20 NOTE — ASSESSMENT & PLAN NOTE
More frequent joint pains recently, diagnosed with psoriatic arthritis in the past.  Complete prednisone taper. Briefly discussed diagnosis, treatment.  Keep appointment with rheumatology next week.

## 2025-01-20 NOTE — ASSESSMENT & PLAN NOTE
Repeat BP remains elevated. Monitor at home.  Continue amlodipine 10 mg, chlorthalidone 25 mg and losartan 25 mg daily.  Orders:  •  Comprehensive metabolic panel; Future

## 2025-01-20 NOTE — PROGRESS NOTES
Name: Kari Erazo      : 1970      MRN: [id number]  Encounter Provider: Melissa Matthew MD  Encounter Date: 2025   Encounter department: [facility] INTERNAL MEDICINE  :  Assessment & Plan  Psoriasis  More frequent joint pains recently, diagnosed with psoriatic arthritis in the past.  Complete prednisone taper. Briefly discussed diagnosis, treatment.  Keep appointment with rheumatology next week.       Controlled type 2 diabetes mellitus without complication, without long-term current use of insulin ([facility])    Lab Results   Component Value Date    HGBA1C 5.7 (H) 2024     A1c improved.  Continue Trulicity 4.5 mg weekly, metformin 500 mg bid.  Discussed decreasing metformin to daily if fasting sugars low.  Orders:  •  Hemoglobin A1C; Future  •  Albumin / creatinine urine ratio; Future    Primary hypertension  Repeat BP remains elevated. Monitor at home.  Continue amlodipine 10 mg, chlorthalidone 25 mg and losartan 25 mg daily.  Orders:  •  Comprehensive metabolic panel; Future    Familial hypercholesterolemia  On atorvastatin 10 mg, Vascepa.  Orders:  •  Lipid panel; Future    AUGUSTO on CPAP  Using CPAP, sees sleep.       Mild intermittent chronic asthma without complication  No recent exacerbation.  On Singulair.       Class 1 obesity due to excess calories with serious comorbidity and body mass index (BMI) of 32.0 to 32.9 in adult  Stable weight.       Vitamin B12 deficiency  Taking B12.  Orders:  •  Vitamin B12; Future    Follow up in 5 months or as needed.       History of Present Illness   That she was sick last October, has recovered from that. After Thanksgi, she reports high blood pressure. She took Medrol at that time, felt she had an inflammatory event. Her blood pressure and leg swelling improved since starting chlorthalidone.  She started to experience joint pains in the last month or so. If she stays still, her muscle and joitns do not hurt. If she is active, walking or  "any kind of movement, she started to experience a lot of pain. After [place], she was traveling. She reports increased pain everywhere. She took prednisone taper at that time, symptoms gradually improved.  Today, she is doing better, started prednisone again about a week ago due to similar symptoms. She reports rapid fatigue such as chewing her food. She is very worried about this.  She reports a history of Raynaud's, more prominent recently since winter weather. No open lesion.    She reports her blood pressure has been good at home, 130/80's. She is post call today, BP high.  No recent leg swelling.      Review of Systems   Constitutional:  Positive for activity change. Negative for appetite change and fatigue.   HENT:  Negative for congestion, ear pain and postnasal drip.    Eyes:  Negative for visual disturbance.   Respiratory:  Negative for cough and shortness of breath.    Cardiovascular:  Negative for chest pain and leg swelling.   Gastrointestinal:  Negative for abdominal pain, constipation and diarrhea.   Genitourinary:  Negative for dysuria, frequency and urgency.   Musculoskeletal:  Positive for arthralgias, joint swelling and myalgias.   Skin:  Negative for rash and wound.   Neurological:  Negative for dizziness, weakness and headaches.   Psychiatric/Behavioral:  Negative for confusion. The patient is not nervous/anxious.        Objective   /86 (BP Location: Left arm, Patient Position: Sitting, Cuff Size: Large)   Pulse 86   Temp (!) 97.3 °F (36.3 °C)   Resp 14   Ht 5' 4\" (1.626 m)   Wt 87.5 kg (193 lb)   SpO2 99%   BMI 33.13 kg/m²      Physical Exam  Vitals and nursing note reviewed.   Constitutional:       General: She is not in acute distress.     Appearance: She is well-developed.   HENT:      Head: Normocephalic and atraumatic.      Mouth/Throat:      Mouth: Mucous membranes are moist.   Eyes:      Pupils: Pupils are equal, round, and reactive to light.   Cardiovascular:      Rate and " Rhythm: Normal rate and regular rhythm.      Heart sounds: Normal heart sounds.   Pulmonary:      Effort: Pulmonary effort is normal.      Breath sounds: Normal breath sounds. No wheezing.   Abdominal:      General: Bowel sounds are normal.      Palpations: Abdomen is soft.   Musculoskeletal:         General: No swelling.      Right shoulder: No tenderness or bony tenderness.      Left shoulder: No tenderness or bony tenderness.      Right wrist: No swelling.      Left wrist: No swelling.      Right hand: No swelling, deformity, tenderness or bony tenderness.      Left hand: No swelling, deformity, tenderness or bony tenderness.      Right knee: No swelling or bony tenderness. No tenderness.      Left knee: No swelling or bony tenderness. No tenderness.      Right lower leg: No edema.      Left lower leg: No edema.   Skin:     General: Skin is warm.      Findings: No rash.   Neurological:      General: No focal deficit present.      Mental Status: She is alert and oriented to person, place, and time.   Psychiatric:         Mood and Affect: Mood and affect normal. Mood is not anxious or depressed.         Behavior: Behavior normal.           Labs & imaging results reviewed with patient.

## 2025-01-20 NOTE — PROGRESS NOTES
Rheumatology Initial Outpatient Visit  Name: Kari Erazo      : 1970      MRN: [id number]  Encounter Provider: Bety Pacheco MD  Encounter Date: 2025   Encounter department: [facility] [address]  :  Assessment & Plan  Raynaud's disease without gangrene  54-year-old female who presents for further evaluation of swelling of hands feet denies, myalgias, and joint pain. History also notable for new onset Raynaud's in the past few years.  Symptoms began after flu shot in November and have improved with multiple courses of steroids.  Exam today shows thickening and swelling of the bilateral hands and right greater than left feet along with some periorbital edema.  She also has some periungual erythema with intact muscle strength.  Unclear etiology of the symptoms at this time.  However, given the new onset Raynaud's would be important to rule out other secondary causes such as systemic sclerosis or dermatomyositis.  Degree of swelling and skin changes would be less typical of something like psoriatic arthritis.  Discussed that would be okay to go back on prednisone if symptoms recur while awaiting the below workup.  We will arrange for follow-up in 2 weeks to review the results.  Orders:    JAYESH by IFA (No Reflex)for Rheumatologist; Future    Sjogren's Antibodies; Future    JAYESH Screen w/Reflex Cascade; Future    Centromere Antibody; Future    Confirmatory Anti Scl-70; Future    MyoMarker 3 Plus Profile (RDL); Future    CK; Future    Quantiferon TB Gold Plus Assay; Future    Cyclic citrul peptide antibody, IgG; Future    RF Screen w/ Reflex to Titer; Future    Anti-Kassie 1 Antibody; Future    Generalized edema    Orders:    predniSONE 5 mg tablet; Take 4 tablets (20 mg total) by mouth daily    Raynaud's phenomenon without gangrene    Orders:    predniSONE 5 mg tablet; Take 4 tablets (20 mg total) by mouth daily        History of Present Illness   HPI  Kari Erazo is a 54 y.o. female who  presents for evaluation of psoriatic arthritis.   Patient reports longstanding history of psoriasis and a diagnosis of psoriatic arthritis made about 10 years ago.  Symptoms have been primarily manageable with topical treatments for the skin psoriasis and never needed any escalation of therapy to a systemic immunosuppressant.    Patient reports 2 days prior to Thanksgiving she received the flu shot.  Shortly after this she had new onset swelling of her hands, feet, and face particularly around her eyes.  This was associated with hypertension.  She ended up going on a course of Medrol and started on chlorthalidone which eventually improved her symptoms.  Leading into before Christmas her hands and feet were less swollen, but were associated with stiffness in the morning lasting about 20 to 30 minutes.  About a week before Christmas she started to get myalgias.  She reported that it was difficult for her to go about her normal activities due to significant muscle pain and weakness.  Reports that she was unable to walk from the parking lot into her office without having to rest.  She reported that she even felt tiredness in her jaw from chewing her food.  She received a prednisone taper which did improve her symptoms and then she was able to rest over the holidays.  Once she finished the prednisone taper she felt better but still a bit tired.  In early January she went back to work and the muscle aching started to come back so she took another prednisone taper.  She is currently finishing up a prednisone taper today.  She still has some swelling in her hands and feet and mild around the eyes.  She still has a lot of muscle fatigue as well.    Patient reports Raynaud's developed a few years ago.  Denies any digital       Review of Systems  Complete ROS conducted as per HPI. In addition, denies:  Fever  Photosensitive rash  Dysphagia  Recurrent oral ulcers  Uveitis  Dactylitis  Chest pain  SOB  Pleurisy  Gross  "hematuria  Foamy urine  Joint issues other than noted above      Objective   /84 (BP Location: Right arm, Patient Position: Sitting, Cuff Size: Adult)   Pulse 100   Temp 98.3 °F (36.8 °C) (Tympanic)   Ht 5' 4\" (1.626 m)   Wt 84.4 kg (186 lb)   SpO2 96%   BMI 31.93 kg/m²      Physical Exam  Physical Exam  Constitutional: well appearing, no acute distress  HEENT: normocephalic, sclera clear, no visible oral or nasal ulcers  Neck: supple, no palpable cervical adenopathy  CV: regular rate and rhythm, no murmur  Pulm: normal respiratory effort, lungs clear to auscultation b/l  Skin: + Periungual erythema, ?  Telangiectasias on the bilateral hands, hands are edematous but nonpitting  Extremities: warm and well perfused, right greater than left pedal edema  MSK: No synovitis or effusions, muscle strength is 5 out of 5    Labs and Imaging  I have personally reviewed pertinent labs and imaging.   "

## 2025-01-27 ENCOUNTER — CONSULT (OUTPATIENT)
Age: 55
End: 2025-01-27
Payer: COMMERCIAL

## 2025-01-27 VITALS
HEIGHT: 64 IN | BODY MASS INDEX: 31.76 KG/M2 | OXYGEN SATURATION: 96 % | DIASTOLIC BLOOD PRESSURE: 84 MMHG | WEIGHT: 186 LBS | HEART RATE: 100 BPM | TEMPERATURE: 98.3 F | SYSTOLIC BLOOD PRESSURE: 162 MMHG

## 2025-01-27 DIAGNOSIS — R60.1 GENERALIZED EDEMA: ICD-10-CM

## 2025-01-27 DIAGNOSIS — I73.00 RAYNAUD'S DISEASE WITHOUT GANGRENE: Primary | ICD-10-CM

## 2025-01-27 DIAGNOSIS — I73.00 RAYNAUD'S PHENOMENON WITHOUT GANGRENE: ICD-10-CM

## 2025-01-27 PROCEDURE — 99244 OFF/OP CNSLTJ NEW/EST MOD 40: CPT | Performed by: STUDENT IN AN ORGANIZED HEALTH CARE EDUCATION/TRAINING PROGRAM

## 2025-01-27 PROCEDURE — G2211 COMPLEX E/M VISIT ADD ON: HCPCS | Performed by: STUDENT IN AN ORGANIZED HEALTH CARE EDUCATION/TRAINING PROGRAM

## 2025-01-27 RX ORDER — PREDNISONE 5 MG/1
20 TABLET ORAL DAILY
Qty: 60 TABLET | Refills: 1 | Status: SHIPPED | OUTPATIENT
Start: 2025-01-27

## 2025-01-28 ENCOUNTER — APPOINTMENT (OUTPATIENT)
Dept: LAB | Facility: CLINIC | Age: 55
End: 2025-01-28
Payer: COMMERCIAL

## 2025-01-28 DIAGNOSIS — I73.00 RAYNAUD'S DISEASE WITHOUT GANGRENE: ICD-10-CM

## 2025-01-28 LAB
CK SERPL-CCNC: 53 U/L (ref 26–192)
RHEUMATOID FACT SER QL LA: NEGATIVE

## 2025-01-28 PROCEDURE — 86235 NUCLEAR ANTIGEN ANTIBODY: CPT

## 2025-01-28 PROCEDURE — 83516 IMMUNOASSAY NONANTIBODY: CPT

## 2025-01-28 PROCEDURE — 82550 ASSAY OF CK (CPK): CPT

## 2025-01-28 PROCEDURE — 83520 IMMUNOASSAY QUANT NOS NONAB: CPT

## 2025-01-28 PROCEDURE — 86039 ANTINUCLEAR ANTIBODIES (ANA): CPT

## 2025-01-28 PROCEDURE — 86038 ANTINUCLEAR ANTIBODIES: CPT

## 2025-01-28 PROCEDURE — 36415 COLL VENOUS BLD VENIPUNCTURE: CPT

## 2025-01-28 PROCEDURE — 86480 TB TEST CELL IMMUN MEASURE: CPT

## 2025-01-28 PROCEDURE — 86200 CCP ANTIBODY: CPT

## 2025-01-28 PROCEDURE — 86430 RHEUMATOID FACTOR TEST QUAL: CPT

## 2025-01-28 PROCEDURE — 86225 DNA ANTIBODY NATIVE: CPT

## 2025-01-29 ENCOUNTER — RESULTS FOLLOW-UP (OUTPATIENT)
Age: 55
End: 2025-01-29

## 2025-01-29 LAB
ANA HOMOGEN SER QL IF: NORMAL
ANA HOMOGEN TITR SER: NORMAL {TITER}
ANA SER QL IF: POSITIVE
CCP AB SER IA-ACNC: 1.8 (ref ?–10)
CENTROMERE B AB SER-ACNC: <0.7 U/ML (ref ?–10)
DSDNA IGG SERPL IA-ACNC: 2.4 IU/ML (ref ?–15)
ENA JO1 AB SER IA-ACNC: <0.5 U/ML (ref ?–10)
ENA SCL70 IGG SER IA-ACNC: 0.9 U/ML (ref ?–10)
ENA SS-A AB SER IA-ACNC: 0.6 U/ML (ref ?–10)
ENA SS-B IGG SER IA-ACNC: 0.8 U/ML (ref ?–10)
GAMMA INTERFERON BACKGROUND BLD IA-ACNC: 0 IU/ML
M TB IFN-G BLD-IMP: NEGATIVE
M TB IFN-G CD4+ BCKGRND COR BLD-ACNC: 0 IU/ML
M TB IFN-G CD4+ BCKGRND COR BLD-ACNC: 0.01 IU/ML
MITOGEN IGNF BCKGRD COR BLD-ACNC: 1.95 IU/ML
NUCLEAR IGG SER IA-RTO: 0.6 RATIO (ref ?–1)

## 2025-01-31 DIAGNOSIS — E11.9 CONTROLLED TYPE 2 DIABETES MELLITUS WITHOUT COMPLICATION, WITHOUT LONG-TERM CURRENT USE OF INSULIN (HCC): ICD-10-CM

## 2025-02-03 NOTE — PROGRESS NOTES
Administrative Statements   Encounter provider Bety Pacheco MD    The Patient is located at Home and in the following state in which I hold an active license [place].    The patient was identified by name and date of birth. Kari Erazo was informed that this is a telemedicine visit and that the visit is being conducted through the Epic Embedded platform. She agrees to proceed..  My office door was closed. No one else was in the room.  She acknowledged consent and understanding of privacy and security of the video platform. The patient has agreed to participate and understands they can discontinue the visit at any time.    I have spent a total time of 30 minutes in caring for this patient on the day of the visit/encounter including Diagnostic results, Prognosis, Risks and benefits of tx options, Instructions for management, Patient and family education, Impressions, Counseling / Coordination of care, Documenting in the medical record, Reviewing / ordering tests, medicine, procedures  , and Obtaining or reviewing history  .    Rheumatology Follow-up Visit  Name: Kari Erazo      : 1970      MRN: [id number]  Encounter Provider: Bety Pacheco MD  Encounter Date: 2/10/2025   Encounter department: [facility] [address]  :  Assessment & Plan  Undifferentiated connective tissue disease (HCC)  54-year-old female who presents for follow-up.  Patient was initially seen for new onset Raynaud's, myalgias, and arthralgias with diffuse swelling of the bilateral hands, feet and periorbital edema.  Workup thus far has been significant for high titer JAYESH with the additional labs still pending.  Discussed at this time symptoms are likely related to connective tissue disease, with leading differentials including early systemic sclerosis versus inflammatory myositis versus other undifferentiated CTD.  I recommend that we obtain additional serologies for SSc and update urine studies given the report of  "proteinuria.  We are also awaiting the results of her mild marker panel which is in process.  Given the concern for SSc, I would recommend that we obtain PFTs and and TTE to screen for pulmonary hypertension or ILD which we can see in the setting of Ssc.  Additionally, given her reports of proximal muscle weakness, there is clinical concern for possible inflammatory myositis.  CK is normal, but considering her symptoms have not been going on for very long it is possible that she could still have an inflammatory myopathic process.  I recommend I recommend that we further evaluate this by either EMG or MRI, but given that we will be able to get the MRI more quickly we will proceed with doing MRI of the femur looking for any evidence of muscle inflammation.    In anticipation of ongoing need for immunosuppression and steroid sparing medication, we did discuss beginning methotrexate.  Previous testing for hepatitis B and C and tuberculosis was negative.  We will begin with methotrexate 15 mg subcu weekly along with daily folic acid.  Discussed need for lab monitoring every 3 months.  We will temporarily increase her prednisone to 40 mg with a taper over the next several weeks.  Discussed cautious use due to the concern for SSc and possible precipitation of SRC. We will arrange a close follow-up in a few weeks to review results.  Orders:    RNA Polymerase III IgG Abs; Future    Anti-Th/To Ab (RDL); Future    Anti-HMGCR Autoantibodies; Future    methotrexate 50 MG/2ML injection; Inject 0.6 mL (15 mg total) under the skin every 7 days    Tuberculin-Allergy Syringes (B-D ALLERGY SYRINGE 1CC/28G) 28G X 1/2\" 1 ML MISC; Use every 7 days Use to admin SQ MTX weekly    folic acid ( Folic Acid) 1 mg tablet; Take 1 tablet (1 mg total) by mouth daily    Urinalysis with microscopic; Future    Protein / creatinine ratio, urine; Future    ARITA (dyspnea on exertion)    Orders:    Echo complete w/ contrast if indicated; Future    " "Complete PFT without post bronchodilator; Future    Myopathy    Orders:    MRI femur left wo contrast; Future    methotrexate 50 MG/2ML injection; Inject 0.6 mL (15 mg total) under the skin every 7 days    Tuberculin-Allergy Syringes (B-D ALLERGY SYRINGE 1CC/28G) 28G X 1/2\" 1 ML MISC; Use every 7 days Use to admin SQ MTX weekly    folic acid ( Folic Acid) 1 mg tablet; Take 1 tablet (1 mg total) by mouth daily    Generalized edema    Orders:    predniSONE 10 mg tablet; Take 4 tablets (40 mg total) by mouth daily for 14 days, THEN 3 tablets (30 mg total) daily for 14 days, THEN 2 tablets (20 mg total) daily for 14 days, THEN 1 tablet (10 mg total) daily for 14 days.    Raynaud's phenomenon without gangrene    Orders:    predniSONE 10 mg tablet; Take 4 tablets (40 mg total) by mouth daily for 14 days, THEN 3 tablets (30 mg total) daily for 14 days, THEN 2 tablets (20 mg total) daily for 14 days, THEN 1 tablet (10 mg total) daily for 14 days.        History of Present Illness   HPI  Kari Erazo is a 54 y.o. female who presents for follow-up.    Interval History:  Patient reports since last visit she has had a somewhat worsening of her symptoms.  She had to increase her prednisone up to 20 mg but it does not seem like it has been as effective for her.  She continues to feel weakness in her muscles of her arms and her legs.  She has to stop multiple times when she walks from the building to her car and vice versa.  She also has a hard time standing for long periods of time, such as during a .  She feels weakness in her arms as well.  Raynaud's seems to be getting a little bit worse.  She is having more skin sensitivity as well.  Admits to dry mouth.  She also thinks that her urine is looking a little bit more foamy.    Permanent History:  Presented 2025 for further evaluation of swelling of hands and feet, myalgias, and joint pain. History also notable for new onset Raynaud's in the past few years.  " Initial exam showed thickening and swelling of the bilateral hands and right greater than left feet, mild periorbital edema, periungual erythema, and intact muscle strength.  She reported a prior diagnosis of PsA, but never required specific immunosuppressing treatment.  Workup was significant for high titer JAYESH 1: 1280 and nucleolar pattern; negative RF, CCP, SSA, SSB, centromere, Kassie 1; normal CK.       Review of Systems  Complete ROS conducted as per HPI. In addition, denies:  Fever  Photosensitive rash  Sicca symptoms  Recurrent oral ulcers  Muscle weakness  Uveitis  Dactylitis  Chest pain  SOB  Pleurisy  Gross hematuria  Foamy urine  Raynaud's  Joint issues other than noted above    Objective   There were no vitals taken for this visit.     Physical Exam  Deferred secondary to video visit    Labs and Imaging  I have personally reviewed pertinent labs and imaging.

## 2025-02-10 ENCOUNTER — TELEMEDICINE (OUTPATIENT)
Age: 55
End: 2025-02-10

## 2025-02-10 DIAGNOSIS — R06.09 DOE (DYSPNEA ON EXERTION): ICD-10-CM

## 2025-02-10 DIAGNOSIS — I73.00 RAYNAUD'S PHENOMENON WITHOUT GANGRENE: ICD-10-CM

## 2025-02-10 DIAGNOSIS — M35.9 UNDIFFERENTIATED CONNECTIVE TISSUE DISEASE (HCC): Primary | ICD-10-CM

## 2025-02-10 DIAGNOSIS — G72.9 MYOPATHY: ICD-10-CM

## 2025-02-10 DIAGNOSIS — R60.1 GENERALIZED EDEMA: ICD-10-CM

## 2025-02-10 RX ORDER — PREDNISONE 10 MG/1
TABLET ORAL
Qty: 140 TABLET | Refills: 0 | Status: SHIPPED | OUTPATIENT
Start: 2025-02-10 | End: 2025-02-22

## 2025-02-10 RX ORDER — METHOTREXATE 25 MG/ML
15 INJECTION, SOLUTION INTRAMUSCULAR; INTRATHECAL; INTRAVENOUS; SUBCUTANEOUS
Qty: 7.2 ML | Refills: 6 | Status: SHIPPED | OUTPATIENT
Start: 2025-02-10 | End: 2025-05-11

## 2025-02-10 RX ORDER — SYRINGE WITH NEEDLE, 1 ML 28GX1/2"
SYRINGE, EMPTY DISPOSABLE MISCELLANEOUS
Qty: 10 EACH | Refills: 1 | Status: SHIPPED | OUTPATIENT
Start: 2025-02-10

## 2025-02-10 RX ORDER — FOLIC ACID 1 MG/1
1 TABLET ORAL DAILY
Qty: 30 TABLET | Refills: 6 | Status: SHIPPED | OUTPATIENT
Start: 2025-02-10

## 2025-02-10 NOTE — Clinical Note
Can you please arrange for follow-up appt? Please offer March 11th at 12:00PM - okay for in person or video visit

## 2025-02-11 DIAGNOSIS — Z79.899 HIGH RISK MEDICATION USE: Primary | ICD-10-CM

## 2025-02-12 ENCOUNTER — LAB (OUTPATIENT)
Dept: LAB | Facility: CLINIC | Age: 55
End: 2025-02-12
Payer: COMMERCIAL

## 2025-02-12 ENCOUNTER — PATIENT MESSAGE (OUTPATIENT)
Dept: INTERNAL MEDICINE CLINIC | Facility: CLINIC | Age: 55
End: 2025-02-12

## 2025-02-12 ENCOUNTER — HOSPITAL ENCOUNTER (OUTPATIENT)
Dept: NON INVASIVE DIAGNOSTICS | Facility: CLINIC | Age: 55
Discharge: HOME | End: 2025-02-12
Payer: COMMERCIAL

## 2025-02-12 VITALS
DIASTOLIC BLOOD PRESSURE: 84 MMHG | SYSTOLIC BLOOD PRESSURE: 162 MMHG | HEART RATE: 103 BPM | BODY MASS INDEX: 31.76 KG/M2 | WEIGHT: 186 LBS | HEIGHT: 64 IN

## 2025-02-12 DIAGNOSIS — R06.09 DOE (DYSPNEA ON EXERTION): ICD-10-CM

## 2025-02-12 DIAGNOSIS — M35.9 UNDIFFERENTIATED CONNECTIVE TISSUE DISEASE (HCC): ICD-10-CM

## 2025-02-12 DIAGNOSIS — Z79.899 HIGH RISK MEDICATION USE: ICD-10-CM

## 2025-02-12 LAB
ALBUMIN SERPL BCG-MCNC: 4.2 G/DL (ref 3.5–5)
ALP SERPL-CCNC: 39 U/L (ref 34–104)
ALT SERPL W P-5'-P-CCNC: 42 U/L (ref 7–52)
ANION GAP SERPL CALCULATED.3IONS-SCNC: 14 MMOL/L (ref 4–13)
ANISOCYTOSIS BLD QL SMEAR: PRESENT
AORTIC ROOT: 2.8 CM
ASCENDING AORTA: 3.3 CM
AST SERPL W P-5'-P-CCNC: 37 U/L (ref 13–39)
BACTERIA UR QL AUTO: ABNORMAL /HPF
BASOPHILS # BLD MANUAL: 0 THOUSAND/UL (ref 0–0.1)
BASOPHILS NFR MAR MANUAL: 0 % (ref 0–1)
BILIRUB SERPL-MCNC: 0.86 MG/DL (ref 0.2–1)
BILIRUB UR QL STRIP: NEGATIVE
BSA FOR ECHO PROCEDURE: 1.9 M2
BUN SERPL-MCNC: 47 MG/DL (ref 5–25)
CALCIUM SERPL-MCNC: 9.4 MG/DL (ref 8.4–10.2)
CHLORIDE SERPL-SCNC: 94 MMOL/L (ref 96–108)
CLARITY UR: CLEAR
CO2 SERPL-SCNC: 25 MMOL/L (ref 21–32)
COLOR UR: ABNORMAL
CREAT SERPL-MCNC: 2.26 MG/DL (ref 0.6–1.3)
CREAT UR-MCNC: 61.5 MG/DL
E WAVE DECELERATION TIME: 140 MS
E/A RATIO: 1.05
EOSINOPHIL # BLD MANUAL: 0 THOUSAND/UL (ref 0–0.4)
EOSINOPHIL NFR BLD MANUAL: 0 % (ref 0–6)
ERYTHROCYTE [DISTWIDTH] IN BLOOD BY AUTOMATED COUNT: 14.8 % (ref 11.6–15.1)
FRACTIONAL SHORTENING: 28 (ref 28–44)
GFR SERPL CREATININE-BSD FRML MDRD: 24 ML/MIN/1.73SQ M
GLUCOSE SERPL-MCNC: 349 MG/DL (ref 65–140)
GLUCOSE UR STRIP-MCNC: ABNORMAL MG/DL
HCT VFR BLD AUTO: 35.3 % (ref 34.8–46.1)
HGB BLD-MCNC: 12.2 G/DL (ref 11.5–15.4)
HGB UR QL STRIP.AUTO: ABNORMAL
INTERVENTRICULAR SEPTUM IN DIASTOLE (PARASTERNAL SHORT AXIS VIEW): 1.7 CM
INTERVENTRICULAR SEPTUM: 1.7 CM (ref 0.6–1.1)
KETONES UR STRIP-MCNC: NEGATIVE MG/DL
LAAS-AP2: 14.9 CM2
LAAS-AP4: 20.3 CM2
LEFT ATRIUM SIZE: 3.5 CM
LEFT ATRIUM VOLUME (MOD BIPLANE): 56 ML
LEFT ATRIUM VOLUME INDEX (MOD BIPLANE): 29.5 ML/M2
LEFT INTERNAL DIMENSION IN SYSTOLE: 2.8 CM (ref 2.1–4)
LEFT VENTRICULAR INTERNAL DIMENSION IN DIASTOLE: 3.9 CM (ref 3.5–6)
LEFT VENTRICULAR POSTERIOR WALL IN END DIASTOLE: 1.3 CM
LEFT VENTRICULAR STROKE VOLUME: 38 ML
LEUKOCYTE ESTERASE UR QL STRIP: ABNORMAL
LV EF US.2D.A4C+ESTIMATED: 70 %
LVSV (TEICH): 38 ML
LYMPHOCYTES # BLD AUTO: 0.66 THOUSAND/UL (ref 0.6–4.47)
LYMPHOCYTES # BLD AUTO: 3 % (ref 14–44)
MCH RBC QN AUTO: 31.9 PG (ref 26.8–34.3)
MCHC RBC AUTO-ENTMCNC: 34.6 G/DL (ref 31.4–37.4)
MCV RBC AUTO: 92 FL (ref 82–98)
MONOCYTES # BLD AUTO: 0.33 THOUSAND/UL (ref 0–1.22)
MONOCYTES NFR BLD: 2 % (ref 4–12)
MV E'TISSUE VEL-SEP: 6 CM/S
MV PEAK A VEL: 0.95 M/S
MV PEAK E VEL: 100 CM/S
MV STENOSIS PRESSURE HALF TIME: 41 MS
MV VALVE AREA P 1/2 METHOD: 5.37
MYELOCYTE ABSOLUTE CT: 0.33 THOUSAND/UL (ref 0–0.1)
MYELOCYTES NFR BLD MANUAL: 2 % (ref 0–1)
NEUTROPHILS # BLD MANUAL: 15.16 THOUSAND/UL (ref 1.85–7.62)
NEUTS BAND NFR BLD MANUAL: 1 % (ref 0–8)
NEUTS SEG NFR BLD AUTO: 91 % (ref 43–75)
NITRITE UR QL STRIP: NEGATIVE
NON-SQ EPI CELLS URNS QL MICRO: ABNORMAL /HPF
PH UR STRIP.AUTO: 5.5 [PH]
PLATELET # BLD AUTO: 259 THOUSANDS/UL (ref 149–390)
PLATELET BLD QL SMEAR: ADEQUATE
PMV BLD AUTO: 9.5 FL (ref 8.9–12.7)
POLYCHROMASIA BLD QL SMEAR: PRESENT
POTASSIUM SERPL-SCNC: 3.6 MMOL/L (ref 3.5–5.3)
PROT SERPL-MCNC: 7 G/DL (ref 6.4–8.4)
PROT UR STRIP-MCNC: ABNORMAL MG/DL
PROT UR-MCNC: 84.7 MG/DL
PROT/CREAT UR: 1.4 MG/G{CREAT} (ref 0–0.1)
RBC # BLD AUTO: 3.82 MILLION/UL (ref 3.81–5.12)
RBC #/AREA URNS AUTO: ABNORMAL /HPF
RBC MORPH BLD: PRESENT
RIGHT ATRIUM AREA SYSTOLE A4C: 12.5 CM2
RIGHT VENTRICLE ID DIMENSION: 4.2 CM
SL CV LEFT ATRIUM LENGTH A2C: 4.3 CM
SL CV LV EF: 70
SL CV PED ECHO LEFT VENTRICLE DIASTOLIC VOLUME (MOD BIPLANE) 2D: 67 ML
SL CV PED ECHO LEFT VENTRICLE SYSTOLIC VOLUME (MOD BIPLANE) 2D: 29 ML
SODIUM SERPL-SCNC: 133 MMOL/L (ref 135–147)
SP GR UR STRIP.AUTO: 1.01 (ref 1–1.03)
TR MAX PG: 26 MMHG
TR PEAK VELOCITY: 2.6 M/S
TRICUSPID ANNULAR PLANE SYSTOLIC EXCURSION: 2.1 CM
TRICUSPID VALVE PEAK REGURGITATION VELOCITY: 2.56 M/S
UROBILINOGEN UR STRIP-ACNC: <2 MG/DL
VARIANT LYMPHS # BLD AUTO: 1 %
WBC # BLD AUTO: 16.48 THOUSAND/UL (ref 4.31–10.16)
WBC #/AREA URNS AUTO: ABNORMAL /HPF

## 2025-02-12 PROCEDURE — 84156 ASSAY OF PROTEIN URINE: CPT

## 2025-02-12 PROCEDURE — 81001 URINALYSIS AUTO W/SCOPE: CPT

## 2025-02-12 PROCEDURE — 82570 ASSAY OF URINE CREATININE: CPT

## 2025-02-12 PROCEDURE — 80053 COMPREHEN METABOLIC PANEL: CPT

## 2025-02-12 PROCEDURE — 83516 IMMUNOASSAY NONANTIBODY: CPT

## 2025-02-12 PROCEDURE — 93306 TTE W/DOPPLER COMPLETE: CPT | Performed by: INTERNAL MEDICINE

## 2025-02-12 PROCEDURE — 85007 BL SMEAR W/DIFF WBC COUNT: CPT

## 2025-02-12 PROCEDURE — 85027 COMPLETE CBC AUTOMATED: CPT

## 2025-02-12 PROCEDURE — 86235 NUCLEAR ANTIGEN ANTIBODY: CPT

## 2025-02-12 PROCEDURE — 36415 COLL VENOUS BLD VENIPUNCTURE: CPT

## 2025-02-12 PROCEDURE — 93306 TTE W/DOPPLER COMPLETE: CPT

## 2025-02-13 ENCOUNTER — RESULTS FOLLOW-UP (OUTPATIENT)
Age: 55
End: 2025-02-13

## 2025-02-13 ENCOUNTER — TELEPHONE (OUTPATIENT)
Age: 55
End: 2025-02-13

## 2025-02-13 ENCOUNTER — APPOINTMENT (EMERGENCY)
Dept: RADIOLOGY | Facility: HOSPITAL | Age: 55
DRG: 546 | End: 2025-02-13
Payer: COMMERCIAL

## 2025-02-13 ENCOUNTER — HOSPITAL ENCOUNTER (INPATIENT)
Facility: HOSPITAL | Age: 55
LOS: 8 days | Discharge: HOME | DRG: 546 | End: 2025-02-22
Attending: EMERGENCY MEDICINE | Admitting: HOSPITALIST
Payer: COMMERCIAL

## 2025-02-13 ENCOUNTER — APPOINTMENT (EMERGENCY)
Dept: CT IMAGING | Facility: HOSPITAL | Age: 55
DRG: 546 | End: 2025-02-13
Payer: COMMERCIAL

## 2025-02-13 DIAGNOSIS — N28.0 ACUTE SCLERODERMA RENAL CRISIS (HCC): ICD-10-CM

## 2025-02-13 DIAGNOSIS — K21.9 GERD (GASTROESOPHAGEAL REFLUX DISEASE): ICD-10-CM

## 2025-02-13 DIAGNOSIS — M16.12 DEGENERATIVE JOINT DISEASE OF LEFT HIP: ICD-10-CM

## 2025-02-13 DIAGNOSIS — K86.2 CYST OF PANCREAS: ICD-10-CM

## 2025-02-13 DIAGNOSIS — M48.00 CENTRAL STENOSIS OF SPINAL CANAL: ICD-10-CM

## 2025-02-13 DIAGNOSIS — N17.9 AKI (ACUTE KIDNEY INJURY) (HCC): Primary | ICD-10-CM

## 2025-02-13 DIAGNOSIS — D58.9 HEMOLYTIC ANEMIA (HCC): ICD-10-CM

## 2025-02-13 DIAGNOSIS — M35.9 UNDIFFERENTIATED CONNECTIVE TISSUE DISEASE (HCC): ICD-10-CM

## 2025-02-13 DIAGNOSIS — S91.302A OPEN WOUND OF LEFT FOOT, INITIAL ENCOUNTER: ICD-10-CM

## 2025-02-13 DIAGNOSIS — I77.9 MILD CAROTID ARTERY DISEASE (HCC): ICD-10-CM

## 2025-02-13 DIAGNOSIS — R93.7 ABNORMAL CT OF THORACIC SPINE: ICD-10-CM

## 2025-02-13 DIAGNOSIS — R60.9 EDEMA: ICD-10-CM

## 2025-02-13 DIAGNOSIS — N28.1 RENAL CYST, LEFT: ICD-10-CM

## 2025-02-13 DIAGNOSIS — I10 HYPERTENSION: ICD-10-CM

## 2025-02-13 DIAGNOSIS — M34.9 ACUTE SCLERODERMA RENAL CRISIS (HCC): ICD-10-CM

## 2025-02-13 DIAGNOSIS — R05.1 ACUTE COUGH: ICD-10-CM

## 2025-02-13 DIAGNOSIS — N17.9 ACUTE KIDNEY INJURY (HCC): ICD-10-CM

## 2025-02-13 DIAGNOSIS — R93.7 ABNORMAL CT SCAN, LUMBAR SPINE: ICD-10-CM

## 2025-02-13 LAB
2HR DELTA HS TROPONIN: -1 NG/L
ALBUMIN SERPL BCG-MCNC: 4.5 G/DL (ref 3.5–5)
ALP SERPL-CCNC: 40 U/L (ref 34–104)
ALT SERPL W P-5'-P-CCNC: 38 U/L (ref 7–52)
ANION GAP SERPL CALCULATED.3IONS-SCNC: 11 MMOL/L (ref 4–13)
AST SERPL W P-5'-P-CCNC: 26 U/L (ref 13–39)
BASOPHILS # BLD AUTO: 0.07 THOUSANDS/ΜL (ref 0–0.1)
BASOPHILS NFR BLD AUTO: 0 % (ref 0–1)
BILIRUB SERPL-MCNC: 0.99 MG/DL (ref 0.2–1)
BUN SERPL-MCNC: 52 MG/DL (ref 5–25)
CALCIUM SERPL-MCNC: 9.5 MG/DL (ref 8.4–10.2)
CARDIAC TROPONIN I PNL SERPL HS: 65 NG/L (ref ?–50)
CARDIAC TROPONIN I PNL SERPL HS: 66 NG/L (ref ?–50)
CHLORIDE SERPL-SCNC: 98 MMOL/L (ref 96–108)
CK SERPL-CCNC: 79 U/L (ref 26–192)
CO2 SERPL-SCNC: 26 MMOL/L (ref 21–32)
CREAT SERPL-MCNC: 2.29 MG/DL (ref 0.6–1.3)
EOSINOPHIL # BLD AUTO: 0.01 THOUSAND/ΜL (ref 0–0.61)
EOSINOPHIL NFR BLD AUTO: 0 % (ref 0–6)
ERYTHROCYTE [DISTWIDTH] IN BLOOD BY AUTOMATED COUNT: 14.7 % (ref 11.6–15.1)
GFR SERPL CREATININE-BSD FRML MDRD: 23 ML/MIN/1.73SQ M
GLUCOSE SERPL-MCNC: 183 MG/DL (ref 65–140)
HCT VFR BLD AUTO: 34.5 % (ref 34.8–46.1)
HGB BLD-MCNC: 12.1 G/DL (ref 11.5–15.4)
HOLD SPECIMEN: NORMAL
HOLD SPECIMEN: NORMAL
IMM GRANULOCYTES # BLD AUTO: 0.35 THOUSAND/UL (ref 0–0.2)
IMM GRANULOCYTES NFR BLD AUTO: 2 % (ref 0–2)
INR PPP: 1.04 (ref 0.85–1.19)
LYMPHOCYTES # BLD AUTO: 0.83 THOUSANDS/ΜL (ref 0.6–4.47)
LYMPHOCYTES NFR BLD AUTO: 5 % (ref 14–44)
MCH RBC QN AUTO: 32.1 PG (ref 26.8–34.3)
MCHC RBC AUTO-ENTMCNC: 35.1 G/DL (ref 31.4–37.4)
MCV RBC AUTO: 92 FL (ref 82–98)
MONOCYTES # BLD AUTO: 0.9 THOUSAND/ΜL (ref 0.17–1.22)
MONOCYTES NFR BLD AUTO: 5 % (ref 4–12)
NEUTROPHILS # BLD AUTO: 16.36 THOUSANDS/ΜL (ref 1.85–7.62)
NEUTS SEG NFR BLD AUTO: 88 % (ref 43–75)
NRBC BLD AUTO-RTO: 0 /100 WBCS
PLATELET # BLD AUTO: 218 THOUSANDS/UL (ref 149–390)
PMV BLD AUTO: 9.2 FL (ref 8.9–12.7)
POTASSIUM SERPL-SCNC: 3.6 MMOL/L (ref 3.5–5.3)
PROT SERPL-MCNC: 7.4 G/DL (ref 6.4–8.4)
PROTHROMBIN TIME: 14.3 SECONDS (ref 12.3–15)
RBC # BLD AUTO: 3.77 MILLION/UL (ref 3.81–5.12)
SODIUM SERPL-SCNC: 135 MMOL/L (ref 135–147)
TSH SERPL DL<=0.05 MIU/L-ACNC: 1.19 UIU/ML (ref 0.45–4.5)
WBC # BLD AUTO: 18.52 THOUSAND/UL (ref 4.31–10.16)

## 2025-02-13 PROCEDURE — 82043 UR ALBUMIN QUANTITATIVE: CPT | Performed by: INTERNAL MEDICINE

## 2025-02-13 PROCEDURE — 86335 IMMUNFIX E-PHORSIS/URINE/CSF: CPT | Performed by: INTERNAL MEDICINE

## 2025-02-13 PROCEDURE — 84443 ASSAY THYROID STIM HORMONE: CPT | Performed by: INTERNAL MEDICINE

## 2025-02-13 PROCEDURE — 86706 HEP B SURFACE ANTIBODY: CPT | Performed by: INTERNAL MEDICINE

## 2025-02-13 PROCEDURE — 84165 PROTEIN E-PHORESIS SERUM: CPT | Performed by: INTERNAL MEDICINE

## 2025-02-13 PROCEDURE — 84166 PROTEIN E-PHORESIS/URINE/CSF: CPT | Performed by: INTERNAL MEDICINE

## 2025-02-13 PROCEDURE — 82570 ASSAY OF URINE CREATININE: CPT | Performed by: INTERNAL MEDICINE

## 2025-02-13 PROCEDURE — 86705 HEP B CORE ANTIBODY IGM: CPT | Performed by: INTERNAL MEDICINE

## 2025-02-13 PROCEDURE — 87340 HEPATITIS B SURFACE AG IA: CPT | Performed by: INTERNAL MEDICINE

## 2025-02-13 PROCEDURE — 83835 ASSAY OF METANEPHRINES: CPT | Performed by: INTERNAL MEDICINE

## 2025-02-13 PROCEDURE — 96365 THER/PROPH/DIAG IV INF INIT: CPT

## 2025-02-13 PROCEDURE — 83036 HEMOGLOBIN GLYCOSYLATED A1C: CPT | Performed by: INTERNAL MEDICINE

## 2025-02-13 PROCEDURE — 86704 HEP B CORE ANTIBODY TOTAL: CPT | Performed by: INTERNAL MEDICINE

## 2025-02-13 PROCEDURE — 96375 TX/PRO/DX INJ NEW DRUG ADDON: CPT

## 2025-02-13 PROCEDURE — 36415 COLL VENOUS BLD VENIPUNCTURE: CPT

## 2025-02-13 PROCEDURE — 85610 PROTHROMBIN TIME: CPT | Performed by: INTERNAL MEDICINE

## 2025-02-13 PROCEDURE — 85025 COMPLETE CBC W/AUTO DIFF WBC: CPT

## 2025-02-13 PROCEDURE — 99284 EMERGENCY DEPT VISIT MOD MDM: CPT

## 2025-02-13 PROCEDURE — 82595 ASSAY OF CRYOGLOBULIN: CPT | Performed by: INTERNAL MEDICINE

## 2025-02-13 PROCEDURE — 86038 ANTINUCLEAR ANTIBODIES: CPT | Performed by: INTERNAL MEDICINE

## 2025-02-13 PROCEDURE — 82550 ASSAY OF CK (CPK): CPT | Performed by: EMERGENCY MEDICINE

## 2025-02-13 PROCEDURE — 86803 HEPATITIS C AB TEST: CPT | Performed by: INTERNAL MEDICINE

## 2025-02-13 PROCEDURE — 83521 IG LIGHT CHAINS FREE EACH: CPT | Performed by: INTERNAL MEDICINE

## 2025-02-13 PROCEDURE — 86037 ANCA TITER EACH ANTIBODY: CPT | Performed by: INTERNAL MEDICINE

## 2025-02-13 PROCEDURE — 71046 X-RAY EXAM CHEST 2 VIEWS: CPT

## 2025-02-13 PROCEDURE — 86225 DNA ANTIBODY NATIVE: CPT | Performed by: INTERNAL MEDICINE

## 2025-02-13 PROCEDURE — 84484 ASSAY OF TROPONIN QUANT: CPT | Performed by: EMERGENCY MEDICINE

## 2025-02-13 PROCEDURE — 87205 SMEAR GRAM STAIN: CPT | Performed by: INTERNAL MEDICINE

## 2025-02-13 PROCEDURE — 93005 ELECTROCARDIOGRAM TRACING: CPT

## 2025-02-13 PROCEDURE — 80053 COMPREHEN METABOLIC PANEL: CPT

## 2025-02-13 PROCEDURE — 86160 COMPLEMENT ANTIGEN: CPT | Performed by: INTERNAL MEDICINE

## 2025-02-13 PROCEDURE — 74176 CT ABD & PELVIS W/O CONTRAST: CPT

## 2025-02-13 PROCEDURE — 83520 IMMUNOASSAY QUANT NOS NONAB: CPT | Performed by: INTERNAL MEDICINE

## 2025-02-13 RX ORDER — LABETALOL HYDROCHLORIDE 5 MG/ML
10 INJECTION, SOLUTION INTRAVENOUS ONCE
Status: COMPLETED | OUTPATIENT
Start: 2025-02-13 | End: 2025-02-13

## 2025-02-13 RX ORDER — ACETAMINOPHEN 325 MG/1
975 TABLET ORAL ONCE
Status: COMPLETED | OUTPATIENT
Start: 2025-02-13 | End: 2025-02-14

## 2025-02-13 RX ORDER — SODIUM CHLORIDE, SODIUM GLUCONATE, SODIUM ACETATE, POTASSIUM CHLORIDE, MAGNESIUM CHLORIDE, SODIUM PHOSPHATE, DIBASIC, AND POTASSIUM PHOSPHATE .53; .5; .37; .037; .03; .012; .00082 G/100ML; G/100ML; G/100ML; G/100ML; G/100ML; G/100ML; G/100ML
100 INJECTION, SOLUTION INTRAVENOUS CONTINUOUS
Status: DISCONTINUED | OUTPATIENT
Start: 2025-02-13 | End: 2025-02-14

## 2025-02-13 RX ORDER — LABETALOL HYDROCHLORIDE 5 MG/ML
10 INJECTION, SOLUTION INTRAVENOUS ONCE
Status: COMPLETED | OUTPATIENT
Start: 2025-02-13 | End: 2025-02-14

## 2025-02-13 RX ADMIN — LABETALOL HYDROCHLORIDE 10 MG: 5 INJECTION, SOLUTION INTRAVENOUS at 21:53

## 2025-02-13 RX ADMIN — SODIUM CHLORIDE, SODIUM GLUCONATE, SODIUM ACETATE, POTASSIUM CHLORIDE, MAGNESIUM CHLORIDE, SODIUM PHOSPHATE, DIBASIC, AND POTASSIUM PHOSPHATE 100 ML/HR: .53; .5; .37; .037; .03; .012; .00082 INJECTION, SOLUTION INTRAVENOUS at 23:30

## 2025-02-13 NOTE — TELEPHONE ENCOUNTER
Prior Auth Request    MRI Femur w/o contrast- evaluating for evidence of muscle edema/inflammation due to inflammatory myositis    Dx: Inflammatory myositis

## 2025-02-13 NOTE — TELEPHONE ENCOUNTER
Spoke with patient about recent lab work showing TITUS and worsening proteinuria. Also reviewed case with Nephrology. Will plan to have patient seen in ED/admitted to expedite work-up. Called [place] ED to make them aware of patient arrival. Also spoke with consult Rheumatology team who will follow patient once admitted.

## 2025-02-14 ENCOUNTER — HOSPITAL ENCOUNTER (INPATIENT)
Dept: VASCULAR ULTRASOUND | Facility: HOSPITAL | Age: 55
Discharge: HOME | DRG: 546 | End: 2025-02-14
Payer: COMMERCIAL

## 2025-02-14 ENCOUNTER — RESULTS FOLLOW-UP (OUTPATIENT)
Dept: OBGYN CLINIC | Facility: CLINIC | Age: 55
End: 2025-02-14

## 2025-02-14 PROBLEM — E87.1 HYPONATREMIA: Status: ACTIVE | Noted: 2025-02-14

## 2025-02-14 PROBLEM — N17.9 ACUTE KIDNEY INJURY (HCC): Status: ACTIVE | Noted: 2025-02-14

## 2025-02-14 PROBLEM — E87.6 HYPOKALEMIA: Status: ACTIVE | Noted: 2025-02-14

## 2025-02-14 PROBLEM — R79.89 ELEVATED SERUM CREATININE: Status: ACTIVE | Noted: 2025-02-14

## 2025-02-14 PROBLEM — M35.9 UNDIFFERENTIATED CONNECTIVE TISSUE DISEASE (HCC): Status: ACTIVE | Noted: 2025-02-14

## 2025-02-14 PROBLEM — R93.89 ABNORMAL FINDING ON IMAGING: Status: ACTIVE | Noted: 2025-02-14

## 2025-02-14 PROBLEM — N17.9 AKI (ACUTE KIDNEY INJURY) (HCC): Status: RESOLVED | Noted: 2025-02-14 | Resolved: 2025-02-14

## 2025-02-14 PROBLEM — N17.9 AKI (ACUTE KIDNEY INJURY) (HCC): Status: ACTIVE | Noted: 2025-02-14

## 2025-02-14 LAB
ALBUMIN UR ELPH-MCNC: 60.1 %
ALPHA1 GLOB MFR UR ELPH: 11.6 %
ALPHA2 GLOB MFR UR ELPH: 6.4 %
ANION GAP SERPL CALCULATED.3IONS-SCNC: 8 MMOL/L (ref 4–13)
ATRIAL RATE: 83 BPM
B-GLOBULIN MFR UR ELPH: 8.3 %
BLD SMEAR INTERP: NORMAL
BUN SERPL-MCNC: 49 MG/DL (ref 5–25)
C3 SERPL-MCNC: 103 MG/DL (ref 87–200)
C4 SERPL-MCNC: 19 MG/DL (ref 19–52)
CALCIUM SERPL-MCNC: 8.6 MG/DL (ref 8.4–10.2)
CHLORIDE SERPL-SCNC: 101 MMOL/L (ref 96–108)
CO2 SERPL-SCNC: 28 MMOL/L (ref 21–32)
CREAT SERPL-MCNC: 2.13 MG/DL (ref 0.6–1.3)
CREAT UR-MCNC: 78.4 MG/DL
CRP SERPL QL: 6.7 MG/L
EOSINOPHIL NFR URNS MANUAL: 0 %
ERYTHROCYTE [DISTWIDTH] IN BLOOD BY AUTOMATED COUNT: 14.8 % (ref 11.6–15.1)
ERYTHROCYTE [SEDIMENTATION RATE] IN BLOOD: 2 MM/HOUR (ref 0–29)
EST. AVERAGE GLUCOSE BLD GHB EST-MCNC: 128 MG/DL
GAMMA GLOB MFR UR ELPH: 13.6 %
GFR SERPL CREATININE-BSD FRML MDRD: 26 ML/MIN/1.73SQ M
GLUCOSE SERPL-MCNC: 118 MG/DL (ref 65–140)
HBA1C MFR BLD: 6.1 %
HBV CORE AB SER QL: ABNORMAL
HBV CORE IGM SER QL: ABNORMAL
HBV SURFACE AB SER-ACNC: 3.84 MIU/ML
HBV SURFACE AG SER QL: ABNORMAL
HCT VFR BLD AUTO: 30.3 % (ref 34.8–46.1)
HCV AB SER QL: REACTIVE
HGB BLD-MCNC: 10.5 G/DL (ref 11.5–15.4)
INTERPRETATION UR IFE-IMP: NORMAL
LDH SERPL-CCNC: 355 U/L (ref 140–271)
MAGNESIUM SERPL-MCNC: 2 MG/DL (ref 1.9–2.7)
MCH RBC QN AUTO: 32.2 PG (ref 26.8–34.3)
MCHC RBC AUTO-ENTMCNC: 34.7 G/DL (ref 31.4–37.4)
MCV RBC AUTO: 93 FL (ref 82–98)
MICROALBUMIN UR-MCNC: 528.9 MG/L
MICROALBUMIN/CREAT 24H UR: 675 MG/G CREATININE (ref 0–30)
P AXIS: 66 DEGREES
PHOSPHATE SERPL-MCNC: 3.9 MG/DL (ref 2.7–4.5)
PLATELET # BLD AUTO: 156 THOUSANDS/UL (ref 149–390)
PMV BLD AUTO: 9.4 FL (ref 8.9–12.7)
POTASSIUM SERPL-SCNC: 3.1 MMOL/L (ref 3.5–5.3)
PR INTERVAL: 168 MS
PROT PATTERN UR ELPH-IMP: NORMAL
PROT UR-MCNC: 115.4 MG/DL
QRS AXIS: 90 DEGREES
QRSD INTERVAL: 80 MS
QT INTERVAL: 374 MS
QTC INTERVAL: 439 MS
RBC # BLD AUTO: 3.26 MILLION/UL (ref 3.81–5.12)
SODIUM SERPL-SCNC: 137 MMOL/L (ref 135–147)
T WAVE AXIS: 68 DEGREES
VENTRICULAR RATE: 83 BPM
WBC # BLD AUTO: 13.11 THOUSAND/UL (ref 4.31–10.16)

## 2025-02-14 PROCEDURE — 86335 IMMUNFIX E-PHORSIS/URINE/CSF: CPT | Performed by: PATHOLOGY

## 2025-02-14 PROCEDURE — 87521 HEPATITIS C PROBE&RVRS TRNSC: CPT

## 2025-02-14 PROCEDURE — 85652 RBC SED RATE AUTOMATED: CPT

## 2025-02-14 PROCEDURE — 84166 PROTEIN E-PHORESIS/URINE/CSF: CPT | Performed by: PATHOLOGY

## 2025-02-14 PROCEDURE — 93975 VASCULAR STUDY: CPT | Performed by: SURGERY

## 2025-02-14 PROCEDURE — 84165 PROTEIN E-PHORESIS SERUM: CPT | Performed by: PATHOLOGY

## 2025-02-14 PROCEDURE — 84100 ASSAY OF PHOSPHORUS: CPT

## 2025-02-14 PROCEDURE — 87522 HEPATITIS C REVRS TRNSCRPJ: CPT

## 2025-02-14 PROCEDURE — 80048 BASIC METABOLIC PNL TOTAL CA: CPT

## 2025-02-14 PROCEDURE — 93975 VASCULAR STUDY: CPT

## 2025-02-14 PROCEDURE — 83735 ASSAY OF MAGNESIUM: CPT

## 2025-02-14 PROCEDURE — 99223 1ST HOSP IP/OBS HIGH 75: CPT | Performed by: INTERNAL MEDICINE

## 2025-02-14 PROCEDURE — 87086 URINE CULTURE/COLONY COUNT: CPT | Performed by: INTERNAL MEDICINE

## 2025-02-14 PROCEDURE — 83615 LACTATE (LD) (LDH) ENZYME: CPT

## 2025-02-14 PROCEDURE — 86140 C-REACTIVE PROTEIN: CPT

## 2025-02-14 PROCEDURE — 93010 ELECTROCARDIOGRAM REPORT: CPT | Performed by: INTERNAL MEDICINE

## 2025-02-14 PROCEDURE — 85027 COMPLETE CBC AUTOMATED: CPT

## 2025-02-14 PROCEDURE — 83010 ASSAY OF HAPTOGLOBIN QUANT: CPT

## 2025-02-14 PROCEDURE — 99285 EMERGENCY DEPT VISIT HI MDM: CPT | Performed by: EMERGENCY MEDICINE

## 2025-02-14 PROCEDURE — 84244 ASSAY OF RENIN: CPT

## 2025-02-14 PROCEDURE — 96376 TX/PRO/DX INJ SAME DRUG ADON: CPT

## 2025-02-14 PROCEDURE — 82088 ASSAY OF ALDOSTERONE: CPT

## 2025-02-14 RX ORDER — PREDNISONE 20 MG/1
20 TABLET ORAL DAILY
Status: DISCONTINUED | OUTPATIENT
Start: 2025-03-10 | End: 2025-02-14

## 2025-02-14 RX ORDER — LABETALOL 100 MG/1
200 TABLET, FILM COATED ORAL EVERY 12 HOURS SCHEDULED
Status: DISCONTINUED | OUTPATIENT
Start: 2025-02-14 | End: 2025-02-14

## 2025-02-14 RX ORDER — LABETALOL HYDROCHLORIDE 5 MG/ML
10 INJECTION, SOLUTION INTRAVENOUS EVERY 4 HOURS PRN
Status: DISCONTINUED | OUTPATIENT
Start: 2025-02-14 | End: 2025-02-14

## 2025-02-14 RX ORDER — HYDRALAZINE HYDROCHLORIDE 20 MG/ML
10 INJECTION INTRAMUSCULAR; INTRAVENOUS EVERY 6 HOURS PRN
Status: DISCONTINUED | OUTPATIENT
Start: 2025-02-14 | End: 2025-02-15

## 2025-02-14 RX ORDER — LOSARTAN POTASSIUM 25 MG/1
25 TABLET ORAL DAILY
Status: DISCONTINUED | OUTPATIENT
Start: 2025-02-14 | End: 2025-02-16

## 2025-02-14 RX ORDER — POTASSIUM CHLORIDE 1500 MG/1
40 TABLET, EXTENDED RELEASE ORAL ONCE
Status: COMPLETED | OUTPATIENT
Start: 2025-02-14 | End: 2025-02-14

## 2025-02-14 RX ORDER — PANTOPRAZOLE SODIUM 40 MG/1
40 TABLET, DELAYED RELEASE ORAL
Status: DISCONTINUED | OUTPATIENT
Start: 2025-02-14 | End: 2025-02-22 | Stop reason: HOSPADM

## 2025-02-14 RX ORDER — SODIUM CHLORIDE, SODIUM GLUCONATE, SODIUM ACETATE, POTASSIUM CHLORIDE, MAGNESIUM CHLORIDE, SODIUM PHOSPHATE, DIBASIC, AND POTASSIUM PHOSPHATE .53; .5; .37; .037; .03; .012; .00082 G/100ML; G/100ML; G/100ML; G/100ML; G/100ML; G/100ML; G/100ML
50 INJECTION, SOLUTION INTRAVENOUS CONTINUOUS
Status: DISPENSED | OUTPATIENT
Start: 2025-02-14 | End: 2025-02-14

## 2025-02-14 RX ORDER — PREDNISONE 20 MG/1
40 TABLET ORAL DAILY
Status: DISCONTINUED | OUTPATIENT
Start: 2025-02-14 | End: 2025-02-14

## 2025-02-14 RX ORDER — ATORVASTATIN CALCIUM 10 MG/1
10 TABLET, FILM COATED ORAL DAILY
Status: DISCONTINUED | OUTPATIENT
Start: 2025-02-14 | End: 2025-02-22 | Stop reason: HOSPADM

## 2025-02-14 RX ORDER — CHLORTHALIDONE 25 MG/1
25 TABLET ORAL DAILY
Status: DISCONTINUED | OUTPATIENT
Start: 2025-02-14 | End: 2025-02-16

## 2025-02-14 RX ORDER — LIDOCAINE 50 MG/G
1 PATCH TOPICAL ONCE
Status: COMPLETED | OUTPATIENT
Start: 2025-02-14 | End: 2025-02-14

## 2025-02-14 RX ORDER — FOLIC ACID 1 MG/1
1 TABLET ORAL DAILY
Status: DISCONTINUED | OUTPATIENT
Start: 2025-02-14 | End: 2025-02-22 | Stop reason: HOSPADM

## 2025-02-14 RX ORDER — AMLODIPINE BESYLATE 10 MG/1
10 TABLET ORAL DAILY
Status: DISCONTINUED | OUTPATIENT
Start: 2025-02-14 | End: 2025-02-19

## 2025-02-14 RX ORDER — HEPARIN SODIUM 5000 [USP'U]/ML
5000 INJECTION, SOLUTION INTRAVENOUS; SUBCUTANEOUS EVERY 8 HOURS SCHEDULED
Status: DISCONTINUED | OUTPATIENT
Start: 2025-02-14 | End: 2025-02-16

## 2025-02-14 RX ORDER — LABETALOL 100 MG/1
200 TABLET, FILM COATED ORAL EVERY 12 HOURS SCHEDULED
Status: DISCONTINUED | OUTPATIENT
Start: 2025-02-14 | End: 2025-02-15

## 2025-02-14 RX ORDER — LANOLIN ALCOHOL/MO/W.PET/CERES
1 CREAM (GRAM) TOPICAL
Status: DISCONTINUED | OUTPATIENT
Start: 2025-02-14 | End: 2025-02-22 | Stop reason: HOSPADM

## 2025-02-14 RX ORDER — PREDNISONE 10 MG/1
10 TABLET ORAL DAILY
Status: DISCONTINUED | OUTPATIENT
Start: 2025-03-24 | End: 2025-02-14

## 2025-02-14 RX ORDER — MONTELUKAST SODIUM 10 MG/1
10 TABLET ORAL
Status: DISCONTINUED | OUTPATIENT
Start: 2025-02-14 | End: 2025-02-22 | Stop reason: HOSPADM

## 2025-02-14 RX ADMIN — HEPARIN SODIUM 5000 UNITS: 5000 INJECTION INTRAVENOUS; SUBCUTANEOUS at 21:07

## 2025-02-14 RX ADMIN — MONTELUKAST 10 MG: 10 TABLET, FILM COATED ORAL at 21:07

## 2025-02-14 RX ADMIN — ATORVASTATIN CALCIUM 10 MG: 10 TABLET, FILM COATED ORAL at 08:54

## 2025-02-14 RX ADMIN — SODIUM CHLORIDE, SODIUM GLUCONATE, SODIUM ACETATE, POTASSIUM CHLORIDE, MAGNESIUM CHLORIDE, SODIUM PHOSPHATE, DIBASIC, AND POTASSIUM PHOSPHATE 50 ML/HR: .53; .5; .37; .037; .03; .012; .00082 INJECTION, SOLUTION INTRAVENOUS at 09:55

## 2025-02-14 RX ADMIN — LIDOCAINE 1 PATCH: 50 PATCH CUTANEOUS at 00:20

## 2025-02-14 RX ADMIN — POTASSIUM CHLORIDE 40 MEQ: 1500 TABLET, EXTENDED RELEASE ORAL at 08:53

## 2025-02-14 RX ADMIN — Medication 1 TABLET: at 08:53

## 2025-02-14 RX ADMIN — ACETAMINOPHEN 975 MG: 325 TABLET, FILM COATED ORAL at 00:18

## 2025-02-14 RX ADMIN — LABETALOL HYDROCHLORIDE 200 MG: 100 TABLET, FILM COATED ORAL at 12:20

## 2025-02-14 RX ADMIN — PREDNISONE 40 MG: 20 TABLET ORAL at 08:53

## 2025-02-14 RX ADMIN — FOLIC ACID 1 MG: 1 TABLET ORAL at 08:53

## 2025-02-14 RX ADMIN — LABETALOL HYDROCHLORIDE 200 MG: 100 TABLET, FILM COATED ORAL at 21:07

## 2025-02-14 RX ADMIN — HEPARIN SODIUM 5000 UNITS: 5000 INJECTION INTRAVENOUS; SUBCUTANEOUS at 05:33

## 2025-02-14 RX ADMIN — PANTOPRAZOLE SODIUM 40 MG: 40 TABLET, DELAYED RELEASE ORAL at 11:54

## 2025-02-14 RX ADMIN — LABETALOL HYDROCHLORIDE 10 MG: 5 INJECTION, SOLUTION INTRAVENOUS at 00:22

## 2025-02-14 RX ADMIN — HEPARIN SODIUM 5000 UNITS: 5000 INJECTION INTRAVENOUS; SUBCUTANEOUS at 14:58

## 2025-02-14 RX ADMIN — LABETALOL HYDROCHLORIDE 10 MG: 5 INJECTION, SOLUTION INTRAVENOUS at 09:26

## 2025-02-14 RX ADMIN — AMLODIPINE BESYLATE 10 MG: 10 TABLET ORAL at 08:53

## 2025-02-14 NOTE — ED PROVIDER NOTES
Time reflects when diagnosis was documented in both MDM as applicable and the Disposition within this note       Time User Action Codes Description Comment    2/13/2025 11:49 PM Micha Hutton [N17.9] TITUS (acute kidney injury) (HCC)     2/13/2025 11:50 PM Micha Hutton Add [I10] Hypertension     2/13/2025 11:50 PM Micha Hutton Add [R60.9] Edema     2/14/2025 12:00 AM Micha Hutton Add [N28.1] Renal cyst, left     2/14/2025 12:02 AM Micha Hutton Add [K86.2] Cyst of pancreas     2/14/2025 12:02 AM Micha Hutton Modify [K86.2] Cyst of pancreas 6 month follow up    2/14/2025 12:02 AM Micha Hutton Add [R93.7] Abnormal CT scan, lumbar spine     2/14/2025 12:02 AM Micha Hutton Add [R93.7] Abnormal CT of thoracic spine     2/14/2025 12:03 AM Micha Hutton Add [M48.00] Central stenosis of spinal canal     2/14/2025 12:03 AM Micha Hutton Add [M16.12] Degenerative joint disease of left hip           ED Disposition       None          Assessment & Plan       Medical Decision Making  54yoF, OBGYN doc for [facility]. Working outpt w/ rheum towards yet unidentified inflammatory dz, potentially psoriatic in nature - intermittently responsive to steroids. While with workup from rheum, noted outpt TITUS. Baseline Cr 0.9, now 2.4. Pt also with intermittent swelling eyes / hands /feet consistent with instrinsic renal process. PO intake and exam don't support pre-renal hypovolemia as cause.  Here, /100 responsive to labetalol (nephro's choice). Exam w/ mild edema to areas aforemtioned. EKG without acute patho but trop mild elevation stable in 60s. Likely 2/2 demand 2/2 pressures vs poor clearance given new change in renal function. Pt w/ out SOB, CP.  CT abdomen to r/o structural patho. Incidental findings and pt made aware. Nephro with labs ordered and US. Prelim note in. Hold ARB/chlorthalidone, 100ml/isolyte till AM for fluid challenge, PRN labetalol to decrease pressures at typical 'severe' range rates, NPO till AM for possible renal  biopsy. Call placed to hospital medicine who accept to their care.     Amount and/or Complexity of Data Reviewed  Independent Historian: spouse  External Data Reviewed: labs and notes.  Labs: ordered.  Radiology: ordered and independent interpretation performed.  ECG/medicine tests: ordered and independent interpretation performed.    Risk  OTC drugs.  Prescription drug management.  Decision regarding hospitalization.        ED Course as of 02/14/25 0026   Thu Feb 13, 2025   2340 EKG NSR without signs of ischemia, infarction, arrythmia. Rt axis. No prior to compare.       Medications   multi-electrolyte (PLASMALYTE-A/ISOLYTE-S PH 7.4) IV solution (100 mL/hr Intravenous New Bag 2/13/25 2330)   labetalol (NORMODYNE) injection 10 mg (has no administration in time range)   lidocaine (LIDODERM) 5 % patch 1 patch (1 patch Topical Medication Applied 2/14/25 0020)   labetalol (NORMODYNE) injection 10 mg (10 mg Intravenous Given 2/13/25 2153)   acetaminophen (TYLENOL) tablet 975 mg (975 mg Oral Given 2/14/25 0018)       ED Risk Strat Scores                          SBIRT 22yo+      Flowsheet Row Most Recent Value   Initial Alcohol Screen: US AUDIT-C     1. How often do you have a drink containing alcohol? 0 Filed at: 02/13/2025 1911   2. How many drinks containing alcohol do you have on a typical day you are drinking?  0 Filed at: 02/13/2025 1911   3a. Male UNDER 65: How often do you have five or more drinks on one occasion? 0 Filed at: 02/13/2025 1911   3b. FEMALE Any Age, or MALE 65+: How often do you have 4 or more drinks on one occassion? 0 Filed at: 02/13/2025 1911   Audit-C Score 0 Filed at: 02/13/2025 1911   SHILPA: How many times in the past year have you...    Used an illegal drug or used a prescription medication for non-medical reasons? Never Filed at: 02/13/2025 1911                            History of Present Illness       Chief Complaint   Patient presents with    Abnormal Lab     Pt being worked up for  sclerosing myositis. Pt had labs done yesterday and states BUN and creat was elevated and had protein in urine.        Past Medical History:   Diagnosis Date    Allergic     latex, some tree nuts, seasonal    Arthritis     psoriatic arthritis    Asthma     CPAP (continuous positive airway pressure) dependence     Diabetes mellitus (HCC)     gestational    Gestational diabetes     Hypertension     patient reports    Obesity     PCOS (polycystic ovarian syndrome)     Sleep apnea     Varicella       Past Surgical History:   Procedure Laterality Date    TONSILLECTOMY      last assessed: 5/3/16      Family History   Problem Relation Age of Onset    Hypertension Mother     Hyperlipidemia Mother     Hypertension Father     Other Father         low serum HDL    Hyperlipidemia Father     Hypothyroidism Sister     Asthma Sister     Diabetes Sister     Thyroid disease Sister     Breast cancer Sister 48    No Known Problems Daughter     No Known Problems Son     Breast cancer Maternal Grandmother 79    Stroke Maternal Grandfather     No Known Problems Paternal Grandmother     No Known Problems Paternal Grandfather     No Known Problems Paternal Aunt     No Known Problems Paternal Aunt     No Known Problems Paternal Aunt       Social History     Tobacco Use    Smoking status: Never    Smokeless tobacco: Never   Vaping Use    Vaping status: Never Used   Substance Use Topics    Alcohol use: No    Drug use: No      E-Cigarette/Vaping    E-Cigarette Use Never User       E-Cigarette/Vaping Substances    Nicotine No     THC No     CBD No     Flavoring No     Other No     Unknown No       I have reviewed and agree with the history as documented.     54yoF, pmhx DMII on glutide w/ controlled A1c low 6s, HTN on 25 chlorthalidone, 25 losartan, presenting to ER given marked increase in Cr. She's being evaluated over last 2 yrs for inflammatory processes via rheum with thought at this time psoriasis/psoriatic arthritis which is improving  with intermittent low dose steroid taper. She's also had increasing BP over similar period refractory to meds described. Question of a myositis but CK normal 2 weeks ago. Over last few months, developing intermittent periorital/hand/feet edema which hands/feet are 1+ edema at this time. Pt denies CP, SOB, ARITA, orthopnea. No change in bladder habits, lower abd, back pain.  at bedside.           Review of Systems   Constitutional:  Negative for chills and fever.   HENT:  Negative for ear pain and sore throat.    Eyes:  Negative for pain and visual disturbance.   Respiratory:  Negative for cough and shortness of breath.    Cardiovascular:  Positive for leg swelling. Negative for chest pain and palpitations.   Gastrointestinal:  Negative for abdominal pain and vomiting.   Genitourinary:  Negative for dysuria and hematuria.   Musculoskeletal:  Positive for arthralgias and myalgias. Negative for back pain.   Skin:  Negative for color change and rash.   Neurological:  Negative for seizures and syncope.   All other systems reviewed and are negative.          Objective       ED Triage Vitals   Temperature Pulse Blood Pressure Respirations SpO2 Patient Position - Orthostatic VS   02/13/25 1908 02/13/25 1907 02/13/25 1934 02/13/25 1907 02/13/25 1907 --   98.1 °F (36.7 °C) 105 (!) 214/100 18 99 %       Temp src Heart Rate Source BP Location FiO2 (%) Pain Score    -- -- -- -- 02/14/25 0018        3      Vitals      Date and Time Temp Pulse SpO2 Resp BP Pain Score FACES Pain Rating User   02/14/25 0018 -- -- -- -- -- 3 -- TAYLORY   02/13/25 2330 -- -- -- -- 188/98 -- -- [facility]   02/13/25 2255 -- -- -- -- 203/101 -- -- AF   02/13/25 2228 -- 90 97 % -- 179/98 -- --    02/13/25 2029 -- 95 98 % -- -- -- --    02/13/25 1934 -- -- -- -- 214/100 -- -- [facility]   02/13/25 1908 98.1 °F (36.7 °C) -- -- -- -- -- -- SEDRICK   02/13/25 1907 -- 105 99 % 18 -- -- -- SEDRICK            Physical Exam  Vitals and nursing note reviewed.   Constitutional:        General: She is not in acute distress.     Appearance: She is well-developed. She is not ill-appearing.   HENT:      Head: Normocephalic and atraumatic.      Nose: Nose normal.   Eyes:      Conjunctiva/sclera: Conjunctivae normal.   Cardiovascular:      Rate and Rhythm: Normal rate and regular rhythm.      Heart sounds: No murmur heard.  Pulmonary:      Effort: Pulmonary effort is normal. No respiratory distress.      Breath sounds: Normal breath sounds.   Abdominal:      General: Abdomen is flat.      Palpations: Abdomen is soft.      Tenderness: There is no abdominal tenderness.   Musculoskeletal:         General: No swelling. Normal range of motion.      Cervical back: Normal range of motion and neck supple.      Right lower leg: Edema present.      Left lower leg: Edema present.      Comments: 1+ BLE distally, BUE 1+ distally   Skin:     General: Skin is warm and dry.      Capillary Refill: Capillary refill takes less than 2 seconds.   Neurological:      General: No focal deficit present.      Mental Status: She is alert and oriented to person, place, and time.   Psychiatric:         Mood and Affect: Mood normal.         Results Reviewed       Procedure Component Value Units Date/Time    HS Troponin I 2hr [[id number]]  (Abnormal) Collected: 02/13/25 2151    Lab Status: Final result Specimen: Blood from Arm, Left Updated: 02/13/25 2352     hs TnI 2hr 65 ng/L      Delta 2hr hsTnI -1 ng/L     Cryoglobulin [[id number]] Collected: 02/13/25 2307    Lab Status: In process Specimen: Blood from Arm, Left Updated: 02/13/25 2318    Metanephrine, Fractionated Plasma Free [[id number]] Collected: 02/13/25 2307    Lab Status: In process Specimen: Blood from Arm, Left Updated: 02/13/25 2313    Protein electrophoresis, serum [[id number]] Collected: 02/13/25 2307    Lab Status: In process Specimen: Blood from Arm, Left Updated: 02/13/25 2313    Anti-DNA antibody, double-stranded [[id number]] Collected: 02/13/25 2307    Lab Status:  In process Specimen: Blood from Arm, Left Updated: 02/13/25 2313    C4 complement [[id number]] Collected: 02/13/25 2307    Lab Status: In process Specimen: Blood from Arm, Left Updated: 02/13/25 2313    Immunoglobulin free LT chains blood [[id number]] Collected: 02/13/25 2307    Lab Status: In process Specimen: Blood from Arm, Left Updated: 02/13/25 2313    Anti-neutrophilic cytoplasmic antibody [[id number]] Collected: 02/13/25 2307    Lab Status: In process Specimen: Blood from Arm, Left Updated: 02/13/25 2312    JAYESH Screen w/Reflex Cascade [[id number]] Collected: 02/13/25 2307    Lab Status: In process Specimen: Blood from Arm, Left Updated: 02/13/25 2312    Hepatitis Panel (IP Renal Unit) [[id number]] Collected: 02/13/25 2307    Lab Status: In process Specimen: Blood from Arm, Left Updated: 02/13/25 2312    C3 complement [[id number]] Collected: 02/13/25 2307    Lab Status: In process Specimen: Blood from Arm, Left Updated: 02/13/25 2312    Protime-INR [[id number]]  (Normal) Collected: 02/13/25 1935    Lab Status: Final result Specimen: Blood from Arm, Left Updated: 02/13/25 2252     Protime 14.3 seconds      INR 1.04    Narrative:      INR Therapeutic Range    Indication                                             INR Range      Atrial Fibrillation                                               2.0-3.0  Hypercoagulable State                                    2.0.2.3  Left Ventricular Asist Device                            2.0-3.0  Mechanical Heart Valve                                  -    Aortic(with afib, MI, embolism, HF, LA enlargement,    and/or coagulopathy)                                     2.0-3.0 (2.5-3.5)     Mitral                                                             2.5-3.5  Prosthetic/Bioprosthetic Heart Valve               2.0-3.0  Venous thromboembolism (VTE: VT, PE        2.0-3.0    Albumin / creatinine urine ratio [[id number]] Collected: 02/13/25 2151    Lab Status: In process Specimen: Urine,  Clean Catch Updated: 02/13/25 2250    Urine Eosinophils [[id number]] Collected: 02/13/25 2151    Lab Status: In process Specimen: Urine, Clean Catch Updated: 02/13/25 2250    Protein electrophoresis, urine [[id number]] Collected: 02/13/25 2151    Lab Status: In process Specimen: Urine, Clean Catch Updated: 02/13/25 2250    TSH, 3rd generation with Free T4 reflex [[id number]]  (Normal) Collected: 02/13/25 1935    Lab Status: Final result Specimen: Blood from Arm, Left Updated: 02/13/25 2218     TSH 3RD GENERATON 1.187 uIU/mL     Hemoglobin A1C w/ EAG Estimation [[id number]] Collected: 02/13/25 1935    Lab Status: In process Specimen: Blood from Arm, Left Updated: 02/13/25 2150    CK [[id number]]  (Normal) Collected: 02/13/25 1935    Lab Status: Final result Specimen: Blood from Arm, Left Updated: 02/13/25 2122     Total CK 79 U/L     HS Troponin 0hr (reflex protocol) [[id number]]  (Abnormal) Collected: 02/13/25 1935    Lab Status: Final result Specimen: Blood from Arm, Left Updated: 02/13/25 2113     hs TnI 0hr 66 ng/L     [place] draw [[id number]] Collected: 02/13/25 1935    Lab Status: Final result Specimen: Blood from Arm, Left Updated: 02/13/25 2101    Narrative:      The following orders were created for panel order [place] draw.  Procedure                               Abnormality         Status                     ---------                               -----------         ------                     Light Blue Top on hold[[id number]]                           Final result               Gold top on hold[[id number]]                                 Final result               Green / Yellow tube on hold[[id number]]                      Final result               Green / Black tube on hold[[id number]]                       Final result                 Please view results for these tests on the individual orders.    Comprehensive metabolic panel [[id number]]  (Abnormal) Collected: 02/13/25 1935    Lab Status: Final result  Specimen: Blood from Arm, Left Updated: 02/13/25 2002     Sodium 135 mmol/L      Potassium 3.6 mmol/L      Chloride 98 mmol/L      CO2 26 mmol/L      ANION GAP 11 mmol/L      BUN 52 mg/dL      Creatinine 2.29 mg/dL      Glucose 183 mg/dL      Calcium 9.5 mg/dL      AST 26 U/L      ALT 38 U/L      Alkaline Phosphatase 40 U/L      Total Protein 7.4 g/dL      Albumin 4.5 g/dL      Total Bilirubin 0.99 mg/dL      eGFR 23 ml/min/1.73sq m     Narrative:      National Kid[employer] Disease Foundation guidelines for Chronic Kidney Disease (CKD):     Stage 1 with normal or high GFR (GFR > 90 mL/min/1.73 square meters)    Stage 2 Mild CKD (GFR = 60-89 mL/min/1.73 square meters)    Stage 3A Moderate CKD (GFR = 45-59 mL/min/1.73 square meters)    Stage 3B Moderate CKD (GFR = 30-44 mL/min/1.73 square meters)    Stage 4 Severe CKD (GFR = 15-29 mL/min/1.73 square meters)    Stage 5 End Stage CKD (GFR <15 mL/min/1.73 square meters)  Note: GFR calculation is accurate only with a steady state creatinine    CBC and differential [[id number]]  (Abnormal) Collected: 02/13/25 1935    Lab Status: Final result Specimen: Blood from Arm, Left Updated: 02/13/25 1952     WBC 18.52 Thousand/uL      RBC 3.77 Million/uL      Hemoglobin 12.1 g/dL      Hematocrit 34.5 %      MCV 92 fL      MCH 32.1 pg      MCHC 35.1 g/dL      RDW 14.7 %      MPV 9.2 fL      Platelets 218 Thousands/uL      nRBC 0 /100 WBCs      Segmented % 88 %      Immature Grans % 2 %      Lymphocytes % 5 %      Monocytes % 5 %      Eosinophils Relative 0 %      Basophils Relative 0 %      Absolute Neutrophils 16.36 Thousands/µL      Absolute Immature Grans 0.35 Thousand/uL      Absolute Lymphocytes 0.83 Thousands/µL      Absolute Monocytes 0.90 Thousand/µL      Eosinophils Absolute 0.01 Thousand/µL      Basophils Absolute 0.07 Thousands/µL             CT abdomen pelvis wo contrast   Final Interpretation by Avery Lizarraga MD (02/13 2355)         1. No evidence of nephrolithiasis or  "obstructive uropathy.   2. Cystic 2.1 cm lesion arising from the tail of the pancreas. Recommend 6-month follow-up CT.   3. Indeterminate radiolucent lesion in the L1 vertebral body.      Workstation performed: [id number]         XR chest 2 views   ED Interpretation by Micha Hutton DO (02/13 5168)   No acute pathology      VAS renal artery complete    (Results Pending)       Procedures    ED Medication and Procedure Management   Prior to Admission Medications   Prescriptions Last Dose Informant Patient Reported? Taking?   Calcium-Magnesium-Vitamin D - MG-MG-UNIT TB24  Self Yes No   Sig: Take 1 tablet by mouth daily   Dulaglutide 4.5 MG/0.5ML SOAJ   No No   Sig: Inject 0.5 mL under the skin every 7 days   Icosapent Ethyl (Vascepa) 1 g CAPS   No No   Sig: Take 1 capsule (1 g total) by mouth 4 (four) times a day   Magnesium 400 MG TABS  Self Yes No   Sig: Take 1 tablet by mouth daily   SF 5000 Plus 1.1 % CREA   Yes No   Tuberculin-Allergy Syringes (B-D ALLERGY SYRINGE 1CC/28G) 28G X 1/2\" 1 ML MISC   No No   Sig: Use every 7 days Use to admin SQ MTX weekly   amLODIPine (NORVASC) 10 mg tablet   No No   Sig: Take 1 tablet (10 mg total) by mouth daily   atorvastatin (LIPITOR) 10 mg tablet   No No   Sig: Take 1 tablet (10 mg total) by mouth daily   chlorthalidone 25 mg tablet   No No   Sig: Take 1 tablet (25 mg total) by mouth daily   etonogestrel-ethinyl estradiol (NuvaRing) 0.12-0.015 MG/24HR vaginal ring   No No   Sig: INSERT 1 RING VAGINALLY LEAVE IN FOR 3 WEEKS REPLACE RING WITH NO WITHDRAWL BLEED   folic acid (KP Folic Acid) 1 mg tablet   No No   Sig: Take 1 tablet (1 mg total) by mouth daily   losartan (COZAAR) 25 mg tablet   No No   Sig: Take 1 tablet (25 mg total) by mouth daily   metFORMIN (GLUCOPHAGE) 500 mg tablet   No No   Sig: Take 1 tablet (500 mg total) by mouth every 12 (twelve) hours   methotrexate 50 MG/2ML injection   No No   Sig: Inject 0.6 mL (15 mg total) under the skin every 7 days "   montelukast (SINGULAIR) 10 mg tablet   No No   Sig: Take 1 tablet (10 mg total) by mouth daily at bedtime   predniSONE 10 mg tablet   No No   Sig: Take 4 tablets (40 mg total) by mouth daily for 14 days, THEN 3 tablets (30 mg total) daily for 14 days, THEN 2 tablets (20 mg total) daily for 14 days, THEN 1 tablet (10 mg total) daily for 14 days.      Facility-Administered Medications: None     Patient's Medications   Discharge Prescriptions    No medications on file     No discharge procedures on file.  ED SEPSIS DOCUMENTATION   Time reflects when diagnosis was documented in both MDM as applicable and the Disposition within this note       Time User Action Codes Description Comment    2/13/2025 11:49 PM Micha Hutton [N17.9] TITUS (acute kidney injury) (HCC)     2/13/2025 11:50 PM Micha Hutton [I10] Hypertension     2/13/2025 11:50 PM Micha Hutton [R60.9] Edema     2/14/2025 12:00 AM Micha Hutton [N28.1] Renal cyst, left     2/14/2025 12:02 AM Micha Hutton [K86.2] Cyst of pancreas     2/14/2025 12:02 AM Micha Hutton Modify [K86.2] Cyst of pancreas 6 month follow up    2/14/2025 12:02 AM Micha Hutton [R93.7] Abnormal CT scan, lumbar spine     2/14/2025 12:02 AM Micha Hutton [R93.7] Abnormal CT of thoracic spine     2/14/2025 12:03 AM Micha Hutton [M48.00] Central stenosis of spinal canal     2/14/2025 12:03 AM Micha Hutton [M16.12] Degenerative joint disease of left hip                  Micha Hutton DO  02/14/25 0026

## 2025-02-14 NOTE — CONSULTS
NEPHROLOGY HOSPITAL CONSULTATION   Kari Erazo 54 y.o. female MRN: [id number]  Unit/Bed#: W -01 Encounter: [id number]    Brief History of Admission -54-year-old female with a past medical history of hypertension, Raynaud's, AUGUSTO, undifferentiated connective tissue disease who was admitted to the hospital due to abnormal outpatient labs revealing TITUS.  Nephrology consulted for management.    Assessment & Plan  TITUS (acute kidney injury) (HCC) (Resolved: 2/14/2025)  Baseline creatinine 0.6-0.7 increasing to 0.9 in December and currently up to 2.26-->2.29-->2.13 over the past 3 days  Patient reports that she had an upper respiratory tract infection in November and therefore delayed having her flu shot until November.  After she had flu shot she began noticing the symptoms of myalgia,  Recently seen by rheumatology and seriologic workup started:  JAYESH +ve. On steroids  Intermittent use of NSAIDs in December.  More recently, starting last weekend, took Celebrex 3 times a day x 3 days.  When patient saw her lab results she self discontinued the Celebrex.  Urinalysis notable for pyuria, proteinuria  UACR 675 mg/g  UPCR 1.4 g  Serologic workup expanded and pending  Started on Plasmalyte 100/hr on 2/13. Currently on maintenance fluids  Recommendations:    Discussed renal biopsy with patient-- will need to control BP <140 prior to procedure. In any case she is not on ASA or anticoagulant. Will plan for biopsy early next week-- Monday or tuesday  Serologic workup in progress  No ACE or ARB  No NSAIDS  Accelerated hypertension  Mildly hypokalemic  Home medications: amlodipine, losartan, chlorthalidone  Current medications: Labetalol, amlodipine and PRN hydralazine  Consider increasing labetalol, adding clonidine  No indication for diuretic  Secondary workup in progress  AUGUSTO on CPAP    Undifferentiated connective tissue disease (HCC)  Positive JAYESH  Following with rheumatology  Recent onset of + arthralgia and myalgia,  edema and accelerated hypertension.  Patient also noted worsening Raynaud's since December.  Abnormal finding on imaging  Cystic lesion tail of the pancreas noted on CT and indeterminant radiolucent lesion L1 vertebral body.  CT imaging follow-up as recommended  Hypokalemia  Given replacement  Hyponatremia  Improved    HISTORY OF PRESENT ILLNESS:  Requesting Physician: Ronel Gilliland MD  Reason for Consult: Acute kidney injury    Kari Erazo is a 54 y.o. female with a PMMH of HTN, undifferentiated connective tissue disease, Raynaud's, AUGUSTO who was admitted to [facility] after presenting with abnormal outpatient labs.  Baseline creatinine usually 0.6 to 0.7 mg/dL.  In December creatinine was above baseline 0.9 mg/dL.  Current labs on admission creatinine 2.26 prompting nephrology consult.  Patient was seen and examined.  Her primary complaint is myalgia.  Worsening Raynaud's since December.  Worsening GERD symptoms. Used Celebrex 3 days recently on Sunday, Monday, Tuesday.  Took 3 doses 3 times a day due to myalgia.     PAST MEDICAL HISTORY:  Past Medical History:   Diagnosis Date    Allergic     latex, some tree nuts, seasonal    Arthritis     psoriatic arthritis    Asthma     CPAP (continuous positive airway pressure) dependence     Diabetes mellitus (HCC)     gestational    Gestational diabetes     Hypertension     patient reports    Obesity     PCOS (polycystic ovarian syndrome)     Sleep apnea     Varicella        PAST SURGICAL HISTORY:  Past Surgical History:   Procedure Laterality Date    TONSILLECTOMY      last assessed: 5/3/16       ALLERGIES:  Allergies   Allergen Reactions    Latex      Per patient    Nuts - Food Allergy     Shellfish-Derived Products - Food Allergy        SOCIAL HISTORY:  Social History     Substance and Sexual Activity   Alcohol Use No     Social History     Substance and Sexual Activity   Drug Use No     Social History     Tobacco Use   Smoking Status Never   Smokeless  Tobacco Never       FAMILY HISTORY:  Family History   Problem Relation Age of Onset    Hypertension Mother     Hyperlipidemia Mother     Hypertension Father     Other Father         low serum HDL    Hyperlipidemia Father     Hypothyroidism Sister     Asthma Sister     Diabetes Sister     Thyroid disease Sister     Breast cancer Sister 48    No Known Problems Daughter     No Known Problems Son     Breast cancer Maternal Grandmother 79    Stroke Maternal Grandfather     No Known Problems Paternal Grandmother     No Known Problems Paternal Grandfather     No Known Problems Paternal Aunt     No Known Problems Paternal Aunt     No Known Problems Paternal Aunt        MEDICATIONS:    Current Facility-Administered Medications:     amLODIPine (NORVASC) tablet 10 mg, 10 mg, Oral, Daily, Thuy Patrick MD, 10 mg at 02/14/25 0853    atorvastatin (LIPITOR) tablet 10 mg, 10 mg, Oral, Daily, Thuy Patrick MD, 10 mg at 02/14/25 0854    calcium carbonate-vitamin D 500 mg-5 mcg tablet 1 tablet, 1 tablet, Oral, Daily With Breakfast, Thuy Patrick MD, 1 tablet at 02/14/25 0853    [Held by provider] chlorthalidone tablet 25 mg, 25 mg, Oral, Daily, Thuy Patrick MD    folic acid (FOLVITE) tablet 1 mg, 1 mg, Oral, Daily, Thuy Patrick MD, 1 mg at 02/14/25 0853    heparin (porcine) subcutaneous injection 5,000 Units, 5,000 Units, Subcutaneous, Q8H [facility], Thuy Patrick MD, 5,000 Units at 02/14/25 0533    hydrALAZINE (APRESOLINE) injection 10 mg, 10 mg, Intravenous, Q6H PRN, Lakisha Woods DO    labetalol (NORMODYNE) tablet 200 mg, 200 mg, Oral, Q12H TERRY, Lakisha Woods DO    lidocaine (LIDODERM) 5 % patch 1 patch, 1 patch, Topical, Once, Micha Hutton DO, 1 patch at 02/14/25 0020    [Held by provider] losartan (COZAAR) tablet 25 mg, 25 mg, Oral, Daily, Thuy Patrick MD    montelukast (SINGULAIR) tablet 10 mg, 10 mg, Oral, HS, Thuy Patrick MD    multi-electrolyte (PLASMALYTE-A/ISOLYTE-S PH 7.4) IV solution, 50 mL/hr, Intravenous, Continuous, Lakisha Woods  DO    predniSONE tablet 40 mg, 40 mg, Oral, Daily, 40 mg at 02/14/25 0853 **FOLLOWED BY** [START ON 2/24/2025] predniSONE tablet 30 mg, 30 mg, Oral, Daily **FOLLOWED BY** [START ON 3/10/2025] predniSONE tablet 20 mg, 20 mg, Oral, Daily **FOLLOWED BY** [START ON 3/24/2025] predniSONE tablet 10 mg, 10 mg, Oral, Daily, Thuy Patrick MD    REVIEW OF SYSTEMS:  Constitutional: Denies fevers or chills  HENT: Negative for postnasal drip  Eyes: Negative for visual disturbance.   Respiratory: Negative for cough, shortness of breath and wheezing.   Cardiovascular: Negative for chest pain, palpitations.  Edema has decreased on steroids  Gastrointestinal: Negative for abdominal pain, constipation, diarrhea, nausea and vomiting.   Genitourinary: No dysuria, hematuria  Musculoskeletal: Positive for myalgia primarily  Skin: Negative for rash.   Neurological: Negative for focal weakness, headaches, dizziness.  Hematological: Negative for easy bruising or bleeding.  Psychiatric/Behavioral: Negative for confusion and sleep disturbance.   All the systems were reviewed and were negative except as documented on the HPI.    PHYSICAL EXAM:  Current Weight: Weight - Scale: 84.4 kg (186 lb)  First Weight: Weight - Scale: 84.4 kg (186 lb)  Vitals:    02/14/25 0500 02/14/25 0600 02/14/25 0859 02/14/25 0927   BP: 153/90 167/93 (!) 194/106 (!) 195/106   BP Location:       Pulse: 77 77 87 83   Resp:       Temp:       TempSrc:       SpO2:  97% 100% 99%   Weight:       Height:         No intake or output data in the 24 hours ending 02/14/25 0944  Physical Exam  Vitals reviewed.   Constitutional:       General: She is not in acute distress.     Appearance: Normal appearance. She is not ill-appearing, toxic-appearing or diaphoretic.   HENT:      Head: Normocephalic and atraumatic.      Nose: Nose normal. No congestion.      Mouth/Throat:      Mouth: Mucous membranes are moist.   Eyes:      Extraocular Movements: Extraocular movements intact.       "Conjunctiva/sclera: Conjunctivae normal.   Neck:      Vascular: No carotid bruit.   Cardiovascular:      Rate and Rhythm: Normal rate and regular rhythm.      Heart sounds: No murmur heard.     No friction rub. No gallop.   Pulmonary:      Effort: Pulmonary effort is normal. No respiratory distress.      Breath sounds: No stridor. No wheezing, rhonchi or rales.   Abdominal:      General: Bowel sounds are normal. There is no distension.      Palpations: Abdomen is soft.      Tenderness: There is no abdominal tenderness. There is no guarding.   Musculoskeletal:         General: No signs of injury.      Cervical back: Normal range of motion. No rigidity or tenderness.      Right lower leg: No edema.      Left lower leg: No edema.      Comments: Tips of fingers cyanotic,cool   Skin:     General: Skin is warm and dry.      Capillary Refill: Capillary refill takes less than 2 seconds.      Coloration: Skin is not jaundiced or pale.      Findings: No erythema.   Neurological:      Mental Status: She is alert and oriented to person, place, and time.   Psychiatric:         Mood and Affect: Mood normal.         Behavior: Behavior normal.         Thought Content: Thought content normal.         Judgment: Judgment normal.           Invasive Devices:      Lab Results:   Results from last 7 days   Lab Units 02/14/25  0622 02/13/25  1935 02/12/25  1527   WBC Thousand/uL 13.11* 18.52* 16.48*   HEMOGLOBIN g/dL 10.5* 12.1 12.2   HEMATOCRIT % 30.3* 34.5* 35.3   PLATELETS Thousands/uL 156 218 259   POTASSIUM mmol/L 3.1* 3.6 3.6   CHLORIDE mmol/L 101 98 94*   CO2 mmol/L 28 26 25   BUN mg/dL 49* 52* 47*   CREATININE mg/dL 2.13* 2.29* 2.26*   CALCIUM mg/dL 8.6 9.5 9.4   MAGNESIUM mg/dL 2.0  --   --    PHOSPHORUS mg/dL 3.9  --   --    ALK PHOS U/L  --  40 39   ALT U/L  --  38 42   AST U/L  --  26 37     Other Studies:     Portions of the record may have been created with voice recognition software. Occasional wrong word or \"sound a like\" " substitutions may have occurred due to the inherent limitations of voice recognition software. Read the chart carefully and recognize, using context, where substitutions have occurred.If you have any questions, please contact the dictating provider.

## 2025-02-14 NOTE — ASSESSMENT & PLAN NOTE
Baseline creatinine 0.6-0.7 increasing to 0.9 in December and currently up to 2.26-->2.29-->2.13 over the past 3 days  Patient reports that she had an upper respiratory tract infection in November and therefore delayed having her flu shot until November.  After she had flu shot she began noticing the symptoms of myalgia,  Recently seen by rheumatology and seriologic workup started:  JAYESH +ve. On steroids  Intermittent use of NSAIDs in December.  More recently, starting last weekend, took Celebrex 3 times a day x 3 days.  When patient saw her lab results she self discontinued the Celebrex.  Urinalysis notable for pyuria, proteinuria  UACR 675 mg/g  UPCR 1.4 g  Serologic workup expanded and pending  Started on Plasmalyte 100/hr on 2/13. Currently on maintenance fluids  Recommendations:    Discussed renal biopsy with patient-- will need to control BP <140 prior to procedure. In any case she is not on ASA or anticoagulant. Will plan for biopsy early next week-- Monday or tuesday  Serologic workup in progress  No ACE or ARB  No NSAIDS

## 2025-02-14 NOTE — ASSESSMENT & PLAN NOTE
Home regimen amlodipine 10 mg daily, chlorthalidone 25 mg daily, losartan 25 mg daily  Per nephrology, hold ARB and chlorthalidone and use labetalol as needed  Continue to monitor pressures closely.  Patient denies headache or visual changes at rest

## 2025-02-14 NOTE — PLAN OF CARE
Problem: PAIN - ADULT  Goal: Verbalizes/displays adequate comfort level or baseline comfort level  Description: Interventions:  - Encourage patient to monitor pain and request assistance  - Assess pain using appropriate pain scale  - Administer analgesics based on type and severity of pain and evaluate response  - Implement non-pharmacological measures as appropriate and evaluate response  - Consider cultural and social influences on pain and pain management  - Notify physician/advanced practitioner if interventions unsuccessful or patient reports new pain  Outcome: Progressing     Problem: INFECTION - ADULT  Goal: Absence or prevention of progression during hospitalization  Description: INTERVENTIONS:  - Assess and monitor for signs and symptoms of infection  - Monitor lab/diagnostic results  - Monitor all insertion sites, i.e. indwelling lines, tubes, and drains  - Monitor endotracheal if appropriate and nasal secretions for changes in amount and color  - Severy appropriate cooling/warming therapies per order  - Administer medications as ordered  - Instruct and encourage patient and family to use good hand hygiene technique  - Identify and instruct in appropriate isolation precautions for identified infection/condition  Outcome: Progressing  Goal: Absence of fever/infection during neutropenic period  Description: INTERVENTIONS:  - Monitor WBC    Outcome: Progressing     Problem: SAFETY ADULT  Goal: Patient will remain free of falls  Description: INTERVENTIONS:  - Educate patient/family on patient safety including physical limitations  - Instruct patient to call for assistance with activity   - Consult OT/PT to assist with strengthening/mobility   - Keep Call bell within reach  - Keep bed low and locked with side rails adjusted as appropriate  - Keep care items and personal belongings within reach  - Initiate and maintain comfort rounds  - Make Fall Risk Sign visible to staff  - Offer Toileting every 2 Hours,  in advance of need  - Initiate/Maintain bed/chair alarm  - Obtain necessary fall risk management equipment  - Apply yellow socks and bracelet for high fall risk patients  - Consider moving patient to room near nurses station  Outcome: Progressing  Goal: Maintain or return to baseline ADL function  Description: INTERVENTIONS:  -  Assess patient's ability to carry out ADLs; assess patient's baseline for ADL function and identify physical deficits which impact ability to perform ADLs (bathing, care of mouth/teeth, toileting, grooming, dressing, etc.)  - Assess/evaluate cause of self-care deficits   - Assess range of motion  - Assess patient's mobility; develop plan if impaired  - Assess patient's need for assistive devices and provide as appropriate  - Encourage maximum independence but intervene and supervise when necessary  - Involve family in performance of ADLs  - Assess for home care needs following discharge   - Consider OT consult to assist with ADL evaluation and planning for discharge  - Provide patient education as appropriate  Outcome: Progressing  Goal: Maintains/Returns to pre admission functional level  Description: INTERVENTIONS:  - Perform AM-PAC 6 Click Basic Mobility/ Daily Activity assessment daily.  - Set and communicate daily mobility goal to care team and patient/family/caregiver.   - Collaborate with rehabilitation services on mobility goals if consulted  - Perform Range of Motion 3 times a day.  - Reposition patient every 2 hours.  - Dangle patient 3 times a day  - Stand patient 3 times a day  - Ambulate patient 3 times a day  - Out of bed to chair 3 times a day   - Out of bed for meals 3 times a day  - Out of bed for toileting  - Record patient progress and toleration of activity level   Outcome: Progressing     Problem: DISCHARGE PLANNING  Goal: Discharge to home or other facility with appropriate resources  Description: INTERVENTIONS:  - Identify barriers to discharge w/patient and  caregiver  - Arrange for needed discharge resources and transportation as appropriate  - Identify discharge learning needs (meds, wound care, etc.)  - Arrange for interpretive services to assist at discharge as needed  - Refer to Case Management Department for coordinating discharge planning if the patient needs post-hospital services based on physician/advanced practitioner order or complex needs related to functional status, cognitive ability, or social support system  Outcome: Progressing     Problem: Knowledge Deficit  Goal: Patient/family/caregiver demonstrates understanding of disease process, treatment plan, medications, and discharge instructions  Description: Complete learning assessment and assess knowledge base.  Interventions:  - Provide teaching at level of understanding  - Provide teaching via preferred learning methods  Outcome: Progressing

## 2025-02-14 NOTE — ASSESSMENT & PLAN NOTE
Cystic lesion tail of the pancreas noted on CT and indeterminant radiolucent lesion L1 vertebral body.  CT imaging follow-up as recommended

## 2025-02-14 NOTE — ASSESSMENT & PLAN NOTE
Patiently recently saw rheumatology with concern over edema in hands and feet, myalgias, Raynaud's phenomenon.  Onset of Raynaud's phenomenon around 5 years ago.  Symptoms of edema and worsening Raynaud's symptoms started around Thanksgiving.  Around [place] time, patient started to have muscle weakness and pain in addition to worsening reflux symptoms and dyspnea on exertion.  She was given additional steroids which helped improve her symptoms.  Echocardiogram showed small pleural effusion but no signs of pulmonary arterial hypertension  No papules on hands or new rashes.  Patient does have brown spots, possibly telangiectasias on lips with some new ones forming.  She also has evidence of Raynaud's on exam in addition to periorbital, hand, and lower extremity swelling.  Currently on prednisone taper taking 40 mg for the next 10 days, with rest of taper ordered.  Patient has not started methotrexate yet  Labs so far:  JAYESH positive, titer at 1280 with nucleolar pattern  Sjogren antibodies negative  Anticentromere antibody negative  CK within normal range  Cyclic Citrullinated peptide antibody negative  Rheumatoid factor negative  Anti-Jo1 antibody negative  Antiscleroderma antibody negative    RNA polymerase 3 IgG antibody, Anti-Th/To ab, Anti-HMGCR ab, Anti-DNA double-stranded ab, Anti-neutrophilic cytoplasmic ab pending    Plan:  Consider in patient rheumatology consult given acuteness of symptoms.  Will consider current dose of prednisone for now  Will need formal PFTs given prior dyspnea on exertion and no evidence of pulmonary arterial hypertension

## 2025-02-14 NOTE — ASSESSMENT & PLAN NOTE
Creatinine 2.29 on arrival, previous baseline 0.6-1.9 which is an abrupt increase  Also in the setting of accelerated hypertension.  Patient's blood pressure normally 150s/80s at the highest and on admission it was 214/100.  Associated symptoms of edema periorbitally, and hands, and lower extremities in the pedal distribution.   UA showing large leukocytes, small occult blood, 1+ protein  Autoimmune, myositis, vasculitis labs still pending    Plan:  Nephrology contacted by ER provider and gave recommendations for renal artery doppler, metanephrine testing, renin/Lorenzo testing, and to hold ARB/chlorthalidone.  100 cc isolate fluids given for fluid challenge.  As needed labetalol for elevated blood pressure.  N.p.o. till morning for possible renal biopsy. Appreciate nephrology recommendations  Follow-up with BMP and rest of labs  Some systemic sclerosis labs still pending such as RNA polymerase, if suspicion for systemic sclerosis and in the setting of acute worsening renal function and accelerated hypertension concern for scleroderma renal crisis.  In this setting, potentially consider use of captopril

## 2025-02-14 NOTE — TREATMENT PLAN
Renal on-call note.  Reviewed case with ER attending.  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  But also systemic symptoms which began startin after flu vaccine around Thanksgiving time.  She has seen rheumatology and rheumatology notes reviewed.  Also to note, she has been hypertensive for 10 years but about a year ago, blood pressures were rising and losartan was increased.  Also had chlorthalidone added in the interim.  She has noticed foamy urine for 1 week.  Blood pressures are significantly elevated.  Recently has been on prednisone taper multiple times and with each time, symptoms do improve    Urinalysis-glucosuria, proteinuria, small blood but no RBCs significant.  UPCR 1.4 g/g.  Creatinine 2.2.  Baseline 0.6-0.7.  With slightly rising 0.9 mg/dL in December.  Prior did not have significantly elevated microalbuminuria.    Serologies ordered  CT scan of abdomen and pelvis  Renal artery Doppler  TSH  Metanephrine  Renin Lorenzo for the morning  I reviewed the patient's case with the patient over the phone and with the ER attending   Will be formally evaluated by our inpatient rounding team in a.m.

## 2025-02-14 NOTE — PLAN OF CARE
Problem: PAIN - ADULT  Goal: Verbalizes/displays adequate comfort level or baseline comfort level  Description: Interventions:  - Encourage patient to monitor pain and request assistance  - Assess pain using appropriate pain scale  - Administer analgesics based on type and severity of pain and evaluate response  - Implement non-pharmacological measures as appropriate and evaluate response  - Consider cultural and social influences on pain and pain management  - Notify physician/advanced practitioner if interventions unsuccessful or patient reports new pain  Outcome: Progressing     Problem: INFECTION - ADULT  Goal: Absence or prevention of progression during hospitalization  Description: INTERVENTIONS:  - Assess and monitor for signs and symptoms of infection  - Monitor lab/diagnostic results  - Monitor all insertion sites, i.e. indwelling lines, tubes, and drains  - Monitor endotracheal if appropriate and nasal secretions for changes in amount and color  - [place] appropriate cooling/warming therapies per order  - Administer medications as ordered  - Instruct and encourage patient and family to use good hand hygiene technique  - Identify and instruct in appropriate isolation precautions for identified infection/condition  Outcome: Progressing  Goal: Absence of fever/infection during neutropenic period  Description: INTERVENTIONS:  - Monitor WBC    Outcome: Progressing     Problem: SAFETY ADULT  Goal: Patient will remain free of falls  Description: INTERVENTIONS:  - Educate patient/family on patient safety including physical limitations  - Instruct patient to call for assistance with activity   - Consult OT/PT to assist with strengthening/mobility   - Keep Call bell within reach  - Keep bed low and locked with side rails adjusted as appropriate  - Keep care items and personal belongings within reach  - Initiate and maintain comfort rounds  - Make Fall Risk Sign visible to staff  - Offer Toileting every  Hours,  in advance of need  - Initiate/Maintain alarm  - Obtain necessary fall risk management equipment:   - Apply yellow socks and bracelet for high fall risk patients  - Consider moving patient to room near nurses station  Outcome: Progressing  Goal: Maintain or return to baseline ADL function  Description: INTERVENTIONS:  -  Assess patient's ability to carry out ADLs; assess patient's baseline for ADL function and identify physical deficits which impact ability to perform ADLs (bathing, care of mouth/teeth, toileting, grooming, dressing, etc.)  - Assess/evaluate cause of self-care deficits   - Assess range of motion  - Assess patient's mobility; develop plan if impaired  - Assess patient's need for assistive devices and provide as appropriate  - Encourage maximum independence but intervene and supervise when necessary  - Involve family in performance of ADLs  - Assess for home care needs following discharge   - Consider OT consult to assist with ADL evaluation and planning for discharge  - Provide patient education as appropriate  Outcome: Progressing  Goal: Maintains/Returns to pre admission functional level  Description: INTERVENTIONS:  - Perform AM-PAC 6 Click Basic Mobility/ Daily Activity assessment daily.  - Set and communicate daily mobility goal to care team and patient/family/caregiver.   - Collaborate with rehabilitation services on mobility goals if consulted  - Perform Range of Motion  times a day.  - Reposition patient every  hours.  - Dangle patient  times a day  - Stand patient  times a day  - Ambulate patient  times a day  - Out of bed to chair  times a day   - Out of bed for meals times a day  - Out of bed for toileting  - Record patient progress and toleration of activity level   Outcome: Progressing     Problem: DISCHARGE PLANNING  Goal: Discharge to home or other facility with appropriate resources  Description: INTERVENTIONS:  - Identify barriers to discharge w/patient and caregiver  - Arrange for  needed discharge resources and transportation as appropriate  - Identify discharge learning needs (meds, wound care, etc.)  - Arrange for interpretive services to assist at discharge as needed  - Refer to Case Management Department for coordinating discharge planning if the patient needs post-hospital services based on physician/advanced practitioner order or complex needs related to functional status, cognitive ability, or social support system  Outcome: Progressing     Problem: Knowledge Deficit  Goal: Patient/family/caregiver demonstrates understanding of disease process, treatment plan, medications, and discharge instructions  Description: Complete learning assessment and assess knowledge base.  Interventions:  - Provide teaching at level of understanding  - Provide teaching via preferred learning methods  Outcome: Progressing

## 2025-02-14 NOTE — H&P
H&P - Hospitalist   Name: Kari Erazo 54 y.o. female I MRN: [id number]  Unit/Bed#: W -01 I Date of Admission: 2/13/2025   Date of Service: 2/14/2025 I Hospital Day: 0     Assessment & Plan  Elevated serum creatinine  Creatinine 2.29 on arrival, previous baseline 0.6-1.9 which is an abrupt increase  Also in the setting of accelerated hypertension.  Patient's blood pressure normally 150s/80s at the highest and on admission it was 214/100.  Associated symptoms of edema periorbitally, and hands, and lower extremities in the pedal distribution.   UA showing large leukocytes, small occult blood, 1+ protein  Autoimmune, myositis, vasculitis labs still pending    Plan:  Nephrology contacted by ER provider and gave recommendations for renal artery doppler, metanephrine testing, renin/Lorenzo testing, and to hold ARB/chlorthalidone.  100 cc isolate fluids given for fluid challenge.  As needed labetalol for elevated blood pressure.  N.p.o. till morning for possible renal biopsy. Appreciate nephrology recommendations  Follow-up with BMP and rest of labs  Some systemic sclerosis labs still pending such as RNA polymerase, if suspicion for systemic sclerosis and in the setting of acute worsening renal function and accelerated hypertension concern for scleroderma renal crisis.  In this setting, potentially consider use of captopril  Undifferentiated connective tissue disease (HCC)  Patiently recently saw rheumatology with concern over edema in hands and feet, myalgias, Raynaud's phenomenon.  Onset of Raynaud's phenomenon around 5 years ago.  Symptoms of edema and worsening Raynaud's symptoms started around Thanksgiving.  Around Christmas time, patient started to have muscle weakness and pain in addition to worsening reflux symptoms and dyspnea on exertion.  She was given additional steroids which helped improve her symptoms.  Echocardiogram showed small pleural effusion but no signs of pulmonary arterial hypertension  No  papules on hands or new rashes.  Patient does have brown spots, possibly telangiectasias on lips with some new ones forming.  She also has evidence of Raynaud's on exam in addition to periorbital, hand, and lower extremity swelling.  Currently on prednisone taper taking 40 mg for the next 10 days, with rest of taper ordered.  Patient has not started methotrexate yet  Labs so far:  JAYESH positive, titer at 1280 with nucleolar pattern  Sjogren antibodies negative  Anticentromere antibody negative  CK within normal range  Cyclic Citrullinated peptide antibody negative  Rheumatoid factor negative  Anti-Jo1 antibody negative  Antiscleroderma antibody negative    RNA polymerase 3 IgG antibody, Anti-Th/To ab, Anti-HMGCR ab, Anti-DNA double-stranded ab, Anti-neutrophilic cytoplasmic ab pending    Plan:  Consider in patient rheumatology consult given acuteness of symptoms.  Will consider current dose of prednisone for now  Will need formal PFTs given prior dyspnea on exertion and no evidence of pulmonary arterial hypertension  Accelerated hypertension  Home regimen amlodipine 10 mg daily, chlorthalidone 25 mg daily, losartan 25 mg daily  Per nephrology, hold ARB and chlorthalidone and use labetalol as needed  Continue to monitor pressures closely.  Patient denies headache or visual changes at rest  [place] on CPAP  Continue CPAP  Abnormal finding on imaging  CT imaging showing cystic 2.1 cm lesion arising from tail of pancreas, recommend 6-month follow-up CT  CT imaging also showing indeterminate radiolucent lesion in L1 vertebral body  Discussed findings with patient      VTE Pharmacologic Prophylaxis: VTE Score: 5 High Risk (Score >/= 5) - Pharmacological DVT Prophylaxis Ordered: heparin. Sequential Compression Devices Ordered.  Code Status: Level 1 - Full Code   Discussion with family: Patient declined call to .     Anticipated Length of Stay: Patient will be admitted on an inpatient basis with an anticipated  length of stay of greater than 2 midnights secondary to workup for worsening renal function.    History of Present Illness   Chief Complaint: Abnormal lab work, elevated creatinine and HTN    Kari Erazo is a 54 y.o. female with a PMH of undifferentiated connective tissue disease, AUGUSTO, HTN who presents with abnormal outpatient labs, specifically worsening renal function.  Her baseline renal function is creatinine 0.6-0.9 and it was 2.26.  She has been following with rheumatology more recently for worsening Raynaud's syndrome, muscle weakness, perioral edema and edema in hands and feet.  She has been on prednisone tapers in the past with improvement.  Her symptoms started to worsen around [place] time and she also developed worsening GERD and dyspnea on exertion.  Increase in prednisone helped alleviate her symptoms.  She has an autoimmune family history of hypothyroidism in sister.  Her extremity edema comes and goes, it is worse in the morning.  With regards to her muscle weakness, she reported lifting her arms up felt weak and would occasionally hurt sometimes in the past. She also noticed that her urine has been slightly frothy in the last week.  Her blood pressure normally is 130s/80s and the highest she typically notes at home is 150/90.  She has not started methotrexate yet.    Review of Systems   Constitutional:  Positive for fatigue. Negative for fever.   Eyes:  Negative for visual disturbance.   Respiratory:  Positive for cough and shortness of breath.    Gastrointestinal:  Negative for constipation and diarrhea.   Genitourinary:  Negative for difficulty urinating and dysuria.   Musculoskeletal:  Positive for myalgias. Negative for arthralgias.   Skin:  Positive for color change.   Neurological:  Positive for weakness. Negative for dizziness, light-headedness and headaches.   Psychiatric/Behavioral:  Negative for confusion.    All other systems reviewed and are negative.      Historical Information    Past Medical History:   Diagnosis Date    Allergic     latex, some tree nuts, seasonal    Arthritis     psoriatic arthritis    Asthma     CPAP (continuous positive airway pressure) dependence     Diabetes mellitus (HCC)     gestational    Gestational diabetes     Hypertension     patient reports    Obesity     PCOS (polycystic ovarian syndrome)     Sleep apnea     Varicella      Past Surgical History:   Procedure Laterality Date    TONSILLECTOMY      last assessed: 5/3/16     Social History     Tobacco Use    Smoking status: Never    Smokeless tobacco: Never   Vaping Use    Vaping status: Never Used   Substance and Sexual Activity    Alcohol use: No    Drug use: No    Sexual activity: Yes     Partners: Male     Birth control/protection: Ring     E-Cigarette/Vaping    E-Cigarette Use Never User      E-Cigarette/Vaping Substances    Nicotine No     THC No     CBD No     Flavoring No     Other No     Unknown No        Social History:  Marital Status: /Civil Union   Occupation: Physician  Patient Pre-hospital Living Situation: Home  Patient Pre-hospital Level of Mobility: walks  Patient Pre-hospital Diet Restrictions: none    Meds/Allergies   I have reviewed home medications with patient personally.  Prior to Admission medications    Medication Sig Start Date End Date Taking? Authorizing Provider   amLODIPine (NORVASC) 10 mg tablet Take 1 tablet (10 mg total) by mouth daily 8/19/24  Yes Melissa Matthew MD   atorvastatin (LIPITOR) 10 mg tablet Take 1 tablet (10 mg total) by mouth daily 5/14/24  Yes Lois Rangel PA-C   Calcium-Magnesium-Vitamin D - MG-MG-UNIT TB24 Take 1 tablet by mouth daily   Yes Historical Provider, MD   chlorthalidone 25 mg tablet Take 1 tablet (25 mg total) by mouth daily 1/7/25  Yes Melissa Matthew MD   Dulaglutide 4.5 MG/0.5ML SOAJ Inject 0.5 mL under the skin every 7 days 1/31/25  Yes Melissa Matthew MD   folic acid ( Folic Acid) 1 mg tablet Take 1 tablet  "(1 mg total) by mouth daily 2/10/25  Yes Bety Pacheco MD   Icosapent Ethyl (Vascepa) 1 g CAPS Take 1 capsule (1 g total) by mouth 4 (four) times a day 6/10/24  Yes Lois Rangel PA-C   losartan (COZAAR) 25 mg tablet Take 1 tablet (25 mg total) by mouth daily 12/3/24  Yes Lois Rangel PA-C   metFORMIN (GLUCOPHAGE) 500 mg tablet Take 1 tablet (500 mg total) by mouth every 12 (twelve) hours 9/2/24  Yes MALVIN Aguirre   montelukast (SINGULAIR) 10 mg tablet Take 1 tablet (10 mg total) by mouth daily at bedtime 9/2/24  Yes MALVIN Aguirre   predniSONE 10 mg tablet Take 4 tablets (40 mg total) by mouth daily for 14 days, THEN 3 tablets (30 mg total) daily for 14 days, THEN 2 tablets (20 mg total) daily for 14 days, THEN 1 tablet (10 mg total) daily for 14 days. 2/10/25 4/7/25 Yes Bety Pacheco MD   etonogestrel-ethinyl estradiol (NuvaRing) 0.12-0.015 MG/24HR vaginal ring INSERT 1 RING VAGINALLY LEAVE IN FOR 3 WEEKS REPLACE RING WITH NO WITHDRAWL BLEED 8/22/24   Lois Rangel PA-C   Magnesium 400 MG TABS Take 1 tablet by mouth daily  Patient not taking: Reported on 2/14/2025    Historical Provider, MD   methotrexate 50 MG/2ML injection Inject 0.6 mL (15 mg total) under the skin every 7 days 2/10/25 5/11/25  Bety Pacheco MD   SF 5000 Plus 1.1 % CREA  8/2/24   Historical Provider, MD   Tuberculin-Allergy Syringes (B-D ALLERGY SYRINGE 1CC/28G) 28G X 1/2\" 1 ML MISC Use every 7 days Use to admin SQ MTX weekly 2/10/25   Bety Pacheco MD     Allergies   Allergen Reactions    Latex      Per patient    Nuts - Food Allergy     Shellfish-Derived Products - Food Allergy        Objective :  Temp:  [98.1 °F (36.7 °C)-98.3 °F (36.8 °C)] 98.3 °F (36.8 °C)  HR:  [] 75  BP: (167-214)/() 176/88  Resp:  [16-18] 16  SpO2:  [95 %-99 %] 95 %  O2 Device: None (Room air)    Physical Exam  Vitals reviewed.   Constitutional:       Appearance: She is not diaphoretic.   HENT:      Head: Normocephalic and " atraumatic.      Mouth/Throat:      Mouth: Mucous membranes are moist.      Comments: Brown spots appearing over lips.  When talking with patient, there are few spots that she had not noticed before.  See picture below  Eyes:      Conjunctiva/sclera: Conjunctivae normal.      Pupils: Pupils are equal, round, and reactive to light.      Comments: Periorbital edema noted   Cardiovascular:      Rate and Rhythm: Normal rate and regular rhythm.   Pulmonary:      Effort: Pulmonary effort is normal. No respiratory distress.      Breath sounds: Normal breath sounds. No rales.   Abdominal:      Palpations: Abdomen is soft.      Tenderness: There is no abdominal tenderness. There is no guarding or rebound.   Musculoskeletal:         General: Swelling present.      Right lower leg: Edema present.      Left lower leg: Edema present.      Comments: Edema noticed in bilateral hands and lower extremities, especially in the pedal distribution   Skin:     General: Skin is warm and dry.      Comments: Bluish discoloration noted in fingers, see picture below   Neurological:      Mental Status: She is alert and oriented to person, place, and time. Mental status is at baseline.   Psychiatric:         Mood and Affect: Mood normal.         Behavior: Behavior normal.         Thought Content: Thought content normal.         Judgment: Judgment normal.             Media Information      Document Information         Media Information      Document Information          Lab Results: I have reviewed the following results:  Results from last 7 days   Lab Units 02/13/25 1935 02/12/25  1527   WBC Thousand/uL 18.52* 16.48*   HEMOGLOBIN g/dL 12.1 12.2   HEMATOCRIT % 34.5* 35.3   PLATELETS Thousands/uL 218 259   BANDS PCT %  --  1   SEGS PCT % 88*  --    LYMPHO PCT % 5* 3*   MONO PCT % 5 2*   EOS PCT % 0 0     Results from last 7 days   Lab Units 02/13/25 1935   SODIUM mmol/L 135   POTASSIUM mmol/L 3.6   CHLORIDE mmol/L 98   CO2 mmol/L 26   BUN mg/dL  52*   CREATININE mg/dL 2.29*   ANION GAP mmol/L 11   CALCIUM mg/dL 9.5   ALBUMIN g/dL 4.5   TOTAL BILIRUBIN mg/dL 0.99   ALK PHOS U/L 40   ALT U/L 38   AST U/L 26   GLUCOSE RANDOM mg/dL 183*     Results from last 7 days   Lab Units 02/13/25 1935   INR  1.04         Lab Results   Component Value Date    HGBA1C 5.7 (H) 12/09/2024    HGBA1C 6.1 (H) 08/11/2024    HGBA1C 6.0 (H) 03/15/2024           Imaging Results Review: I reviewed radiology reports from this admission including: chest xray and CT abdomen/pelvis.  Other Study Results Review: EKG was reviewed.     Administrative Statements       ** Please Note: This note has been constructed using a voice recognition system. **

## 2025-02-14 NOTE — INCIDENTAL FINDINGS
The following findings require follow up:  Radiographic finding   Finding: Cystic 2.1 cm lesion arising from tail of pancreas   Follow up required: Repeat CT   Follow up should be done within 6 month(s)    Please notify the following clinician to assist with the follow up:   Dr. Matthew    Incidental finding results were discussed with the Patient by Thuy Patrick MD on 02/14/25.   They expressed understanding and all questions answered.

## 2025-02-14 NOTE — ASSESSMENT & PLAN NOTE
CT imaging showing cystic 2.1 cm lesion arising from tail of pancreas, recommend 6-month follow-up CT  CT imaging also showing indeterminate radiolucent lesion in L1 vertebral body  Discussed findings with patient

## 2025-02-14 NOTE — ASSESSMENT & PLAN NOTE
Patiently recently saw rheumatology with concern over edema in hands and feet, myalgias, Raynaud's phenomenon.  Onset of Raynaud's phenomenon around 5 years ago.  Symptoms of edema and worsening Raynaud's symptoms started around Thanksgiving.  Around [place] time, patient started to have muscle weakness and pain in addition to worsening reflux symptoms and dyspnea on exertion.  She was given additional steroids which helped improve her symptoms.  Echocardiogram showed small pleural effusion but no signs of pulmonary arterial hypertension  No papules on hands or new rashes.  Patient does have brown spots, possibly telangiectasias on lips with some new ones forming.  She also has evidence of Raynaud's on exam in addition to periorbital, hand, and lower extremity swelling.  Currently on prednisone taper taking 40 mg for the next 10 days, with rest of taper ordered.  Patient has not started methotrexate yet  Labs so far:  JAYESH positive, titer at 1280 with nucleolar pattern  Sjogren antibodies negative  Anticentromere antibody negative  CK within normal range  Cyclic Citrullinated peptide antibody negative  Rheumatoid factor negative  Anti-Jo1 antibody negative  Antiscleroderma antibody negative    RNA polymerase 3 IgG antibody, Anti-Th/To ab, Anti-HMGCR ab, Anti-DNA double-stranded ab, Anti-neutrophilic cytoplasmic ab pending    Plan:  Consider in patient rheumatology consult given acuteness of symptoms.  Will consider current dose of prednisone for now  Will need formal PFTs given prior dyspnea on exertion and no evidence of pulmonary arterial hypertension

## 2025-02-14 NOTE — ASSESSMENT & PLAN NOTE
Mildly hypokalemic  Home medications: amlodipine, losartan, chlorthalidone  Current medications: Labetalol, amlodipine and PRN hydralazine  Consider increasing labetalol, adding clonidine  No indication for diuretic  Secondary workup in progress

## 2025-02-14 NOTE — ASSESSMENT & PLAN NOTE
Positive JAYESH  Following with rheumatology  Recent onset of + arthralgia and myalgia, edema and accelerated hypertension.  Patient also noted worsening Raynaud's since December.

## 2025-02-15 ENCOUNTER — APPOINTMENT (INPATIENT)
Dept: MRI IMAGING | Facility: HOSPITAL | Age: 55
DRG: 546 | End: 2025-02-15
Payer: COMMERCIAL

## 2025-02-15 PROBLEM — R76.8 HEPATITIS C ANTIBODY TEST POSITIVE: Status: ACTIVE | Noted: 2025-02-15

## 2025-02-15 PROBLEM — E87.1 HYPONATREMIA: Status: RESOLVED | Noted: 2025-02-14 | Resolved: 2025-02-15

## 2025-02-15 PROBLEM — D64.9 ANEMIA: Status: ACTIVE | Noted: 2025-02-15

## 2025-02-15 PROBLEM — R65.10 SIRS WITHOUT INFECTION OR ORGAN DYSFUNCTION (HCC): Status: ACTIVE | Noted: 2025-02-15

## 2025-02-15 LAB
ANION GAP SERPL CALCULATED.3IONS-SCNC: 11 MMOL/L (ref 4–13)
BACTERIA UR CULT: NORMAL
BUN SERPL-MCNC: 45 MG/DL (ref 5–25)
CALCIUM SERPL-MCNC: 8.5 MG/DL (ref 8.4–10.2)
CHLORIDE SERPL-SCNC: 100 MMOL/L (ref 96–108)
CO2 SERPL-SCNC: 25 MMOL/L (ref 21–32)
CREAT SERPL-MCNC: 2.11 MG/DL (ref 0.6–1.3)
ERYTHROCYTE [DISTWIDTH] IN BLOOD BY AUTOMATED COUNT: 14.8 % (ref 11.6–15.1)
FERRITIN SERPL-MCNC: 72 NG/ML (ref 11–307)
FOLATE SERPL-MCNC: >22.3 NG/ML
GFR SERPL CREATININE-BSD FRML MDRD: 26 ML/MIN/1.73SQ M
GLUCOSE SERPL-MCNC: 167 MG/DL (ref 65–140)
HAPTOGLOB SERPL-MCNC: <10 MG/DL (ref 33–346)
HCT VFR BLD AUTO: 31.8 % (ref 34.8–46.1)
HGB BLD-MCNC: 10.9 G/DL (ref 11.5–15.4)
IRON SATN MFR SERPL: 24 % (ref 15–50)
IRON SERPL-MCNC: 66 UG/DL (ref 50–212)
KAPPA LC FREE SER-MCNC: 29.8 MG/L (ref 3.3–19.4)
KAPPA LC FREE/LAMBDA FREE SER: 0.94 {RATIO} (ref 0.26–1.65)
LAMBDA LC FREE SERPL-MCNC: 31.6 MG/L (ref 5.7–26.3)
MAGNESIUM SERPL-MCNC: 2.3 MG/DL (ref 1.9–2.7)
MCH RBC QN AUTO: 32.2 PG (ref 26.8–34.3)
MCHC RBC AUTO-ENTMCNC: 34.3 G/DL (ref 31.4–37.4)
MCV RBC AUTO: 94 FL (ref 82–98)
PHOSPHATE SERPL-MCNC: 3.2 MG/DL (ref 2.7–4.5)
PLATELET # BLD AUTO: 164 THOUSANDS/UL (ref 149–390)
PMV BLD AUTO: 9.3 FL (ref 8.9–12.7)
POTASSIUM SERPL-SCNC: 3.4 MMOL/L (ref 3.5–5.3)
RBC # BLD AUTO: 3.39 MILLION/UL (ref 3.81–5.12)
SODIUM SERPL-SCNC: 136 MMOL/L (ref 135–147)
TIBC SERPL-MCNC: 274.4 UG/DL (ref 250–450)
TRANSFERRIN SERPL-MCNC: 196 MG/DL (ref 203–362)
UIBC SERPL-MCNC: 208 UG/DL (ref 155–355)
VIT B12 SERPL-MCNC: 545 PG/ML (ref 180–914)
WBC # BLD AUTO: 13.76 THOUSAND/UL (ref 4.31–10.16)

## 2025-02-15 PROCEDURE — 99232 SBSQ HOSP IP/OBS MODERATE 35: CPT | Performed by: INTERNAL MEDICINE

## 2025-02-15 PROCEDURE — 83550 IRON BINDING TEST: CPT

## 2025-02-15 PROCEDURE — 84100 ASSAY OF PHOSPHORUS: CPT | Performed by: INTERNAL MEDICINE

## 2025-02-15 PROCEDURE — NC001 PR NO CHARGE: Performed by: RADIOLOGY

## 2025-02-15 PROCEDURE — 73718 MRI LOWER EXTREMITY W/O DYE: CPT

## 2025-02-15 PROCEDURE — 83540 ASSAY OF IRON: CPT

## 2025-02-15 PROCEDURE — 82728 ASSAY OF FERRITIN: CPT

## 2025-02-15 PROCEDURE — 80048 BASIC METABOLIC PNL TOTAL CA: CPT | Performed by: INTERNAL MEDICINE

## 2025-02-15 PROCEDURE — 99254 IP/OBS CNSLTJ NEW/EST MOD 60: CPT | Performed by: STUDENT IN AN ORGANIZED HEALTH CARE EDUCATION/TRAINING PROGRAM

## 2025-02-15 PROCEDURE — 83735 ASSAY OF MAGNESIUM: CPT | Performed by: INTERNAL MEDICINE

## 2025-02-15 PROCEDURE — 99448 NTRPROF PH1/NTRNET/EHR 21-30: CPT | Performed by: INTERNAL MEDICINE

## 2025-02-15 PROCEDURE — 85027 COMPLETE CBC AUTOMATED: CPT | Performed by: INTERNAL MEDICINE

## 2025-02-15 PROCEDURE — 82746 ASSAY OF FOLIC ACID SERUM: CPT

## 2025-02-15 PROCEDURE — 0241U HB NFCT DS VIR RESP RNA 4 TRGT: CPT

## 2025-02-15 PROCEDURE — 82607 VITAMIN B-12: CPT

## 2025-02-15 RX ORDER — LABETALOL 100 MG/1
300 TABLET, FILM COATED ORAL EVERY 12 HOURS SCHEDULED
Status: DISCONTINUED | OUTPATIENT
Start: 2025-02-15 | End: 2025-02-16

## 2025-02-15 RX ORDER — ACETAMINOPHEN 325 MG/1
650 TABLET ORAL EVERY 6 HOURS PRN
Status: DISCONTINUED | OUTPATIENT
Start: 2025-02-15 | End: 2025-02-22 | Stop reason: HOSPADM

## 2025-02-15 RX ORDER — DOXAZOSIN 1 MG/1
2 TABLET ORAL EVERY EVENING
Status: DISCONTINUED | OUTPATIENT
Start: 2025-02-15 | End: 2025-02-16

## 2025-02-15 RX ORDER — POTASSIUM CHLORIDE 1500 MG/1
20 TABLET, EXTENDED RELEASE ORAL ONCE
Status: COMPLETED | OUTPATIENT
Start: 2025-02-15 | End: 2025-02-15

## 2025-02-15 RX ORDER — LABETALOL HYDROCHLORIDE 5 MG/ML
10 INJECTION, SOLUTION INTRAVENOUS EVERY 6 HOURS PRN
Status: DISCONTINUED | OUTPATIENT
Start: 2025-02-15 | End: 2025-02-17

## 2025-02-15 RX ORDER — HYDRALAZINE HYDROCHLORIDE 20 MG/ML
10 INJECTION INTRAMUSCULAR; INTRAVENOUS EVERY 6 HOURS PRN
Status: DISCONTINUED | OUTPATIENT
Start: 2025-02-15 | End: 2025-02-15

## 2025-02-15 RX ORDER — LABETALOL 100 MG/1
100 TABLET, FILM COATED ORAL ONCE
Status: COMPLETED | OUTPATIENT
Start: 2025-02-15 | End: 2025-02-15

## 2025-02-15 RX ADMIN — HEPARIN SODIUM 5000 UNITS: 5000 INJECTION INTRAVENOUS; SUBCUTANEOUS at 14:15

## 2025-02-15 RX ADMIN — FOLIC ACID 1 MG: 1 TABLET ORAL at 08:01

## 2025-02-15 RX ADMIN — Medication 1 TABLET: at 08:01

## 2025-02-15 RX ADMIN — ACETAMINOPHEN 650 MG: 325 TABLET, FILM COATED ORAL at 20:16

## 2025-02-15 RX ADMIN — POTASSIUM CHLORIDE 20 MEQ: 1500 TABLET, EXTENDED RELEASE ORAL at 08:02

## 2025-02-15 RX ADMIN — DOXAZOSIN 2 MG: 1 TABLET ORAL at 18:31

## 2025-02-15 RX ADMIN — PANTOPRAZOLE SODIUM 40 MG: 40 TABLET, DELAYED RELEASE ORAL at 05:03

## 2025-02-15 RX ADMIN — LABETALOL HYDROCHLORIDE 300 MG: 100 TABLET, FILM COATED ORAL at 20:16

## 2025-02-15 RX ADMIN — HEPARIN SODIUM 5000 UNITS: 5000 INJECTION INTRAVENOUS; SUBCUTANEOUS at 20:16

## 2025-02-15 RX ADMIN — ATORVASTATIN CALCIUM 10 MG: 10 TABLET, FILM COATED ORAL at 08:01

## 2025-02-15 RX ADMIN — LABETALOL HYDROCHLORIDE 200 MG: 100 TABLET, FILM COATED ORAL at 08:07

## 2025-02-15 RX ADMIN — LABETALOL HYDROCHLORIDE 100 MG: 100 TABLET, FILM COATED ORAL at 11:44

## 2025-02-15 RX ADMIN — AMLODIPINE BESYLATE 10 MG: 10 TABLET ORAL at 08:01

## 2025-02-15 RX ADMIN — MONTELUKAST 10 MG: 10 TABLET, FILM COATED ORAL at 20:16

## 2025-02-15 RX ADMIN — LABETALOL HYDROCHLORIDE 10 MG: 5 INJECTION, SOLUTION INTRAVENOUS at 22:34

## 2025-02-15 NOTE — PLAN OF CARE
Problem: PAIN - ADULT  Goal: Verbalizes/displays adequate comfort level or baseline comfort level  Description: Interventions:  - Encourage patient to monitor pain and request assistance  - Assess pain using appropriate pain scale  - Administer analgesics based on type and severity of pain and evaluate response  - Implement non-pharmacological measures as appropriate and evaluate response  - Consider cultural and social influences on pain and pain management  - Notify physician/advanced practitioner if interventions unsuccessful or patient reports new pain  Outcome: Progressing     Problem: INFECTION - ADULT  Goal: Absence or prevention of progression during hospitalization  Description: INTERVENTIONS:  - Assess and monitor for signs and symptoms of infection  - Monitor lab/diagnostic results  - Monitor all insertion sites, i.e. indwelling lines, tubes, and drains  - Monitor endotracheal if appropriate and nasal secretions for changes in amount and color  - [place] appropriate cooling/warming therapies per order  - Administer medications as ordered  - Instruct and encourage patient and family to use good hand hygiene technique  - Identify and instruct in appropriate isolation precautions for identified infection/condition  Outcome: Progressing  Goal: Absence of fever/infection during neutropenic period  Description: INTERVENTIONS:  - Monitor WBC    Outcome: Progressing     Problem: SAFETY ADULT  Goal: Patient will remain free of falls  Description: INTERVENTIONS:  - Educate patient/family on patient safety including physical limitations  - Instruct patient to call for assistance with activity   - Consult OT/PT to assist with strengthening/mobility   - Keep Call bell within reach  - Keep bed low and locked with side rails adjusted as appropriate  - Keep care items and personal belongings within reach  - Initiate and maintain comfort rounds  - Make Fall Risk Sign visible to staff  - Offer Toileting every  Hours,  in advance of need  - Initiate/Maintain alarm  - Obtain necessary fall risk management equipment:   - Apply yellow socks and bracelet for high fall risk patients  - Consider moving patient to room near nurses station  Outcome: Progressing  Goal: Maintain or return to baseline ADL function  Description: INTERVENTIONS:  -  Assess patient's ability to carry out ADLs; assess patient's baseline for ADL function and identify physical deficits which impact ability to perform ADLs (bathing, care of mouth/teeth, toileting, grooming, dressing, etc.)  - Assess/evaluate cause of self-care deficits   - Assess range of motion  - Assess patient's mobility; develop plan if impaired  - Assess patient's need for assistive devices and provide as appropriate  - Encourage maximum independence but intervene and supervise when necessary  - Involve family in performance of ADLs  - Assess for home care needs following discharge   - Consider OT consult to assist with ADL evaluation and planning for discharge  - Provide patient education as appropriate  Outcome: Progressing  Goal: Maintains/Returns to pre admission functional level  Description: INTERVENTIONS:  - Perform AM-PAC 6 Click Basic Mobility/ Daily Activity assessment daily.  - Set and communicate daily mobility goal to care team and patient/family/caregiver.   - Collaborate with rehabilitation services on mobility goals if consulted  - Perform Range of Motion  times a day.  - Reposition patient every  hours.  - Dangle patient  times a day  - Stand patient  times a day  - Ambulate patient  times a day  - Out of bed to chair  times a day   - Out of bed for meals  times a day  - Out of bed for toileting  - Record patient progress and toleration of activity level   Outcome: Progressing     Problem: DISCHARGE PLANNING  Goal: Discharge to home or other facility with appropriate resources  Description: INTERVENTIONS:  - Identify barriers to discharge w/patient and caregiver  - Arrange for  needed discharge resources and transportation as appropriate  - Identify discharge learning needs (meds, wound care, etc.)  - Arrange for interpretive services to assist at discharge as needed  - Refer to Case Management Department for coordinating discharge planning if the patient needs post-hospital services based on physician/advanced practitioner order or complex needs related to functional status, cognitive ability, or social support system  Outcome: Progressing     Problem: Knowledge Deficit  Goal: Patient/family/caregiver demonstrates understanding of disease process, treatment plan, medications, and discharge instructions  Description: Complete learning assessment and assess knowledge base.  Interventions:  - Provide teaching at level of understanding  - Provide teaching via preferred learning methods  Outcome: Progressing

## 2025-02-15 NOTE — SEPSIS NOTE
Sepsis Note   Kari Erazo 54 y.o. female MRN: [id number]  Unit/Bed#: W -01 Encounter: [id number]       Initial Sepsis Screening       Row Name 02/15/25 0647                Is the patient's history suggestive of a new or worsening infection? No  -SP                  User Key  (r) = Recorded By, (t) = Taken By, (c) = Cosigned By      Initials Name Provider Type    SP Lakisha Woods DO Resident                    Body mass index is 31.93 kg/m².  Wt Readings from Last 1 Encounters:   02/14/25 84.4 kg (186 lb)     IBW (Ideal Body Weight): 54.7 kg    Ideal body weight: 54.7 kg (120 lb 9.5 oz)  Adjusted ideal body weight: 66.6 kg (146 lb 12.1 oz)    Patient meets sepsis criteria with leukocytosis and tachycardia.  No suspicion for active infection.   Leukocytosis is secondary to steroid use.  Patient is afebrile.

## 2025-02-15 NOTE — UTILIZATION REVIEW
Initial Clinical Review    Admission: Date/Time/Statement:   Admission Orders (From admission, onward)       Ordered        02/14/25 0028  INPATIENT ADMISSION  Once                          Orders Placed This Encounter   Procedures    INPATIENT ADMISSION     Standing Status:   Standing     Number of Occurrences:   1     Level of Care:   Med Surg [16]     Estimated length of stay:   More than 2 Midnights     Certification:   I certify that inpatient services are medically necessary for this patient for a duration of greater than two midnights. See H&P and MD Progress Notes for additional information about the patient's course of treatment.     ED Arrival Information       Expected   -    Arrival   2/13/2025 18:57    Acuity   Urgent              Means of arrival   Walk-In    Escorted by   Spouse    Service   Hospitalist    Admission type   Emergency              Arrival complaint   Abnormal lab tests             Chief Complaint   Patient presents with    Abnormal Lab     Pt being worked up for sclerosing myositis. Pt had labs done yesterday and states BUN and creat was elevated and had protein in urine.        Initial Presentation: 54 y.o. female to ED as walk in as Inpatient admission due to ABN lab work, TITUS, HTN, undifferentiated connective tissue disease, psoriatic arthritis.    Presents w abnormal outpatient labs, specifically worsening renal function - CR baseline is 0.6-0.9 and result  2.26   Following w OP Rheumatology for worsening Raynaud's syndrome w muscle weakness, perioral edema and edema in hands and feet. She has been on prednisone tapers in the past with improvement on 40 mg for next 10 days w taper. Sympts worsening around [place] w developing GERD, ARITA w increase in Prednisone alleviating sympt. Edema intermittent, worse in AM. Concerning muscle weakness, she reported lifting her arms up felt weak and would occasionally hurt sometimes in the past. Currently urine slightly frothy in the last week.   Her blood pressure normally is 130s/80s and the highest she typically notes at home is 150/90.  Not started methotrexate yet.     EXAM  Mild edema to areas aforemtioned.   /100 responsive to labetalol (nephro's choice). UA showing large leukocytes, small occult blood, 1+ protein. TITUS (CR baseline 0.6 to 0.7 mg/d), anemia; Nephro consult w recs for with labs ordered and US.  Hold ARB/chlorthalidone, 100ml/isolyte IVF till AM for fluid challenge, PRN labetalol to decrease pressures at typical 'severe' range rates, NPO till AM for possible renal biopsy.  Autoimmune, myositis, vasculitis labs still pending    PMH undifferentiated connective tissue disease, AUGUSTO, HTN   Obtain renal artery doppler, metanephrine testing, renin/Lorenzo testing, and to hold ARB/chlorthalidone.  100 cc isolate fluids given for fluid challenge.  PRN labetalol for elevated blood pressure.  N.p.o. till morning for possible renal biopsy. Appreciate nephrology recommendations, cont prednisone;   Nephrology  Baseline creatinine 0.6 to 0.7 mg/dL  Creatinine slightly higher than baseline 0.9 mg/dL in December  OP Creatinine prior to admission was 2.3 mg/dL on admission was 2.3 mg/dLand relatively unchanged on 2/14 at 2.1 mg/dL  URI sympts reported to our advanced practitioner in early November, subsequently had influenza vaccine at the end of November and couple days later began to notice myalgia, periorbital edema and swelling.  Etiology of acute kidney injury is unclear.   Continue low-dose intravenous fluids for 500 cc  Avoid ACE inhibitor due to history of cough with ACE inhibitor and I have added to patient's allergy list  Holding ARB for now  Holding diuretic for now  Continue amlodipine   Start oral labetalol   May need to consider clonidine if blood pressures remain significantly elevated Attempting to avoid diuretics for now   I/os; avoid nephrotoxic agents  Avoid hypotension  Renal Biopsy -Ingrid biopsy form completion  N.p.o. past  midnight Sunday night   Agree with low-dose potassium repletion  If blood pressures improve less than 145 systolic at least by Sunday, could consider placing IR consult for potential biopsy on Monday  Anticipated Length of Stay/Certification Statement:  Patient will be admitted on an inpatient basis with an anticipated length of stay of greater than 2 midnights secondary to workup for worsening renal function.  Date: 2/15/2025   Day 2:   Attending  Stable myalgia and mild ARITA, mild ankle swelling.   -170's/80-90's. Increasing labetatol and possible adding Cardura if BP later remains elevated per renal.   Noted significantly low haptoglobin. Concern for possible autoimmune hemolytic anemia.   Check direct Diana test in the morning. Will appreciate heme-onc evaluation. Spoke with Rheumatology fellow last night. Overall less concern for scleroderma renal crisis but would recommend to discontinue steroid   IR Consult  IR consulted for a random renal biopsy for Ingrid Cruz.       ED Treatment-Medication Administration from 02/13/2025 1857 to 02/14/2025 0140         Date/Time Order Dose Route Action     02/13/2025 2153 labetalol (NORMODYNE) injection 10 mg 10 mg Intravenous Given     02/13/2025 2330 multi-electrolyte (PLASMALYTE-A/ISOLYTE-S PH 7.4) IV solution 100 mL/hr Intravenous New Bag     02/14/2025 0022 labetalol (NORMODYNE) injection 10 mg 10 mg Intravenous Given     02/14/2025 0018 acetaminophen (TYLENOL) tablet 975 mg 975 mg Oral Given     02/14/2025 0020 lidocaine (LIDODERM) 5 % patch 1 patch 1 patch Topical Medication Applied            Scheduled Medications:  amLODIPine, 10 mg, Oral, Daily  atorvastatin, 10 mg, Oral, Daily  calcium carbonate-vitamin D, 1 tablet, Oral, Daily With Breakfast  [Held by provider] chlorthalidone, 25 mg, Oral, Daily  folic acid, 1 mg, Oral, Daily  heparin (porcine), 5,000 Units, Subcutaneous, Q8H TERRY  labetalol, 300 mg, Oral, Q12H TERRY  [Held by provider] losartan, 25 mg, Oral,  Daily  montelukast, 10 mg, Oral, HS  pantoprazole, 40 mg, Oral, Early Morning      Continuous IV Infusions:  Medications 02/12 02/13 02/14 02/15   multi-electrolyte (PLASMALYTE-A/ISOLYTE-S PH 7.4) IV solution  Rate: 50 mL/hr Dose: 50 mL/hr  Freq: Continuous Route: IV  Last Dose: Stopped (02/14/25 2002)  Start: 02/14/25 0945 End: 02/14/25 1954 0955 [C]     1954-D/C'd  2002 [C]         multi-electrolyte (PLASMALYTE-A/ISOLYTE-S PH 7.4) IV solution  Rate: 100 mL/hr Dose: 100 mL/hr  Freq: Continuous Route: IV  Last Dose: Stopped (02/14/25 0948)  Start: 02/13/25 2130 End: 02/14/25 0933 2330 0933-D/C'd  0948 [C]            PRN Meds:  labetalol, 10 mg, Intravenous, Q6H PRN      ED Triage Vitals   Temperature Pulse Respirations Blood Pressure SpO2 Pain Score   02/13/25 1908 02/13/25 1907 02/13/25 1907 02/13/25 1934 02/13/25 1907 02/14/25 0018   98.1 °F (36.7 °C) 105 18 (!) 214/100 99 % 3     Weight (last 2 days)       Date/Time Weight    02/14/25 01:48:34 84.4 (186)    02/13/25 1907 84.4 (186)            Vital Signs (last 3 days)       Date/Time Temp Pulse Resp BP MAP (mmHg) SpO2 O2 Device Patient Position - Orthostatic VS Pain    02/15/25 14:18:18 -- 84 -- 153/88 110 98 % -- -- --    02/15/25 11:25:01 -- 85 -- 176/98 124 100 % -- -- --    02/15/25 0813 -- -- -- -- -- -- None (Room air) -- No Pain    02/15/25 06:47:09 -- 83 -- 158/87 111 97 % -- -- --    02/15/25 04:49:08 -- 89 -- 178/97 124 98 % -- -- --    02/15/25 04:48:53 -- 89 -- 178/97 124 97 % -- -- --    02/14/25 22:01:12 97.5 °F (36.4 °C) 104 -- 171/87 115 98 % -- -- No Pain    02/14/25 2100 -- -- -- 168/89 -- -- -- -- --    02/14/25 2000 -- -- -- -- -- 98 % -- -- --    02/14/25 1900 -- -- -- 158/90 -- -- -- -- --    02/14/25 1700 -- -- -- 159/95 -- -- -- -- --    02/14/25 1500 -- -- -- 155/88 -- -- -- -- --    02/14/25 09:57:57 -- 79 -- 187/103 131 98 % -- -- --    02/14/25 09:27:38 -- 83 -- 195/106 136 99 % -- -- --    02/14/25 0859 -- 87 -- 194/106  135 100 % -- -- --    02/14/25 0810 -- -- -- -- -- -- None (Room air) -- 2    02/14/25 0600 -- 77 -- 167/93 118 97 % -- -- --    02/14/25 0500 -- 77 -- 153/90 111 -- -- -- --    02/14/25 0400 -- 75 -- 176/88 117 97 % -- -- --    02/14/25 0325 -- 79 -- 175/98 124 95 % -- -- --    02/14/25 0200 -- -- -- -- -- -- -- -- 2    02/14/25 01:48:34 98.3 °F (36.8 °C) 91 16 195/105 135 98 % -- -- --    02/14/25 0045 98.3 °F (36.8 °C) 81 18 167/86 121 96 % None (Room air) Sitting --    02/14/25 0018 -- -- -- -- -- -- -- -- 3    02/13/25 2330 -- -- -- 188/98 -- -- -- -- --    02/13/25 2255 -- -- -- 203/101 141 -- -- -- --    02/13/25 2228 -- 90 -- 179/98 134 97 % -- -- --    02/13/25 2029 -- 95 -- -- -- 98 % -- -- --    02/13/25 1934 -- -- -- 214/100 -- -- -- -- --    02/13/25 1908 98.1 °F (36.7 °C) -- -- -- -- -- -- -- --    02/13/25 1907 -- 105 18 -- -- 99 % -- -- --              Pertinent Labs/Diagnostic Test Results:   Radiology:  VAS renal artery complete   Final Interpretation by Bahman Salas DO (02/14 1415)      CT abdomen pelvis wo contrast   Final Interpretation by Avery Lizarraga MD (02/13 2355)         1. No evidence of nephrolithiasis or obstructive uropathy.   2. Cystic 2.1 cm lesion arising from the tail of the pancreas. Recommend 6-month follow-up CT.   3. Indeterminate radiolucent lesion in the L1 vertebral body.      Workstation performed: [id number]         XR chest 2 views   ED Interpretation by Micha Hutton DO (02/13 7852)   No acute pathology      Final Interpretation by Juancho Weber MD (02/14 6161)      No acute cardiopulmonary disease.            Workstation performed: [id number]         IR biopsy kidney random    (Results Pending)     Cardiology:  ECG 12 lead   Final Result by Barbara Zazueta MD (02/14 4839)   Normal sinus rhythm   Rightward axis   Borderline ECG   No previous ECGs available   Confirmed by Barbara Zazueta ([id number]) on 2/14/2025 1:18:17 PM        GI:  No orders to display          "  Results from last 7 days   Lab Units 02/15/25  0451 02/14/25  0622 02/13/25 1935 02/12/25  1527   WBC Thousand/uL 13.76* 13.11* 18.52* 16.48*   HEMOGLOBIN g/dL 10.9* 10.5* 12.1 12.2   HEMATOCRIT % 31.8* 30.3* 34.5* 35.3   PLATELETS Thousands/uL 164 156 218 259   TOTAL NEUT ABS Thousands/µL  --   --  16.36*  --    BANDS PCT %  --   --   --  1         Results from last 7 days   Lab Units 02/15/25  0451 02/14/25  0622 02/13/25 1935 02/12/25  1527   SODIUM mmol/L 136 137 135 133*   POTASSIUM mmol/L 3.4* 3.1* 3.6 3.6   CHLORIDE mmol/L 100 101 98 94*   CO2 mmol/L 25 28 26 25   ANION GAP mmol/L 11 8 11 14*   BUN mg/dL 45* 49* 52* 47*   CREATININE mg/dL 2.11* 2.13* 2.29* 2.26*   EGFR ml/min/1.73sq m 26 26 23 24   CALCIUM mg/dL 8.5 8.6 9.5 9.4   MAGNESIUM mg/dL 2.3 2.0  --   --    PHOSPHORUS mg/dL 3.2 3.9  --   --      Results from last 7 days   Lab Units 02/13/25 1935 02/12/25  1527   AST U/L 26 37   ALT U/L 38 42   ALK PHOS U/L 40 39   TOTAL PROTEIN g/dL 7.4 7.0   ALBUMIN g/dL 4.5 4.2   TOTAL BILIRUBIN mg/dL 0.99 0.86         Results from last 7 days   Lab Units 02/15/25  0451 02/14/25  0622 02/13/25 1935 02/12/25  1527   GLUCOSE RANDOM mg/dL 167* 118 183* 349*         Results from last 7 days   Lab Units 02/13/25 1935   HEMOGLOBIN A1C % 6.1*   EAG mg/dl 128     No results found for: \"BETA-HYDROXYBUTYRATE\"               Results from last 7 days   Lab Units 02/13/25 1935   CK TOTAL U/L 79     Results from last 7 days   Lab Units 02/13/25  2151 02/13/25 1935   HS TNI 0HR ng/L  --  66*   HS TNI 2HR ng/L 65*  --    HSTNI D2 ng/L -1  --          Results from last 7 days   Lab Units 02/13/25 1935   PROTIME seconds 14.3   INR  1.04     Results from last 7 days   Lab Units 02/13/25 1935   TSH 3RD GENERATON uIU/mL 1.187                                     Results from last 7 days   Lab Units 02/13/25  2307   HEP B S AG  Non-reactive   HEP C AB  Reactive*   HEP B C IGM  Non-reactive   HEP B C TOTAL AB  Non-reactive       "   Results from last 7 days   Lab Units 02/14/25  0622   CRP mg/L 6.7*   SED RATE mm/hour 2             Results from last 7 days   Lab Units 02/13/25  2151 02/12/25  1527   CLARITY UA   --  Clear   COLOR UA   --  Light Yellow   SPEC GRAV UA   --  1.011   PH UA   --  5.5   GLUCOSE UA mg/dl  --  1000 (1%)*   KETONES UA mg/dl  --  Negative   BLOOD UA   --  Small*   PROTEIN UA mg/dl  --  50 (1+)*   NITRITE UA   --  Negative   BILIRUBIN UA   --  Negative   UROBILINOGEN UA (BE) mg/dl  --  <2.0   LEUKOCYTES UA   --  Large*   WBC UA /hpf  --  30-50*   RBC UA /hpf  --  1-2   BACTERIA UA /hpf  --  Occasional   EPITHELIAL CELLS WET PREP /hpf  --  Occasional   CREATININE UR mg/dL 78.4 61.5   PROTEIN UR mg/dL  --  84.7   PROT/CREAT RATIO UR   --  1.4*                                 Results from last 7 days   Lab Units 02/14/25  1646   URINE CULTURE  No Growth <1000 cfu/mL                   Past Medical History:   Diagnosis Date    Allergic     latex, some tree nuts, seasonal    Arthritis     psoriatic arthritis    Asthma     CPAP (continuous positive airway pressure) dependence     Diabetes mellitus (HCC)     gestational    Gestational diabetes     Hypertension     patient reports    Obesity     PCOS (polycystic ovarian syndrome)     Sleep apnea     Varicella      Present on Admission:   Accelerated hypertension   AUGUSTO on CPAP      Admitting Diagnosis: Edema [R60.9]  Cyst of pancreas [K86.2]  Hypertension [I10]  Abnormal laboratory test result [R89.9]  Renal cyst, left [N28.1]  TITUS (acute kidney injury) (HCC) [N17.9]  Abnormal CT of thoracic spine [R93.7]  Abnormal CT scan, lumbar spine [R93.7]  Degenerative joint disease of left hip [M16.12]  Central stenosis of spinal canal [M48.00]  Age/Sex: 54 y.o. female    Network Utilization Review Department  ATTENTION: Please call with any questions or concerns to [phone] and carefully listen to the prompts so that you are directed to the right person. All voicemails are  confidential.   For Discharge needs, contact Care Management DC Support Team at [phone] opt. 2  Send all requests for admission clinical reviews, approved or denied determinations and any other requests to dedicated fax number below belonging to the campus where the patient is receiving treatment. List of dedicated fax numbers for the Facilities:  FACILITY NAME UR FAX NUMBER   ADMISSION DENIALS (Administrative/Medical Necessity) [phone]   DISCHARGE SUPPORT TEAM (NETWORK) [phone]   PARENT CHILD HEALTH (Maternity/NICU/Pediatrics) [phone]   [facility] [phone]   [facility] [phone]   [facility] [phone]   [facility] [phone]   [facility] [phone]   [facility] [phone]   [facility] [phone]   [facility] [phone]   [facility] [phone]   [facility] [phone]   [facility] [phone]   [facility] [phone]

## 2025-02-15 NOTE — ASSESSMENT & PLAN NOTE
Lab Results   Component Value Date    HGB 10.9 (L) 02/15/2025    MCV 94 02/15/2025     Low haptoglobin, elevated LDH, peripheral smear without schistocytes    Plan:  Concern for thrombotic microangiopathy.  Hematology/oncology consulted, appreciate recommendations  Check Diana test to r/o MAHA  Check iron panel, vitamin B12, folate levels

## 2025-02-15 NOTE — PLAN OF CARE
Problem: PAIN - ADULT  Goal: Verbalizes/displays adequate comfort level or baseline comfort level  Description: Interventions:  - Encourage patient to monitor pain and request assistance  - Assess pain using appropriate pain scale  - Administer analgesics based on type and severity of pain and evaluate response  - Implement non-pharmacological measures as appropriate and evaluate response  - Consider cultural and social influences on pain and pain management  - Notify physician/advanced practitioner if interventions unsuccessful or patient reports new pain  Outcome: Progressing     Problem: INFECTION - ADULT  Goal: Absence or prevention of progression during hospitalization  Description: INTERVENTIONS:  - Assess and monitor for signs and symptoms of infection  - Monitor lab/diagnostic results  - Monitor all insertion sites, i.e. indwelling lines, tubes, and drains  - Monitor endotracheal if appropriate and nasal secretions for changes in amount and color  - Hodges appropriate cooling/warming therapies per order  - Administer medications as ordered  - Instruct and encourage patient and family to use good hand hygiene technique  - Identify and instruct in appropriate isolation precautions for identified infection/condition  Outcome: Progressing  Goal: Absence of fever/infection during neutropenic period  Description: INTERVENTIONS:  - Monitor WBC    Outcome: Progressing     Problem: SAFETY ADULT  Goal: Patient will remain free of falls  Description: INTERVENTIONS:  - Educate patient/family on patient safety including physical limitations  - Instruct patient to call for assistance with activity   - Consult OT/PT to assist with strengthening/mobility   - Keep Call bell within reach  - Keep bed low and locked with side rails adjusted as appropriate  - Keep care items and personal belongings within reach  - Initiate and maintain comfort rounds  - Make Fall Risk Sign visible to staff  - Offer Toileting every  Hours,  in advance of need  - Initiate/Maintain alarm  - Obtain necessary fall risk management equipment:   - Apply yellow socks and bracelet for high fall risk patients  - Consider moving patient to room near nurses station  Outcome: Progressing  Goal: Maintain or return to baseline ADL function  Description: INTERVENTIONS:  -  Assess patient's ability to carry out ADLs; assess patient's baseline for ADL function and identify physical deficits which impact ability to perform ADLs (bathing, care of mouth/teeth, toileting, grooming, dressing, etc.)  - Assess/evaluate cause of self-care deficits   - Assess range of motion  - Assess patient's mobility; develop plan if impaired  - Assess patient's need for assistive devices and provide as appropriate  - Encourage maximum independence but intervene and supervise when necessary  - Involve family in performance of ADLs  - Assess for home care needs following discharge   - Consider OT consult to assist with ADL evaluation and planning for discharge  - Provide patient education as appropriate  Outcome: Progressing  Goal: Maintains/Returns to pre admission functional level  Description: INTERVENTIONS:  - Perform AM-PAC 6 Click Basic Mobility/ Daily Activity assessment daily.  - Set and communicate daily mobility goal to care team and patient/family/caregiver.   - Collaborate with rehabilitation services on mobility goals if consulted  - Perform Range of Motion  times a day.  - Reposition patient every  hours.  - Dangle patient  times a day  - Stand patient  times a day  - Ambulate patient  times a day  - Out of bed to chair  times a day   - Out of bed for meals  times a day  - Out of bed for toileting  - Record patient progress and toleration of activity level   Outcome: Progressing     Problem: DISCHARGE PLANNING  Goal: Discharge to home or other facility with appropriate resources  Description: INTERVENTIONS:  - Identify barriers to discharge w/patient and caregiver  - Arrange for  needed discharge resources and transportation as appropriate  - Identify discharge learning needs (meds, wound care, etc.)  - Arrange for interpretive services to assist at discharge as needed  - Refer to Case Management Department for coordinating discharge planning if the patient needs post-hospital services based on physician/advanced practitioner order or complex needs related to functional status, cognitive ability, or social support system  Outcome: Progressing     Problem: Knowledge Deficit  Goal: Patient/family/caregiver demonstrates understanding of disease process, treatment plan, medications, and discharge instructions  Description: Complete learning assessment and assess knowledge base.  Interventions:  - Provide teaching at level of understanding  - Provide teaching via preferred learning methods  Outcome: Progressing

## 2025-02-15 NOTE — ASSESSMENT & PLAN NOTE
Lab Results   Component Value Date    K 3.4 (L) 02/15/2025    K 3.1 (L) 02/14/2025    K 3.6 02/13/2025     Monitor and replete

## 2025-02-15 NOTE — PROGRESS NOTES
NEPHROLOGY HOSPITAL PROGRESS NOTE   Kari Erazo 54 y.o. female MRN: [id number]  Unit/Bed#: W -01 Encounter: [id number]  Reason for Consult: TITUS  Assessment & Plan  Acute kidney injury (HCC)  Baseline creatinine 0.6-0.7  Creatinine slightly higher than baseline 0.9 in December  Creatinine on admission 2.3  Creatinine today 2.11  UA: 1+ protein, 30-50 WBC, 1-2 RBC  UACR 675 mg/g/UPCR 1.4 g  Urine immunofixation with no monoclonal immunoglobulin  CT abdomen: No hydronephrosis, left renal cortical cyst 2.5 cm  Chest x-ray: No acute cardiopulmonary disease  JAYESH elevated with titer of 1218 January 2025  Haptoglobin less than 10, LDH elevated, hemolysis smear without schistocytes or helmet cells  Hep C antibody reactive, RNA pending  Urine eosinophil 0%  No hypocomplementemia  Rheumatoid factor/CCP/anti-Jo1 antibody/antiscleroderma antibody negative  JAYESH/dsDNA pending  SPEP/free light chain ratio pending  ANCA pending  Cryoglobulin pending  Etiology of TITUS unclear (rule out thrombotic microangiopathy given findings of low low haptoglobin, elevated LDH and hypertension)  She has anemia but no thrombocytopenia, TTP less likely  Rule out complement mediated HUS versus systemic rheumatic disease associated TMA such as scleroderma renal crisis crisis (positive JAYESH, negative SCL 70)  Kidney biopsy for further evaluation, this would need to be scheduled for next week once blood pressure better controlled  [place] biopsy form completed by rounding team 02/14  Accelerated hypertension  Mildly hypokalemic potassium level today 3.4  Home medications: Amlodipine, losartan, chlorthalidone  Current medications: Labetalol 200 mg twice daily, amlodipine 10 mg daily  Changes: Increase labetalol to 300 mg twice daily  Keep losartan and chlorthalidone on hold  Aldosterone/renin ratio pending  Metanephrine/normetanephrine pending  Renal artery Doppler with no evidence of renal artery stenosis  Undifferentiated connective tissue  disease (HCC)  Positive JAYESH  Following with rheumatology  Recent onset of + arthralgia and myalgia, edema and accelerated hypertension Patient also noted worsening Raynaud's since December  Abnormal finding on imaging  Cystic lesion tail of the pancreas noted on CT and indeterminant radiolucent lesion L1 vertebral body  CT imaging follow-up as recommended  Hypokalemia  Give K-Dur 20 mEq x 1  Anemia  Hemoglobin 10.9  Haptoglobin less than 10  LDH elevated at 355  Hemolysis smear: No schistocytes or helmet cells  Check Diana antibody  Consult hematology for further evaluation especially to rule out hemolysis    I have reviewed the nephrology recommendations including increasing labetalol to 300 mg twice daily and to consult interventional radiology for kidney biopsy and to consult hematology for evaluation of hemolytic anemia, with internal medicine team, and we are in agreement with renal plan including the information outlined above.    SUBJECTIVE / 24H INTERVAL HISTORY:  She complains of ankle swelling.  She complains of dyspnea on exertion.  Blood pressure has been elevated.    OBJECTIVE:  Current Weight: Weight - Scale: 84.4 kg (186 lb)  Vitals:    02/14/25 2201 02/15/25 0448 02/15/25 0449 02/15/25 0647   BP: (!) 171/87 (!) 178/97 (!) 178/97 158/87   BP Location:       Pulse: 104 89 89 83   Resp:       Temp: 97.5 °F (36.4 °C)      TempSrc: Oral      SpO2: 98% 97% 98% 97%   Weight:       Height:         No intake or output data in the 24 hours ending 02/15/25 0648  Review of Systems   Constitutional:  Negative for chills and fever.   Eyes:  Negative for visual disturbance.   Respiratory:  Positive for shortness of breath. Negative for cough.    Cardiovascular:  Positive for leg swelling. Negative for chest pain and palpitations.   Gastrointestinal:  Negative for abdominal pain and vomiting.   Genitourinary:  Negative for dysuria and hematuria.   Musculoskeletal:  Negative for arthralgias.   Skin:  Negative for  color change and rash.   All other systems reviewed and are negative.    Physical Exam  Vitals and nursing note reviewed.   Constitutional:       General: She is not in acute distress.     Appearance: She is well-developed.   HENT:      Head: Normocephalic and atraumatic.   Eyes:      Conjunctiva/sclera: Conjunctivae normal.   Cardiovascular:      Rate and Rhythm: Normal rate and regular rhythm.      Pulses: Normal pulses.      Heart sounds: Normal heart sounds. No murmur heard.  Pulmonary:      Effort: Pulmonary effort is normal. No respiratory distress.      Breath sounds: Normal breath sounds.   Abdominal:      Palpations: Abdomen is soft.      Tenderness: There is no abdominal tenderness.   Musculoskeletal:         General: No swelling.      Cervical back: Neck supple.      Right lower leg: No edema.      Left lower leg: No edema.   Skin:     General: Skin is warm and dry.      Capillary Refill: Capillary refill takes less than 2 seconds.   Neurological:      Mental Status: She is alert.   Psychiatric:         Mood and Affect: Mood normal.       Medications:    Current Facility-Administered Medications:     amLODIPine (NORVASC) tablet 10 mg, 10 mg, Oral, Daily, Thuy Patrick MD, 10 mg at 02/14/25 0853    atorvastatin (LIPITOR) tablet 10 mg, 10 mg, Oral, Daily, Thuy Patrick MD, 10 mg at 02/14/25 0854    calcium carbonate-vitamin D 500 mg-5 mcg tablet 1 tablet, 1 tablet, Oral, Daily With Breakfast, Thuy Patrick MD, 1 tablet at 02/14/25 0853    [Held by provider] chlorthalidone tablet 25 mg, 25 mg, Oral, Daily, Thuy Patrick MD    folic acid (FOLVITE) tablet 1 mg, 1 mg, Oral, Daily, Thuy Patrick MD, 1 mg at 02/14/25 0853    heparin (porcine) subcutaneous injection 5,000 Units, 5,000 Units, Subcutaneous, Q8H [facility], Thuy Patrick MD, 5,000 Units at 02/14/25 2107    hydrALAZINE (APRESOLINE) injection 10 mg, 10 mg, Intravenous, Q6H PRN, Lakisha Woods DO    labetalol (NORMODYNE) tablet 200 mg, 200 mg, Oral, Q12H [facility]  "DO Chuck, 200 mg at 02/14/25 2107    [Held by provider] losartan (COZAAR) tablet 25 mg, 25 mg, Oral, Daily, Thuy Patrick MD    montelukast (SINGULAIR) tablet 10 mg, 10 mg, Oral, HS, Thuy Patrick MD, 10 mg at 02/14/25 2107    pantoprazole (PROTONIX) EC tablet 40 mg, 40 mg, Oral, Early Morning, Lakisha Woods DO, 40 mg at 02/15/25 0503    potassium chloride (Klor-Con M20) CR tablet 20 mEq, 20 mEq, Oral, Once, Lakisha Woods DO    Laboratory Results:  Results from last 7 days   Lab Units 02/15/25  0451 02/14/25  0622 02/13/25  1935 02/12/25  1527   WBC Thousand/uL 13.76* 13.11* 18.52* 16.48*   HEMOGLOBIN g/dL 10.9* 10.5* 12.1 12.2   HEMATOCRIT % 31.8* 30.3* 34.5* 35.3   PLATELETS Thousands/uL 164 156 218 259   POTASSIUM mmol/L 3.4* 3.1* 3.6 3.6   CHLORIDE mmol/L 100 101 98 94*   CO2 mmol/L 25 28 26 25   BUN mg/dL 45* 49* 52* 47*   CREATININE mg/dL 2.11* 2.13* 2.29* 2.26*   CALCIUM mg/dL 8.5 8.6 9.5 9.4   MAGNESIUM mg/dL 2.3 2.0  --   --    PHOSPHORUS mg/dL 3.2 3.9  --   --        Portions of the record may have been created with voice recognition software. Occasional wrong word or \"sound a like\" substitutions may have occurred due to the inherent limitations of voice recognition software. Read the chart carefully and recognize, using context, where substitutions have occurred.If you have any questions, please contact the dictating provider.    "

## 2025-02-15 NOTE — ASSESSMENT & PLAN NOTE
Lab Results   Component Value Date    CREATININE 2.11 (H) 02/15/2025    CREATININE 2.13 (H) 02/14/2025    CREATININE 2.29 (H) 02/13/2025     Here with abnormal outpatient lab work. Cr 2.26 (baseline is 0.6 to 0.7)  Also the setting of accelerated hypertension  CTAP - no evidence of nephrolithiasis or obstructive uropathy  Etiology is unclear  Possible concern for systemic sclerosis  Nephrology ordered comprehensive workup    Plan:  Nephrology following, appreciate recommendations  Plan for kidney biopsy on Monday once blood pressures are better controlled  Hold home losartan, chlorthalidone  No need for further IVF at this time  Follow-up on remainder of workup

## 2025-02-15 NOTE — CONSULTS
e-Consult (IPC)  - Interventional Radiology  Kari Erazo 54 y.o. female MRN: [id number]  Unit/Bed#: W -01 Encounter: [id number]    Interventional Radiology has been consulted to evaluate Kari Erazo    We were consulted by Dr. Zazueta concerning this patient with TITUS.    Inpatient Consult to IR  Consult performed by: Adrian Barreto MD  Consult ordered by: Gavin Zazueta MD        02/15/25    Assessment/Recommendation:   Patient with TITUS and anemia.  IR consulted for a random renal biopsy for Ingrid Kit.  Biopsy was cancelled on 2/16/2025 at 12 pm.     21-30 minutes, >50% of the total time devoted to medical consultative verbal/EMR discussion between providers. Written report will be generated in the EMR.     Thank you for allowing Interventional Radiology to participate in the care of Kari Erazo. Please don't hesitate to call or TigerText us with any questions.     Adrian Barreto MD

## 2025-02-15 NOTE — ASSESSMENT & PLAN NOTE
Mildly hypokalemic potassium level today 3.4  Home medications: Amlodipine, losartan, chlorthalidone  Current medications: Labetalol 200 mg twice daily, amlodipine 10 mg daily  Changes: Increase labetalol to 300 mg twice daily  Keep losartan and chlorthalidone on hold  Aldosterone/renin ratio pending  Metanephrine/normetanephrine pending  Renal artery Doppler with no evidence of renal artery stenosis

## 2025-02-15 NOTE — ASSESSMENT & PLAN NOTE
Home meds: Amlodipine 10 mg, chlorthalidone 25 mg, losartan 25 mg  Upon presentation, blood pressure was as high as 200s/100s  Asymptomatic  VAS renal artery - no evidence of significant arterial occlusive disease    Plan:  Continue home amlodipine  Discontinue chlorthalidone, losartan  Increase labetalol 200 mg BID to labetalol 300 mg BID  IV Hydralazine 10 mg q6h prn

## 2025-02-15 NOTE — PROGRESS NOTES
Progress Note - Hospitalist   Name: Kari Erazo 54 y.o. female I MRN: [id number]  Unit/Bed#: W -01 I Date of Admission: 2/13/2025   Date of Service: 2/15/2025 I Hospital Day: 1    Assessment & Plan  Acute kidney injury (HCC)  Lab Results   Component Value Date    CREATININE 2.11 (H) 02/15/2025    CREATININE 2.13 (H) 02/14/2025    CREATININE 2.29 (H) 02/13/2025     Here with abnormal outpatient lab work. Cr 2.26 (baseline is 0.6 to 0.7)  Also the setting of accelerated hypertension  CTAP - no evidence of nephrolithiasis or obstructive uropathy  Etiology is unclear  Possible concern for systemic sclerosis  Nephrology ordered comprehensive workup    Plan:  Nephrology following, appreciate recommendations  Plan for kidney biopsy on Monday once blood pressures are better controlled  Hold home losartan, chlorthalidone  No need for further IVF at this time  Follow-up on remainder of workup  Accelerated hypertension  Home meds: Amlodipine 10 mg, chlorthalidone 25 mg, losartan 25 mg  Upon presentation, blood pressure was as high as 200s/100s  Asymptomatic  VAS renal artery - no evidence of significant arterial occlusive disease    Plan:  Continue home amlodipine  Discontinue chlorthalidone, losartan  Increase labetalol 200 mg BID to labetalol 300 mg BID  IV Hydralazine 10 mg q6h prn  Anemia  Lab Results   Component Value Date    HGB 10.9 (L) 02/15/2025    MCV 94 02/15/2025     Low haptoglobin, elevated LDH, peripheral smear without schistocytes    Plan:  Concern for thrombotic microangiopathy.  Hematology/oncology consulted, appreciate recommendations  Check Diana test to r/o MAHA  Check iron panel, vitamin B12, folate levels  Undifferentiated connective tissue disease (HCC)  Has been following with rheumatology in the outpatient setting due to progressively worsening edema in hands and feet and myalgias, Raynaud's symptoms [facility]  Since then, blood pressures have  been worsening. Has been on multiple prednisone tapers  Echo (2/12) - EF 70%. G2DD.  RV and LV mildly dilated.  Mild MR and TR, small anterior pericardial effusion  Echo from 11/21/23 with normal diastolic function  Currently on prednisone taper 40 mg x 14 days, decrease by 10 mg every 14 days  Has not started methotrexate yet  Follows with Dr. To  Labs so far:  JAYESH positive, titer at 1280 with nucleolar pattern  Sjogren antibodies,   Anticentromere antibody negative  CK within normal range  Cyclic Citrullinated peptide antibody negative  Rheumatoid factor negative  Anti-Jo1 antibody negative  Antiscleroderma antibody negative  RNA polymerase 3 IgG antibody, Anti-Th/To ab, Anti-HMGCR ab, Anti-DNA double-stranded ab, Anti-neutrophilic cytoplasmic ab pending    Plan:  Rheumatology consulted, appreciate recommendations  Continue home prednisone taper  Hypokalemia  Lab Results   Component Value Date    K 3.4 (L) 02/15/2025    K 3.1 (L) 02/14/2025    K 3.6 02/13/2025     Monitor and replete  AUGUSTO on CPAP  Continue CPAP  Abnormal finding on imaging  CT imaging showing cystic 2.1 cm lesion arising from tail of pancreas, recommend 6-month follow-up CT  CT imaging also showing indeterminate radiolucent lesion in L1 vertebral body  Discussed findings with patient.  Follow-up as outpatient  Hepatitis C antibody test positive  Follow-up on hepatitis C RNA level  SIRS without infection or organ dysfunction (HCC)  Has SIRS criteria of leukocytosis and tachycardia  No concern for active infection  Leukocytosis is secondary to steroids    VTE Pharmacologic Prophylaxis: VTE Score: 5 High Risk (Score >/= 5) - Pharmacological DVT Prophylaxis Ordered: heparin. Sequential Compression Devices Ordered.    Mobility:   Basic Mobility Inpatient Raw Score: 24  JH-HLM Goal: 8: Walk 250 feet or more  JH-HLM Achieved: 7: Walk 25 feet or more  JH-HLM Goal NOT achieved. Continue with multidisciplinary rounding and encourage appropriate mobility  to improve upon [facility] goals.    Patient Centered Rounds: I performed bedside rounds with nursing staff today.   Discussions with Specialists or Other Care Team Provider: Rheumatology, nephrology    Education and Discussions with Family / Patient: Patient declined call to .     Current Length of Stay: 1 day(s)  Current Patient Status: Inpatient   Certification Statement: The patient will continue to require additional inpatient hospital stay due to TITUS, etc. hypertension, pending renal biopsy  Discharge Plan: Anticipate discharge in 48-72 hrs to home.    Code Status: Level 1 - Full Code    Subjective   Overnight, patient's blood pressure improved to 150s/80s.  Nephrology increased labetalol dose to 300 mg  BID.  Patient had no complaints at this time.  She specifically denies headache, chest pain.  She is eating and moving her bowels.    Objective :  Temp:  [97.5 °F (36.4 °C)] 97.5 °F (36.4 °C)  HR:  [] 85  BP: (155-178)/(87-98) 176/98  SpO2:  [97 %-100 %] 100 %  O2 Device: None (Room air)    Body mass index is 31.93 kg/m².     Input and Output Summary (last 24 hours):     Intake/Output Summary (Last 24 hours) at 2/15/2025 1155  Last data filed at 2/15/2025 0833  Gross per 24 hour   Intake 480 ml   Output --   Net 480 ml       Physical Exam  Constitutional:       General: She is not in acute distress.     Appearance: Normal appearance. She is obese. She is not ill-appearing.   HENT:      Head: Normocephalic and atraumatic.      Mouth/Throat:      Mouth: Mucous membranes are moist.   Eyes:      Extraocular Movements: Extraocular movements intact.      Comments: Periorbital edema   Cardiovascular:      Rate and Rhythm: Normal rate and regular rhythm.      Heart sounds: No murmur heard.  Pulmonary:      Effort: No respiratory distress.      Breath sounds: No wheezing, rhonchi or rales.   Abdominal:      General: Bowel sounds are normal. There is no distension.      Palpations: Abdomen is soft.       Tenderness: There is no abdominal tenderness. There is no guarding or rebound.   Musculoskeletal:         General: Swelling present. Normal range of motion.      Cervical back: Normal range of motion.      Right lower leg: Edema present.      Left lower leg: Edema present.   Neurological:      Mental Status: She is alert.       Lines/Drains:        Lab Results: I have reviewed the following results:   Results from last 7 days   Lab Units 02/15/25  0451 02/14/25  0622 02/13/25  1935 02/12/25  1527   WBC Thousand/uL 13.76*   < > 18.52* 16.48*   HEMOGLOBIN g/dL 10.9*   < > 12.1 12.2   HEMATOCRIT % 31.8*   < > 34.5* 35.3   PLATELETS Thousands/uL 164   < > 218 259   BANDS PCT %  --   --   --  1   SEGS PCT %  --   --  88*  --    LYMPHO PCT %  --   --  5* 3*   MONO PCT %  --   --  5 2*   EOS PCT %  --   --  0 0    < > = values in this interval not displayed.     Results from last 7 days   Lab Units 02/15/25  0451 02/14/25  0622 02/13/25  1935   SODIUM mmol/L 136   < > 135   POTASSIUM mmol/L 3.4*   < > 3.6   CHLORIDE mmol/L 100   < > 98   CO2 mmol/L 25   < > 26   BUN mg/dL 45*   < > 52*   CREATININE mg/dL 2.11*   < > 2.29*   ANION GAP mmol/L 11   < > 11   CALCIUM mg/dL 8.5   < > 9.5   ALBUMIN g/dL  --   --  4.5   TOTAL BILIRUBIN mg/dL  --   --  0.99   ALK PHOS U/L  --   --  40   ALT U/L  --   --  38   AST U/L  --   --  26   GLUCOSE RANDOM mg/dL 167*   < > 183*    < > = values in this interval not displayed.     Results from last 7 days   Lab Units 02/13/25 1935   INR  1.04         Results from last 7 days   Lab Units 02/13/25 1935   HEMOGLOBIN A1C % 6.1*           Recent Cultures (last 7 days):         Imaging Results Review: I reviewed radiology reports from this admission including: VAS renal artery, chest xray, and CT abdomen/pelvis.  Other Study Results Review: EKG was reviewed.     Last 24 Hours Medication List:     Current Facility-Administered Medications:     amLODIPine (NORVASC) tablet 10 mg, Daily     atorvastatin (LIPITOR) tablet 10 mg, Daily    calcium carbonate-vitamin D 500 mg-5 mcg tablet 1 tablet, Daily With Breakfast    [Held by provider] chlorthalidone tablet 25 mg, Daily    folic acid (FOLVITE) tablet 1 mg, Daily    heparin (porcine) subcutaneous injection 5,000 Units, Q8H TERRY    hydrALAZINE (APRESOLINE) injection 10 mg, Q6H PRN    labetalol (NORMODYNE) tablet 300 mg, Q12H TERRY    [Held by provider] losartan (COZAAR) tablet 25 mg, Daily    montelukast (SINGULAIR) tablet 10 mg, HS    pantoprazole (PROTONIX) EC tablet 40 mg, Early Morning    Administrative Statements   Today, Patient Was Seen By: Lakisha Woods DO      **Please Note: This note may have been constructed using a voice recognition system.**

## 2025-02-15 NOTE — ASSESSMENT & PLAN NOTE
Has been following with rheumatology in the outpatient setting due to progressively worsening edema in hands and feet and myalgias, Raynaud's symptoms [facility]  Since then, blood pressures have been worsening. Has been on multiple prednisone tapers  Echo (2/12) - EF 70%. G2DD.  RV and LV mildly dilated.  Mild MR and TR, small anterior pericardial effusion  Echo from 11/21/23 with normal diastolic function  Currently on prednisone taper 40 mg x 14 days, decrease by 10 mg every 14 days  Has not started methotrexate yet  Follows with Dr. To  Labs so far:  JAYESH positive, titer at 1280 with nucleolar pattern  Sjogren antibodies,   Anticentromere antibody negative  CK within normal range  Cyclic Citrullinated peptide antibody negative  Rheumatoid factor negative  Anti-Jo1 antibody negative  Antiscleroderma antibody negative  RNA polymerase 3 IgG antibody, Anti-Th/To ab, Anti-HMGCR ab, Anti-DNA double-stranded ab, Anti-neutrophilic cytoplasmic ab pending    Plan:  Rheumatology consulted, appreciate recommendations  Continue home prednisone taper

## 2025-02-15 NOTE — ASSESSMENT & PLAN NOTE
Hemoglobin 10.9  Haptoglobin less than 10  LDH elevated at 355  Hemolysis smear: No schistocytes or helmet cells  Check Diana antibody  Consult hematology for further evaluation especially to rule out hemolysis

## 2025-02-15 NOTE — QUICK NOTE
Patient's blood pressure is 173/90 despite being on amlodipine and increased dose of labetalol.  Will start doxazosin 2 mg as per nephrology recommendations.

## 2025-02-15 NOTE — ASSESSMENT & PLAN NOTE
Cystic lesion tail of the pancreas noted on CT and indeterminant radiolucent lesion L1 vertebral body  CT imaging follow-up as recommended

## 2025-02-15 NOTE — ASSESSMENT & PLAN NOTE
Positive JAYESH  Following with rheumatology  Recent onset of + arthralgia and myalgia, edema and accelerated hypertension Patient also noted worsening Raynaud's since December

## 2025-02-15 NOTE — ASSESSMENT & PLAN NOTE
CT imaging showing cystic 2.1 cm lesion arising from tail of pancreas, recommend 6-month follow-up CT  CT imaging also showing indeterminate radiolucent lesion in L1 vertebral body  Discussed findings with patient.  Follow-up as outpatient   no known allergies

## 2025-02-15 NOTE — ASSESSMENT & PLAN NOTE
Baseline creatinine 0.6-0.7  Creatinine slightly higher than baseline 0.9 in December  Creatinine on admission 2.3  Creatinine today 2.11  UA: 1+ protein, 30-50 WBC, 1-2 RBC  UACR 675 mg/g/UPCR 1.4 g  Urine immunofixation with no monoclonal immunoglobulin  CT abdomen: No hydronephrosis, left renal cortical cyst 2.5 cm  Chest x-ray: No acute cardiopulmonary disease  JAYESH elevated with titer of 1218 January 2025  Haptoglobin less than 10, LDH elevated, hemolysis smear without schistocytes or helmet cells  Hep C antibody reactive, RNA pending  Urine eosinophil 0%  No hypocomplementemia  Rheumatoid factor/CCP/anti-Jo1 antibody/antiscleroderma antibody negative  JAYESH/dsDNA pending  SPEP/free light chain ratio pending  ANCA pending  Cryoglobulin pending  Etiology of TITUS unclear (rule out thrombotic microangiopathy given findings of low low haptoglobin, elevated LDH and hypertension)  She has anemia but no thrombocytopenia, TTP less likely  Rule out complement mediated HUS versus systemic rheumatic disease associated TMA such as scleroderma renal crisis crisis (positive JAYESH, negative SCL 70)  Kidney biopsy for further evaluation, this would need to be scheduled for next week once blood pressure better controlled  [place] biopsy form completed by rounding team 02/14

## 2025-02-15 NOTE — ASSESSMENT & PLAN NOTE
Has SIRS criteria of leukocytosis and tachycardia  No concern for active infection  Leukocytosis is secondary to steroids

## 2025-02-15 NOTE — CONSULTS
Rheumatology Inpatient Consult   Kari Erazo 54 y.o. female MRN: [id number]  Unit/Bed#: W -01 Encounter: [id number]      VIRTUAL CARE DOCUMENTATION:      1. This service was provided via Telemedicine using Teams Virtual Rounding       2. Parties in the room with patient during teleconsult Patient only     3. Confidentiality My office door was closed      4. Participants No one else was in the room     5. Patient acknowledged consent and understanding of privacy and security of the  Telemedicine consult. I informed the patient that I have reviewed their record in Epic and presented the opportunity for them to ask any questions regarding the visit today.  The patient agreed to participate.     6. 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 She was notified to come to hospital for abnormal labs (elevated serum Cr of 2.26, baseline was around 0.6-0.9) noted on outpatient testing. She was being managed with short courses of prednisone with improvement of symptoms. Since, coming to the hospital, she was noted to have high blood pressures in the range of hypertensive urgency.    - With symptoms of raynaud's, myalgias, swelling and skin thickening of distal extremities, periorbital edema, delayed clearance of food, positive JAYESH 1:1280 nucleolar pattern (negative ds DNA, RF, CCP, SSA, SSB, Scl-70, Kassie-1, centromere abs), concern for an underlying possible overlap syndrome, mainly of scleroderma/myositis   - Awaiting other rheumatological serologies for more definitive answers: RNA polymerase III, Th/To, myomarker panel  - With  presentation of TITUS (baseline serum Cr 0.6-0.9-->on admission sCr 2.3-->today sCr 2.11) and worsening of pre-existing hypertension, in the background of unexplained connective tissue (with features suspicious of scleroderma), 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 Recent history of steroids use may possibly be a trigger for it. Steroids were discontinued (last dose on 02/14)  - Laboratory evidence of hemolysis: anemia (HgB 12.2-->10.5), high LDH (355), low haptoglobin (<10) also supports the pathogenesis of microangiopathy in SRC. (But no thrombocytopenia and no schistocytes on peripheral smear)  - Also has significant proteinuria, with UPCR of 1.4, which needs to be further evaluated by means of a kidney biopsy (unlikely to see proteinuria from SRC - expect bland UA). Normal complements, negative ds DNA, pending ANCA, cryo. 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 She was notified to come to hospital for abnormal labs (elevated serum Cr of 2.26, baseline was around 0.6-0.9) noted on outpatient testing. She was being managed with short courses of prednisone with improvement of symptoms. Since, coming to the hospital, she was noted to have high blood pressures in the range of hypertensive urgency.     Today, on my encounter, she feels better clinically. There is some swelling of hands and feet but better than before.    Review of  Systems  Review of Systems  Rest of ROS are normal except noted in HPI    Allergies  Allergies   Allergen Reactions    Ace Inhibitors Cough    Latex      Per patient    Nuts - Food Allergy     Shellfish-Derived Products - Food Allergy        Current Medications  Current Facility-Administered Medications   Medication Dose Route Frequency Provider Last Rate    amLODIPine  10 mg Oral Daily Thuy Patrick MD      atorvastatin  10 mg Oral Daily Thuy Patrick MD      calcium carbonate-vitamin D  1 tablet Oral Daily With Breakfast Thuy Patrick MD      [Held by provider] chlorthalidone  25 mg Oral Daily Thuy Patrick MD      folic acid  1 mg Oral Daily Thuy Patrick MD      heparin (porcine)  5,000 Units Subcutaneous Q8H TERRY Thuy Patrick MD      labetalol  10 mg Intravenous Q6H PRN Lakisha Woods DO      labetalol  300 mg Oral Q12H [facility] Gavin Zazueta MD      [Held by provider] losartan  25 mg Oral Daily Thuy Patrick MD      montelukast  10 mg Oral HS Thuy Patrick MD      pantoprazole  40 mg Oral Early Morning Lakisha Woods DO         Past Medical History  Past Medical History:   Diagnosis Date    Allergic     latex, some tree nuts, seasonal    Arthritis     psoriatic arthritis    Asthma     CPAP (continuous positive airway pressure) dependence     Diabetes mellitus (HCC)     gestational    Gestational diabetes     Hypertension     patient reports    Obesity     PCOS (polycystic ovarian syndrome)     Sleep apnea     Varicella        Past Surgical History  Past Surgical History:   Procedure Laterality Date    TONSILLECTOMY      last assessed: 5/3/16       Family History  No known autoimmune or inflammatory diseases in the family.   Family History   Problem Relation Age of Onset    Hypertension Mother     Hyperlipidemia Mother     Hypertension Father     Other Father         low serum HDL    Hyperlipidemia Father     Hypothyroidism Sister     Asthma Sister     Diabetes Sister     Thyroid disease Sister     Breast cancer Sister 48    No  "Known Problems Daughter     No Known Problems Son     Breast cancer Maternal Grandmother 79    Stroke Maternal Grandfather     No Known Problems Paternal Grandmother     No Known Problems Paternal Grandfather     No Known Problems Paternal Aunt     No Known Problems Paternal Aunt     No Known Problems Paternal Aunt        Social History  Occupation: OBGYN physician  Social History     Substance and Sexual Activity   Alcohol Use No     Social History     Substance and Sexual Activity   Drug Use No     Social History     Tobacco Use   Smoking Status Never   Smokeless Tobacco Never          Objective:  /94   Pulse 89   Temp 97.5 °F (36.4 °C) (Oral)   Resp 16   Ht 5' 4\" (1.626 m)   Wt 84.4 kg (186 lb)   SpO2 98%   BMI 31.93 kg/m²     PHYSICAL EXAM  Physical Exam   Unable to perform full physical exam as this is a virtual consult    Lab Results: I have personally reviewed pertinent reports.      CBC:   Results from last 7 days   Lab Units 02/15/25  0451 02/14/25  0622 02/13/25  1935   WBC Thousand/uL 13.76*   < > 18.52*   RBC Million/uL 3.39*   < > 3.77*   HEMOGLOBIN g/dL 10.9*   < > 12.1   HEMATOCRIT % 31.8*   < > 34.5*   MCV fL 94   < > 92   MCH pg 32.2   < > 32.1   MCHC g/dL 34.3   < > 35.1   RDW % 14.8   < > 14.7   MPV fL 9.3   < > 9.2   PLATELETS Thousands/uL 164   < > 218   NRBC AUTO /100 WBCs  --   --  0   SEGS PCT %  --   --  88*   LYMPHO PCT %  --   --  5*   MONO PCT %  --   --  5   EOS PCT %  --   --  0   BASOS PCT %  --   --  0   TOTAL NEUT ABS Thousands/µL  --   --  16.36*   LYMPHS ABS Thousands/µL  --   --  0.83   MONOS ABS Thousand/µL  --   --  0.90   EOS ABS Thousand/µL  --   --  0.01    < > = values in this interval not displayed.   , Chemistry Profile:   Results from last 7 days   Lab Units 02/15/25  0451 02/14/25  0622 02/13/25  1935   POTASSIUM mmol/L 3.4*   < > 3.6   CHLORIDE mmol/L 100   < > 98   CO2 mmol/L 25   < > 26   BUN mg/dL 45*   < > 52*   CREATININE mg/dL 2.11*   < > 2.29* "   CALCIUM mg/dL 8.5   < > 9.5   AST U/L  --   --  26   ALT U/L  --   --  38   ALK PHOS U/L  --   --  40   EGFR ml/min/1.73sq m 26   < > 23    < > = values in this interval not displayed.     Lab Results   Component Value Date    CRP 6.7 (H) 02/14/2025    JAYESH 0.60 01/28/2025    JAYESH Negative 12/03/2015    RF Negative 01/28/2025    RF Negative 12/03/2015    HAV Non-Reactive (q 11/21/2014    HEPBIGM Non-reactive 02/13/2025    HEPBCAB Non-reactive 02/13/2025    HEPCAB Reactive (A) 02/13/2025     Results from last 7 days   Lab Units 02/12/25  1527   COLOR UA  Light Yellow   CLARITY UA  Clear   SPEC GRAV UA  1.011   PH UA  5.5   LEUKOCYTES UA  Large*   NITRITE UA  Negative   GLUCOSE UA mg/dl 1000 (1%)*   KETONES UA mg/dl Negative   BILIRUBIN UA  Negative   BLOOD UA  Small*      Results from last 7 days   Lab Units 02/12/25  1527   RBC UA /hpf 1-2   WBC UA /hpf 30-50*   EPITHELIAL CELLS WET PREP /hpf Occasional   BACTERIA UA /hpf Occasional       Imaging: I have personally reviewed pertinent films in PACS

## 2025-02-15 NOTE — CONSULTS
e-Consult ([facility]) - Oncology-Medical   Name: Kari Erazo 54 y.o. female I MRN: [id number]  Unit/Bed#: W -01 I Date of Admission: 2/13/2025   Date of Service: 2/15/2025 I Hospital Day: 1   Consults  Physician Requesting Evaluation: Ronel Gilliland MD   Reason for Evaluation / Principal Problem: Thrombotic microangiopathy?    Patient was admitted 2/14/2025.  History of undifferentiated connective tissue disease.  Presenting with creatinine 2.2, baseline 0.7.  Raynaud's syndrome, muscle weakness, periorbital edema.  Symptoms worsened over the past 2 months or so.  Increased muscle weakness.  On admission WBC 13.1, hemoglobin 10.5, MCV 93, platelet count 156.  88 segs, 2 bands, 5 lymphocytes, 5 monocytes.  CMP showed creatinine 2.2, glucose 23, otherwise normal.  INR normal.  Cryoglobulin pending.  .  No schistocytes noted.  Haptoglobin less than 10.  CT abdomen pelvis showed 2.1 cm pancreatic tail cyst.  Indeterminate radiolucent L1 vertebral body lesion.  Liver/spleen unremarkable.    ASSESSMENT:  Anemia with depressed haptoglobin, normal MCV.  LDH only minimally elevated.  Bilirubin normal.  LDH and bilirubin argue against substantial hemolysis.  Potential causes of depressed haptoglobin include hemolysis, intra or extravascular, hepatic synthetic dysfunction.  LFTs appear normal arguing against the latter.  MARCO is in progress.  Autoimmune hemolytic anemia certainly could be associated with background autoimmune processes and present in this fashion.  Absence of schistocytes, normalcy of platelet count argue against possibility of thrombotic microangiopathy (TTP, HUS, etc).  Await MARCO result.  Further treatment to follow accordingly.      RECOMMENDATIONS:  See above.     21-30 minutes, >50% of the total time devoted to medical consultative verbal/EMR discussion between providers. Written report will be generated in the EMR.

## 2025-02-16 LAB
ALBUMIN SERPL ELPH-MCNC: 4.01 G/DL (ref 3.2–5.1)
ALBUMIN SERPL ELPH-MCNC: 62.6 % (ref 48–70)
ALPHA1 GLOB SERPL ELPH-MCNC: 0.28 G/DL (ref 0.15–0.47)
ALPHA1 GLOB SERPL ELPH-MCNC: 4.3 % (ref 1.8–7)
ALPHA2 GLOB SERPL ELPH-MCNC: 0.48 G/DL (ref 0.42–1.04)
ALPHA2 GLOB SERPL ELPH-MCNC: 7.5 % (ref 5.9–14.9)
ANION GAP SERPL CALCULATED.3IONS-SCNC: 8 MMOL/L (ref 4–13)
BETA GLOB ABNORMAL SERPL ELPH-MCNC: 0.4 G/DL (ref 0.31–0.57)
BETA1 GLOB SERPL ELPH-MCNC: 6.2 % (ref 4.7–7.7)
BETA2 GLOB SERPL ELPH-MCNC: 3.5 % (ref 3.1–7.9)
BETA2+GAMMA GLOB SERPL ELPH-MCNC: 0.22 G/DL (ref 0.2–0.58)
BUN SERPL-MCNC: 39 MG/DL (ref 5–25)
CALCIUM SERPL-MCNC: 8 MG/DL (ref 8.4–10.2)
CHLORIDE SERPL-SCNC: 101 MMOL/L (ref 96–108)
CO2 SERPL-SCNC: 26 MMOL/L (ref 21–32)
CREAT SERPL-MCNC: 2.3 MG/DL (ref 0.6–1.3)
DAT POLY-SP REAG RBC QL: NEGATIVE
ERYTHROCYTE [DISTWIDTH] IN BLOOD BY AUTOMATED COUNT: 14.9 % (ref 11.6–15.1)
FLUAV RNA RESP QL NAA+PROBE: NEGATIVE
FLUBV RNA RESP QL NAA+PROBE: NEGATIVE
GAMMA GLOB ABNORMAL SERPL ELPH-MCNC: 1.02 G/DL (ref 0.4–1.66)
GAMMA GLOB SERPL ELPH-MCNC: 15.9 % (ref 6.9–22.3)
GFR SERPL CREATININE-BSD FRML MDRD: 23 ML/MIN/1.73SQ M
GLUCOSE SERPL-MCNC: 154 MG/DL (ref 65–140)
HCT VFR BLD AUTO: 30.2 % (ref 34.8–46.1)
HGB BLD-MCNC: 10.3 G/DL (ref 11.5–15.4)
IGG/ALB SER: 1.67 {RATIO} (ref 1.1–1.8)
MCH RBC QN AUTO: 31.7 PG (ref 26.8–34.3)
MCHC RBC AUTO-ENTMCNC: 34.1 G/DL (ref 31.4–37.4)
MCV RBC AUTO: 93 FL (ref 82–98)
PLATELET # BLD AUTO: 136 THOUSANDS/UL (ref 149–390)
PMV BLD AUTO: 10 FL (ref 8.9–12.7)
POTASSIUM SERPL-SCNC: 3.1 MMOL/L (ref 3.5–5.3)
PROT PATTERN SERPL ELPH-IMP: NORMAL
PROT SERPL-MCNC: 6.4 G/DL (ref 6.4–8.2)
RBC # BLD AUTO: 3.25 MILLION/UL (ref 3.81–5.12)
RNAP III AB SER IA-ACNC: 175 U/ML (ref ?–10)
RSV RNA RESP QL NAA+PROBE: NEGATIVE
SARS-COV-2 RNA RESP QL NAA+PROBE: NEGATIVE
SODIUM SERPL-SCNC: 135 MMOL/L (ref 135–147)
WBC # BLD AUTO: 10.28 THOUSAND/UL (ref 4.31–10.16)

## 2025-02-16 PROCEDURE — 99233 SBSQ HOSP IP/OBS HIGH 50: CPT | Performed by: INTERNAL MEDICINE

## 2025-02-16 PROCEDURE — 86880 COOMBS TEST DIRECT: CPT | Performed by: INTERNAL MEDICINE

## 2025-02-16 PROCEDURE — 85027 COMPLETE CBC AUTOMATED: CPT | Performed by: INTERNAL MEDICINE

## 2025-02-16 PROCEDURE — 99232 SBSQ HOSP IP/OBS MODERATE 35: CPT | Performed by: INTERNAL MEDICINE

## 2025-02-16 PROCEDURE — 80048 BASIC METABOLIC PNL TOTAL CA: CPT | Performed by: INTERNAL MEDICINE

## 2025-02-16 PROCEDURE — 99232 SBSQ HOSP IP/OBS MODERATE 35: CPT | Performed by: STUDENT IN AN ORGANIZED HEALTH CARE EDUCATION/TRAINING PROGRAM

## 2025-02-16 RX ORDER — CAPTOPRIL 12.5 MG/1
6.25 TABLET ORAL ONCE
Status: COMPLETED | OUTPATIENT
Start: 2025-02-16 | End: 2025-02-16

## 2025-02-16 RX ORDER — POTASSIUM CHLORIDE 1500 MG/1
20 TABLET, EXTENDED RELEASE ORAL ONCE
Status: DISCONTINUED | OUTPATIENT
Start: 2025-02-16 | End: 2025-02-16

## 2025-02-16 RX ORDER — GUAIFENESIN 600 MG/1
600 TABLET, EXTENDED RELEASE ORAL EVERY 12 HOURS PRN
Status: DISCONTINUED | OUTPATIENT
Start: 2025-02-16 | End: 2025-02-20

## 2025-02-16 RX ORDER — DOXAZOSIN 1 MG/1
2 TABLET ORAL 2 TIMES DAILY
Status: DISCONTINUED | OUTPATIENT
Start: 2025-02-16 | End: 2025-02-22 | Stop reason: HOSPADM

## 2025-02-16 RX ORDER — POTASSIUM CHLORIDE 1500 MG/1
40 TABLET, EXTENDED RELEASE ORAL ONCE
Status: COMPLETED | OUTPATIENT
Start: 2025-02-16 | End: 2025-02-16

## 2025-02-16 RX ORDER — CLONIDINE HYDROCHLORIDE 0.1 MG/1
0.1 TABLET ORAL EVERY 12 HOURS SCHEDULED
Status: DISCONTINUED | OUTPATIENT
Start: 2025-02-16 | End: 2025-02-16

## 2025-02-16 RX ADMIN — ACETAMINOPHEN 650 MG: 325 TABLET, FILM COATED ORAL at 22:32

## 2025-02-16 RX ADMIN — POTASSIUM CHLORIDE 40 MEQ: 1500 TABLET, EXTENDED RELEASE ORAL at 07:39

## 2025-02-16 RX ADMIN — Medication 1 TABLET: at 07:39

## 2025-02-16 RX ADMIN — MONTELUKAST 10 MG: 10 TABLET, FILM COATED ORAL at 21:43

## 2025-02-16 RX ADMIN — CAPTOPRIL 6.25 MG: 12.5 TABLET ORAL at 14:14

## 2025-02-16 RX ADMIN — FOLIC ACID 1 MG: 1 TABLET ORAL at 09:18

## 2025-02-16 RX ADMIN — LABETALOL HYDROCHLORIDE 300 MG: 100 TABLET, FILM COATED ORAL at 09:28

## 2025-02-16 RX ADMIN — ATORVASTATIN CALCIUM 10 MG: 10 TABLET, FILM COATED ORAL at 09:18

## 2025-02-16 RX ADMIN — DOXAZOSIN 2 MG: 1 TABLET ORAL at 18:23

## 2025-02-16 RX ADMIN — CLONIDINE HYDROCHLORIDE 0.1 MG: 0.1 TABLET ORAL at 09:18

## 2025-02-16 RX ADMIN — PANTOPRAZOLE SODIUM 40 MG: 40 TABLET, DELAYED RELEASE ORAL at 05:35

## 2025-02-16 RX ADMIN — POTASSIUM CHLORIDE 40 MEQ: 1500 TABLET, EXTENDED RELEASE ORAL at 18:23

## 2025-02-16 RX ADMIN — AMLODIPINE BESYLATE 10 MG: 10 TABLET ORAL at 09:18

## 2025-02-16 NOTE — ASSESSMENT & PLAN NOTE
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  Hold home losartan and chlorthalidone  Cardura 2 mg twice daily  Amlodipine 10 mg daily

## 2025-02-16 NOTE — ASSESSMENT & PLAN NOTE
Lab Results   Component Value Date    CREATININE 2.30 (H) 02/16/2025    CREATININE 2.11 (H) 02/15/2025    CREATININE 2.13 (H) 02/14/2025     Here with abnormal outpatient lab work. Cr 2.26 (baseline is 0.6 to 0.7)  Also the setting of accelerated hypertension  CTAP - no evidence of nephrolithiasis or obstructive uropathy  Etiology -Possible concern for systemic sclerosis, anti RNA polymerase III positive  Nephrology ordered comprehensive workup  2/16- cr worsening 2.30    Plan:  Nephrology following, appreciate recommendations  Trial one dose of captopril  6.25 today , if patient tolerating well , plan to increase the dosing form tmrw   Hold home losartan, chlorthalidone  No need for further IVF at this time  Follow-up on remainder of workup

## 2025-02-16 NOTE — PROGRESS NOTES
Rheumatology Inpatient Follow-up   Kari Erazo 54 y.o. female MRN: [id number]  Unit/Bed#: W -01 Encounter: [id number]      VIRTUAL CARE DOCUMENTATION:      1. This service was provided via Telemedicine using Teams Virtual Rounding       2. Parties in the room with patient during teleconsult Patient only     3. Confidentiality My office door was closed      4. Participants No one else was in the room     5. Patient acknowledged consent and understanding of privacy and security of the  Telemedicine consult. I informed the patient that I have reviewed their record in Epic and presented the opportunity for them to ask any questions regarding the visit today.  The patient agreed to participate.     6. 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 She was notified to come to hospital for abnormal labs (elevated serum Cr of 2.26, baseline was around 0.6-0.9) noted on outpatient testing. She was being managed with short courses of prednisone with improvement of symptoms. Since, coming to the hospital, she was noted to have high blood pressures in the range of hypertensive urgency.     - With symptoms of raynaud's, myalgias, swelling and skin thickening of distal extremities, periorbital edema, delayed clearance of food, positive JAYESH 1:1280 nucleolar pattern (negative ds DNA, RF, CCP, SSA, SSB, Scl-70, Kassie-1, centromere abs), concern for an underlying possible overlap syndrome, mainly of scleroderma/myositis   - With presentation of TITUS (baseline serum Cr 0.6-0.9-->on admission sCr 2.3-->today sCr 2.30) and worsening of pre-existing hypertension, in the background of  unexplained connective tissue (with features suspicious of scleroderma) and high-positive RNA polymerase III ab 175, 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 Recent history of steroids use may possibly be a trigger for it. Steroids were discontinued (last dose on 02/14)  - Initiated captopril after discussion with patient and nephrology. (Discussed risks vs benefits with patient - has previous history of cough with ACEI, but with high mortality risk of untreated SRC without an ACEI, patient agreed to start captopril). Dosing per nephrology.  - Can continue amlodipine. But recommend discontinuing labetalol due to concerns of beta-blocker-induced vasospasm (discussed with nephrology)    - Awaiting other rheumatological serologies: Th/To, myomarker panel  - Laboratory evidence of hemolysis: anemia (HgB 12.2-->10.5), high LDH (355), low haptoglobin (<10) also supports the pathogenesis of microangiopathy in SRC. (But no thrombocytopenia and no schistocytes on peripheral smear)  - Also has significant proteinuria, with UPCR of 1.4, which needs to be further evaluated by means of a kidney biopsy (unlikely to see proteinuria from SRC - expect bland UA). Normal complements, negative ds DNA, pending ANCA, cryo. Kidney biopsy awaiting better control of blood pressure  - MRI of left femur did not show signs of muscle involvement      History of Present Illness:  Kari Erazo is a 54 y.o. female who was being followed up for concerns of sclerodermal renal crisis.    Interval history: Still with elevated BP. Reports mild swelling of extremities.     Review of Systems  Review of Systems  Rest of ROS are negative    Allergies  Allergies   Allergen Reactions    Ace Inhibitors Cough    Latex      Per patient    Nuts - Food Allergy     Shellfish-Derived Products - Food Allergy        Current Medications  Current Facility-Administered Medications   Medication Dose Route Frequency Provider Last Rate     acetaminophen  650 mg Oral Q6H PRN Haroon Chavis MD      amLODIPine  10 mg Oral Daily Thuy Patrick MD      atorvastatin  10 mg Oral Daily Thuy Patrick MD      calcium carbonate-vitamin D  1 tablet Oral Daily With Breakfast Thuy Patrick MD      captopril  6.25 mg Oral Once Tisha Tolbert MD      doxazosin  2 mg Oral BID Gavin Zazueta MD      folic acid  1 mg Oral Daily Thuy Patrick MD      guaiFENesin  600 mg Oral Q12H PRN Tisha Tolbert MD      labetalol  10 mg Intravenous Q6H PRN Lakisha Woods, DO      montelukast  10 mg Oral HS Thuy Patrick MD      pantoprazole  40 mg Oral Early Morning Lakisha Woods DO         Past Medical History  Past Medical History:   Diagnosis Date    Allergic     latex, some tree nuts, seasonal    Arthritis     psoriatic arthritis    Asthma     CPAP (continuous positive airway pressure) dependence     Diabetes mellitus (HCC)     gestational    Gestational diabetes     Hypertension     patient reports    Obesity     PCOS (polycystic ovarian syndrome)     Sleep apnea     Varicella        Past Surgical History  Past Surgical History:   Procedure Laterality Date    TONSILLECTOMY      last assessed: 5/3/16       Family History  No known autoimmune or inflammatory diseases in the family.   Family History   Problem Relation Age of Onset    Hypertension Mother     Hyperlipidemia Mother     Hypertension Father     Other Father         low serum HDL    Hyperlipidemia Father     Hypothyroidism Sister     Asthma Sister     Diabetes Sister     Thyroid disease Sister     Breast cancer Sister 48    No Known Problems Daughter     No Known Problems Son     Breast cancer Maternal Grandmother 79    Stroke Maternal Grandfather     No Known Problems Paternal Grandmother     No Known Problems Paternal Grandfather     No Known Problems Paternal Aunt     No Known Problems Paternal Aunt     No Known Problems Paternal Aunt        Social History  Occupation: OBGYN physician  Social History  "    Substance and Sexual Activity   Alcohol Use No     Social History     Substance and Sexual Activity   Drug Use No     Social History     Tobacco Use   Smoking Status Never   Smokeless Tobacco Never          Objective:  /93   Pulse 84   Temp 98.4 °F (36.9 °C) (Oral)   Resp 18   Ht 5' 4\" (1.626 m)   Wt 84.4 kg (186 lb)   SpO2 98%   BMI 31.93 kg/m²     PHYSICAL EXAM  Physical Exam   Unable to complete full physical exam as this is a virtual consult    Lab Results: I have personally reviewed pertinent reports.      CBC:   Results from last 7 days   Lab Units 02/16/25 0413 02/14/25 0622 02/13/25 1935   WBC Thousand/uL 10.28*   < > 18.52*   RBC Million/uL 3.25*   < > 3.77*   HEMOGLOBIN g/dL 10.3*   < > 12.1   HEMATOCRIT % 30.2*   < > 34.5*   MCV fL 93   < > 92   MCH pg 31.7   < > 32.1   MCHC g/dL 34.1   < > 35.1   RDW % 14.9   < > 14.7   MPV fL 10.0   < > 9.2   PLATELETS Thousands/uL 136*   < > 218   NRBC AUTO /100 WBCs  --   --  0   SEGS PCT %  --   --  88*   LYMPHO PCT %  --   --  5*   MONO PCT %  --   --  5   EOS PCT %  --   --  0   BASOS PCT %  --   --  0   TOTAL NEUT ABS Thousands/µL  --   --  16.36*   LYMPHS ABS Thousands/µL  --   --  0.83   MONOS ABS Thousand/µL  --   --  0.90   EOS ABS Thousand/µL  --   --  0.01    < > = values in this interval not displayed.   , Chemistry Profile:   Results from last 7 days   Lab Units 02/16/25 0413 02/14/25 0622 02/13/25 1935   POTASSIUM mmol/L 3.1*   < > 3.6   CHLORIDE mmol/L 101   < > 98   CO2 mmol/L 26   < > 26   BUN mg/dL 39*   < > 52*   CREATININE mg/dL 2.30*   < > 2.29*   CALCIUM mg/dL 8.0*   < > 9.5   AST U/L  --   --  26   ALT U/L  --   --  38   ALK PHOS U/L  --   --  40   EGFR ml/min/1.73sq m 23   < > 23    < > = values in this interval not displayed.     Lab Results   Component Value Date    CRP 6.7 (H) 02/14/2025    JAYESH 0.60 01/28/2025    JAYESH Negative 12/03/2015    RF Negative 01/28/2025    RF Negative 12/03/2015    HAV Non-Reactive (q " 11/21/2014    HEPBIGM Non-reactive 02/13/2025    HEPBCAB Non-reactive 02/13/2025    HEPCAB Reactive (A) 02/13/2025     Results from last 7 days   Lab Units 02/12/25  1527   COLOR UA  Light Yellow   CLARITY UA  Clear   SPEC GRAV UA  1.011   PH UA  5.5   LEUKOCYTES UA  Large*   NITRITE UA  Negative   GLUCOSE UA mg/dl 1000 (1%)*   KETONES UA mg/dl Negative   BILIRUBIN UA  Negative   BLOOD UA  Small*      Results from last 7 days   Lab Units 02/12/25  1527   RBC UA /hpf 1-2   WBC UA /hpf 30-50*   EPITHELIAL CELLS WET PREP /hpf Occasional   BACTERIA UA /hpf Occasional       Imaging: I have personally reviewed pertinent films in PACS

## 2025-02-16 NOTE — PROGRESS NOTES
NEPHROLOGY HOSPITAL PROGRESS NOTE   Kari Erazo 54 y.o. female MRN: [id number]  Unit/Bed#: W -01 Encounter: [id number]  Reason for Consult: TITUS  Assessment & Plan  Acute kidney injury (HCC)  Baseline creatinine 0.6-0.7  Creatinine slightly higher than baseline 0.9 in December  Creatinine on admission 2.3  Creatinine today 2.3  UA: 1+ protein, 30-50 WBC, 1-2 RBC  UACR 675 mg/g/UPCR 1.4 g  Urine immunofixation with no monoclonal immunoglobulin  CT abdomen: No hydronephrosis, left renal cortical cyst 2.5 cm  Chest x-ray: No acute cardiopulmonary disease  JAYESH elevated with titer of 1218 January 2025  Haptoglobin less than 10, LDH elevated, hemolysis smear without schistocytes or helmet cells  Hep C antibody reactive, RNA pending  Urine eosinophil 0%  No hypocomplementemia  Rheumatoid factor/CCP/anti-Jo1 antibody/antiscleroderma antibody negative  JAYESH/dsDNA pending  SPEP/free light chain ratio pending  ANCA pending  Cryoglobulin pending  Rule out TTP versus complement mediated HUS (low haptoglobin, mildly elevated LDH, developing thrombocytopenia, hypertension) versus systemic rheumatic disease associated TMA such as scleroderma renal crisis crisis (positive JAYESH, but negative SCL 70)    Addendum  High positive RNA polymerase III Ab 175, raises a suspicion for scleroderma renal crisis  Okay to start captopril (previous history of cough to ACE inhibitor, patient agreeable to trial of captopril)  We will trial with low-dose captopril 6.25 mg x 1 today to assess tolerability  Uptitrate captopril in next 24 hours  Hold off kidney biopsy (especially due to dropping platelet count and need for better blood pressure control), and we may have a clinical diagnosis of scleroderma renal crisis  She will eventually need kidney biopsy if pending serological workup lights up or if albuminuria/proteinuria worsens  Accelerated hypertension  Mildly hypokalemic potassium level today 3.1  Home medications: Amlodipine, losartan,  chlorthalidone  Current medications: Labetalol 300 mg twice daily, amlodipine 10 mg daily, Cardura 2 mg daily  Changes: Discontinue labetalol due to risk of vasospasm, addition of captopril as above  Aldosterone/renin ratio pending  Metanephrine/normetanephrine pending  Renal artery Doppler with no evidence of renal artery stenosis  Undifferentiated connective tissue disease (HCC)  With symptoms of raynaud's, myalgias, swelling and skin thickening of distal extremities, periorbital edema, delayed clearance of food, positive JAYESH 1:1280 nucleolar pattern (negative ds DNA, RF, CCP, SSA, SSB, Scl-70, Kassie-1, centromere abs), concern for an underlying possible overlap syndrome, mainly of scleroderma/myositis  Management per rheumatology  Abnormal finding on imaging  Cystic lesion tail of the pancreas noted on CT and indeterminant radiolucent lesion L1 vertebral body  CT imaging follow-up as recommended  Hypokalemia  Potassium level today 3.1  Give K-Dur 40M EQ x 1  Anemia  Hemoglobin today 10.3  Haptoglobin less than 10  LDH elevated at 355  Hemolysis smear: No schistocytes or helmet cells  Direct Diana negative  Ferritin 72/iron saturation 24%  Progressive decrease in platelet count, 136 today  Hematology following  Hepatitis C antibody test positive  Hep C RNA pending    Discussed with internal medicine team.  After discussion, we agreed that we need better blood pressure control before kidney biopsy at and such to increase Cardura to 2 mg twice daily and add clonidine 0.1 mg twice daily.    SUBJECTIVE / 24H INTERVAL HISTORY:  Complains of stiffness in hands and swelling in hands.  Complains of intermittent cough.    OBJECTIVE:  Current Weight: Weight - Scale: 84.4 kg (186 lb)  Vitals:    02/15/25 2235 02/16/25 0011 02/16/25 0343 02/16/25 0737   BP: (!) 180/94 160/91 146/82 168/96   BP Location: Right arm      Pulse:  79 83 84   Resp: 18      Temp:       TempSrc:       SpO2:  94% 96% 95%   Weight:       Height:            Intake/Output Summary (Last 24 hours) at 2/16/2025 0912  Last data filed at 2/16/2025 0910  Gross per 24 hour   Intake 1440 ml   Output 0 ml   Net 1440 ml     Review of Systems   Respiratory:  Positive for cough.    Cardiovascular:  Positive for leg swelling.   Genitourinary:  Negative for difficulty urinating.   Musculoskeletal:         Hand swelling     Physical Exam  Vitals and nursing note reviewed.   Constitutional:       General: She is not in acute distress.     Appearance: She is well-developed.   HENT:      Head: Normocephalic and atraumatic.   Eyes:      Conjunctiva/sclera: Conjunctivae normal.   Cardiovascular:      Rate and Rhythm: Normal rate and regular rhythm.      Heart sounds: No murmur heard.  Pulmonary:      Effort: Pulmonary effort is normal. No respiratory distress.      Breath sounds: Normal breath sounds.   Musculoskeletal:         General: Swelling present.      Cervical back: Neck supple.      Right lower leg: No edema.      Left lower leg: No edema.      Comments: Bilateral hand swelling noted   Skin:     General: Skin is warm and dry.      Capillary Refill: Capillary refill takes less than 2 seconds.   Neurological:      Mental Status: She is alert.   Psychiatric:         Mood and Affect: Mood normal.       Medications:    Current Facility-Administered Medications:     acetaminophen (TYLENOL) tablet 650 mg, 650 mg, Oral, Q6H PRN, Haroon Chavis MD, 650 mg at 02/15/25 2016    amLODIPine (NORVASC) tablet 10 mg, 10 mg, Oral, Daily, Thuy Patrick MD, 10 mg at 02/15/25 0801    atorvastatin (LIPITOR) tablet 10 mg, 10 mg, Oral, Daily, Thuy Patrick MD, 10 mg at 02/15/25 0801    calcium carbonate-vitamin D 500 mg-5 mcg tablet 1 tablet, 1 tablet, Oral, Daily With Breakfast, Thuy Patrick MD, 1 tablet at 02/16/25 0739    [Held by provider] chlorthalidone tablet 25 mg, 25 mg, Oral, Daily, Thuy Patrick MD    cloNIDine (CATAPRES) tablet 0.1 mg, 0.1 mg, Oral, Q12H TERRY, Ronel Gilliland MD    doxazosin  "(CARDURA) tablet 2 mg, 2 mg, Oral, BID, Gavin Zazueta MD    folic acid (FOLVITE) tablet 1 mg, 1 mg, Oral, Daily, Thuy Patrick MD, 1 mg at 02/15/25 0801    labetalol (NORMODYNE) injection 10 mg, 10 mg, Intravenous, Q6H PRN, Lakisha Woods DO, 10 mg at 02/15/25 2234    labetalol (NORMODYNE) tablet 300 mg, 300 mg, Oral, Q12H TERRY, Gavin Zazueta MD, 300 mg at 02/15/25 2016    [Held by provider] losartan (COZAAR) tablet 25 mg, 25 mg, Oral, Daily, Thuy Patrick MD    montelukast (SINGULAIR) tablet 10 mg, 10 mg, Oral, HS, Thuy Patrick MD, 10 mg at 02/15/25 2016    pantoprazole (PROTONIX) EC tablet 40 mg, 40 mg, Oral, Early Morning, Lakisha Woods DO, 40 mg at 02/16/25 0535    potassium chloride (Klor-Con M20) CR tablet 20 mEq, 20 mEq, Oral, Once, Tisha Tolbert MD    Laboratory Results:  Results from last 7 days   Lab Units 02/16/25  0413 02/15/25  0451 02/14/25  0622 02/13/25  1935 02/12/25  1527   WBC Thousand/uL 10.28* 13.76* 13.11* 18.52* 16.48*   HEMOGLOBIN g/dL 10.3* 10.9* 10.5* 12.1 12.2   HEMATOCRIT % 30.2* 31.8* 30.3* 34.5* 35.3   PLATELETS Thousands/uL 136* 164 156 218 259   POTASSIUM mmol/L 3.1* 3.4* 3.1* 3.6 3.6   CHLORIDE mmol/L 101 100 101 98 94*   CO2 mmol/L 26 25 28 26 25   BUN mg/dL 39* 45* 49* 52* 47*   CREATININE mg/dL 2.30* 2.11* 2.13* 2.29* 2.26*   CALCIUM mg/dL 8.0* 8.5 8.6 9.5 9.4   MAGNESIUM mg/dL  --  2.3 2.0  --   --    PHOSPHORUS mg/dL  --  3.2 3.9  --   --        Portions of the record may have been created with voice recognition software. Occasional wrong word or \"sound a like\" substitutions may have occurred due to the inherent limitations of voice recognition software. Read the chart carefully and recognize, using context, where substitutions have occurred.If you have any questions, please contact the dictating provider.    "

## 2025-02-16 NOTE — ASSESSMENT & PLAN NOTE
Hemoglobin today 10.3  Haptoglobin less than 10  LDH elevated at 355  Hemolysis smear: No schistocytes or helmet cells  Direct Diana negative  Ferritin 72/iron saturation 24%  Progressive decrease in platelet count, 136 today  Hematology following

## 2025-02-16 NOTE — ASSESSMENT & PLAN NOTE
Has been following with rheumatology in the outpatient setting due to progressively worsening edema in hands and feet and myalgias, Raynaud's symptoms [facility]  Since then, blood pressures have been worsening. Has been on multiple prednisone tapers  Echo (2/12) - EF 70%. G2DD.  RV and LV mildly dilated.  Mild MR and TR, small anterior pericardial effusion  Echo from 11/21/23 with normal diastolic function  Currently on prednisone taper 40 mg x 14 days, decrease by 10 mg every 14 days  Has not started methotrexate yet  Follows with Dr. To  Labs so far:  JAYESH positive, titer at 1280 with nucleolar pattern  Sjogren antibodies,   Anticentromere antibody negative  CK within normal range  Cyclic Citrullinated peptide antibody negative  Rheumatoid factor negative  Anti-Jo1 antibody negative  Antiscleroderma antibody negative  RNA polymerase 3 IgG antibody elevated and positive   Pending anti-Th/To ab, Anti-HMGCR ab, Anti-DNA double-stranded ab, Anti-neutrophilic cytoplasmic ab pending    Plan:  Rheumatology consulted, appreciate recommendations  Patient was initiated on prednisone which was stopped by rheumatology yesterday due to increased concerns for sleep primary)  Antipolymerase 3 IgG positive-high concern for scleroderma renal crisis,will trial captopril 6.25 mg today , increase if patient tolerating well  MRI of the femur left-was done to rule out myositis-did not show any myositis or muscle atrophy but showed severe left hip osteoarthritis

## 2025-02-16 NOTE — ASSESSMENT & PLAN NOTE
Lab Results   Component Value Date    HGB 10.3 (L) 02/16/2025    MCV 93 02/16/2025     Low haptoglobin, elevated LDH, peripheral smear without schistocytes  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  Homans test negative  Given B12, folate levels within normal limits  Iron 66 within normal limits and ferritin 72 within normal limits    Plan:  Concern for thrombotic microangiopathy.  Hematology/oncology consulted, appreciate recommendations  Check iron panel, vitamin B12, folate levels

## 2025-02-16 NOTE — ASSESSMENT & PLAN NOTE
Lab Results   Component Value Date    K 3.1 (L) 02/16/2025    K 3.4 (L) 02/15/2025    K 3.1 (L) 02/14/2025     Monitor and replete

## 2025-02-16 NOTE — ASSESSMENT & PLAN NOTE
Mildly hypokalemic potassium level today 3.1  Home medications: Amlodipine, losartan, chlorthalidone  Current medications: Labetalol 300 mg twice daily, amlodipine 10 mg daily, Cardura 2 mg daily  Changes: Discontinue labetalol due to risk of vasospasm, addition of captopril as above  Aldosterone/renin ratio pending  Metanephrine/normetanephrine pending  Renal artery Doppler with no evidence of renal artery stenosis

## 2025-02-16 NOTE — PROGRESS NOTES
Progress Note - Hospitalist   Name: Kari Erazo 54 y.o. female I MRN: [id number]  Unit/Bed#: W -01 I Date of Admission: 2/13/2025   Date of Service: 2/16/2025 I Hospital Day: 2    Assessment & Plan  Acute kidney injury (HCC)  Lab Results   Component Value Date    CREATININE 2.30 (H) 02/16/2025    CREATININE 2.11 (H) 02/15/2025    CREATININE 2.13 (H) 02/14/2025     Here with abnormal outpatient lab work. Cr 2.26 (baseline is 0.6 to 0.7)  Also the setting of accelerated hypertension  CTAP - no evidence of nephrolithiasis or obstructive uropathy  Etiology -Possible concern for systemic sclerosis, anti RNA polymerase III positive  Nephrology ordered comprehensive workup  2/16- cr worsening 2.30    Plan:  Nephrology following, appreciate recommendations  Trial one dose of captopril  6.25 today , if patient tolerating well , plan to increase the dosing form tmrw   Hold home losartan, chlorthalidone  No need for further IVF at this time  Follow-up on remainder of workup  Accelerated hypertension  Home meds: Amlodipine 10 mg, chlorthalidone 25 mg, losartan 25 mg  Upon presentation, blood pressure was as high as 200s/100s  Asymptomatic  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  Hematology/oncology consulted, appreciate  recommendations  Check iron panel, vitamin B12, folate levels  Undifferentiated connective tissue disease (HCC)  Has been following with rheumatology in the outpatient setting due to progressively worsening edema in hands and feet and myalgias, Raynaud's symptoms after getting influenza vaccination around Thanksgiving  Since then, blood pressures have been worsening. Has been on multiple prednisone tapers  Echo (2/12) - EF 70%. G2DD.  RV and LV mildly dilated.  Mild MR and TR, small anterior pericardial effusion  Echo from 11/21/23 with normal diastolic function  Currently on prednisone taper 40 mg x 14 days, decrease by 10 mg every 14 days  Has not started methotrexate yet  Follows with Dr. To  Labs so far:  JAYESH positive, titer at 1280 with nucleolar pattern  Sjogren antibodies,   Anticentromere antibody negative  CK within normal range  Cyclic Citrullinated peptide antibody negative  Rheumatoid factor negative  Anti-Jo1 antibody negative  Antiscleroderma antibody negative  RNA polymerase 3 IgG antibody elevated and positive   Pending anti-Th/To ab, Anti-HMGCR ab, Anti-DNA double-stranded ab, Anti-neutrophilic cytoplasmic ab pending    Plan:  Rheumatology consulted, appreciate recommendations  Patient was initiated on prednisone which was stopped by rheumatology yesterday due to increased concerns for sleep primary)  Antipolymerase 3 IgG positive-high concern for scleroderma renal crisis,will trial captopril 6.25 mg today , increase if patient tolerating well  MRI of the femur left-was done to rule out myositis-did not show any myositis or muscle atrophy but showed severe left hip osteoarthritis  Hypokalemia  Lab Results   Component Value Date    K 3.1 (L) 02/16/2025    K 3.4 (L) 02/15/2025    K 3.1 (L) 02/14/2025     Monitor and replete  [place]O on CPAP  Continue CPAP  Abnormal finding on imaging  CT imaging showing cystic 2.1 cm lesion arising from tail of pancreas, recommend 6-month follow-up CT  CT imaging also  showing indeterminate radiolucent lesion in L1 vertebral body  Discussed findings with patient.  Follow-up as outpatient  Hepatitis C antibody test positive  Follow-up on hepatitis C RNA level  SIRS without infection or organ dysfunction (HCC)  Has SIRS criteria of leukocytosis and tachycardia  No concern for active infection  Leukocytosis is secondary to steroids    VTE Pharmacologic Prophylaxis: VTE Score: 5     Mobility:   Basic Mobility Inpatient Raw Score: 24  JH-HLM Goal: 8: Walk 250 feet or more  JH-HLM Achieved: 7: Walk 25 feet or more  JH-HLM Goal achieved. Continue to encourage appropriate mobility.    Patient Centered Rounds: I performed bedside rounds with nursing staff today.   Discussions with Specialists or Other Care Team Provider: nephrology, hematology, rheumartology    Education and Discussions with Family / Patient: Patient declined call to .     Current Length of Stay: 2 day(s)  Current Patient Status: Inpatient   Certification Statement: The patient will continue to require additional inpatient hospital stay due to possible scleroderma renal crsisi  Discharge Plan: Anticipate discharge in 48 hrs to home.    Code Status: Level 1 - Full Code    Subjective   This Morning, patient was seen and examined at bedside.  Patient was sitting up in the chair comfortably.  She reported worsening of swelling of the hands and bilateral lower extremities after stopping steroids.  Patient was concerned about her elevated blood pressure despite multiple medication s.  Also reported on and off cough since yesterday with bronchitis-like pattern.Denied shortness of breath or chest pain.  Also patient reported about heel wound for the past couple of days.  Denied, fever chills, abdominal pain.  Patient is urinating and bowels normally.    Objective :  Temp:  [97.7 °F (36.5 °C)-98.4 °F (36.9 °C)] 98.4 °F (36.9 °C)  HR:  [79-89] 84  BP: (146-184)/() 162/93  Resp:  [18] 18  SpO2:  [94 %-99 %] 98  %  O2 Device: None (Room air)    Body mass index is 31.93 kg/m².     Input and Output Summary (last 24 hours):     Intake/Output Summary (Last 24 hours) at 2/16/2025 1246  Last data filed at 2/16/2025 0910  Gross per 24 hour   Intake 1440 ml   Output 0 ml   Net 1440 ml       Physical Exam  Constitutional:       Appearance: She is obese.   Cardiovascular:      Rate and Rhythm: Normal rate and regular rhythm.      Pulses: Normal pulses.      Heart sounds: Normal heart sounds.   Pulmonary:      Effort: Pulmonary effort is normal.      Breath sounds: Normal breath sounds.   Abdominal:      General: Abdomen is flat.      Palpations: Abdomen is soft.   Musculoskeletal:      Right lower leg: Edema present.      Left lower leg: Edema present.      Comments: Swelling of hands   Skin:     Comments: Left heel wound-crack, no infection  Likely pressure related   Neurological:      Mental Status: She is alert.           Lines/Drains:              Lab Results: I have reviewed the following results:   Results from last 7 days   Lab Units 02/16/25 0413 02/14/25 0622 02/13/25 1935 02/12/25  1527   WBC Thousand/uL 10.28*   < > 18.52* 16.48*   HEMOGLOBIN g/dL 10.3*   < > 12.1 12.2   HEMATOCRIT % 30.2*   < > 34.5* 35.3   PLATELETS Thousands/uL 136*   < > 218 259   BANDS PCT %  --   --   --  1   SEGS PCT %  --   --  88*  --    LYMPHO PCT %  --   --  5* 3*   MONO PCT %  --   --  5 2*   EOS PCT %  --   --  0 0    < > = values in this interval not displayed.     Results from last 7 days   Lab Units 02/16/25 0413 02/14/25 0622 02/13/25 1935   SODIUM mmol/L 135   < > 135   POTASSIUM mmol/L 3.1*   < > 3.6   CHLORIDE mmol/L 101   < > 98   CO2 mmol/L 26   < > 26   BUN mg/dL 39*   < > 52*   CREATININE mg/dL 2.30*   < > 2.29*   ANION GAP mmol/L 8   < > 11   CALCIUM mg/dL 8.0*   < > 9.5   ALBUMIN g/dL  --   --  4.5   TOTAL BILIRUBIN mg/dL  --   --  0.99   ALK PHOS U/L  --   --  40   ALT U/L  --   --  38   AST U/L  --   --  26   GLUCOSE  RANDOM mg/dL 154*   < > 183*    < > = values in this interval not displayed.     Results from last 7 days   Lab Units 02/13/25  1935   INR  1.04         Results from last 7 days   Lab Units 02/13/25  1935   HEMOGLOBIN A1C % 6.1*           Recent Cultures (last 7 days):   Results from last 7 days   Lab Units 02/14/25  1646   URINE CULTURE  No Growth <1000 cfu/mL             Last 24 Hours Medication List:     Current Facility-Administered Medications:     acetaminophen (TYLENOL) tablet 650 mg, Q6H PRN    amLODIPine (NORVASC) tablet 10 mg, Daily    atorvastatin (LIPITOR) tablet 10 mg, Daily    calcium carbonate-vitamin D 500 mg-5 mcg tablet 1 tablet, Daily With Breakfast    doxazosin (CARDURA) tablet 2 mg, BID    folic acid (FOLVITE) tablet 1 mg, Daily    guaiFENesin (MUCINEX) 12 hr tablet 600 mg, Q12H PRN    labetalol (NORMODYNE) injection 10 mg, Q6H PRN    montelukast (SINGULAIR) tablet 10 mg, HS    pantoprazole (PROTONIX) EC tablet 40 mg, Early Morning    potassium chloride (Klor-Con M20) CR tablet 20 mEq, Once    Administrative Statements   Today, Patient Was Seen By: Tisha Tolbert MD      **Please Note: This note may have been constructed using a voice recognition system.**

## 2025-02-16 NOTE — ASSESSMENT & PLAN NOTE
CT imaging showing cystic 2.1 cm lesion arising from tail of pancreas, recommend 6-month follow-up CT  CT imaging also showing indeterminate radiolucent lesion in L1 vertebral body  Discussed findings with patient.  Follow-up as outpatient

## 2025-02-16 NOTE — PLAN OF CARE
Problem: PAIN - ADULT  Goal: Verbalizes/displays adequate comfort level or baseline comfort level  Description: Interventions:  - Encourage patient to monitor pain and request assistance  - Assess pain using appropriate pain scale  - Administer analgesics based on type and severity of pain and evaluate response  - Implement non-pharmacological measures as appropriate and evaluate response  - Consider cultural and social influences on pain and pain management  - Notify physician/advanced practitioner if interventions unsuccessful or patient reports new pain  Outcome: Progressing     Problem: INFECTION - ADULT  Goal: Absence or prevention of progression during hospitalization  Description: INTERVENTIONS:  - Assess and monitor for signs and symptoms of infection  - Monitor lab/diagnostic results  - Monitor all insertion sites, i.e. indwelling lines, tubes, and drains  - Monitor endotracheal if appropriate and nasal secretions for changes in amount and color  - [place] appropriate cooling/warming therapies per order  - Administer medications as ordered  - Instruct and encourage patient and family to use good hand hygiene technique  - Identify and instruct in appropriate isolation precautions for identified infection/condition  Outcome: Progressing  Goal: Absence of fever/infection during neutropenic period  Description: INTERVENTIONS:  - Monitor WBC    Outcome: Progressing     Problem: SAFETY ADULT  Goal: Patient will remain free of falls  Description: INTERVENTIONS:  - Educate patient/family on patient safety including physical limitations  - Instruct patient to call for assistance with activity   - Consult OT/PT to assist with strengthening/mobility   - Keep Call bell within reach  - Keep bed low and locked with side rails adjusted as appropriate  - Keep care items and personal belongings within reach  - Initiate and maintain comfort rounds  - Make Fall Risk Sign visible to staff  - Offer Toileting every  Hours,  in advance of need  - Initiate/Maintain alarm  - Obtain necessary fall risk management equipment: - Apply yellow socks and bracelet for high fall risk patients  - Consider moving patient to room near nurses station  Outcome: Progressing  Goal: Maintain or return to baseline ADL function  Description: INTERVENTIONS:  -  Assess patient's ability to carry out ADLs; assess patient's baseline for ADL function and identify physical deficits which impact ability to perform ADLs (bathing, care of mouth/teeth, toileting, grooming, dressing, etc.)  - Assess/evaluate cause of self-care deficits   - Assess range of motion  - Assess patient's mobility; develop plan if impaired  - Assess patient's need for assistive devices and provide as appropriate  - Encourage maximum independence but intervene and supervise when necessary  - Involve family in performance of ADLs  - Assess for home care needs following discharge   - Consider OT consult to assist with ADL evaluation and planning for discharge  - Provide patient education as appropriate  Outcome: Progressing  Goal: Maintains/Returns to pre admission functional level  Description: INTERVENTIONS:  - Perform AM-PAC 6 Click Basic Mobility/ Daily Activity assessment daily.  - Set and communicate daily mobility goal to care team and patient/family/caregiver.   - Collaborate with rehabilitation services on mobility goals if consulted  - Perform Range of Motion  times a day.  - Reposition patient every  hours.  - Dangle patient  times a day  - Stand patient  times a day  - Ambulate patient  times a day  - Out of bed to chair  times a day   - Out of bed for meals  times a day  - Out of bed for toileting  - Record patient progress and toleration of activity level   Outcome: Progressing     Problem: DISCHARGE PLANNING  Goal: Discharge to home or other facility with appropriate resources  Description: INTERVENTIONS:  - Identify barriers to discharge w/patient and caregiver  - Arrange for  needed discharge resources and transportation as appropriate  - Identify discharge learning needs (meds, wound care, etc.)  - Arrange for interpretive services to assist at discharge as needed  - Refer to Case Management Department for coordinating discharge planning if the patient needs post-hospital services based on physician/advanced practitioner order or complex needs related to functional status, cognitive ability, or social support system  Outcome: Progressing     Problem: Knowledge Deficit  Goal: Patient/family/caregiver demonstrates understanding of disease process, treatment plan, medications, and discharge instructions  Description: Complete learning assessment and assess knowledge base.  Interventions:  - Provide teaching at level of understanding  - Provide teaching via preferred learning methods  Outcome: Progressing

## 2025-02-16 NOTE — PLAN OF CARE
Problem: PAIN - ADULT  Goal: Verbalizes/displays adequate comfort level or baseline comfort level  Description: Interventions:  - Encourage patient to monitor pain and request assistance  - Assess pain using appropriate pain scale  - Administer analgesics based on type and severity of pain and evaluate response  - Implement non-pharmacological measures as appropriate and evaluate response  - Consider cultural and social influences on pain and pain management  - Notify physician/advanced practitioner if interventions unsuccessful or patient reports new pain  Outcome: Progressing     Problem: INFECTION - ADULT  Goal: Absence or prevention of progression during hospitalization  Description: INTERVENTIONS:  - Assess and monitor for signs and symptoms of infection  - Monitor lab/diagnostic results  - Monitor all insertion sites, i.e. indwelling lines, tubes, and drains  - Monitor endotracheal if appropriate and nasal secretions for changes in amount and color  - [place] appropriate cooling/warming therapies per order  - Administer medications as ordered  - Instruct and encourage patient and family to use good hand hygiene technique  - Identify and instruct in appropriate isolation precautions for identified infection/condition  Outcome: Progressing  Goal: Absence of fever/infection during neutropenic period  Description: INTERVENTIONS:  - Monitor WBC    Outcome: Progressing     Problem: SAFETY ADULT  Goal: Patient will remain free of falls  Description: INTERVENTIONS:  - Educate patient/family on patient safety including physical limitations  - Instruct patient to call for assistance with activity   - Consult OT/PT to assist with strengthening/mobility   - Keep Call bell within reach  - Keep bed low and locked with side rails adjusted as appropriate  - Keep care items and personal belongings within reach  - Initiate and maintain comfort rounds  - Make Fall Risk Sign visible to staff  - Offer Toileting every  Hours,  in advance of need  - Initiate/Maintain alarm  - Obtain necessary fall risk management equipment:   - Apply yellow socks and bracelet for high fall risk patients  - Consider moving patient to room near nurses station  Outcome: Progressing  Goal: Maintain or return to baseline ADL function  Description: INTERVENTIONS:  -  Assess patient's ability to carry out ADLs; assess patient's baseline for ADL function and identify physical deficits which impact ability to perform ADLs (bathing, care of mouth/teeth, toileting, grooming, dressing, etc.)  - Assess/evaluate cause of self-care deficits   - Assess range of motion  - Assess patient's mobility; develop plan if impaired  - Assess patient's need for assistive devices and provide as appropriate  - Encourage maximum independence but intervene and supervise when necessary  - Involve family in performance of ADLs  - Assess for home care needs following discharge   - Consider OT consult to assist with ADL evaluation and planning for discharge  - Provide patient education as appropriate  Outcome: Progressing  Goal: Maintains/Returns to pre admission functional level  Description: INTERVENTIONS:  - Perform AM-PAC 6 Click Basic Mobility/ Daily Activity assessment daily.  - Set and communicate daily mobility goal to care team and patient/family/caregiver.   - Collaborate with rehabilitation services on mobility goals if consulted  - Perform Range of Motion  times a day.  - Reposition patient every  hours.  - Dangle patient  times a day  - Stand patient  times a day  - Ambulate patient  times a day  - Out of bed to chair  times a day   - Out of bed for meals  times a day  - Out of bed for toileting  - Record patient progress and toleration of activity level   Outcome: Progressing     Problem: DISCHARGE PLANNING  Goal: Discharge to home or other facility with appropriate resources  Description: INTERVENTIONS:  - Identify barriers to discharge w/patient and caregiver  - Arrange for  needed discharge resources and transportation as appropriate  - Identify discharge learning needs (meds, wound care, etc.)  - Arrange for interpretive services to assist at discharge as needed  - Refer to Case Management Department for coordinating discharge planning if the patient needs post-hospital services based on physician/advanced practitioner order or complex needs related to functional status, cognitive ability, or social support system  Outcome: Progressing     Problem: Knowledge Deficit  Goal: Patient/family/caregiver demonstrates understanding of disease process, treatment plan, medications, and discharge instructions  Description: Complete learning assessment and assess knowledge base.  Interventions:  - Provide teaching at level of understanding  - Provide teaching via preferred learning methods  Outcome: Progressing

## 2025-02-16 NOTE — ASSESSMENT & PLAN NOTE
With symptoms of raynaud's, myalgias, swelling and skin thickening of distal extremities, periorbital edema, delayed clearance of food, positive JAYESH 1:1280 nucleolar pattern (negative ds DNA, RF, CCP, SSA, SSB, Scl-70, Kassie-1, centromere abs), concern for an underlying possible overlap syndrome, mainly of scleroderma/myositis  Management per rheumatology

## 2025-02-16 NOTE — ASSESSMENT & PLAN NOTE
Baseline creatinine 0.6-0.7  Creatinine slightly higher than baseline 0.9 in December  Creatinine on admission 2.3  Creatinine today 2.3  UA: 1+ protein, 30-50 WBC, 1-2 RBC  UACR 675 mg/g/UPCR 1.4 g  Urine immunofixation with no monoclonal immunoglobulin  CT abdomen: No hydronephrosis, left renal cortical cyst 2.5 cm  Chest x-ray: No acute cardiopulmonary disease  JAYESH elevated with titer of 1218 January 2025  Haptoglobin less than 10, LDH elevated, hemolysis smear without schistocytes or helmet cells  Hep C antibody reactive, RNA pending  Urine eosinophil 0%  No hypocomplementemia  Rheumatoid factor/CCP/anti-Jo1 antibody/antiscleroderma antibody negative  JAYESH/dsDNA pending  SPEP/free light chain ratio pending  ANCA pending  Cryoglobulin pending  Rule out TTP versus complement mediated HUS (low haptoglobin, mildly elevated LDH, developing thrombocytopenia, hypertension) versus systemic rheumatic disease associated TMA such as scleroderma renal crisis crisis (positive JAYESH, but negative SCL 70)    Addendum  High positive RNA polymerase III Ab 175, raises a suspicion for scleroderma renal crisis  Okay to start captopril (previous history of cough to ACE inhibitor, patient agreeable to trial of captopril)  We will trial with low-dose captopril 6.25 mg x 1 today to assess tolerability  Uptitrate captopril in next 24 hours  Hold off kidney biopsy (especially due to dropping platelet count and need for better blood pressure control), and we may have a clinical diagnosis of scleroderma renal crisis  She will eventually need kidney biopsy if pending serological workup lights up or if albuminuria/proteinuria worsens

## 2025-02-17 ENCOUNTER — APPOINTMENT (INPATIENT)
Dept: CT IMAGING | Facility: HOSPITAL | Age: 55
DRG: 546 | End: 2025-02-17
Payer: COMMERCIAL

## 2025-02-17 PROBLEM — D69.6 THROMBOCYTOPENIA (HCC): Status: ACTIVE | Noted: 2025-02-17

## 2025-02-17 LAB
ALBUMIN SERPL BCG-MCNC: 3.4 G/DL (ref 3.5–5)
ALP SERPL-CCNC: 31 U/L (ref 34–104)
ALT SERPL W P-5'-P-CCNC: 30 U/L (ref 7–52)
ANION GAP SERPL CALCULATED.3IONS-SCNC: 7 MMOL/L (ref 4–13)
AST SERPL W P-5'-P-CCNC: 19 U/L (ref 13–39)
BILIRUB DIRECT SERPL-MCNC: 0.14 MG/DL (ref 0–0.2)
BILIRUB SERPL-MCNC: 0.74 MG/DL (ref 0.2–1)
BLD SMEAR INTERP: NORMAL
BUN SERPL-MCNC: 37 MG/DL (ref 5–25)
CALCIUM SERPL-MCNC: 7.9 MG/DL (ref 8.4–10.2)
CHLORIDE SERPL-SCNC: 103 MMOL/L (ref 96–108)
CK SERPL-CCNC: 38 U/L (ref 26–192)
CO2 SERPL-SCNC: 26 MMOL/L (ref 21–32)
CREAT SERPL-MCNC: 2.27 MG/DL (ref 0.6–1.3)
DSDNA IGG SERPL IA-ACNC: 1.6 IU/ML (ref ?–15)
ERYTHROCYTE [DISTWIDTH] IN BLOOD BY AUTOMATED COUNT: 15.2 % (ref 11.6–15.1)
GFR SERPL CREATININE-BSD FRML MDRD: 24 ML/MIN/1.73SQ M
GLUCOSE SERPL-MCNC: 154 MG/DL (ref 65–140)
HCT VFR BLD AUTO: 29.7 % (ref 34.8–46.1)
HCV RNA SERPL NAA+PROBE-ACNC: NOT DETECTED K[IU]/ML
HGB BLD-MCNC: 10.2 G/DL (ref 11.5–15.4)
LDH SERPL-CCNC: 333 U/L (ref 140–271)
MAGNESIUM SERPL-MCNC: 2.2 MG/DL (ref 1.9–2.7)
MCH RBC QN AUTO: 32.3 PG (ref 26.8–34.3)
MCHC RBC AUTO-ENTMCNC: 34.3 G/DL (ref 31.4–37.4)
MCV RBC AUTO: 94 FL (ref 82–98)
NUCLEAR IGG SER IA-RTO: 0.6 RATIO (ref ?–1)
PLATELET # BLD AUTO: 117 THOUSANDS/UL (ref 149–390)
PMV BLD AUTO: 9.9 FL (ref 8.9–12.7)
POTASSIUM SERPL-SCNC: 3.7 MMOL/L (ref 3.5–5.3)
PROT SERPL-MCNC: 5.8 G/DL (ref 6.4–8.4)
RBC # BLD AUTO: 3.16 MILLION/UL (ref 3.81–5.12)
RETICS # AUTO: ABNORMAL 10*3/UL (ref 14097–95744)
RETICS # CALC: 6.69 % (ref 0.37–1.87)
SODIUM SERPL-SCNC: 136 MMOL/L (ref 135–147)
WBC # BLD AUTO: 10.14 THOUSAND/UL (ref 4.31–10.16)

## 2025-02-17 PROCEDURE — 80048 BASIC METABOLIC PNL TOTAL CA: CPT

## 2025-02-17 PROCEDURE — 86645 CMV ANTIBODY IGM: CPT | Performed by: STUDENT IN AN ORGANIZED HEALTH CARE EDUCATION/TRAINING PROGRAM

## 2025-02-17 PROCEDURE — NC001 PR NO CHARGE: Performed by: INTERNAL MEDICINE

## 2025-02-17 PROCEDURE — 86644 CMV ANTIBODY: CPT | Performed by: STUDENT IN AN ORGANIZED HEALTH CARE EDUCATION/TRAINING PROGRAM

## 2025-02-17 PROCEDURE — 71250 CT THORAX DX C-: CPT

## 2025-02-17 PROCEDURE — 85045 AUTOMATED RETICULOCYTE COUNT: CPT

## 2025-02-17 PROCEDURE — 83615 LACTATE (LD) (LDH) ENZYME: CPT | Performed by: INTERNAL MEDICINE

## 2025-02-17 PROCEDURE — 83010 ASSAY OF HAPTOGLOBIN QUANT: CPT | Performed by: INTERNAL MEDICINE

## 2025-02-17 PROCEDURE — 86157 COLD AGGLUTININ TITER: CPT | Performed by: STUDENT IN AN ORGANIZED HEALTH CARE EDUCATION/TRAINING PROGRAM

## 2025-02-17 PROCEDURE — 99232 SBSQ HOSP IP/OBS MODERATE 35: CPT | Performed by: INTERNAL MEDICINE

## 2025-02-17 PROCEDURE — 85027 COMPLETE CBC AUTOMATED: CPT

## 2025-02-17 PROCEDURE — 99233 SBSQ HOSP IP/OBS HIGH 50: CPT | Performed by: INTERNAL MEDICINE

## 2025-02-17 PROCEDURE — 80076 HEPATIC FUNCTION PANEL: CPT | Performed by: STUDENT IN AN ORGANIZED HEALTH CARE EDUCATION/TRAINING PROGRAM

## 2025-02-17 PROCEDURE — 82550 ASSAY OF CK (CPK): CPT | Performed by: INTERNAL MEDICINE

## 2025-02-17 PROCEDURE — 82668 ASSAY OF ERYTHROPOIETIN: CPT | Performed by: STUDENT IN AN ORGANIZED HEALTH CARE EDUCATION/TRAINING PROGRAM

## 2025-02-17 PROCEDURE — 83735 ASSAY OF MAGNESIUM: CPT

## 2025-02-17 RX ORDER — CAPTOPRIL 12.5 MG/1
12.5 TABLET ORAL 3 TIMES DAILY
Status: DISCONTINUED | OUTPATIENT
Start: 2025-02-17 | End: 2025-02-18

## 2025-02-17 RX ORDER — CAPTOPRIL 12.5 MG/1
6.25 TABLET ORAL ONCE
Status: COMPLETED | OUTPATIENT
Start: 2025-02-17 | End: 2025-02-17

## 2025-02-17 RX ORDER — CAPTOPRIL 12.5 MG/1
6.25 TABLET ORAL 3 TIMES DAILY
Status: DISCONTINUED | OUTPATIENT
Start: 2025-02-17 | End: 2025-02-17

## 2025-02-17 RX ORDER — CALCIUM CARBONATE 500 MG/1
500 TABLET, CHEWABLE ORAL ONCE
Status: COMPLETED | OUTPATIENT
Start: 2025-02-17 | End: 2025-02-18

## 2025-02-17 RX ORDER — NIFEDIPINE 10 MG/1
30 CAPSULE ORAL EVERY 8 HOURS PRN
Status: DISCONTINUED | OUTPATIENT
Start: 2025-02-17 | End: 2025-02-18

## 2025-02-17 RX ADMIN — NIFEDIPINE 30 MG: 10 CAPSULE ORAL at 17:19

## 2025-02-17 RX ADMIN — AMLODIPINE BESYLATE 10 MG: 10 TABLET ORAL at 09:07

## 2025-02-17 RX ADMIN — ATORVASTATIN CALCIUM 10 MG: 10 TABLET, FILM COATED ORAL at 09:07

## 2025-02-17 RX ADMIN — CAPTOPRIL 6.25 MG: 12.5 TABLET ORAL at 18:38

## 2025-02-17 RX ADMIN — MONTELUKAST 10 MG: 10 TABLET, FILM COATED ORAL at 21:24

## 2025-02-17 RX ADMIN — DOXAZOSIN 2 MG: 1 TABLET ORAL at 09:07

## 2025-02-17 RX ADMIN — CAPTOPRIL 6.25 MG: 12.5 TABLET ORAL at 09:08

## 2025-02-17 RX ADMIN — FOLIC ACID 1 MG: 1 TABLET ORAL at 09:07

## 2025-02-17 RX ADMIN — CAPTOPRIL 6.25 MG: 12.5 TABLET ORAL at 15:04

## 2025-02-17 RX ADMIN — DOXAZOSIN 2 MG: 1 TABLET ORAL at 17:20

## 2025-02-17 RX ADMIN — CAPTOPRIL 12.5 MG: 12.5 TABLET ORAL at 21:24

## 2025-02-17 RX ADMIN — ACETAMINOPHEN 650 MG: 325 TABLET, FILM COATED ORAL at 11:10

## 2025-02-17 RX ADMIN — Medication 1 TABLET: at 09:08

## 2025-02-17 RX ADMIN — PANTOPRAZOLE SODIUM 40 MG: 40 TABLET, DELAYED RELEASE ORAL at 06:25

## 2025-02-17 NOTE — UTILIZATION REVIEW
NOTIFICATION OF INPATIENT ADMISSION   AUTHORIZATION REQUEST   SERVICING FACILITY:   [place] [address]  Tax ID: [id number]  NPI: [id number]   ATTENDING PROVIDER:  Attending Name and NPI#: Ronel Gilliland Md [[id number]]  Address: [address] [place] [address]  Phone: [phone]     ADMISSION INFORMATION:  Place of Service: Inpatient Acute [facility]  Place of Service Code: 21  Inpatient Admission Date/Time: 2/14/25 12:28 AM  Discharge Date/Time: No discharge date for patient encounter.  Admitting Diagnosis Code/Description:  Edema [R60.9]  Cyst of pancreas [K86.2]  Hypertension [I10]  Abnormal laboratory test result [R89.9]  Renal cyst, left [N28.1]  TITUS (acute kidney injury) (HCC) [N17.9]  Abnormal CT of thoracic spine [R93.7]  Abnormal CT scan, lumbar spine [R93.7]  Degenerative joint disease of left hip [M16.12]  Central stenosis of spinal canal [M48.00]     UTILIZATION REVIEW CONTACT:  Linda Eagle Utilization   Network Utilization Review Department  Phone: [phone]  Fax: [phone]  Email: [email or web address]  Contact for approvals/pending authorizations, clinical reviews, and discharge.     PHYSICIAN ADVISORY SERVICES:  Medical Necessity Denial & [employer] Review  Phone: [phone]  Fax: [phone]  Email: [email or web address]     DISCHARGE SUPPORT TEAM:  For Patients Discharge Needs & Updates  Phone: [phone] opt. 2 Fax: [phone]  Email: [email or web address]

## 2025-02-17 NOTE — ASSESSMENT & PLAN NOTE
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

## 2025-02-17 NOTE — QUICK NOTE
#Sclerodermal renal crisis  - Started on captopril 6.25 mg x 1 dose yesterday and switched to 6.25 mg TID today with no significant improvement  - Recommended to increase the dose of captopril to 12.5 mg TID  - Need frequent hourly monitoring of blood pressure to up titrate the dose of captopril  - Can up titrate in increments of 25 mg every 4 to 8 hrs (Total max dose in a day is 300 to 450 in divided doses)  - Recommend adjusting the dose of captopril rather than increasing the dose of other anti-hypertensive agents  - Goal BP is to bring to her baseline BP which is 140-150/80-90 and captopril dose can be maintained after achieving the goal BP

## 2025-02-17 NOTE — ASSESSMENT & PLAN NOTE
Home Rx: Amlodipine, losartan, chlorthalidone.  BP elevated likely due to SRC.   Current Rx: Amlodipine 10 mg daily, doxazosin 2 mg twice a day.  Start Captopril 6.25 mg TID.   Work up: Renal artery Doppler showed no PATO.  Aldosterone/renin, metanephrines are pending.  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

## 2025-02-17 NOTE — PLAN OF CARE
Problem: PAIN - ADULT  Goal: Verbalizes/displays adequate comfort level or baseline comfort level  Description: Interventions:  - Encourage patient to monitor pain and request assistance  - Assess pain using appropriate pain scale  - Administer analgesics based on type and severity of pain and evaluate response  - Implement non-pharmacological measures as appropriate and evaluate response  - Consider cultural and social influences on pain and pain management  - Notify physician/advanced practitioner if interventions unsuccessful or patient reports new pain  Outcome: Progressing     Problem: INFECTION - ADULT  Goal: Absence or prevention of progression during hospitalization  Description: INTERVENTIONS:  - Assess and monitor for signs and symptoms of infection  - Monitor lab/diagnostic results  - Monitor all insertion sites, i.e. indwelling lines, tubes, and drains  - Monitor endotracheal if appropriate and nasal secretions for changes in amount and color  - [place] appropriate cooling/warming therapies per order  - Administer medications as ordered  - Instruct and encourage patient and family to use good hand hygiene technique  - Identify and instruct in appropriate isolation precautions for identified infection/condition  Outcome: Progressing  Goal: Absence of fever/infection during neutropenic period  Description: INTERVENTIONS:  - Monitor WBC    Outcome: Progressing     Problem: DISCHARGE PLANNING  Goal: Discharge to home or other facility with appropriate resources  Description: INTERVENTIONS:  - Identify barriers to discharge w/patient and caregiver  - Arrange for needed discharge resources and transportation as appropriate  - Identify discharge learning needs (meds, wound care, etc.)  - Arrange for interpretive services to assist at discharge as needed  - Refer to Case Management Department for coordinating discharge planning if the patient needs post-hospital services based on physician/advanced  practitioner order or complex needs related to functional status, cognitive ability, or social support system  Outcome: Progressing     Problem: Knowledge Deficit  Goal: Patient/family/caregiver demonstrates understanding of disease process, treatment plan, medications, and discharge instructions  Description: Complete learning assessment and assess knowledge base.  Interventions:  - Provide teaching at level of understanding  - Provide teaching via preferred learning methods  Outcome: Progressing

## 2025-02-17 NOTE — ASSESSMENT & PLAN NOTE
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  Hematology/oncology consulted, appreciate recommendations

## 2025-02-17 NOTE — PROGRESS NOTES
Progress Note - Hospitalist   Name: Kari Erazo 54 y.o. female I MRN: [id number]  Unit/Bed#: W -01 I Date of Admission: 2/13/2025   Date of Service: 2/17/2025 I Hospital Day: 3    Assessment & Plan  Acute kidney injury (HCC)  Lab Results   Component Value Date    CREATININE 2.27 (H) 02/17/2025    CREATININE 2.30 (H) 02/16/2025    CREATININE 2.11 (H) 02/15/2025     Here with abnormal outpatient lab work. 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, anti RNA polymerase III positive  Nephrology ordered comprehensive workup  2/17- Cr slight decrease 2.27    Plan:  Nephrology following, appreciate recommendations  Start Captopril 6.25 mg TID for BP control in setting of suspected scleroderma renal crisis, pt tolerated one dose of captopril yesterday   Hold off on renal biopsy for now in setting of HTN and thrombocytopenia   Hold home losartan, chlorthalidone  No need for further IVF at this time  Follow-up on remainder of workup  Accelerated hypertension  Home meds: Amlodipine 10 mg, chlorthalidone 25 mg, losartan 25 mg  Upon presentation, blood pressure was as high as 200s/100s  Asymptomatic  VAS renal artery - no evidence of significant arterial occlusive disease  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  BP elevated this morning SBP 180s-200s but responded well to one dose of captopril yesterday - increase captopril dosing for BP control     Plan  Nephrology following, appreciate recommendations  Avoid beta-blockers, start captopril 6.25 mg TID   Cardura 2 mg twice daily  Amlodipine 10 mg daily  Nifedipine 30 mg for SBP >180 as needed q8h  Hold home losartan and chlorthalidone    Thrombocytopenia (HCC)  Recent Labs     02/15/25  0451 02/16/25  0410  02/17/25  0611    136* 117*      Platelets down trending to 117 today   Low haptoglobin, elevated LDH, peripheral smear without schistocytes, normal corrected reticulocyte percentage, negative MARCO   B12, folate, iron panel WNL, transferrin level 196  Total and direct bilirubin WNL  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  Hematology/oncology consulted, appreciate recommendations  Undifferentiated connective tissue disease (HCC)  Has been following with rheumatology in the outpatient setting due to progressively worsening edema in hands and feet and myalgias, Raynaud's symptoms after getting influenza vaccination around [facility]  Since then, blood pressures have been worsening. Has been on multiple prednisone tapers  Echo (2/12) - EF 70%. G2DD.  RV and LV mildly dilated.  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 To  MRI of the femur left-was done to rule out myositis-did not show any myositis or muscle atrophy but showed severe left hip osteoarthritis  Labs so far:  JAYESH positive, titer at 1280 with nucleolar pattern  Sjogren antibodies   Anticentromere antibody negative  CK within normal range  Cyclic Citrullinated  peptide antibody negative  Rheumatoid factor negative  Anti-Jo1 antibody negative  Antiscleroderma antibody negative  RNA polymerase 3 IgG antibody elevated and positive   JAYESH screen with reflex: UPEP, SPEP negative  FABIANA screen and Anti-dsDNA ab negative   Ig Kappa and Lambda free light chains elevated  C3/C4 normal   Pending anti-Th/To ab, Anti-HMGCR ab, Anti-neutrophilic cytoplasmic ab   2/17 fine crackles heard on bilateral lower lung fields - non-contrast chest CT ordered for suspicion of ILD in setting of sclerodermal renal crisis     Plan:  Rheumatology consulted, appreciate recommendations  Patient was initiated on prednisone which was stopped by rheumatology 2/15 due to increased concerns for sclerodermal renal crisis   RNA polymerase III positive-high 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    Hypokalemia  Lab Results   Component Value Date    K 3.7 02/17/2025    K 3.1 (L) 02/16/2025    K 3.4 (L) 02/15/2025     Monitor and replete  [place] on CPAP  Continue CPAP  Abnormal finding on imaging  CT imaging showing cystic 2.1 cm lesion arising from tail of pancreas, recommend 6-month follow-up CT  CT imaging also showing indeterminate radiolucent lesion in L1 vertebral body  Discussed findings with patient.  Follow-up as outpatient  Hepatitis C antibody test positive  Follow-up on hepatitis C RNA level  SIRS without infection or organ dysfunction (HCC)  Has SIRS criteria of leukocytosis and tachycardia  No concern for active infection  Leukocytosis is secondary to steroids    VTE Pharmacologic Prophylaxis: VTE Score: 5 High Risk (Score >/= 5) - Pharmacological DVT Prophylaxis Contraindicated. Sequential Compression Devices Ordered.    Mobility:   Basic Mobility Inpatient Raw Score: 24  JH-HLM Goal: 8: Walk 250 feet or more  JH-HLM Achieved: 8: Walk 250 feet ot more  JH-HLM Goal achieved.  Continue to encourage appropriate mobility.    Patient Centered Rounds: I performed bedside rounds with nursing staff today.   Discussions with Specialists or Other Care Team Provider: Hematology, Nephrology, Rheumatology    Education and Discussions with Family / Patient: Patient declined call to .     Current Length of Stay: 3 day(s)  Current Patient Status: Inpatient   Certification Statement: The patient will continue to require additional inpatient hospital stay due to TITUS in the setting suspected scleroderma renal crisis, hypertensive urgency  Discharge Plan: Anticipate discharge in 24-48 hrs to home.    Code Status: Level 1 - Full Code    Subjective   No acute events overnight. Pt seen and examined resting in chair. Pt's SBP 180s-200s this morning. Pt endorses her extremity edema worsened slightly after discontinuation of steroids but now is remaining stable. She has been trying to walk around her room every hour or two and has been tolerating that well with myalgias. Pt states she tolerated the captopril she received yesterday. Denies dry cough but endorses some nasal congestion from the dry winter conditions that is normal for her. Denies chest pain, abdominal pain, reflux symptoms, fevers or chills.     Objective :  Temp:  [97.7 °F (36.5 °C)-98.6 °F (37 °C)] 98.6 °F (37 °C)  HR:  [] 115  BP: (140-206)/() 206/120  Resp:  [15-18] 15  SpO2:  [96 %-99 %] 97 %  O2 Device: None (Room air)    Body mass index is 31.93 kg/m².     Input and Output Summary (last 24 hours):     Intake/Output Summary (Last 24 hours) at 2/17/2025 0847  Last data filed at 2/16/2025 2040  Gross per 24 hour   Intake 1260 ml   Output 0 ml   Net 1260 ml       Physical Exam  Vitals and nursing note reviewed.   Constitutional:       General: She is not in acute distress.     Appearance: She is well-developed. She is obese.   HENT:      Head: Normocephalic and atraumatic.      Right Ear: External ear normal.      Left  Ear: External ear normal.      Nose: Nose normal.   Eyes:      Extraocular Movements: Extraocular movements intact.      Conjunctiva/sclera: Conjunctivae normal.   Cardiovascular:      Rate and Rhythm: Normal rate and regular rhythm.      Heart sounds: No murmur heard.  Pulmonary:      Effort: Pulmonary effort is normal. No respiratory distress.      Comments: Fine crackles at lung bases, greater on left  Abdominal:      Palpations: Abdomen is soft.      Tenderness: There is no abdominal tenderness.   Musculoskeletal:      Cervical back: Neck supple.      Comments: Bilateral lower extremity edema  Swelling of hands, Raynaud's phenomena    Skin:     General: Skin is warm and dry.      Comments: Bilateral lower extremity edema  Swelling of hands with thickened skin, Raynaud's phenomena    Neurological:      General: No focal deficit present.      Mental Status: She is alert and oriented to person, place, and time.   Psychiatric:         Mood and Affect: Mood normal.             Lab Results: I have reviewed the following results:   Results from last 7 days   Lab Units 02/17/25  0611 02/14/25  0622 02/13/25 1935 02/12/25  1527   WBC Thousand/uL 10.14   < > 18.52* 16.48*   HEMOGLOBIN g/dL 10.2*   < > 12.1 12.2   HEMATOCRIT % 29.7*   < > 34.5* 35.3   PLATELETS Thousands/uL 117*   < > 218 259   BANDS PCT %  --   --   --  1   SEGS PCT %  --   --  88*  --    LYMPHO PCT %  --   --  5* 3*   MONO PCT %  --   --  5 2*   EOS PCT %  --   --  0 0    < > = values in this interval not displayed.     Results from last 7 days   Lab Units 02/17/25  0611   SODIUM mmol/L 136   POTASSIUM mmol/L 3.7   CHLORIDE mmol/L 103   CO2 mmol/L 26   BUN mg/dL 37*   CREATININE mg/dL 2.27*   ANION GAP mmol/L 7   CALCIUM mg/dL 7.9*   ALBUMIN g/dL 3.4*   TOTAL BILIRUBIN mg/dL 0.74   ALK PHOS U/L 31*   ALT U/L 30   AST U/L 19   GLUCOSE RANDOM mg/dL 154*     Results from last 7 days   Lab Units 02/13/25 1935   INR  1.04         Results from last 7 days    Lab Units 02/13/25  1935   HEMOGLOBIN A1C % 6.1*           Recent Cultures (last 7 days):   Results from last 7 days   Lab Units 02/14/25  1646   URINE CULTURE  No Growth <1000 cfu/mL           Last 24 Hours Medication List:     Current Facility-Administered Medications:     acetaminophen (TYLENOL) tablet 650 mg, Q6H PRN    amLODIPine (NORVASC) tablet 10 mg, Daily    atorvastatin (LIPITOR) tablet 10 mg, Daily    calcium carbonate-vitamin D 500 mg-5 mcg tablet 1 tablet, Daily With Breakfast    doxazosin (CARDURA) tablet 2 mg, BID    folic acid (FOLVITE) tablet 1 mg, Daily    guaiFENesin (MUCINEX) 12 hr tablet 600 mg, Q12H PRN    labetalol (NORMODYNE) injection 10 mg, Q6H PRN    montelukast (SINGULAIR) tablet 10 mg, HS    pantoprazole (PROTONIX) EC tablet 40 mg, Early Morning    Administrative Statements   Milagros Glover, MS3  Today, Patient Was Seen By: Milagros Glover    **Please Note: This note may have been constructed using a voice recognition system.**

## 2025-02-17 NOTE — PLAN OF CARE
Problem: PAIN - ADULT  Goal: Verbalizes/displays adequate comfort level or baseline comfort level  Description: Interventions:  - Encourage patient to monitor pain and request assistance  - Assess pain using appropriate pain scale  - Administer analgesics based on type and severity of pain and evaluate response  - Implement non-pharmacological measures as appropriate and evaluate response  - Consider cultural and social influences on pain and pain management  - Notify physician/advanced practitioner if interventions unsuccessful or patient reports new pain  Outcome: Progressing     Problem: INFECTION - ADULT  Goal: Absence or prevention of progression during hospitalization  Description: INTERVENTIONS:  - Assess and monitor for signs and symptoms of infection  - Monitor lab/diagnostic results  - Monitor all insertion sites, i.e. indwelling lines, tubes, and drains  - Monitor endotracheal if appropriate and nasal secretions for changes in amount and color  - [place] appropriate cooling/warming therapies per order  - Administer medications as ordered  - Instruct and encourage patient and family to use good hand hygiene technique  - Identify and instruct in appropriate isolation precautions for identified infection/condition  Outcome: Progressing  Goal: Absence of fever/infection during neutropenic period  Description: INTERVENTIONS:  - Monitor WBC    Outcome: Progressing     Problem: SAFETY ADULT  Goal: Patient will remain free of falls  Description: INTERVENTIONS:  - Educate patient/family on patient safety including physical limitations  - Instruct patient to call for assistance with activity   - Consult OT/PT to assist with strengthening/mobility   - Keep Call bell within reach  - Keep bed low and locked with side rails adjusted as appropriate  - Keep care items and personal belongings within reach  - Initiate and maintain comfort rounds  - Make Fall Risk Sign visible to staff  - Offer Toileting every  Hours,  in advance of need  - Initiate/Maintain alarm  - Obtain necessary fall risk management equipment:   - Apply yellow socks and bracelet for high fall risk patients  - Consider moving patient to room near nurses station  Outcome: Progressing  Goal: Maintain or return to baseline ADL function  Description: INTERVENTIONS:  -  Assess patient's ability to carry out ADLs; assess patient's baseline for ADL function and identify physical deficits which impact ability to perform ADLs (bathing, care of mouth/teeth, toileting, grooming, dressing, etc.)  - Assess/evaluate cause of self-care deficits   - Assess range of motion  - Assess patient's mobility; develop plan if impaired  - Assess patient's need for assistive devices and provide as appropriate  - Encourage maximum independence but intervene and supervise when necessary  - Involve family in performance of ADLs  - Assess for home care needs following discharge   - Consider OT consult to assist with ADL evaluation and planning for discharge  - Provide patient education as appropriate  Outcome: Progressing  Goal: Maintains/Returns to pre admission functional level  Description: INTERVENTIONS:  - Perform AM-PAC 6 Click Basic Mobility/ Daily Activity assessment daily.  - Set and communicate daily mobility goal to care team and patient/family/caregiver.   - Collaborate with rehabilitation services on mobility goals if consulted  - Perform Range of Motion  times a day.  - Reposition patient every  hours.  - Dangle patient  times a day  - Stand patient  times a day  - Ambulate patient  times a day  - Out of bed to chair  times a day   - Out of bed for meals  times a day  - Out of bed for toileting  - Record patient progress and toleration of activity level   Outcome: Progressing     Problem: DISCHARGE PLANNING  Goal: Discharge to home or other facility with appropriate resources  Description: INTERVENTIONS:  - Identify barriers to discharge w/patient and caregiver  - Arrange for  needed discharge resources and transportation as appropriate  - Identify discharge learning needs (meds, wound care, etc.)  - Arrange for interpretive services to assist at discharge as needed  - Refer to Case Management Department for coordinating discharge planning if the patient needs post-hospital services based on physician/advanced practitioner order or complex needs related to functional status, cognitive ability, or social support system  Outcome: Progressing     Problem: Knowledge Deficit  Goal: Patient/family/caregiver demonstrates understanding of disease process, treatment plan, medications, and discharge instructions  Description: Complete learning assessment and assess knowledge base.  Interventions:  - Provide teaching at level of understanding  - Provide teaching via preferred learning methods  Outcome: Progressing

## 2025-02-17 NOTE — UTILIZATION REVIEW
Initial Clinical Review    Admission: Date/Time/Statement:   Admission Orders (From admission, onward)       Ordered        02/14/25 0028  INPATIENT ADMISSION  Once                          Orders Placed This Encounter   Procedures    INPATIENT ADMISSION     Standing Status:   Standing     Number of Occurrences:   1     Level of Care:   Med Surg [16]     Estimated length of stay:   More than 2 Midnights     Certification:   I certify that inpatient services are medically necessary for this patient for a duration of greater than two midnights. See H&P and MD Progress Notes for additional information about the patient's course of treatment.     ED Arrival Information       Expected   -    Arrival   2/13/2025 18:57    Acuity   Urgent              Means of arrival   Walk-In    Escorted by   Spouse    Service   Hospitalist    Admission type   Emergency              Arrival complaint   Abnormal lab tests             Chief Complaint   Patient presents with    Abnormal Lab     Pt being worked up for sclerosing myositis. Pt had labs done yesterday and states BUN and creat was elevated and had protein in urine.        Initial Presentation: 54 y.o. female to ED as walk in as Inpatient admission due to ABN lab work, TITUS, HTN, undifferentiated connective tissue disease, psoriatic arthritis.    Presents w abnormal outpatient labs, specifically worsening renal function - CR baseline is 0.6-0.9 and result  2.26   Following w OP Rheumatology for worsening Raynaud's syndrome w muscle weakness, perioral edema and edema in hands and feet. She has been on prednisone tapers in the past with improvement on 40 mg for next 10 days w taper. Sympts worsening around [place] w developing GERD, ARITA w increase in Prednisone alleviating sympt. Edema intermittent, worse in AM. Concerning muscle weakness, she reported lifting her arms up felt weak and would occasionally hurt sometimes in the past. Currently urine slightly frothy in the last week.   Her blood pressure normally is 130s/80s and the highest she typically notes at home is 150/90.  Not started methotrexate yet.     EXAM  Mild edema to areas aforemtioned.   /100 responsive to labetalol (nephro's choice). UA showing large leukocytes, small occult blood, 1+ protein. TITUS (CR baseline 0.6 to 0.7 mg/d), anemia; Nephro consult w recs for with labs ordered and US.  Hold ARB/chlorthalidone, 100ml/isolyte IVF till AM for fluid challenge, PRN labetalol to decrease pressures at typical 'severe' range rates, NPO till AM for possible renal biopsy.  Autoimmune, myositis, vasculitis labs still pending    PMH undifferentiated connective tissue disease, AUGUSTO, HTN   Obtain renal artery doppler, metanephrine testing, renin/Lorenzo testing, and to hold ARB/chlorthalidone.  100 cc isolate fluids given for fluid challenge.  PRN labetalol for elevated blood pressure.  N.p.o. till morning for possible renal biopsy. Appreciate nephrology recommendations, cont prednisone;   Nephrology  Baseline creatinine 0.6 to 0.7 mg/dL  Creatinine slightly higher than baseline 0.9 mg/dL in December  OP Creatinine prior to admission was 2.3 mg/dL on admission was 2.3 mg/dLand relatively unchanged on 2/14 at 2.1 mg/dL  URI sympts reported to our advanced practitioner in early November, subsequently had influenza vaccine at the end of November and couple days later began to notice myalgia, periorbital edema and swelling.  Etiology of acute kidney injury is unclear.   Continue low-dose intravenous fluids for 500 cc  Avoid ACE inhibitor due to history of cough with ACE inhibitor and I have added to patient's allergy list  Holding ARB for now  Holding diuretic for now  Continue amlodipine   Start oral labetalol   May need to consider clonidine if blood pressures remain significantly elevated Attempting to avoid diuretics for now   I/os; avoid nephrotoxic agents  Avoid hypotension  Renal Biopsy -Ingrid biopsy form completion  N.p.o. past  midnight Sunday night   Agree with low-dose potassium repletion  If blood pressures improve less than 145 systolic at least by Sunday, could consider placing IR consult for potential biopsy on Monday  Anticipated Length of Stay/Certification Statement:  Patient will be admitted on an inpatient basis with an anticipated length of stay of greater than 2 midnights secondary to workup for worsening renal function.  Date: 2/15/2025   Day 2:   Attending  Stable myalgia and mild ARITA, mild ankle swelling.   -170's/80-90's. Increasing labetatol and possible adding Cardura if BP later remains elevated per renal.   Noted significantly low haptoglobin. Concern for possible autoimmune hemolytic anemia.   Check direct Diana test in the morning. Will appreciate heme-onc evaluation. Spoke with Rheumatology fellow last night. Overall less concern for scleroderma renal crisis but would recommend to discontinue steroid   IR Consult  IR consulted for a random renal biopsy for Ingrid Cruz.       ED Treatment-Medication Administration from 02/13/2025 1857 to 02/14/2025 0140         Date/Time Order Dose Route Action     02/13/2025 2153 labetalol (NORMODYNE) injection 10 mg 10 mg Intravenous Given     02/13/2025 2330 multi-electrolyte (PLASMALYTE-A/ISOLYTE-S PH 7.4) IV solution 100 mL/hr Intravenous New Bag     02/14/2025 0022 labetalol (NORMODYNE) injection 10 mg 10 mg Intravenous Given     02/14/2025 0018 acetaminophen (TYLENOL) tablet 975 mg 975 mg Oral Given     02/14/2025 0020 lidocaine (LIDODERM) 5 % patch 1 patch 1 patch Topical Medication Applied            Scheduled Medications:  amLODIPine, 10 mg, Oral, Daily  atorvastatin, 10 mg, Oral, Daily  calcium carbonate-vitamin D, 1 tablet, Oral, Daily With Breakfast  [Held by provider] chlorthalidone, 25 mg, Oral, Daily  folic acid, 1 mg, Oral, Daily  heparin (porcine), 5,000 Units, Subcutaneous, Q8H TERRY  labetalol, 300 mg, Oral, Q12H TERRY  [Held by provider] losartan, 25 mg, Oral,  Daily  montelukast, 10 mg, Oral, HS  pantoprazole, 40 mg, Oral, Early Morning      Continuous IV Infusions:  Medications 02/12 02/13 02/14 02/15   multi-electrolyte (PLASMALYTE-A/ISOLYTE-S PH 7.4) IV solution  Rate: 50 mL/hr Dose: 50 mL/hr  Freq: Continuous Route: IV  Last Dose: Stopped (02/14/25 2002)  Start: 02/14/25 0945 End: 02/14/25 1954 0955 [C]     1954-D/C'd 2002 [C]         multi-electrolyte (PLASMALYTE-A/ISOLYTE-S PH 7.4) IV solution  Rate: 100 mL/hr Dose: 100 mL/hr  Freq: Continuous Route: IV  Last Dose: Stopped (02/14/25 0948)  Start: 02/13/25 2130 End: 02/14/25 0933 2330 0933-D/C'd 0948 [C]            PRN Meds:  acetaminophen, 650 mg, Oral, Q6H PRN  guaiFENesin, 600 mg, Oral, Q12H PRN  labetalol, 10 mg, Intravenous, Q6H PRN      ED Triage Vitals   Temperature Pulse Respirations Blood Pressure SpO2 Pain Score   02/13/25 1908 02/13/25 1907 02/13/25 1907 02/13/25 1934 02/13/25 1907 02/14/25 0018   98.1 °F (36.7 °C) 105 18 (!) 214/100 99 % 3     Weight (last 2 days)       None            Vital Signs (last 3 days)       Date/Time Temp Pulse Resp BP MAP (mmHg) SpO2 O2 Device Patient Position - Orthostatic VS Pain    02/16/25 2232 -- -- -- -- -- -- -- -- 3    02/16/25 22:27:54 -- 89 -- 142/100 114 97 % -- -- --    02/16/25 21:44:45 97.7 °F (36.5 °C) 103 18 142/100 132 99 % None (Room air) Lying --    02/16/25 2000 -- -- -- -- -- -- -- -- No Pain    02/16/25 18:25:26 -- 91 -- 151/86 108 98 % -- -- --    02/16/25 15:59:11 98.2 °F (36.8 °C) 81 16 144/81 102 96 % None (Room air) Sitting --    02/16/25 14:17:58 -- 83 -- 140/78 99 97 % -- -- --    02/16/25 14:17:46 -- 82 -- 140/78 99 97 % -- -- --    02/16/25 12:18:03 -- 84 -- 162/93 116 98 % -- -- --    02/16/25 09:15:51 -- 87 -- 165/94 118 98 % -- -- No Pain    02/16/25 07:37:47 -- 84 -- 168/96 120 95 % -- -- --    02/16/25 03:43:35 -- 83 -- 146/82 103 96 % -- -- --    02/16/25 00:11:45 -- 79 -- 160/91 114 94 % -- -- --    02/15/25 2235 -- -- 18  180/94 -- -- None (Room air) Sitting --    02/15/25 22:29:52 98.4 °F (36.9 °C) 85 18 184/101 129 97 % None (Room air) Sitting --    02/15/25 20:18:58 -- 89 -- 174/99 124 96 % -- -- --    02/15/25 2016 -- 89 -- 174/99 -- -- -- -- --    02/15/25 2000 -- -- -- -- -- -- -- -- No Pain    02/15/25 18:20:29 -- 89 -- 173/90 118 98 % -- -- --    02/15/25 16:36:38 97.7 °F (36.5 °C) 83 -- 170/93 119 99 % -- -- --    02/15/25 16:25:06 -- 87 -- 170/93 119 98 % -- -- --    02/15/25 16:24:07 -- 86 -- 168/95 119 99 % -- -- --    02/15/25 16:23:03 -- 85 -- 168/95 119 98 % -- -- --    02/15/25 16:03:39 -- 84 -- 173/89 117 98 % -- -- --    02/15/25 15:21:37 -- 89 18 166/94 118 98 % None (Room air) Sitting --    02/15/25 14:18:18 -- 84 -- 153/88 110 98 % -- -- --    02/15/25 11:25:01 -- 85 -- 176/98 124 100 % -- -- --    02/15/25 0813 -- -- -- -- -- -- None (Room air) -- No Pain    02/15/25 06:47:09 -- 83 -- 158/87 111 97 % -- -- --    02/15/25 04:49:08 -- 89 -- 178/97 124 98 % -- -- --    02/15/25 04:48:53 -- 89 -- 178/97 124 97 % -- -- --    02/14/25 22:01:12 97.5 °F (36.4 °C) 104 -- 171/87 115 98 % -- -- No Pain    02/14/25 2100 -- -- -- 168/89 -- -- -- -- --    02/14/25 2000 -- -- -- -- -- 98 % -- -- --    02/14/25 1900 -- -- -- 158/90 -- -- -- -- --    02/14/25 1700 -- -- -- 159/95 -- -- -- -- --    02/14/25 1500 -- -- -- 155/88 -- -- -- -- --    02/14/25 09:57:57 -- 79 -- 187/103 131 98 % -- -- --    02/14/25 09:27:38 -- 83 -- 195/106 136 99 % -- -- --    02/14/25 0859 -- 87 -- 194/106 135 100 % -- -- --    02/14/25 0810 -- -- -- -- -- -- None (Room air) -- 2    02/14/25 0600 -- 77 -- 167/93 118 97 % -- -- --    02/14/25 0500 -- 77 -- 153/90 111 -- -- -- --    02/14/25 0400 -- 75 -- 176/88 117 97 % -- -- --    02/14/25 0325 -- 79 -- 175/98 124 95 % -- -- --    02/14/25 0200 -- -- -- -- -- -- -- -- 2    02/14/25 01:48:34 98.3 °F (36.8 °C) 91 16 195/105 135 98 % -- -- --    02/14/25 0045 98.3 °F (36.8 °C) 81 18 167/86 121 96 % None  (Room air) Sitting --    02/14/25 0018 -- -- -- -- -- -- -- -- 3              Pertinent Labs/Diagnostic Test Results:   Radiology:  MRI femur left wo contrast   Final Interpretation by Thony Feldman MD (02/16 1024)      No evidence of significant muscle edema to suggest active inflammatory myositis. Also no muscle atrophy.      Severe left hip osteoarthritis.         Workstation performed: [id number]         VAS renal artery complete   Final Interpretation by Bahman Salas DO (02/14 1415)      CT abdomen pelvis wo contrast   Final Interpretation by Avery Lizarraga MD (02/13 7445)         1. No evidence of nephrolithiasis or obstructive uropathy.   2. Cystic 2.1 cm lesion arising from the tail of the pancreas. Recommend 6-month follow-up CT.   3. Indeterminate radiolucent lesion in the L1 vertebral body.      Workstation performed: [id number]         XR chest 2 views   ED Interpretation by Micha Hutton DO (02/13 2322)   No acute pathology      Final Interpretation by Juancho Weber MD (02/14 8424)      No acute cardiopulmonary disease.            Workstation performed: [id number]           Cardiology:  ECG 12 lead   Final Result by Barbara Zazueta MD (02/14 2058)   Normal sinus rhythm   Rightward axis   Borderline ECG   No previous ECGs available   Confirmed by Barbara Zazueta ([id number]) on 2/14/2025 1:18:17 PM        GI:  No orders to display       Results from last 7 days   Lab Units 02/15/25  2321   SARS-COV-2  Negative     Results from last 7 days   Lab Units 02/17/25  0611 02/16/25  0413 02/15/25  0451 02/14/25  0622 02/13/25  1935 02/12/25  1527   WBC Thousand/uL 10.14 10.28* 13.76* 13.11* 18.52* 16.48*   HEMOGLOBIN g/dL 10.2* 10.3* 10.9* 10.5* 12.1 12.2   HEMATOCRIT % 29.7* 30.2* 31.8* 30.3* 34.5* 35.3   PLATELETS Thousands/uL 117* 136* 164 156 218 259   TOTAL NEUT ABS Thousands/µL  --   --   --   --  16.36*  --    BANDS PCT %  --   --   --   --   --  1         Results from last 7 days   Lab Units  "02/17/25  0611 02/16/25  0413 02/15/25  0451 02/14/25 0622 02/13/25 1935   SODIUM mmol/L 136 135 136 137 135   POTASSIUM mmol/L 3.7 3.1* 3.4* 3.1* 3.6   CHLORIDE mmol/L 103 101 100 101 98   CO2 mmol/L 26 26 25 28 26   ANION GAP mmol/L 7 8 11 8 11   BUN mg/dL 37* 39* 45* 49* 52*   CREATININE mg/dL 2.27* 2.30* 2.11* 2.13* 2.29*   EGFR ml/min/1.73sq m 24 23 26 26 23   CALCIUM mg/dL 7.9* 8.0* 8.5 8.6 9.5   MAGNESIUM mg/dL 2.2  --  2.3 2.0  --    PHOSPHORUS mg/dL  --   --  3.2 3.9  --      Results from last 7 days   Lab Units 02/13/25 2307 02/13/25 1935 02/12/25  1527   AST U/L  --  26 37   ALT U/L  --  38 42   ALK PHOS U/L  --  40 39   TOTAL PROTEIN g/dL 6.4 7.4 7.0   ALBUMIN g/dL  --  4.5 4.2   TOTAL BILIRUBIN mg/dL  --  0.99 0.86         Results from last 7 days   Lab Units 02/17/25  0611 02/16/25 0413 02/15/25  0451 02/14/25  0622 02/13/25  1935 02/12/25  1527   GLUCOSE RANDOM mg/dL 154* 154* 167* 118 183* 349*         Results from last 7 days   Lab Units 02/13/25 1935   HEMOGLOBIN A1C % 6.1*   EAG mg/dl 128     No results found for: \"BETA-HYDROXYBUTYRATE\"               Results from last 7 days   Lab Units 02/17/25 0611 02/13/25 1935   CK TOTAL U/L 38 79     Results from last 7 days   Lab Units 02/13/25 2151 02/13/25 1935   HS TNI 0HR ng/L  --  66*   HS TNI 2HR ng/L 65*  --    HSTNI D2 ng/L -1  --          Results from last 7 days   Lab Units 02/13/25 1935   PROTIME seconds 14.3   INR  1.04     Results from last 7 days   Lab Units 02/13/25  1935   TSH 3RD GENERATON uIU/mL 1.187                         Results from last 7 days   Lab Units 02/15/25  1252   FERRITIN ng/mL 72   IRON SATURATION % 24   IRON ug/dL 66   TIBC ug/dL 274.4     Results from last 7 days   Lab Units 02/15/25  1252   TRANSFERRIN mg/dL 196*         Results from last 7 days   Lab Units 02/13/25  2307   HEP B S AG  Non-reactive   HEP C AB  Reactive*   HEP B C IGM  Non-reactive   HEP B C TOTAL AB  Non-reactive         Results from last 7 " days   Lab Units 02/14/25  0622   CRP mg/L 6.7*   SED RATE mm/hour 2             Results from last 7 days   Lab Units 02/13/25  2151 02/12/25  1527   CLARITY UA   --  Clear   COLOR UA   --  Light Yellow   SPEC GRAV UA   --  1.011   PH UA   --  5.5   GLUCOSE UA mg/dl  --  1000 (1%)*   KETONES UA mg/dl  --  Negative   BLOOD UA   --  Small*   PROTEIN UA mg/dl  --  50 (1+)*   NITRITE UA   --  Negative   BILIRUBIN UA   --  Negative   UROBILINOGEN UA (BE) mg/dl  --  <2.0   LEUKOCYTES UA   --  Large*   WBC UA /hpf  --  30-50*   RBC UA /hpf  --  1-2   BACTERIA UA /hpf  --  Occasional   EPITHELIAL CELLS WET PREP /hpf  --  Occasional   CREATININE UR mg/dL 78.4 61.5   PROTEIN UR mg/dL  --  84.7   PROT/CREAT RATIO UR   --  1.4*     Results from last 7 days   Lab Units 02/15/25  2321   INFLUENZA A PCR  Negative   INFLUENZA B PCR  Negative   RSV PCR  Negative                             Results from last 7 days   Lab Units 02/14/25  1646   URINE CULTURE  No Growth <1000 cfu/mL                   Past Medical History:   Diagnosis Date    Allergic     latex, some tree nuts, seasonal    Arthritis     psoriatic arthritis    Asthma     CPAP (continuous positive airway pressure) dependence     Diabetes mellitus (HCC)     gestational    Gestational diabetes     Hypertension     patient reports    Obesity     PCOS (polycystic ovarian syndrome)     Sleep apnea     Varicella      Present on Admission:   Accelerated hypertension   AUGUSTO on CPAP      Admitting Diagnosis: Edema [R60.9]  Cyst of pancreas [K86.2]  Hypertension [I10]  Abnormal laboratory test result [R89.9]  Renal cyst, left [N28.1]  TITUS (acute kidney injury) (HCC) [N17.9]  Abnormal CT of thoracic spine [R93.7]  Abnormal CT scan, lumbar spine [R93.7]  Degenerative joint disease of left hip [M16.12]  Central stenosis of spinal canal [M48.00]  Age/Sex: 54 y.o. female    Network Utilization Review Department  ATTENTION: Please call with any questions or concerns to [phone] and  carefully listen to the prompts so that you are directed to the right person. All voicemails are confidential.   For Discharge needs, contact Care Management DC Support Team at [phone] opt. 2  Send all requests for admission clinical reviews, approved or denied determinations and any other requests to dedicated fax number below belonging to the campus where the patient is receiving treatment. List of dedicated fax numbers for the Facilities:  FACILITY NAME UR FAX NUMBER   ADMISSION DENIALS (Administrative/Medical Necessity) [phone]   DISCHARGE SUPPORT TEAM (NETWORK) [phone]   PARENT CHILD HEALTH (Maternity/NICU/Pediatrics) [phone]   [facility] [phone]   [facility] [phone]   [facility] [phone]   [facility] [phone]   [facility] [phone]   [facility] [phone]   [facility] [phone]   [facility] [phone]   [facility] [phone]   [facility] [phone]   [facility] [phone]   [facility] [phone]

## 2025-02-17 NOTE — ASSESSMENT & PLAN NOTE
Baseline creatinine 0.6-0.7  SCr was 0.91 on 12/9/24.   Sent in for outpatient SCr of 2.26 on 2/12/24 and SCr was 2.29 on admission on 2/13/25.   UA with 1+ protein, small blood, large leuk, 1-2 RBC and 30-50 WBCs. CT on 2/13/25 without hydronephrosis.   Work up: JAYESH 1:1280, SSA and SSB Ab negative, anti dsDNA 2.5, anticentromere Ab <0.7, CCP 1.8, RF negative, SCL 70 Ab 0.9 (normal), anti RNA polymerase III Ab 175, urine ALONSO negative, SPEP negative, KL ratio 0.94, Hep C +, complements normal, Haptoglobin < 10, , peripheral smear no hemolysis.   Creatinine has been 2.1 to 2.3 since admission.   SCr stable today at 2.27.  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 She may have a low level TMA which would explain the anemia, thrombocytopenia, low haptoglobin and elevated LDH  She tolerated 1 dose of captopril yesterday.  Start captopril 6.25 mg TID.   A renal biopsy is likely still necessary but would hold off for now until BP and platelets are better.

## 2025-02-17 NOTE — ASSESSMENT & PLAN NOTE
Recent Labs     02/15/25  0451 02/16/25  0413 02/17/25  0611   HGB 10.9* 10.3* 10.2*      Low haptoglobin, elevated LDH, peripheral smear without schistocytes  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  Homans test negative  B12, folate, iron panel WNL, transferrin level 196  Iron 66 within normal limits and ferritin 72 within normal limits  Total and direct bilirubin WNL    Plan:  Concern for thrombotic microangiopathy.  Hematology/oncology consulted, appreciate recommendations

## 2025-02-17 NOTE — ASSESSMENT & PLAN NOTE
Hgb stable at 10.2.   Platelets down to 117.   Work up: Haptoglobin <10, , Hemolysis smear: No schistocytes or helmet cells, Direct Diana negative  Hematology consulted.

## 2025-02-17 NOTE — PROGRESS NOTES
"Oncology Progress Note  Kari Erazo 54 y.o. female MRN: [id number]  Unit/Bed#: W -01 Encounter: [id number]      BP (!) 190/90   Pulse 97   Temp 98.6 °F (37 °C)   Resp 15   Ht 5' 4\" (1.626 m)   Wt 84.4 kg (186 lb)   SpO2 99%   BMI 31.93 kg/m²     Subjective: Patient reports mild exacerbation in shortness of breath on ambulation.  She is more tired compared to previous days.    Objective:    General Appearance:    Alert, oriented , facial swelling noticed       Eyes:    EOMI   Ears:    Normal external ear canals, both ears   Nose:   Nares normal, septum midline   Throat:   Mucosa moist. Pharynx without injection.    Neck:   Supple       Lungs:     Clear to auscultation bilaterally   Chest Wall:    No tenderness or deformity    Heart:    Regular rate and rhythm       Abdomen:     Soft, non-tender, bowel sounds +, no organomegaly           Extremities:   Puffy hand and fingers bilat, tightening of the skin of forearm, stiff fingers       Skin:   no rash or icterus.    Lymph nodes:   Cervical, supraclavicular, and axillary nodes normal   Neurologic:   No focal neurological defecits        Recent Results (from the past 48 hours)   Vitamin B12    Collection Time: 02/15/25 12:52 PM   Result Value Ref Range    Vitamin B-12 545 180 - 914 pg/mL   Folate    Collection Time: 02/15/25 12:52 PM   Result Value Ref Range    Folate >22.3 >5.9 ng/mL   TIBC Panel (incl. Iron, TIBC, % Iron Saturation)    Collection Time: 02/15/25 12:52 PM   Result Value Ref Range    Iron Saturation 24 15 - 50 %    TIBC 274.4 250 - 450 ug/dL    Iron 66 50 - 212 ug/dL    Transferrin 196 (L) 203 - 362 mg/dL    UIBC 208 155 - 355 ug/dL   Ferritin    Collection Time: 02/15/25 12:52 PM   Result Value Ref Range    Ferritin 72 11 - 307 ng/mL   COVID/FLU/RSV    Collection Time: 02/15/25 11:21 PM    Specimen: Nose; Nares   Result Value Ref Range    SARS-CoV-2 Negative Negative    INFLUENZA A PCR Negative Negative    INFLUENZA B PCR Negative " Negative    RSV PCR Negative Negative   Direct antiglobulin test    Collection Time: 02/16/25  3:41 AM   Result Value Ref Range    DIRECT ROSEANNE Negative    Basic metabolic panel    Collection Time: 02/16/25  4:13 AM   Result Value Ref Range    Sodium 135 135 - 147 mmol/L    Potassium 3.1 (L) 3.5 - 5.3 mmol/L    Chloride 101 96 - 108 mmol/L    CO2 26 21 - 32 mmol/L    ANION GAP 8 4 - 13 mmol/L    BUN 39 (H) 5 - 25 mg/dL    Creatinine 2.30 (H) 0.60 - 1.30 mg/dL    Glucose 154 (H) 65 - 140 mg/dL    Calcium 8.0 (L) 8.4 - 10.2 mg/dL    eGFR 23 ml/min/1.73sq m   CBC    Collection Time: 02/16/25  4:13 AM   Result Value Ref Range    WBC 10.28 (H) 4.31 - 10.16 Thousand/uL    RBC 3.25 (L) 3.81 - 5.12 Million/uL    Hemoglobin 10.3 (L) 11.5 - 15.4 g/dL    Hematocrit 30.2 (L) 34.8 - 46.1 %    MCV 93 82 - 98 fL    MCH 31.7 26.8 - 34.3 pg    MCHC 34.1 31.4 - 37.4 g/dL    RDW 14.9 11.6 - 15.1 %    Platelets 136 (L) 149 - 390 Thousands/uL    MPV 10.0 8.9 - 12.7 fL   Lactate dehydrogenase    Collection Time: 02/17/25  6:11 AM   Result Value Ref Range     (H) 140 - 271 U/L   Hemolysis Smear    Collection Time: 02/17/25  6:11 AM   Result Value Ref Range    Hemolysis Smear No Schistocytes or Helmet Cells noted    CBC and Platelet    Collection Time: 02/17/25  6:11 AM   Result Value Ref Range    WBC 10.14 4.31 - 10.16 Thousand/uL    RBC 3.16 (L) 3.81 - 5.12 Million/uL    Hemoglobin 10.2 (L) 11.5 - 15.4 g/dL    Hematocrit 29.7 (L) 34.8 - 46.1 %    MCV 94 82 - 98 fL    MCH 32.3 26.8 - 34.3 pg    MCHC 34.3 31.4 - 37.4 g/dL    RDW 15.2 (H) 11.6 - 15.1 %    Platelets 117 (L) 149 - 390 Thousands/uL    MPV 9.9 8.9 - 12.7 fL   Basic metabolic panel    Collection Time: 02/17/25  6:11 AM   Result Value Ref Range    Sodium 136 135 - 147 mmol/L    Potassium 3.7 3.5 - 5.3 mmol/L    Chloride 103 96 - 108 mmol/L    CO2 26 21 - 32 mmol/L    ANION GAP 7 4 - 13 mmol/L    BUN 37 (H) 5 - 25 mg/dL    Creatinine 2.27 (H) 0.60 - 1.30 mg/dL    Glucose  154 (H) 65 - 140 mg/dL    Calcium 7.9 (L) 8.4 - 10.2 mg/dL    eGFR 24 ml/min/1.73sq m   Magnesium    Collection Time: 02/17/25  6:11 AM   Result Value Ref Range    Magnesium 2.2 1.9 - 2.7 mg/dL   CK    Collection Time: 02/17/25  6:11 AM   Result Value Ref Range    Total CK 38 26 - 192 U/L   Hepatic function panel    Collection Time: 02/17/25  6:11 AM   Result Value Ref Range    Total Bilirubin 0.74 0.20 - 1.00 mg/dL    Bilirubin, Direct 0.14 0.00 - 0.20 mg/dL    Alkaline Phosphatase 31 (L) 34 - 104 U/L    AST 19 13 - 39 U/L    ALT 30 7 - 52 U/L    Total Protein 5.8 (L) 6.4 - 8.4 g/dL    Albumin 3.4 (L) 3.5 - 5.0 g/dL   Retic Count    Collection Time: 02/17/25  6:11 AM   Result Value Ref Range    Retic Ct Abs 210,700 (H) 14,097 - 95,744    Retic Ct Pct 6.69 (H) 0.37 - 1.87 %         MRI femur left wo contrast  Result Date: 2/16/2025  Narrative: MRI FEMUR LEFT WO CONTRAST INDICATION:   Myalgias, worsening Raynaud's, and acute kidney injury suspect systemic rheumatologic disease. Rule out myopathic features, guidance for possible muscle biopsy.. COMPARISON: Abdomen and pelvic CT from 2/13/2025. TECHNIQUE:  Multiplanar/multisequence MR of the above left lower extremity body part was performed. (Please note the large field-of-view coronal images also include the contralateral lower extremity.) FINDINGS: SUBCUTANEOUS TISSUES: Mild diffuse subcutaneous edema. BONES:  No fracture, dislocation or destructive osseous lesion. Severe left hip osteoarthritis. Evidenced by joint space narrowing and marginal osteophytes. No joint effusion to suggest inflammatory arthritis. ARTICULAR SURFACES:  Intact VISUALIZED MUSCULATURE: No evidence of significant muscle edema to suggest active inflammatory myositis. Also no muscle atrophy. OTHER PERTINENT FINDINGS:  None. PARTIALLY IMAGED CONTRALATERAL LOWER EXTREMITY: There are no abnormalities in the partially imaged contralateral lower extremity.     Impression: No evidence of significant  muscle edema to suggest active inflammatory myositis. Also no muscle atrophy. Severe left hip osteoarthritis. Workstation performed: [id number]     VAS renal artery complete  Result Date: 2/14/2025  Narrative:  [facility] REPORT CLINICAL: Indications: Patient presents to evaluate the renal arteries secondary to uncontrolled HTN. Patient is currently on 2 medications for blood pressure. Operative History: No cardiovascular surgeries Risk Factors The patient has history of HTN, Diabetes and Hyperlipidemia. Clinical Right Pressure:  186/102 mm Hg, Left Pressure: IV.  FINDINGS:  Sup-Minna Ao      Diameter AP: 2.2      Diameter Lateral: 2.0      PSV (cm/s): 135      EDV (cm/s): 34      RI: 0.75 Celiac      Diameter AP: 0.8      PSV (cm/s): 167      EDV (cm/s): 47 Px Inf-Tanner Ao      Diameter AP: 1.8      Diameter Lateral: 1.7      PSV (cm/s): 81      EDV (cm/s): 17      RI: 0.79 Common Hepatic Artery      Diameter AP: 0.9      PSV (cm/s): 250      EDV (cm/s): 57 Ds Inf-Tanner Ao      Diameter AP: 1.8      Diameter Lateral: 1.6      PSV (cm/s): 97      EDV (cm/s): 14      RI: 0.86 Splenic      Diameter AP: 0.9      PSV (cm/s): 116      EDV (cm/s): 26 Prox. SMA      Diameter AP: 0.9      PSV (cm/s): 213      EDV (cm/s): 45 Ostial Renal, Right      Impression: Normal      PSV (cm/s): 117      EDV (cm/s): 27      RAR: 0.86 Prox Renal, Right      PSV (cm/s): 82      EDV (cm/s): 14      RAR: 0.60 Mid Renal, Right      PSV (cm/s): 86      EDV (cm/s): 19      RAR: 0.64      RI: 0.78 Dist Renal, Right      PSV (cm/s): 81      EDV (cm/s): 19      RAR: 0.60 Kidney, Right      Kidney (cm): 11.20 Upper Pole, Right      PSV (cm/s): 15      EDV (cm/s): 4      RI: 0.73 Mid Pole, Right      PSV (cm/s): 11      EDV (cm/s): 3      RI: 0.70 Lower Pole, Right      PSV (cm/s): 14      EDV (cm/s): 4      RI: 0.69 Renal, Right      Impression: Patent Common Iliac Artery, Right      Diameter AP: 1.2      Diameter Lateral: 1.2      PSV (cm/s):  90      EDV (cm/s): 13 Ostial Renal, Left      Impression: Normal      PSV (cm/s): 79      EDV (cm/s): 14      RAR: 0.59 Prox Renal, Left      PSV (cm/s): 71      EDV (cm/s): 22      RAR: 0.52 Mid Renal, Left      PSV (cm/s): 81      EDV (cm/s): 17      RAR: 0.60 Dist Renal, Left      PSV (cm/s): 79      EDV (cm/s): 15      RAR: 0.58 Kidney, Left      Kidney (cm): 11.30 Upper Pole, Left      PSV (cm/s): 18      EDV (cm/s): 5      RI: 0.72 Mid Pole, Left      PSV (cm/s): 11      EDV (cm/s): 4      RI: 0.68 Lower Pole, Left      PSV (cm/s): 11      EDV (cm/s): 5      RI: 0.54 Renal, Left      Impression: Patent Common Iliac Artery, Left      Diameter AP: 1.3      Diameter Lateral: 1.1      PSV (cm/s): 103      EDV (cm/s): 11    CONCLUSION:  Impression The abdominal aorta is widely patent and normal caliber.  RIGHT RENAL: No evidence of significant arterial occlusive disease in the main renal artery. Patent renal vein. Adequate parenchymal flow is noted with a renovascular resistive index of 0.73. Renal/Aorta Ratio: 0.86.  The kidney measures approximately 11.2 cm.  LEFT RENAL: No evidence of significant arterial occlusive disease in the main renal artery. Patent renal vein. Adequate parenchymal flow is noted with a renovascular resistive index of 0.72. Renal/Aorta Ratio: 0.60.  The kidney measures approximately 11.3 cm.  MESENTERIC: Celiac and superior mesenteric arteries are patent.  SIGNATURE: Electronically Signed by: JAROD ESCOTO DO on 2025-02-14 02:15:45 PM    XR chest 2 views  Result Date: 2/14/2025  Narrative: XR CHEST PA AND LATERAL INDICATION: r/o pulmonary. COMPARISON: 12/7/2011 FINDINGS: Clear lungs. No pneumothorax or pleural effusion. Normal cardiomediastinal silhouette. Bones are unremarkable for age. Normal upper abdomen.     Impression: No acute cardiopulmonary disease. Workstation performed: [id number]     CT abdomen pelvis wo contrast  Result Date: 2/13/2025  Narrative: CT ABDOMEN AND PELVIS  WITHOUT IV CONTRAST INDICATION: Acute renal failure. COMPARISON: 12/2/2014. TECHNIQUE: CT examination of the abdomen and pelvis was performed without intravenous contrast. Multiplanar 2D reformatted images were created from the source data. This examination, like all CT scans performed in the [facility], was performed utilizing techniques to minimize radiation dose exposure, including the use of iterative reconstruction and automated exposure control. Radiation dose length product (DLP) for this visit: 737 mGy-cm Enteric Contrast: Not administered. FINDINGS: ABDOMEN LOWER CHEST: Clear lung bases. LIVER/BILIARY TREE: Unremarkable. GALLBLADDER: No calcified gallstones. No pericholecystic inflammatory change. SPLEEN: Unremarkable. PANCREAS: Cystic lesion likely arising from the tail of the pancreas measuring 2.1 cm. ADRENAL GLANDS: Unremarkable. KIDNEYS/URETERS: Left renal cortical 2.5 cm cyst. No nephrolithiasis or obstructive uropathy. STOMACH AND BOWEL: No bowel obstruction, colitis or diverticulitis. APPENDIX: Normal appendix. ABDOMINOPELVIC CAVITY: No ascites. No pneumoperitoneum. No lymphadenopathy. VESSELS: Unremarkable for patient's age. PELVIS REPRODUCTIVE ORGANS: No adnexal mass or cyst. URINARY BLADDER: Unremarkable. ABDOMINAL WALL/INGUINAL REGIONS: Unremarkable. BONES: Previously demonstrated T11 lesion demonstrates interval remodeling of the posterior wall, possibly representing a hemangioma. L1 vertebral body radiolucent lesion, indeterminate. Posterior disc osteophyte complex disease throughout the lumbar spine resulting in moderate to severe central canal stenosis and neural foraminal narrowing. Moderate to severe degenerative change in the left hip     Impression: 1. No evidence of nephrolithiasis or obstructive uropathy. 2. Cystic 2.1 cm lesion arising from the tail of the pancreas. Recommend 6-month follow-up CT. 3. Indeterminate radiolucent lesion in the L1 vertebral body.  Workstation performed: [id number]     Echo complete w/ contrast if indicated  Result Date: 2/12/2025  Narrative:   Left Ventricle: Left ventricular cavity size is normal. Wall thickness is moderately increased. The left ventricular ejection fraction is 70%. Systolic function is vigorous. Wall motion is normal. Diastolic function is moderately abnormal, consistent with grade II (pseudonormal) relaxation.   Right Ventricle: Right ventricular cavity size is mildly dilated.   Right Atrium: The atrium is mildly dilated.   Mitral Valve: There is mild regurgitation.   Tricuspid Valve: There is mild regurgitation.   Pericardium: There is a small pericardial effusion anterior to the heart.         Assessment and Plan:  54-year-old male with history significant for hypertension, raynauds, connective tissue disorder [likely scleroderma] was admitted on account of acute rise in BP, serum creatinine from baseline of 1.1 to 2.3, facial swelling.  Patient has recently been undergoing evaluation by rheumatology for immune related connective tissue disorder.  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    Hematology was consulted regarding abnormal lab values which indicated mild drop in hemoglobin, haptoglobin <10 and mildly elevated LDH but peripheral smear did not show any signs of hemolysis/schistocytes.  Her MARCO is negative.  Her platelets are above 100 K.  Her hep C quantitative PCR is negative. Low suspicion for microangiopathic hemolysis (TTP, HUS, aHUS).  We have ordered cold agglutinin antibodies for completion of hemolytic workup.  Patient's BUN and creatinine were both elevated indicating prerenal cause.  At this point patient is to follow-up with rheumatology.  As per recommendation patient will undergo biopsy of her kidneys once her blood pressure is under control.  We agree with this plan.  We will follow-up with patient peripherally.  Monitor CBC with differential and CMP daily.  Please  reach out with any questions.

## 2025-02-17 NOTE — HOSPITAL COURSE
54-year-old female who presented with abnormal outpatient lab work, specifically creatinine 2.26 when her baseline is 0.6-0.7.  She presented with blood pressures as high as 210s/100s.  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  Nephrology was consulted.  Extensive autoimmune and rheumatologic workup was started.  Patient's home antihypertensive agents included amlodipine 10 mg, chlorthalidone 25 mg, losartan 25 mg.  Due to TITUS, chlorthalidone and losartan were held.  She was started on labetalol 300 mg BID, doxazosin 2 mg, and home amlodipine 10 mg was continued with improved blood pressure control.  CTAP was significant for incidental cystic 2.1 cm lesion in tail of pancreas and indeterminate radiolucent lesion in L1 vertebral body; otherwise, no evidence of nephrolithiasis or obstructive uropathy.  VAS renal artery was negative for any significant arterial occlusive disease.  Due to anemia, high LDH, and low haptoglobin, there was concern for thrombotic microangiopathy for which hematology was consulted.  Due to high positive RNA polymerase III antibody, there was increased suspicion for scleroderma renal crisis.  As a result, she was started on low-dose captopril.  Rheumatology was also consulted.  They recommended discontinuing beta-blockers as this can worsen with vasospasms, discontinue home prednisone, and also recommended renal biopsy once blood pressure improves.  MRI of the left femur did not show any evidence of active inflammatory myositis.

## 2025-02-17 NOTE — ASSESSMENT & PLAN NOTE
Lab Results   Component Value Date    K 3.7 02/17/2025    K 3.1 (L) 02/16/2025    K 3.4 (L) 02/15/2025     Monitor and replete

## 2025-02-17 NOTE — ASSESSMENT & PLAN NOTE
Has been following with rheumatology in the outpatient setting due to progressively worsening edema in hands and feet and myalgias, Raynaud's symptoms after getting influenza vaccination around [facility]  Since then, blood pressures have been worsening. Has been on multiple prednisone tapers  Echo (2/12) - EF 70%. G2DD.  RV and LV mildly dilated.  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 To  MRI of the femur left-was done to rule out myositis-did not show any myositis or muscle atrophy but showed severe left hip osteoarthritis  Labs so far:  JAYESH positive, titer at 1280 with nucleolar pattern  Sjogren antibodies   Anticentromere antibody negative  CK within normal range  Cyclic Citrullinated peptide antibody negative  Rheumatoid factor negative  Anti-Jo1 antibody negative  Antiscleroderma antibody negative  RNA polymerase 3 IgG antibody elevated and positive   JAYESH screen with reflex: UPEP, SPEP negative  FABIANA screen and Anti-dsDNA ab negative   Ig Kappa and Lambda free light chains elevated  C3/C4 normal   Pending anti-Th/To ab, Anti-HMGCR ab, Anti-neutrophilic cytoplasmic ab   2/17 fine crackles heard on bilateral lower lung fields - non-contrast chest CT ordered for suspicion of ILD in setting of sclerodermal renal crisis     Plan:  Rheumatology consulted, appreciate recommendations  Patient was initiated on prednisone which was stopped by rheumatology 2/15 due to increased concerns for sclerodermal renal crisis   RNA polymerase III positive-high 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

## 2025-02-17 NOTE — QUICK NOTE
Pt's BP improved to 154/83 current after Cardura and procardia that she took around 5pm . Last dose captopril 6.25 mg was given 3 pm.  Pt is agreeable to be upgraded to level 2 stepdown. Will check BP hourly overnight. ON call team will contact nephrology for SBP>150 mmHg. Next dose captopril increased to 12.5 mg at 9 pm.  Appreciated multidisciplinary team discussion ( Nephrology, Rheumatology, Hemonc, and Critical care)

## 2025-02-17 NOTE — PROGRESS NOTES
NEPHROLOGY HOSPITAL PROGRESS NOTE   Kari Erazo 54 y.o. female MRN: [id number]  Unit/Bed#: W -01 Encounter: [id number]  Reason for Consult: TITUS    Assessment & Plan  Acute kidney injury (HCC)  Baseline creatinine 0.6-0.7  SCr was 0.91 on 12/9/24.   Sent in for outpatient SCr of 2.26 on 2/12/24 and SCr was 2.29 on admission on 2/13/25.   UA with 1+ protein, small blood, large leuk, 1-2 RBC and 30-50 WBCs. CT on 2/13/25 without hydronephrosis.   Work up: JAYESH 1:1280, SSA and SSB Ab negative, anti dsDNA 2.5, anticentromere Ab <0.7, CCP 1.8, RF negative, SCL 70 Ab 0.9 (normal), anti RNA polymerase III Ab 175, urine ALONSO negative, SPEP negative, KL ratio 0.94, Hep C +, complements normal, Haptoglobin < 10, , peripheral smear no hemolysis.   Creatinine has been 2.1 to 2.3 since admission.   SCr stable today at 2.27.  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 She may have a low level TMA which would explain the anemia, thrombocytopenia, low haptoglobin and elevated LDH  She tolerated 1 dose of captopril yesterday.  Start captopril 6.25 mg TID.   A renal biopsy is likely still necessary but would hold off for now until BP and platelets are better.     Accelerated hypertension  Home Rx: Amlodipine, losartan, chlorthalidone.  BP elevated likely due to SRC.   Current Rx: Amlodipine 10 mg daily, doxazosin 2 mg twice a day.  Start Captopril 6.25 mg TID.   Work up: Renal artery Doppler showed no PATO.  Aldosterone/renin, metanephrines are pending.  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     Hypokalemia  K 3.7 today.   Monitor with Captopril initiation.     Anemia  Hgb stable at 10.2.   Platelets down to 117.   Work up: Haptoglobin <10, , Hemolysis smear: No schistocytes or helmet cells, Direct Diana negative  Hematology consulted.     Undifferentiated connective tissue disease (HCC)  Concern for scleroderma +/ - overlap.   Management per rheumatology    Abnormal  finding on imaging  Cystic lesion tail of the pancreas noted on CT and indeterminant radiolucent lesion L1 vertebral body  CT imaging follow-up as recommended    Hepatitis C antibody test positive  Hep C RNA pending    TODAY's DISCUSSION / PLAN:  Start Captopril 6.25 mg TID for BP control in the setting of suspected scleroderma renal crisis.  We are planning to hold off on renal biopsy for now given hypertension and thrombocytopenia.  Escalate dosing of Captopril over the next 48 hours.   May consider addition of loop diuretic in the next 24 to 48 hours.  Follow up rest of serologies.    SUBJECTIVE / 24H INTERVAL HISTORY:  No CP or SOB.   Does report some swelling.   Tolerated Captopril yesterday.     OBJECTIVE:  Current Weight: Weight - Scale: 84.4 kg (186 lb)  Vitals:    02/16/25 2144 02/16/25 2227 02/17/25 0808 02/17/25 0855   BP: 142/100 142/100 (!) 206/120 (!) 204/111   BP Location: Left arm      Pulse: 103 89 (!) 115 97   Resp: 18  15    Temp: 97.7 °F (36.5 °C)  98.6 °F (37 °C)    TempSrc: Oral      SpO2: 99% 97% 97% 99%   Weight:       Height:           Intake/Output Summary (Last 24 hours) at 2/17/2025 0855  Last data filed at 2/16/2025 2040  Gross per 24 hour   Intake 1260 ml   Output 0 ml   Net 1260 ml     General: conscious, cooperative, no distress  Skin: dry  Eyes: pink conjunctivae  ENT: moist mucous membranes  Respiratory: equal chest expansion, crackles on L base.   Cardiovascular: distinct heart sounds, normal rate, regular rhythm, no rub  Abdomen: soft, non tender, non distended, normal bowel sounds  Extremities: (+) LE edema.   Genitourinary: no dunlap catheter.   Neuro: awake, alert.   Psych: appropriate affect    Medications:    Current Facility-Administered Medications:     acetaminophen (TYLENOL) tablet 650 mg, 650 mg, Oral, Q6H PRN, Haroon Chavis MD, 650 mg at 02/16/25 2232    amLODIPine (NORVASC) tablet 10 mg, 10 mg, Oral, Daily, Thuy Patrick MD, 10 mg at 02/16/25 0918    atorvastatin (LIPITOR)  "tablet 10 mg, 10 mg, Oral, Daily, Thuy Patrick MD, 10 mg at 02/16/25 0918    calcium carbonate-vitamin D 500 mg-5 mcg tablet 1 tablet, 1 tablet, Oral, Daily With Breakfast, Thuy Patrick MD, 1 tablet at 02/16/25 0739    doxazosin (CARDURA) tablet 2 mg, 2 mg, Oral, BID, Gavin Zazueta MD, 2 mg at 02/16/25 1823    folic acid (FOLVITE) tablet 1 mg, 1 mg, Oral, Daily, Thuy Patrick MD, 1 mg at 02/16/25 0918    guaiFENesin (MUCINEX) 12 hr tablet 600 mg, 600 mg, Oral, Q12H PRN, Tisha Tolbert MD    labetalol (NORMODYNE) injection 10 mg, 10 mg, Intravenous, Q6H PRN, Lakisha Woods DO, 10 mg at 02/15/25 2234    montelukast (SINGULAIR) tablet 10 mg, 10 mg, Oral, HS, Thuy Patrick MD, 10 mg at 02/16/25 2143    pantoprazole (PROTONIX) EC tablet 40 mg, 40 mg, Oral, Early Morning, Lakisha Woods DO, 40 mg at 02/17/25 0625    Laboratory Results:  Results from last 7 days   Lab Units 02/17/25  0611 02/16/25  0413 02/15/25  0451 02/14/25  0622 02/13/25  1935 02/12/25  1527   WBC Thousand/uL 10.14 10.28* 13.76* 13.11* 18.52* 16.48*   HEMOGLOBIN g/dL 10.2* 10.3* 10.9* 10.5* 12.1 12.2   HEMATOCRIT % 29.7* 30.2* 31.8* 30.3* 34.5* 35.3   PLATELETS Thousands/uL 117* 136* 164 156 218 259   POTASSIUM mmol/L 3.7 3.1* 3.4* 3.1* 3.6 3.6   CHLORIDE mmol/L 103 101 100 101 98 94*   CO2 mmol/L 26 26 25 28 26 25   BUN mg/dL 37* 39* 45* 49* 52* 47*   CREATININE mg/dL 2.27* 2.30* 2.11* 2.13* 2.29* 2.26*   CALCIUM mg/dL 7.9* 8.0* 8.5 8.6 9.5 9.4   MAGNESIUM mg/dL 2.2  --  2.3 2.0  --   --    PHOSPHORUS mg/dL  --   --  3.2 3.9  --   --      Portions of the record may have been created with voice recognition software. Occasional wrong word or \"sound a like\" substitutions may have occurred due to the inherent limitations of voice recognition software. Read the chart carefully and recognize, using context, where substitutions have occurred.If you have any questions, please contact the dictating provider.    "

## 2025-02-18 LAB
ANION GAP SERPL CALCULATED.3IONS-SCNC: 8 MMOL/L (ref 4–13)
BASOPHILS # BLD AUTO: 0.04 THOUSANDS/ΜL (ref 0–0.1)
BASOPHILS NFR BLD AUTO: 0 % (ref 0–1)
BUN SERPL-MCNC: 37 MG/DL (ref 5–25)
C-ANCA TITR SER IF: NORMAL TITER
CALCIUM SERPL-MCNC: 7.8 MG/DL (ref 8.4–10.2)
CHLORIDE SERPL-SCNC: 100 MMOL/L (ref 96–108)
CMV IGG SERPL QL IA: NEGATIVE
CMV IGM SERPL QL IA: NEGATIVE
CO2 SERPL-SCNC: 25 MMOL/L (ref 21–32)
CREAT SERPL-MCNC: 2.43 MG/DL (ref 0.6–1.3)
EOSINOPHIL # BLD AUTO: 0.2 THOUSAND/ΜL (ref 0–0.61)
EOSINOPHIL NFR BLD AUTO: 2 % (ref 0–6)
EPO SERPL-ACNC: 58.5 MIU/ML (ref 2.6–18.5)
ERYTHROCYTE [DISTWIDTH] IN BLOOD BY AUTOMATED COUNT: 15.8 % (ref 11.6–15.1)
GFR SERPL CREATININE-BSD FRML MDRD: 22 ML/MIN/1.73SQ M
GLUCOSE SERPL-MCNC: 151 MG/DL (ref 65–140)
HAPTOGLOB SERPL-MCNC: <10 MG/DL (ref 33–346)
HCT VFR BLD AUTO: 29 % (ref 34.8–46.1)
HGB BLD-MCNC: 9.8 G/DL (ref 11.5–15.4)
IMM GRANULOCYTES # BLD AUTO: 0.24 THOUSAND/UL (ref 0–0.2)
IMM GRANULOCYTES NFR BLD AUTO: 2 % (ref 0–2)
LYMPHOCYTES # BLD AUTO: 1.01 THOUSANDS/ΜL (ref 0.6–4.47)
LYMPHOCYTES NFR BLD AUTO: 9 % (ref 14–44)
MCH RBC QN AUTO: 32.3 PG (ref 26.8–34.3)
MCHC RBC AUTO-ENTMCNC: 33.8 G/DL (ref 31.4–37.4)
MCV RBC AUTO: 96 FL (ref 82–98)
METANEPH FREE SERPL-MCNC: <25 PG/ML (ref 0–88)
MONOCYTES # BLD AUTO: 0.73 THOUSAND/ΜL (ref 0.17–1.22)
MONOCYTES NFR BLD AUTO: 6 % (ref 4–12)
MYELOPEROXIDASE AB SER IA-ACNC: <0.2 UNITS (ref 0–0.9)
NEUTROPHILS # BLD AUTO: 9.43 THOUSANDS/ΜL (ref 1.85–7.62)
NEUTS SEG NFR BLD AUTO: 81 % (ref 43–75)
NORMETANEPHRINE SERPL-MCNC: 117.9 PG/ML (ref 0–244)
NRBC BLD AUTO-RTO: 0 /100 WBCS
P-ANCA ATYPICAL TITR SER IF: NORMAL TITER
P-ANCA TITR SER IF: NORMAL TITER
PLATELET # BLD AUTO: 124 THOUSANDS/UL (ref 149–390)
PMV BLD AUTO: 10.1 FL (ref 8.9–12.7)
POTASSIUM SERPL-SCNC: 3.8 MMOL/L (ref 3.5–5.3)
PROTEINASE3 AB SER IA-ACNC: <0.2 UNITS (ref 0–0.9)
RBC # BLD AUTO: 3.03 MILLION/UL (ref 3.81–5.12)
SODIUM SERPL-SCNC: 133 MMOL/L (ref 135–147)
WBC # BLD AUTO: 11.65 THOUSAND/UL (ref 4.31–10.16)

## 2025-02-18 PROCEDURE — 80048 BASIC METABOLIC PNL TOTAL CA: CPT | Performed by: INTERNAL MEDICINE

## 2025-02-18 PROCEDURE — 85025 COMPLETE CBC W/AUTO DIFF WBC: CPT

## 2025-02-18 PROCEDURE — 99232 SBSQ HOSP IP/OBS MODERATE 35: CPT | Performed by: STUDENT IN AN ORGANIZED HEALTH CARE EDUCATION/TRAINING PROGRAM

## 2025-02-18 PROCEDURE — 99232 SBSQ HOSP IP/OBS MODERATE 35: CPT | Performed by: HOSPITALIST

## 2025-02-18 RX ORDER — FAMOTIDINE 20 MG/1
20 TABLET, FILM COATED ORAL DAILY PRN
Status: DISCONTINUED | OUTPATIENT
Start: 2025-02-18 | End: 2025-02-18

## 2025-02-18 RX ORDER — CAPTOPRIL 12.5 MG/1
25 TABLET ORAL 3 TIMES DAILY
Status: CANCELLED | OUTPATIENT
Start: 2025-02-18

## 2025-02-18 RX ORDER — CAPTOPRIL 12.5 MG/1
50 TABLET ORAL 3 TIMES DAILY
Status: DISCONTINUED | OUTPATIENT
Start: 2025-02-18 | End: 2025-02-22 | Stop reason: HOSPADM

## 2025-02-18 RX ORDER — FAMOTIDINE 20 MG/1
20 TABLET, FILM COATED ORAL DAILY
Status: DISCONTINUED | OUTPATIENT
Start: 2025-02-18 | End: 2025-02-18

## 2025-02-18 RX ORDER — CALCIUM CARBONATE 500 MG/1
500 TABLET, CHEWABLE ORAL 3 TIMES DAILY PRN
Status: DISCONTINUED | OUTPATIENT
Start: 2025-02-18 | End: 2025-02-19

## 2025-02-18 RX ORDER — CAPTOPRIL 12.5 MG/1
25 TABLET ORAL 3 TIMES DAILY
Status: DISCONTINUED | OUTPATIENT
Start: 2025-02-18 | End: 2025-02-18

## 2025-02-18 RX ORDER — TORSEMIDE 10 MG/1
5 TABLET ORAL DAILY
Status: DISCONTINUED | OUTPATIENT
Start: 2025-02-18 | End: 2025-02-19

## 2025-02-18 RX ORDER — HEPARIN SODIUM 5000 [USP'U]/ML
5000 INJECTION, SOLUTION INTRAVENOUS; SUBCUTANEOUS EVERY 8 HOURS SCHEDULED
Status: DISCONTINUED | OUTPATIENT
Start: 2025-02-18 | End: 2025-02-20

## 2025-02-18 RX ORDER — FAMOTIDINE 20 MG/1
20 TABLET, FILM COATED ORAL DAILY PRN
Status: DISCONTINUED | OUTPATIENT
Start: 2025-02-18 | End: 2025-02-19

## 2025-02-18 RX ORDER — NIFEDIPINE 10 MG/1
30 CAPSULE ORAL EVERY 8 HOURS PRN
Status: DISCONTINUED | OUTPATIENT
Start: 2025-02-18 | End: 2025-02-20

## 2025-02-18 RX ADMIN — AMLODIPINE BESYLATE 10 MG: 10 TABLET ORAL at 08:33

## 2025-02-18 RX ADMIN — CAPTOPRIL 50 MG: 12.5 TABLET ORAL at 20:42

## 2025-02-18 RX ADMIN — CALCIUM CARBONATE (ANTACID) CHEW TAB 500 MG 500 MG: 500 CHEW TAB at 00:08

## 2025-02-18 RX ADMIN — CAPTOPRIL 25 MG: 12.5 TABLET ORAL at 15:07

## 2025-02-18 RX ADMIN — PANTOPRAZOLE SODIUM 40 MG: 40 TABLET, DELAYED RELEASE ORAL at 05:11

## 2025-02-18 RX ADMIN — ATORVASTATIN CALCIUM 10 MG: 10 TABLET, FILM COATED ORAL at 08:33

## 2025-02-18 RX ADMIN — DOXAZOSIN 2 MG: 1 TABLET ORAL at 17:57

## 2025-02-18 RX ADMIN — FAMOTIDINE 20 MG: 20 TABLET, FILM COATED ORAL at 21:04

## 2025-02-18 RX ADMIN — DOXAZOSIN 2 MG: 1 TABLET ORAL at 08:33

## 2025-02-18 RX ADMIN — Medication 1 TABLET: at 08:33

## 2025-02-18 RX ADMIN — CALCIUM CARBONATE (ANTACID) CHEW TAB 500 MG 500 MG: 500 CHEW TAB at 19:45

## 2025-02-18 RX ADMIN — TORSEMIDE 5 MG: 10 TABLET ORAL at 13:24

## 2025-02-18 RX ADMIN — MONTELUKAST 10 MG: 10 TABLET, FILM COATED ORAL at 20:46

## 2025-02-18 RX ADMIN — FOLIC ACID 1 MG: 1 TABLET ORAL at 08:33

## 2025-02-18 RX ADMIN — HEPARIN SODIUM 5000 UNITS: 5000 INJECTION INTRAVENOUS; SUBCUTANEOUS at 20:46

## 2025-02-18 RX ADMIN — CAPTOPRIL 12.5 MG: 12.5 TABLET ORAL at 08:33

## 2025-02-18 RX ADMIN — CALCIUM CARBONATE (ANTACID) CHEW TAB 500 MG 500 MG: 500 CHEW TAB at 12:30

## 2025-02-18 NOTE — ASSESSMENT & PLAN NOTE
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

## 2025-02-18 NOTE — ASSESSMENT & PLAN NOTE
Lab Results   Component Value Date    K 3.8 02/18/2025    K 3.7 02/17/2025    K 3.1 (L) 02/16/2025     Monitor and replete

## 2025-02-18 NOTE — ASSESSMENT & PLAN NOTE
Mixed connective tissue disorder, has alopecia, joint pain, right notes, skin changes   Management per rheumatology

## 2025-02-18 NOTE — PLAN OF CARE
Problem: PAIN - ADULT  Goal: Verbalizes/displays adequate comfort level or baseline comfort level  Description: Interventions:  - Encourage patient to monitor pain and request assistance  - Assess pain using appropriate pain scale  - Administer analgesics based on type and severity of pain and evaluate response  - Implement non-pharmacological measures as appropriate and evaluate response  - Consider cultural and social influences on pain and pain management  - Notify physician/advanced practitioner if interventions unsuccessful or patient reports new pain  Outcome: Progressing     Problem: INFECTION - ADULT  Goal: Absence or prevention of progression during hospitalization  Description: INTERVENTIONS:  - Assess and monitor for signs and symptoms of infection  - Monitor lab/diagnostic results  - Monitor all insertion sites, i.e. indwelling lines, tubes, and drains  - Monitor endotracheal if appropriate and nasal secretions for changes in amount and color  - [place] appropriate cooling/warming therapies per order  - Administer medications as ordered  - Instruct and encourage patient and family to use good hand hygiene technique  - Identify and instruct in appropriate isolation precautions for identified infection/condition  Outcome: Progressing     Problem: SAFETY ADULT  Goal: Patient will remain free of falls  Description: INTERVENTIONS:  - Educate patient/family on patient safety including physical limitations  - Instruct patient to call for assistance with activity   - Consult OT/PT to assist with strengthening/mobility   - Keep Call bell within reach  - Keep bed low and locked with side rails adjusted as appropriate  - Keep care items and personal belongings within reach  - Initiate and maintain comfort rounds  - Make Fall Risk Sign visible to staff  - Apply yellow socks and bracelet for high fall risk patients  - Consider moving patient to room near nurses station  Outcome: Progressing

## 2025-02-18 NOTE — ASSESSMENT & PLAN NOTE
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

## 2025-02-18 NOTE — ASSESSMENT & PLAN NOTE
Lab Results   Component Value Date    CREATININE 2.43 (H) 02/18/2025    CREATININE 2.27 (H) 02/17/2025    CREATININE 2.30 (H) 02/16/2025     Here with abnormal outpatient lab work. 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 Need frequent hourly monitoring of blood pressure to up titrate the dose of captopril. Can up titrate in increments of 25 mg every 4 to 8 hrs (Total max dose in a day is 300 to 450 in divided doses)  Recommend adjusting the dose of captopril rather than increasing the dose of other anti-hypertensive agents  If SBP is well controlled <130 tomorrow, nephrology will schedule renal biopsy    Monitor urine output   Hold home losartan, chlorthalidone  No need for further IVF at this time  Follow-up on remainder of workup

## 2025-02-18 NOTE — ASSESSMENT & PLAN NOTE
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  We discussed with benefits and risks  Avoid NSAIDs and IV contrast   Adjust medications to GFR

## 2025-02-18 NOTE — PROGRESS NOTES
Progress Note - Hospitalist   Name: Kari Erazo 54 y.o. female I MRN: [id number]  Unit/Bed#: S -01 I Date of Admission: 2/13/2025   Date of Service: 2/18/2025 I Hospital Day: 4    Assessment & Plan  Acute kidney injury (HCC)  Lab Results   Component Value Date    CREATININE 2.43 (H) 02/18/2025    CREATININE 2.27 (H) 02/17/2025    CREATININE 2.30 (H) 02/16/2025     Here with abnormal outpatient lab work. 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 Need frequent hourly monitoring of blood pressure to up titrate the dose of captopril. Can up titrate in increments of 25 mg every 4 to 8 hrs (Total max dose in a day is 300 to 450 in divided doses)  Recommend adjusting the dose of captopril rather than increasing the dose of other anti-hypertensive agents  If SBP is well controlled <130 tomorrow, nephrology will schedule renal biopsy    Monitor urine output   Hold home losartan, chlorthalidone  No need for further IVF at this time  Follow-up on remainder of workup  Accelerated hypertension  Home meds: Amlodipine 10 mg, chlorthalidone 25 mg, losartan 25 mg  Upon presentation, blood pressure was as high as 200s/100s  Asymptomatic  VAS renal artery - no evidence of significant arterial occlusive disease  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  BP this morning better controlled 150s/80s on captopril 12.5 mg TID    Plan  Nephrology following,  appreciate recommendations  Continue with captopril 12.5 mg 3 times daily, would wait 24 hours to increase dose if needed    Add torsemide 5 mg due to fluid overload   Avoid beta-blockers and other anti-hypertension agents in favor of increasing captopril dose  Cardura 2 mg twice daily  Amlodipine 10 mg daily  Hold home losartan and chlorthalidone    Undifferentiated connective tissue disease (HCC)  Has been following with rheumatology in the outpatient setting due to progressively worsening edema in hands and feet and myalgias, Raynaud's symptoms after getting influenza vaccination around [facility]  Since then, blood pressures have been worsening. Has been on multiple prednisone tapers  Echo (2/12) - EF 70%. G2DD.  RV and LV mildly dilated.  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 To  MRI of the femur left-was done to rule out myositis-did not show any myositis or muscle atrophy but showed severe left hip osteoarthritis  Labs so far:  JAYESH positive, titer at 1280 with nucleolar pattern  Sjogren antibodies   Anticentromere antibody negative  CK within normal range  Cyclic Citrullinated peptide antibody negative  Rheumatoid factor negative  Anti-Jo1 antibody negative  Antiscleroderma antibody negative  RNA polymerase 3 IgG antibody elevated and positive   JAYESH screen with reflex: UPEP, SPEP negative  FABIANA screen and Anti-dsDNA ab negative   Ig Kappa and Lambda free light chains elevated  C3/C4 normal   Pending anti-Th/To ab, Anti-HMGCR ab, Anti-neutrophilic cytoplasmic ab   2/17 fine crackles heard on bilateral lower lung fields and noncontrast CT chest ordered for concern of ILD   CT chest high resolution - Mild bibasilar juxtapleural reticulation, nonspecific but  could represent early, mild interstitial fibrosis. Mild, subtle scattered groundglass density in both lungs (302/112), nonspecific but may be infectious/inflammatory. No consolidation.   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 Need frequent hourly monitoring of blood pressure to up titrate the dose of captopril. Can up titrate in increments of 25 mg every 4 to 8 hrs (Total max dose in a day is 300 to 450 in divided doses)  Recommend adjusting the dose of captopril rather than increasing the dose of other anti-hypertensive agents  CT chest non contrast - nonspecific but could represent early, mild interstitial fibrosis, follow up with rheum     Thrombocytopenia (HCC)  Recent Labs     02/16/25  0413 02/17/25  0611 02/18/25  0528   * 117* 124*      Platelets down trending to 117 today   Low haptoglobin, elevated LDH, peripheral smear without schistocytes, normal corrected reticulocyte percentage, negative MARCO   B12, folate, iron panel WNL, transferrin level 196  Total and direct bilirubin WNL  CMV IgG and IgM negative   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  Hematology/oncology consulted, appreciate recommendations  mild drop in hemoglobin, haptoglobin <10 and mildly elevated LDH but peripheral smear did not show any signs of hemolysis/schistocytes. Her MARCO is negative. Her platelets are above 100 K. No hyperbilirubinemia. her PLASMIC score 3 points, Low Risk  , 0% Risk of severe [id number] deficiency, so no need for urgent TTP w/u or treatment , would continue trend CBC , likely mild thrombocytopenia and anemia in context of the acute illness / SRC. If continues to trend down further or evidence of hemolysis further workup can be pursued.   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  Homans test negative  B12, folate, iron panel WNL, transferrin level 196  Iron 66 within normal limits and ferritin 72 within normal limits  Total and direct bilirubin WNL    Plan:  Hematology/oncology consulted, appreciate recommendations above  Hypokalemia  Lab Results   Component Value Date    K 3.8 02/18/2025    K 3.7 02/17/2025    K 3.1 (L) 02/16/2025     Monitor and replete  AUGUSTO on CPAP  Continue CPAP  Abnormal findings on imaging  CT imaging showing cystic 2.1 cm lesion arising from tail of pancreas, recommend 6-month follow-up CT  CT imaging also showing indeterminate radiolucent lesion in L1 vertebral body  CT chest high resolution - 3 mm juxtapleural medial right upper lobe nodule. Based on current Fleischner Society 2017 Guidelines on incidental pulmonary nodule, no routine follow-up is needed if the patient is low risk. If the patient is high risk, optional follow-up chest CT at 12 months can be considered.  Discussed findings with patient.  Follow-up as outpatient    Hepatitis C antibody test positive  Hepatitis C RNA level negative, no further workup warranted   SIRS without infection or organ dysfunction (HCC)  Has SIRS criteria of leukocytosis and tachycardia  No concern for active infection  Leukocytosis is secondary to steroids    VTE Pharmacologic Prophylaxis: VTE Score: 5 High Risk (Score >/= 5) - Pharmacological DVT Prophylaxis Ordered: heparin. Sequential Compression Devices Ordered.    Mobility:   Basic  Mobility Inpatient Raw Score: 24  JH-HLM Goal: 8: Walk 250 feet or more  JH-HLM Achieved: 8: Walk 250 feet ot more  JH-HLM Goal achieved. Continue to encourage appropriate mobility.    Patient Centered Rounds: I performed bedside rounds with nursing staff today.   Discussions with Specialists or Other Care Team Provider: Nephrology     Education and Discussions with Family / Patient: Patient declined call to .     Current Length of Stay: 4 day(s)  Current Patient Status: Inpatient   Certification Statement: The patient will continue to require additional inpatient hospital stay due to sclerodermal renal crisis and pending renal biopsy.  Discharge Plan: Anticipate discharge in >72 hrs to home.    Code Status: Level 1 - Full Code    Subjective   No acute events overnight. Yesterday afternoon pressures were persistently elevated SBP >1802, 190s until captopril dosing was increased. Pressures now stable in 150s/80s. Pt seen and examined sitting in chair. Pt has noticed worsening reflux symptoms including a nighttime/ morning cough, bitter taste in mouth after meals, but without burning sensation. Pt trying to sleep with HOB elevated. Pt also endorses persistent muscle fatigue/ myalgias and SOB when trying to walk around the room every hour or so. Endorses improved LE swelling. Denies chest pain, abdominal pain, fevers or chills.     Objective :  Temp:  [98 °F (36.7 °C)-99.3 °F (37.4 °C)] 99.3 °F (37.4 °C)  HR:  [] 102  BP: (121-204)/() 158/88  Resp:  [18] 18  SpO2:  [95 %-99 %] 96 %    Body mass index is 31.93 kg/m².     Input and Output Summary (last 24 hours):     Intake/Output Summary (Last 24 hours) at 2/18/2025 0841  Last data filed at 2/17/2025 1719  Gross per 24 hour   Intake 1200 ml   Output --   Net 1200 ml       Physical Exam  Vitals and nursing note reviewed.   Constitutional:       General: She is not in acute distress.     Appearance: She is well-developed.   HENT:      Head:  Normocephalic and atraumatic.      Right Ear: External ear normal.      Left Ear: External ear normal.      Nose: Nose normal.   Eyes:      Extraocular Movements: Extraocular movements intact.      Conjunctiva/sclera: Conjunctivae normal.   Cardiovascular:      Rate and Rhythm: Regular rhythm. Tachycardia present.      Heart sounds: No murmur heard.  Pulmonary:      Effort: Pulmonary effort is normal. No respiratory distress.      Comments: Mild crackles at bases of lungs, greater on left than right  Abdominal:      Palpations: Abdomen is soft.      Tenderness: There is no abdominal tenderness.   Musculoskeletal:      Cervical back: Neck supple.   Skin:     General: Skin is warm and dry.      Comments: Swelling of hands with Raynaud phenomena  Mild lower extremity swelling  Periorbital swelling   Neurological:      General: No focal deficit present.      Mental Status: She is alert and oriented to person, place, and time.   Psychiatric:         Mood and Affect: Mood normal.         Behavior: Behavior normal.               Lab Results: I have reviewed the following results:   Results from last 7 days   Lab Units 02/18/25  0528 02/13/25  1935 02/12/25  1527   WBC Thousand/uL 11.65*   < > 16.48*   HEMOGLOBIN g/dL 9.8*   < > 12.2   HEMATOCRIT % 29.0*   < > 35.3   PLATELETS Thousands/uL 124*   < > 259   BANDS PCT %  --   --  1   SEGS PCT % 81*   < >  --    LYMPHO PCT % 9*   < > 3*   MONO PCT % 6   < > 2*   EOS PCT % 2   < > 0    < > = values in this interval not displayed.     Results from last 7 days   Lab Units 02/18/25  0528 02/17/25  0611   SODIUM mmol/L 133* 136   POTASSIUM mmol/L 3.8 3.7   CHLORIDE mmol/L 100 103   CO2 mmol/L 25 26   BUN mg/dL 37* 37*   CREATININE mg/dL 2.43* 2.27*   ANION GAP mmol/L 8 7   CALCIUM mg/dL 7.8* 7.9*   ALBUMIN g/dL  --  3.4*   TOTAL BILIRUBIN mg/dL  --  0.74   ALK PHOS U/L  --  31*   ALT U/L  --  30   AST U/L  --  19   GLUCOSE RANDOM mg/dL 151* 154*     Results from last 7 days   Lab  Units 02/13/25  1935   INR  1.04         Results from last 7 days   Lab Units 02/13/25  1935   HEMOGLOBIN A1C % 6.1*           Recent Cultures (last 7 days):   Results from last 7 days   Lab Units 02/14/25  1646   URINE CULTURE  No Growth <1000 cfu/mL       Imaging Results Review: I reviewed radiology reports from this admission including: CT chest.      Last 24 Hours Medication List:     Current Facility-Administered Medications:     acetaminophen (TYLENOL) tablet 650 mg, Q6H PRN    amLODIPine (NORVASC) tablet 10 mg, Daily    atorvastatin (LIPITOR) tablet 10 mg, Daily    calcium carbonate-vitamin D 500 mg-5 mcg tablet 1 tablet, Daily With Breakfast    captopril (CAPOTEN) tablet 12.5 mg, TID    doxazosin (CARDURA) tablet 2 mg, BID    folic acid (FOLVITE) tablet 1 mg, Daily    guaiFENesin (MUCINEX) 12 hr tablet 600 mg, Q12H PRN    montelukast (SINGULAIR) tablet 10 mg, HS    NIFEdipine (PROCARDIA) capsule 30 mg, Q8H PRN    pantoprazole (PROTONIX) EC tablet 40 mg, Early Morning    Administrative Statements   Milagros Glover, MS3  Today, Patient Was Seen By: Milagros Glover    **Please Note: This note may have been constructed using a voice recognition system.**

## 2025-02-18 NOTE — ASSESSMENT & PLAN NOTE
Volume: Hypervolemic with lower extremity edema  Etiology: Secondary to scleroderma crisis  Blood pressure: Hypertensive, /87 overall trending down after captopril was increased  Recommend:  Continue with captopril 12.5 mg 3 times daily, would wait 24 hours to increase dose if needed  Add torsemide 5 mg due to fluid overload  Amlodipine  Monitor urinary output

## 2025-02-18 NOTE — ASSESSMENT & PLAN NOTE
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  Hematology/oncology consulted, appreciate recommendations  mild drop in hemoglobin, haptoglobin <10 and mildly elevated LDH but peripheral smear did not show any signs of hemolysis/schistocytes. Her MARCO is negative. Her platelets are above 100 K. No hyperbilirubinemia. her PLASMIC score 3 points, Low Risk , 0% Risk of severe OLBITQ26 deficiency, so no need for urgent TTP w/u or treatment , would continue trend CBC , likely mild thrombocytopenia and anemia in context of the acute illness / SRC. If continues to trend down further or evidence of hemolysis further workup can be pursued.

## 2025-02-18 NOTE — CASE MANAGEMENT
Case Management Progress Note    Patient name Kari Erazo  Location S /S -01 MRN [id number]  : 1970 Date 2025       LOS (days): 4  Geometric Mean LOS (GMLOS) (days):   Days to GMLOS:        OBJECTIVE:        Current admission status: Inpatient  Preferred Pharmacy:   [facility] [place])  [place] - [address]  [address]  [place] [address]  Phone: [phone] Fax: [phone]    Primary Care Provider: Melissa Matthew MD    Primary Insurance: CAPITAL  Secondary Insurance:     PROGRESS NOTE:    Weekly Care Management Length of Stay Review     Current LOS: 4 Days    Most Recent Labs:     Lab Results   Component Value Date/Time    WBC 11.65 (H) 2025 05:28 AM    HGB 9.8 (L) 2025 05:28 AM    HCT 29.0 (L) 2025 05:28 AM     (L) 2025 05:28 AM    SODIUM 133 (L) 2025 05:28 AM    K 3.8 2025 05:28 AM     2025 05:28 AM    CO2 25 2025 05:28 AM    BUN 37 (H) 2025 05:28 AM    CREATININE 2.43 (H) 2025 05:28 AM    GLUC 151 (H) 2025 05:28 AM    ALKPHOS 31 (L) 2025 06:11 AM    ALT 30 2025 06:11 AM    AST 19 2025 06:11 AM    ALB 3.4 (L) 2025 06:11 AM    TBILI 0.74 2025 06:11 AM       Most Recent Vitals:   Vitals:    25 1129   BP:    Pulse: 102   Resp: 18   Temp: 99.2 °F (37.3 °C)   SpO2: 97%        Identified Barriers to Discharge/Discharge Goals/Care Management Interventions: TITUS, nephrology consulted, hematology/ oncology consulted. Rheumatology consulted.     Intended Discharge Disposition: anticipated home    Expected Discharge Date:  48hrs

## 2025-02-18 NOTE — ASSESSMENT & PLAN NOTE
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

## 2025-02-18 NOTE — ASSESSMENT & PLAN NOTE
CT imaging showing cystic 2.1 cm lesion arising from tail of pancreas, recommend 6-month follow-up CT  CT imaging also showing indeterminate radiolucent lesion in L1 vertebral body  CT chest high resolution - 3 mm juxtapleural medial right upper lobe nodule. Based on current Fleischner Society 2017 Guidelines on incidental pulmonary nodule, no routine follow-up is needed if the patient is low risk. If the patient is high risk, optional follow-up chest CT at 12 months can be considered.  Discussed findings with patient.  Follow-up as outpatient

## 2025-02-18 NOTE — ASSESSMENT & PLAN NOTE
Has been following with rheumatology in the outpatient setting due to progressively worsening edema in hands and feet and myalgias, Raynaud's symptoms after getting influenza vaccination around [facility]  Since then, blood pressures have been worsening. Has been on multiple prednisone tapers  Echo (2/12) - EF 70%. G2DD.  RV and LV mildly dilated.  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 To  MRI of the femur left-was done to rule out myositis-did not show any myositis or muscle atrophy but showed severe left hip osteoarthritis  Labs so far:  JAYESH positive, titer at 1280 with nucleolar pattern  Sjogren antibodies   Anticentromere antibody negative  CK within normal range  Cyclic Citrullinated peptide antibody negative  Rheumatoid factor negative  Anti-Jo1 antibody negative  Antiscleroderma antibody negative  RNA polymerase 3 IgG antibody elevated and positive   JAYESH screen with reflex: UPEP, SPEP negative  FABIANA screen and Anti-dsDNA ab negative   Ig Kappa and Lambda free light chains elevated  C3/C4 normal   Pending anti-Th/To ab, Anti-HMGCR ab, Anti-neutrophilic cytoplasmic ab   2/17 fine crackles heard on bilateral lower lung fields and noncontrast CT chest ordered for concern of ILD   CT chest high resolution - Mild bibasilar juxtapleural reticulation, nonspecific but could represent early, mild interstitial fibrosis. Mild, subtle scattered groundglass density in both lungs (302/112), nonspecific but may be infectious/inflammatory. No consolidation.   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 Need frequent hourly monitoring of blood pressure to up titrate the dose of captopril. Can up titrate in increments of 25 mg every 4 to 8 hrs (Total max dose in a day is 300 to 450 in divided doses)  Recommend adjusting the dose of captopril rather than increasing the dose of other anti-hypertensive agents  CT chest non contrast - nonspecific but could represent early, mild interstitial fibrosis, follow up with rheum

## 2025-02-19 PROBLEM — K21.9 GERD (GASTROESOPHAGEAL REFLUX DISEASE): Status: ACTIVE | Noted: 2025-02-19

## 2025-02-19 PROBLEM — S91.309A OPEN WOUND OF HEEL: Status: ACTIVE | Noted: 2025-02-19

## 2025-02-19 LAB
ALBUMIN SERPL BCG-MCNC: 3.5 G/DL (ref 3.5–5)
ALP SERPL-CCNC: 39 U/L (ref 34–104)
ALT SERPL W P-5'-P-CCNC: 29 U/L (ref 7–52)
ANION GAP SERPL CALCULATED.3IONS-SCNC: 7 MMOL/L (ref 4–13)
AST SERPL W P-5'-P-CCNC: 21 U/L (ref 13–39)
BILIRUB SERPL-MCNC: 0.74 MG/DL (ref 0.2–1)
BUN SERPL-MCNC: 37 MG/DL (ref 5–25)
CA TITR SERPL AGGL: NEGATIVE {TITER}
CALCIUM SERPL-MCNC: 7.8 MG/DL (ref 8.4–10.2)
CHLORIDE SERPL-SCNC: 103 MMOL/L (ref 96–108)
CO2 SERPL-SCNC: 26 MMOL/L (ref 21–32)
CREAT SERPL-MCNC: 2.53 MG/DL (ref 0.6–1.3)
DSDNA IGG SERPL IA-ACNC: 1.4 IU/ML (ref ?–15)
ERYTHROCYTE [DISTWIDTH] IN BLOOD BY AUTOMATED COUNT: 16 % (ref 11.6–15.1)
GFR SERPL CREATININE-BSD FRML MDRD: 21 ML/MIN/1.73SQ M
GLUCOSE SERPL-MCNC: 133 MG/DL (ref 65–140)
HCT VFR BLD AUTO: 29.1 % (ref 34.8–46.1)
HGB BLD-MCNC: 9.6 G/DL (ref 11.5–15.4)
MCH RBC QN AUTO: 31.9 PG (ref 26.8–34.3)
MCHC RBC AUTO-ENTMCNC: 33 G/DL (ref 31.4–37.4)
MCV RBC AUTO: 97 FL (ref 82–98)
PLATELET # BLD AUTO: 130 THOUSANDS/UL (ref 149–390)
PMV BLD AUTO: 10.3 FL (ref 8.9–12.7)
POTASSIUM SERPL-SCNC: 3.7 MMOL/L (ref 3.5–5.3)
PROT SERPL-MCNC: 5.9 G/DL (ref 6.4–8.4)
RBC # BLD AUTO: 3.01 MILLION/UL (ref 3.81–5.12)
SODIUM SERPL-SCNC: 136 MMOL/L (ref 135–147)
WBC # BLD AUTO: 9.44 THOUSAND/UL (ref 4.31–10.16)

## 2025-02-19 PROCEDURE — 99232 SBSQ HOSP IP/OBS MODERATE 35: CPT | Performed by: STUDENT IN AN ORGANIZED HEALTH CARE EDUCATION/TRAINING PROGRAM

## 2025-02-19 PROCEDURE — 99221 1ST HOSP IP/OBS SF/LOW 40: CPT | Performed by: PODIATRIST

## 2025-02-19 PROCEDURE — 99232 SBSQ HOSP IP/OBS MODERATE 35: CPT | Performed by: HOSPITALIST

## 2025-02-19 PROCEDURE — 85027 COMPLETE CBC AUTOMATED: CPT

## 2025-02-19 PROCEDURE — 80053 COMPREHEN METABOLIC PANEL: CPT

## 2025-02-19 RX ORDER — FAMOTIDINE 20 MG/1
10 TABLET, FILM COATED ORAL DAILY PRN
Status: DISCONTINUED | OUTPATIENT
Start: 2025-02-19 | End: 2025-02-22 | Stop reason: HOSPADM

## 2025-02-19 RX ORDER — TORSEMIDE 10 MG/1
10 TABLET ORAL DAILY
Status: DISCONTINUED | OUTPATIENT
Start: 2025-02-20 | End: 2025-02-22

## 2025-02-19 RX ORDER — BENZONATATE 100 MG/1
100 CAPSULE ORAL 3 TIMES DAILY PRN
Status: DISCONTINUED | OUTPATIENT
Start: 2025-02-19 | End: 2025-02-20

## 2025-02-19 RX ORDER — NIFEDIPINE 30 MG/1
60 TABLET, EXTENDED RELEASE ORAL DAILY
Status: DISCONTINUED | OUTPATIENT
Start: 2025-02-20 | End: 2025-02-22 | Stop reason: HOSPADM

## 2025-02-19 RX ORDER — CALCIUM CARBONATE 500 MG/1
1000 TABLET, CHEWABLE ORAL 3 TIMES DAILY PRN
Status: DISCONTINUED | OUTPATIENT
Start: 2025-02-19 | End: 2025-02-22 | Stop reason: HOSPADM

## 2025-02-19 RX ADMIN — MONTELUKAST 10 MG: 10 TABLET, FILM COATED ORAL at 21:12

## 2025-02-19 RX ADMIN — BENZONATATE 100 MG: 100 CAPSULE ORAL at 21:12

## 2025-02-19 RX ADMIN — HEPARIN SODIUM 5000 UNITS: 5000 INJECTION INTRAVENOUS; SUBCUTANEOUS at 15:24

## 2025-02-19 RX ADMIN — CAPTOPRIL 50 MG: 12.5 TABLET ORAL at 15:24

## 2025-02-19 RX ADMIN — PANTOPRAZOLE SODIUM 40 MG: 40 TABLET, DELAYED RELEASE ORAL at 05:06

## 2025-02-19 RX ADMIN — ATORVASTATIN CALCIUM 10 MG: 10 TABLET, FILM COATED ORAL at 08:40

## 2025-02-19 RX ADMIN — FOLIC ACID 1 MG: 1 TABLET ORAL at 08:39

## 2025-02-19 RX ADMIN — FAMOTIDINE 10 MG: 20 TABLET, FILM COATED ORAL at 22:08

## 2025-02-19 RX ADMIN — CALCIUM CARBONATE (ANTACID) CHEW TAB 500 MG 500 MG: 500 CHEW TAB at 06:28

## 2025-02-19 RX ADMIN — CAPTOPRIL 50 MG: 12.5 TABLET ORAL at 21:12

## 2025-02-19 RX ADMIN — HEPARIN SODIUM 5000 UNITS: 5000 INJECTION INTRAVENOUS; SUBCUTANEOUS at 05:06

## 2025-02-19 RX ADMIN — DOXAZOSIN 2 MG: 1 TABLET ORAL at 08:39

## 2025-02-19 RX ADMIN — AMLODIPINE BESYLATE 10 MG: 10 TABLET ORAL at 08:40

## 2025-02-19 RX ADMIN — TORSEMIDE 5 MG: 10 TABLET ORAL at 08:40

## 2025-02-19 RX ADMIN — HEPARIN SODIUM 5000 UNITS: 5000 INJECTION INTRAVENOUS; SUBCUTANEOUS at 21:12

## 2025-02-19 RX ADMIN — DOXAZOSIN 2 MG: 1 TABLET ORAL at 18:17

## 2025-02-19 RX ADMIN — CAPTOPRIL 50 MG: 12.5 TABLET ORAL at 08:40

## 2025-02-19 RX ADMIN — Medication 1 TABLET: at 08:39

## 2025-02-19 NOTE — PROGRESS NOTES
Progress Note - Hospitalist   Name: Kari Erazo 54 y.o. female I MRN: [id number]  Unit/Bed#: S -01 I Date of Admission: 2/13/2025   Date of Service: 2/19/2025 I Hospital Day: 5  { ?Quick Links I Problem List I PORCH I Billing Tip:26955}  Assessment & Plan  Acute kidney injury (HCC)  Lab Results   Component Value Date    CREATININE 2.53 (H) 02/19/2025    CREATININE 2.43 (H) 02/18/2025    CREATININE 2.27 (H) 02/17/2025     Here with abnormal outpatient lab work. 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  Increase likely secondary to hemodynamic changes in the settings of captopril  Continue with captopril 12.5 mg 3 times daily for BP control at her baseline which is 140-150/80-90. Need frequent hourly monitoring of blood pressure to up titrate the dose of captopril. Can up titrate in increments of 25 mg every 4 to 8 hrs (Total max dose in a day is 300 to 450 in divided doses)  Recommend adjusting the dose of captopril rather than increasing the dose of other anti-hypertensive agents  If SBP is well controlled <130 tomorrow, nephrology will schedule renal biopsy    Monitor urine output   Hold home losartan, chlorthalidone  No need for further IVF at this time  Follow-up on remainder of workup  Accelerated hypertension  Home meds: Amlodipine 10 mg, chlorthalidone 25 mg, losartan 25 mg  Upon presentation, blood pressure was as high as 200s/100s  Asymptomatic  VAS renal artery - no evidence of significant arterial  occlusive disease  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  BP this morning better controlled 150s/80s on captopril 12.5 mg TID    Plan  Nephrology following, appreciate recommendations  Continue with captopril 12.5 mg 3 times daily, would wait 24 hours to increase dose if needed    Add torsemide 5 mg due to fluid overload   Avoid beta-blockers and other anti-hypertension agents in favor of increasing captopril dose  Cardura 2 mg twice daily  Amlodipine 10 mg daily  Hold home losartan and chlorthalidone    Undifferentiated connective tissue disease (HCC)  Has been following with rheumatology in the outpatient setting due to progressively worsening edema in hands and feet and myalgias, Raynaud's symptoms after getting influenza vaccination around [facility]  Since then, blood pressures have been worsening. Has been on multiple prednisone tapers  Echo (2/12) - EF 70%. G2DD.  RV and LV mildly dilated.  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 To  MRI of the femur left-was done to rule out myositis-did not show any myositis or muscle atrophy but showed severe left hip osteoarthritis  Labs so far:  JAYESH positive, titer at 1280 with nucleolar pattern  Sjogren antibodies   Anticentromere antibody negative  CK within normal range  Cyclic Citrullinated peptide antibody negative  Rheumatoid factor negative  Anti-Jo1 antibody negative  Antiscleroderma antibody negative  RNA polymerase 3 IgG antibody elevated and positive   JAYESH screen with reflex: UPEP, SPEP negative  FABIANA screen and Anti-dsDNA ab negative   Ig Kappa and Lambda free light chains elevated  C3/C4 normal   Pending anti-Th/To ab, Anti-HMGCR ab, Anti-neutrophilic cytoplasmic ab    2/17 fine crackles heard on bilateral lower lung fields and noncontrast CT chest ordered for concern of ILD   CT chest high resolution - Mild bibasilar juxtapleural reticulation, nonspecific but could represent early, mild interstitial fibrosis. Mild, subtle scattered groundglass density in both lungs (302/112), nonspecific but may be infectious/inflammatory. No consolidation.   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 Need frequent hourly monitoring of blood pressure to up titrate the dose of captopril. Can up titrate in increments of 25 mg every 4 to 8 hrs (Total max dose in a day is 300 to 450 in divided doses)  Recommend adjusting the dose of captopril rather than increasing the dose of other anti-hypertensive agents  CT chest non contrast - nonspecific but could represent early, mild interstitial fibrosis, follow up with rheum     Thrombocytopenia (HCC)  Recent Labs     02/17/25  0611 02/18/25  0528 02/19/25  0546   * 124* 130*      Platelets down trending to 117 today   Low haptoglobin, elevated LDH, peripheral smear without schistocytes, normal corrected reticulocyte percentage, negative MARCO   B12, folate, iron panel WNL, transferrin level 196  Total and direct bilirubin WNL  CMV IgG and IgM negative   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  Hematology/oncology consulted, appreciate recommendations  mild drop in hemoglobin, haptoglobin <10 and  mildly elevated LDH but peripheral smear did not show any signs of hemolysis/schistocytes. Her MARCO is negative. Her platelets are above 100 K. No hyperbilirubinemia. her PLASMIC score 3 points, Low Risk , 0% Risk of severe [id number] deficiency, so no need for urgent TTP w/u or treatment , would continue trend CBC , likely mild thrombocytopenia and anemia in context of the acute illness / SRC. If continues to trend down further or evidence of hemolysis further workup can be pursued.   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  Homans test negative  B12, folate, iron panel WNL, transferrin level 196  Iron 66 within normal limits and ferritin 72 within normal limits  Total and direct bilirubin WNL    Plan:  Hematology/oncology consulted, appreciate recommendations above  Hypokalemia  Lab Results   Component Value Date    K 3.7 02/19/2025    K 3.8 02/18/2025    K 3.7 02/17/2025     Monitor and replete  AUGUSTO on CPAP  Continue CPAP  Abnormal findings on imaging  CT imaging showing cystic 2.1 cm lesion arising from tail of pancreas, recommend 6-month follow-up CT  CT imaging also showing indeterminate radiolucent lesion in L1 vertebral body  CT chest high resolution - 3 mm juxtapleural medial right upper lobe nodule. Based on current Fleischner Society 2017 Guidelines on incidental pulmonary nodule, no routine follow-up is needed if the patient is low risk. If the patient is high risk, optional follow-up chest CT at 12 months can be considered.  Discussed findings with patient.  Follow-up as outpatient    Hepatitis C antibody test positive  Hepatitis C RNA level negative, no further workup warranted   SIRS without infection or organ dysfunction (HCC)  Has SIRS criteria of leukocytosis and tachycardia  No concern for active infection  Leukocytosis is  secondary to steroids  Open wound of heel      VTE Pharmacologic Prophylaxis: VTE Score: 5 {VTE Risk:40792}    Mobility:   Basic Mobility Inpatient Raw Score: 24  JH-HLM Goal: 8: Walk 250 feet or more  JH-HLM Achieved: 8: Walk 250 feet ot more  {Mobility:67249}    Patient Centered Rounds: {Pt Centered Care Rounds:47302}   Discussions with Specialists or Other Care Team Provider: ***    Education and Discussions with Family / Patient: {Family Communication:44085}    Current Length of Stay: 5 day(s)  Current Patient Status: Inpatient   Certification Statement: {Certification Statement:19928}  Discharge Plan: {Discharge Plan:41437}    Code Status: Level 1 - Full Code    Subjective   ***    Objective :{?Quick Links I ICU Summary I Vitals I I/Os I LDAs I Mobility (PT/OT) I Code Status / ACP   ?Quick Links I Active Meds I Pain Meds I Antibiotics I Anticoagulants:80946}  Temp:  [98.7 °F (37.1 °C)-99.2 °F (37.3 °C)] 99.2 °F (37.3 °C)  HR:  [] 104  BP: (141-175)/(73-95) 147/85  Resp:  [16-18] 18  SpO2:  [94 %-98 %] 95 %    Body mass index is 31.93 kg/m².     Input and Output Summary (last 24 hours):     Intake/Output Summary (Last 24 hours) at 2/19/2025 1525  Last data filed at 2/19/2025 0401  Gross per 24 hour   Intake --   Output 1150 ml   Net -1150 ml       Physical Exam  ***    Lines/Drains:            {?Quick Links I Lab Review I Micro Results I Radiology I Cardiology:52620}  Lab Results: I have reviewed the following results:   Results from last 7 days   Lab Units 02/19/25  0546 02/18/25  0528 02/13/25  1935 02/12/25  1527   WBC Thousand/uL 9.44 11.65*   < > 16.48*   HEMOGLOBIN g/dL 9.6* 9.8*   < > 12.2   HEMATOCRIT % 29.1* 29.0*   < > 35.3   PLATELETS Thousands/uL 130* 124*   < > 259   BANDS PCT %  --   --   --  1   SEGS PCT %  --  81*   < >  --    LYMPHO PCT %  --  9*   < > 3*   MONO PCT %  --  6   < > 2*   EOS PCT %  --  2   < > 0    < > = values in this interval not displayed.     Results from last 7 days  "  Lab Units 02/19/25  0546   SODIUM mmol/L 136   POTASSIUM mmol/L 3.7   CHLORIDE mmol/L 103   CO2 mmol/L 26   BUN mg/dL 37*   CREATININE mg/dL 2.53*   ANION GAP mmol/L 7   CALCIUM mg/dL 7.8*   ALBUMIN g/dL 3.5   TOTAL BILIRUBIN mg/dL 0.74   ALK PHOS U/L 39   ALT U/L 29   AST U/L 21   GLUCOSE RANDOM mg/dL 133     Results from last 7 days   Lab Units 02/13/25  1935   INR  1.04         Results from last 7 days   Lab Units 02/13/25  1935   HEMOGLOBIN A1C % 6.1*           Recent Cultures (last 7 days):   Results from last 7 days   Lab Units 02/14/25  1646   URINE CULTURE  No Growth <1000 cfu/mL       {Imaging Results Review:75759::\"No pertinent imaging studies reviewed.\"}  {Other Study Results Review:78286::\"No additional pertinent studies reviewed.\"}    Last 24 Hours Medication List:     Current Facility-Administered Medications:     acetaminophen (TYLENOL) tablet 650 mg, Q6H PRN    atorvastatin (LIPITOR) tablet 10 mg, Daily    benzonatate (TESSALON PERLES) capsule 100 mg, TID PRN    calcium carbonate (TUMS) chewable tablet 1,000 mg, TID PRN    calcium carbonate-vitamin D 500 mg-5 mcg tablet 1 tablet, Daily With Breakfast    captopril (CAPOTEN) tablet 50 mg, TID    doxazosin (CARDURA) tablet 2 mg, BID    famotidine (PEPCID) tablet 10 mg, Daily PRN    folic acid (FOLVITE) tablet 1 mg, Daily    guaiFENesin (MUCINEX) 12 hr tablet 600 mg, Q12H PRN    heparin (porcine) subcutaneous injection 5,000 Units, Q8H TERRY    montelukast (SINGULAIR) tablet 10 mg, HS    [START ON 2/20/2025] NIFEdipine (PROCARDIA XL) 24 hr tablet 60 mg, Daily    NIFEdipine (PROCARDIA) capsule 30 mg, Q8H PRN    pantoprazole (PROTONIX) EC tablet 40 mg, Early Morning    [START ON 2/20/2025] torsemide (DEMADEX) tablet 10 mg, Daily    Administrative Statements   Today, Patient Was Seen By: Milagros Glover  {Time Spent (Optional):64801}    **Please Note: This note may have been constructed using a voice recognition system.**  "

## 2025-02-19 NOTE — ASSESSMENT & PLAN NOTE
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  Hematology/oncology consulted, appreciate recommendations  mild drop in hemoglobin, haptoglobin <10 and mildly elevated LDH but peripheral smear did not show any signs of hemolysis/schistocytes. Her MARCO is negative. Her platelets are above 100 K. No hyperbilirubinemia. her PLASMIC score 3 points, Low Risk , 0% Risk of severe [id number] deficiency, so no need for urgent TTP w/u or treatment , would continue trend CBC , likely mild thrombocytopenia and anemia in context of the acute illness / SRC. If continues to trend down further or evidence of hemolysis further workup can be pursued.

## 2025-02-19 NOTE — PROGRESS NOTES
Progress Note - Hospitalist   Name: Kari Erazo 54 y.o. female I MRN: [id number]  Unit/Bed#: S -01 I Date of Admission: 2/13/2025   Date of Service: 2/19/2025 I Hospital Day: 5    Assessment & Plan  Acute kidney injury (HCC)  Lab Results   Component Value Date    CREATININE 2.53 (H) 02/19/2025    CREATININE 2.43 (H) 02/18/2025    CREATININE 2.27 (H) 02/17/2025     Here with abnormal outpatient lab work. 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 Need frequent hourly monitoring of blood pressure to up titrate the dose of captopril. Considering up titrate in increments of 25 mg every 4 to 8 hrs (Total max dose in a day is 300 to 450 in divided doses)  Recommend adjusting the dose of captopril rather than increasing the dose of other anti-hypertensive agents  If SBP is well controlled <130 tomorrow, nephrology will schedule renal biopsy    Monitor urine output   Hold home losartan, chlorthalidone  No need for further IVF at this time  Follow-up on remainder of workup  Accelerated hypertension  Home meds: Amlodipine 10 mg, chlorthalidone 25 mg, losartan 25 mg  Upon presentation, blood pressure was as high as 200s/100s  Asymptomatic  VAS renal artery - no evidence of significant arterial occlusive disease  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  BP this morning better controlled 150s/80s on captopril 12.5 mg TID    Plan  Nephrology  following, appreciate recommendations  Continue with captopril 50 mg 3 times daily, would wait 24 hours to increase dose if needed    Continue torsemide 5 mg due to fluid overload   Avoid beta-blockers and other anti-hypertension agents in favor of increasing captopril dose  Cardura 2 mg twice daily  Amlodipine 10 mg daily  Hold home losartan and chlorthalidone    Undifferentiated connective tissue disease (HCC)  Has been following with rheumatology in the outpatient setting due to progressively worsening edema in hands and feet and myalgias, Raynaud's symptoms after getting influenza vaccination around [facility]  Since then, blood pressures have been worsening. Has been on multiple prednisone tapers  Echo (2/12) - EF 70%. G2DD.  RV and LV mildly dilated.  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 To  MRI of the femur left-was done to rule out myositis-did not show any myositis or muscle atrophy but showed severe left hip osteoarthritis  Labs so far:  JAYESH positive, titer at 1280 with nucleolar pattern  Sjogren antibodies   Anticentromere antibody negative  CK within normal range  Cyclic Citrullinated peptide antibody negative  Rheumatoid factor negative  Anti-Jo1 antibody negative  Antiscleroderma antibody negative  RNA polymerase 3 IgG antibody elevated and positive   JAYESH screen with reflex: UPEP, SPEP negative  FABIANA screen and Anti-dsDNA ab negative   Ig Kappa and Lambda free light chains elevated  C3/C4 normal   Pending anti-Th/To ab, Anti-HMGCR ab, Anti-neutrophilic cytoplasmic ab   2/17 fine crackles heard on bilateral lower lung fields and noncontrast CT chest ordered for concern of ILD   CT chest high resolution - Mild bibasilar juxtapleural reticulation,  nonspecific but could represent early, mild interstitial fibrosis. Mild, subtle scattered groundglass density in both lungs (302/112), nonspecific but may be infectious/inflammatory. No consolidation.   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 Need frequent hourly monitoring of blood pressure to up titrate the dose of captopril. Can up titrate in increments of 25 mg every 4 to 8 hrs (Total max dose in a day is 300 to 450 in divided doses)  Recommend adjusting the dose of captopril rather than increasing the dose of other anti-hypertensive agents  CT chest non contrast - nonspecific but could represent early, mild interstitial fibrosis, follow up with rheum     Thrombocytopenia (HCC)  Recent Labs     02/17/25  0611 02/18/25  0528 02/19/25  0546   * 124* 130*      Platelets 130 today   Low haptoglobin, elevated LDH, peripheral smear without schistocytes, normal corrected reticulocyte percentage, negative MARCO   B12, folate, iron panel WNL, transferrin level 196  Total and direct bilirubin WNL  CMV IgG and IgM negative   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  Hematology/oncology consulted, appreciate recommendations  mild drop in hemoglobin, haptoglobin <10 and mildly elevated LDH but peripheral smear did not show any signs of hemolysis/schistocytes. Her MARCO is negative. Her platelets are above 100 K. No hyperbilirubinemia. her PLASMIC score 3 points, Low Risk  , 0% Risk of severe [id number] deficiency, so no need for urgent TTP w/u or treatment , would continue trend CBC , likely mild thrombocytopenia and anemia in context of the acute illness / SRC. If continues to trend down further or evidence of hemolysis further workup can be pursued.   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  Homans test negative  B12, folate, iron panel WNL, transferrin level 196  Iron 66 within normal limits and ferritin 72 within normal limits  Total and direct bilirubin WNL    Plan:  Hematology/oncology consulted, appreciate recommendations above  Hypokalemia  Lab Results   Component Value Date    K 3.7 02/19/2025    K 3.8 02/18/2025    K 3.7 02/17/2025     Monitor and replete  AUGUSTO on CPAP  Continue CPAP  Abnormal findings on imaging  CT imaging showing cystic 2.1 cm lesion arising from tail of pancreas, recommend 6-month follow-up CT  CT imaging also showing indeterminate radiolucent lesion in L1 vertebral body  CT chest high resolution - 3 mm juxtapleural medial right upper lobe nodule. Based on current Fleischner Society 2017 Guidelines on incidental pulmonary nodule, no routine follow-up is needed if the patient is low risk. If the patient is high risk, optional follow-up chest CT at 12 months can be considered.  Discussed findings with patient.  Follow-up as outpatient    Hepatitis C antibody test positive  Hepatitis C RNA level negative, no further workup warranted   SIRS without infection or organ dysfunction (HCC)  Has SIRS criteria of leukocytosis and tachycardia  No concern for active infection  Leukocytosis is secondary to steroids  Open wound of heel  Patient has a 1 x 1 cm ulcerated wound on the left heel  Podiatry consulted  Commended wound care, offloading of left heel with foot in surgical shoe  Close follow-up  outpatient  GERD (gastroesophageal reflux disease)  Will give pantoprazole 40 mg once daily  Increase Tums to 1000 mg 3 times daily as needed  Famotidine 10 mg once daily renally dosed in the setting of TITUS    VTE Pharmacologic Prophylaxis: VTE Score: 5 High Risk (Score >/= 5) - Pharmacological DVT Prophylaxis Ordered: heparin. Sequential Compression Devices Ordered.    Mobility:   Basic Mobility Inpatient Raw Score: 24  JH-HLM Goal: 8: Walk 250 feet or more  JH-HLM Achieved: 8: Walk 250 feet ot more  JH-HLM Goal achieved. Continue to encourage appropriate mobility.    Patient Centered Rounds: I performed bedside rounds with nursing staff today.   Discussions with Specialists or Other Care Team Provider: Nephrology, Podiatry    Education and Discussions with Family / Patient: Patient declined call to .     Current Length of Stay: 5 day(s)  Current Patient Status: Inpatient   Certification Statement: The patient will continue to require additional inpatient hospital stay due to Scleroderma Renal Crisis  Discharge Plan: Anticipate discharge in 48 hrs to home.    Code Status: Level 1 - Full Code    Subjective   Patient seen and evaluated bedside. Patient has complaints of acid reflux last night and states she was not able to sleep well last night.  She denies any heartburn but states the acid reflux is causing her to have cough a dry intermittent nonproductive cough mostly at night when laying down.  She does endorse exertional dyspnea as well.  Patient also has complaints of left heel wound, she states that previously the skin was cracked there but now a wound has opened up.    Objective :  Temp:  [98.7 °F (37.1 °C)-99.2 °F (37.3 °C)] 99.2 °F (37.3 °C)  HR:  [] 101  BP: (141-175)/(73-95) 150/76  Resp:  [16-18] 18  SpO2:  [94 %-98 %] 94 %    Body mass index is 31.93 kg/m².     Input and Output Summary (last 24 hours):     Intake/Output Summary (Last 24 hours) at 2/19/2025 1644  Last data filed at  2/19/2025 1601  Gross per 24 hour   Intake --   Output 1400 ml   Net -1400 ml       Physical Exam  Vitals and nursing note reviewed.   Constitutional:       General: She is not in acute distress.     Appearance: She is well-developed.   HENT:      Head: Normocephalic and atraumatic.      Right Ear: External ear normal.      Left Ear: External ear normal.      Nose: Nose normal.   Eyes:      Extraocular Movements: Extraocular movements intact.      Conjunctiva/sclera: Conjunctivae normal.   Cardiovascular:      Rate and Rhythm: Regular rhythm. Tachycardia present.      Heart sounds: No murmur heard.  Pulmonary:      Effort: Pulmonary effort is normal. No respiratory distress.      Comments: Mild crackles at bases of lungs, greater on left than right  Abdominal:      Palpations: Abdomen is soft.      Tenderness: There is no abdominal tenderness.   Musculoskeletal:      Cervical back: Neck supple.   Skin:     General: Skin is warm and dry.      Comments: Swelling of hands with Raynaud phenomena  Mild lower extremity swelling  Periorbital swelling  [id number] ulcerated appearing wound in posterior aspect of left heel  Picture present in media   Neurological:      General: No focal deficit present.      Mental Status: She is alert and oriented to person, place, and time.   Psychiatric:         Mood and Affect: Mood normal.         Behavior: Behavior normal.         Lines/Drains:              Lab Results: I have reviewed the following results:   Results from last 7 days   Lab Units 02/19/25  0546 02/18/25  0528   WBC Thousand/uL 9.44 11.65*   HEMOGLOBIN g/dL 9.6* 9.8*   HEMATOCRIT % 29.1* 29.0*   PLATELETS Thousands/uL 130* 124*   SEGS PCT %  --  81*   LYMPHO PCT %  --  9*   MONO PCT %  --  6   EOS PCT %  --  2     Results from last 7 days   Lab Units 02/19/25  0546   SODIUM mmol/L 136   POTASSIUM mmol/L 3.7   CHLORIDE mmol/L 103   CO2 mmol/L 26   BUN mg/dL 37*   CREATININE mg/dL 2.53*   ANION GAP mmol/L 7   CALCIUM mg/dL  7.8*   ALBUMIN g/dL 3.5   TOTAL BILIRUBIN mg/dL 0.74   ALK PHOS U/L 39   ALT U/L 29   AST U/L 21   GLUCOSE RANDOM mg/dL 133     Results from last 7 days   Lab Units 02/13/25 1935   INR  1.04         Results from last 7 days   Lab Units 02/13/25 1935   HEMOGLOBIN A1C % 6.1*           Recent Cultures (last 7 days):   Results from last 7 days   Lab Units 02/14/25  1646   URINE CULTURE  No Growth <1000 cfu/mL       Imaging Results Review: I reviewed radiology reports from this admission including: CT chest.  Last 24 Hours Medication List:     Current Facility-Administered Medications:     acetaminophen (TYLENOL) tablet 650 mg, Q6H PRN    atorvastatin (LIPITOR) tablet 10 mg, Daily    benzonatate (TESSALON PERLES) capsule 100 mg, TID PRN    calcium carbonate (TUMS) chewable tablet 1,000 mg, TID PRN    calcium carbonate-vitamin D 500 mg-5 mcg tablet 1 tablet, Daily With Breakfast    captopril (CAPOTEN) tablet 50 mg, TID    doxazosin (CARDURA) tablet 2 mg, BID    famotidine (PEPCID) tablet 10 mg, Daily PRN    folic acid (FOLVITE) tablet 1 mg, Daily    guaiFENesin (MUCINEX) 12 hr tablet 600 mg, Q12H PRN    heparin (porcine) subcutaneous injection 5,000 Units, Q8H TERRY    montelukast (SINGULAIR) tablet 10 mg, HS    [START ON 2/20/2025] NIFEdipine (PROCARDIA XL) 24 hr tablet 60 mg, Daily    NIFEdipine (PROCARDIA) capsule 30 mg, Q8H PRN    pantoprazole (PROTONIX) EC tablet 40 mg, Early Morning    [START ON 2/20/2025] torsemide (DEMADEX) tablet 10 mg, Daily    Administrative Statements   Today, Patient Was Seen By: Trev Couch MD    **Please Note: This note may have been constructed using a voice recognition system.**

## 2025-02-19 NOTE — ASSESSMENT & PLAN NOTE
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  Hematology/oncology consulted, appreciate recommendations  mild drop in hemoglobin, haptoglobin <10 and mildly elevated LDH but peripheral smear did not show any signs of hemolysis/schistocytes. Her MARCO is negative. Her platelets are above 100 K. No hyperbilirubinemia. her PLASMIC score 3 points, Low Risk , 0% Risk of severe [id number] deficiency, so no need for urgent TTP w/u or treatment , would continue trend CBC , likely mild thrombocytopenia and anemia in context of the acute illness / SRC. If continues to trend down further or evidence of hemolysis further workup can be pursued.

## 2025-02-19 NOTE — ASSESSMENT & PLAN NOTE
Patient has a 1 x 1 cm ulcerated wound on the left heel  Podiatry consulted  Commended wound care, offloading of left heel with foot in surgical shoe  Close follow-up outpatient

## 2025-02-19 NOTE — PROGRESS NOTES
Progress Note - Nephrology   Name: Kari Erazo 54 y.o. 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  Increase likely secondary to hemodynamic changes in the settings of captopril  Peak creatinine: Trending  UA: Leukocyturia, no hematuria  UACR 665 mg/g  Renal imaging : No hydronephrosis, left kidney cortical cyst  GN workup  JAYESH positive   dsDNA neg  SSA, SSB neg  Anti 70 Ab neg   Anti-RNA polymerase 175 (elevated)  Treatment:  No indication of dialysis at this time  BP not at goal for biopsy at this time   Avoid NSAIDs, IV contrast    Adjust medications to GFR    Accelerated hypertension  Volume: Hypervolemic   Etiology: Secondary to scleroderma crisis  Blood pressure: Hypertensive, /86, remains above goal  Continue with low-sodium diet   Captopril 50 mg 3 times daily   Stop amlodipine   Start nifedipine 60 starting tomorrow   Increase torsemide to 10   monitor urinary output     Hypokalemia  Potassium 3.7  Resolved     Anemia  Current hemoglobin: 9.6mg/dL  Haptoglobin <10 with no schistocytes on blood smear  Some component of hemolysis in the settings of TMA  Treatment:  Transfuse for hemoglobin less than 7.0 per primary service    Undifferentiated connective tissue disease (HCC)  Mixed connective tissue disorder, has alopecia, joint pain, right notes, skin changes   Management per rheumatology    Abnormal findings on imaging  Cystic lesion tail of the pancreas noted on CT and indeterminant radiolucent lesion L1 vertebral body  CT imaging follow-up as recommended      I have reviewed the nephrology recommendations including discontinue amlodipine and transition to  nifedipine, increase torsemide to 10 , with primary team, and we are in agreement with renal plan including the information outlined above. I have discussed the above management plan in detail with the primary service.     Subjective   Brief History of Admission -  55 yo woman with PMH of Raynaud's, psoriatic arthritis, hypertension, diabetes p/w accelerated hypertension.  Nephrology is consulted for management of TITUS     Complaint self lower extremity, lower extremity edema, no shortness of breath.    Objective :  Temp:  [98.7 °F (37.1 °C)-99 °F (37.2 °C)] 98.7 °F (37.1 °C)  HR:  [] 103  BP: (141-179)/(73-95) 151/86  Resp:  [16] 16  SpO2:  [94 %-98 %] 96 %    Current Weight: Weight - Scale: 84.4 kg (186 lb)  First Weight: Weight - Scale: 84.4 kg (186 lb)  I/O         02/17 0701  02/18 0700 02/18 0701  02/19 0700 02/19 0701  02/20 0700    P.O. 1200      Total Intake(mL/kg) 1200 (14.2)      Urine (mL/kg/hr)  1250 (0.6)     Total Output  1250     Net +1200 -1250                  Physical Exam  General:  no acute distress at this time  Skin:  No acute rash  Eyes:  No scleral icterus and noninjected  ENT:  mucous membranes moist  Neck:  no carotid bruits  Chest:  Clear to auscultation percussion, good respiratory effort, no use of accessory respiratory muscles  CVS:  Regular rate and rhythm without rub   Abdomen:  soft and nontender   Extremities: lower extremity edema  Neuro:  No gross focality  Psych:  Alert , cooperative     Medications:    Current Facility-Administered Medications:     acetaminophen (TYLENOL) tablet 650 mg, 650 mg, Oral, Q6H PRN, Haroon Chavis MD, 650 mg at 02/17/25 1110    atorvastatin (LIPITOR) tablet 10 mg, 10 mg, Oral, Daily, Thuy Patrick MD, 10 mg at 02/19/25 0840    benzonatate (TESSALON PERLES) capsule 100 mg, 100 mg, Oral, TID PRN, Trev Couch MD    calcium carbonate (TUMS) chewable tablet 1,000 mg, 1,000 mg, Oral, TID PRN, Dov Kerr MD    calcium carbonate-vitamin D 500 mg-5 mcg  tablet 1 tablet, 1 tablet, Oral, Daily With Breakfast, Thuy Patrick MD, 1 tablet at 02/19/25 0839    captopril (CAPOTEN) tablet 50 mg, 50 mg, Oral, TID, Trev Couch MD, 50 mg at 02/19/25 0840    doxazosin (CARDURA) tablet 2 mg, 2 mg, Oral, BID, Gavin Zazueta MD, 2 mg at 02/19/25 0839    famotidine (PEPCID) tablet 10 mg, 10 mg, Oral, Daily PRN, Trev Couch MD    folic acid (FOLVITE) tablet 1 mg, 1 mg, Oral, Daily, Thuy Patrick MD, 1 mg at 02/19/25 0839    guaiFENesin (MUCINEX) 12 hr tablet 600 mg, 600 mg, Oral, Q12H PRN, Tisha Tolbert MD    heparin (porcine) subcutaneous injection 5,000 Units, 5,000 Units, Subcutaneous, Q8H TERRY, Trev Couch MD, 5,000 Units at 02/19/25 0506    montelukast (SINGULAIR) tablet 10 mg, 10 mg, Oral, HS, Thuy Patrick MD, 10 mg at 02/18/25 2046    [START ON 2/20/2025] NIFEdipine (PROCARDIA XL) 24 hr tablet 60 mg, 60 mg, Oral, Daily, Joselyn Reyes Bahamonde, MD    NIFEdipine (PROCARDIA) capsule 30 mg, 30 mg, Oral, Q8H PRN, Trev Couch MD    pantoprazole (PROTONIX) EC tablet 40 mg, 40 mg, Oral, Early Morning, Lakisha Woods DO, 40 mg at 02/19/25 0506    torsemide (DEMADEX) tablet 5 mg, 5 mg, Oral, Daily, Joselyn Reyes Bahamonde, MD, 5 mg at 02/19/25 0840      Lab Results: I have reviewed the following results:  Results from last 7 days   Lab Units 02/19/25  0546 02/18/25  0528 02/17/25  0611 02/16/25  0413 02/15/25  0451 02/14/25  0622 02/13/25  1935 02/12/25  1527   WBC Thousand/uL 9.44 11.65* 10.14 10.28* 13.76* 13.11* 18.52* 16.48*   HEMOGLOBIN g/dL 9.6* 9.8* 10.2* 10.3* 10.9* 10.5* 12.1 12.2   HEMATOCRIT % 29.1* 29.0* 29.7* 30.2* 31.8* 30.3* 34.5* 35.3   PLATELETS Thousands/uL 130* 124* 117* 136* 164 156 218 259   POTASSIUM mmol/L 3.7 3.8 3.7 3.1* 3.4* 3.1* 3.6 3.6   CHLORIDE mmol/L 103 100 103 101 100 101 98 94*   CO2 mmol/L 26 25 26 26 25 28 26 25   BUN mg/dL 37* 37* 37* 39* 45* 49* 52* 47*   CREATININE mg/dL 2.53* 2.43* 2.27* 2.30* 2.11* 2.13* 2.29* 2.26*  "  CALCIUM mg/dL 7.8* 7.8* 7.9* 8.0* 8.5 8.6 9.5 9.4   MAGNESIUM mg/dL  --   --  2.2  --  2.3 2.0  --   --    PHOSPHORUS mg/dL  --   --   --   --  3.2 3.9  --   --    ALBUMIN g/dL 3.5  --  3.4*  --   --   --  4.5 4.2       Administrative Statements     Portions of the record may have been created with voice recognition software. Occasional wrong word or \"sound a like\" substitutions may have occurred due to the inherent limitations of voice recognition software. Read the chart carefully and recognize, using context, where substitutions have occurred.If you have any questions, please contact the dictating provider.  "

## 2025-02-19 NOTE — ASSESSMENT & PLAN NOTE
Lab Results   Component Value Date    CREATININE 2.53 (H) 02/19/2025    CREATININE 2.43 (H) 02/18/2025    CREATININE 2.27 (H) 02/17/2025     Here with abnormal outpatient lab work. 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 Need frequent hourly monitoring of blood pressure to up titrate the dose of captopril. Considering up titrate in increments of 25 mg every 4 to 8 hrs (Total max dose in a day is 300 to 450 in divided doses)  Recommend adjusting the dose of captopril rather than increasing the dose of other anti-hypertensive agents  If SBP is well controlled <130 tomorrow, nephrology will schedule renal biopsy    Monitor urine output   Hold home losartan, chlorthalidone  No need for further IVF at this time  Follow-up on remainder of workup

## 2025-02-19 NOTE — DISCHARGE INSTR - AVS FIRST PAGE
Dear Kari Erazo,     It was our pleasure to care for you here at [facility].  It is our hope that we were always able to exceed the expected standards for your care during your stay.  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  You were cared for on the 2nd floor by Trev Couch MD under the service of Dov Kerr MD with the [facility] who covers for your primary care physician (PCP), Melissa Matthew MD, while you were hospitalized.  If you have any questions or concerns related to this hospitalization, you may contact us at .  For follow up as well as any medication refills, we recommend that you follow up with your primary care physician.  A registered nurse will reach out to you by phone within a few days after your discharge to answer any additional questions that you may have after going home.  However, at this time we provide for you here, the most important instructions / recommendations at discharge:     Notable Medication Adjustments -   Please STOP taking amlodipine.  Please STOP taking chlorthalidone 25 mg tablet.  Please STOP taking losartan 25 mg tablet.  Please STOP taking prednisone 10 mg tablets.  Please START taking captopril 50 mg 3 times a day.  Please START doxazosin 2 mg tablet 2 times a day.  Please START nifedipine 60 mg 24-hour tablet once daily in the morning  Please START torsemide 10 mg by mouth daily starting tomorrow.  Please HOLD metformin for now.  Please HOLD methotrexate for now. Please follow up with rheumatology if necessary to restart.  Testing Required after Discharge -   CBC and BMP in 1 week  MRI abdomen pelvis with IV contrast recommended for pancreatic tail cystic lesion  3 mm juxtapleural medial right upper lobe nodule optional follow-up chest CT at 12 months can be considered.  ** Please contact your PCP to request testing orders for any of the testing recommended here  **  Important follow up information -   Please follow-up with your primary care provider within 1 week of discharge.  Please follow-up with rheumatology outpatient.  Please follow-up with nephrology outpatient.  Please follow-up with wound care outpatient one week after discharge .  Other Instructions -   Please check your blood pressures 3 times daily going forward. Please message nephrology or rheumatology with any concerns for blood pressure management.  If you experience any worsening chest pain, shortness of breath, jaundice, dizziness, lightheadedness or worsening urine output please return to the emergency department.  Please review this entire after visit summary as additional general instructions including medication list, appointments, activity, diet, any pertinent wound care, and other additional recommendations from your care team that may be provided for you.      Sincerely,     Trev Couch MD      Discharge Instructions - Podiatry  Thank you for allowing us to provide podiatric care for you during your stay here at [facility]. Below are some instructions to help continue your care after leaving the hospital. Please feel free to contact our office with any questions or concerns.    Weight Bearing Status: Weightbearing as tolerated surgical shoe.  Please keep heel offloaded in Prevalon boot when nonambulatory.                 Antibiotics: Continue any antibiotic medications if and as directed.    Follow-up appointment instructions: Please make an appointment within 1 week(s) of discharge with [facility] wound care contact sooner if any increase in pain, or signs of infection occur    Wound Care: Please apply Acticoat to left heel. Then cover with Gauze and secure with Kerlix and ACE/tape. Please change dressing once every 2 to 3 days.

## 2025-02-19 NOTE — ASSESSMENT & PLAN NOTE
Home meds: Amlodipine 10 mg, chlorthalidone 25 mg, losartan 25 mg  Upon presentation, blood pressure was as high as 200s/100s  Asymptomatic  VAS renal artery - no evidence of significant arterial occlusive disease  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  BP this morning better controlled 150s/80s on captopril 12.5 mg TID    Plan  Nephrology following, appreciate recommendations  Continue with captopril 50 mg 3 times daily, would wait 24 hours to increase dose if needed    Continue torsemide 5 mg due to fluid overload   Avoid beta-blockers and other anti-hypertension agents in favor of increasing captopril dose  Cardura 2 mg twice daily  Amlodipine 10 mg daily  Hold home losartan and chlorthalidone

## 2025-02-19 NOTE — ASSESSMENT & PLAN NOTE
Will give pantoprazole 40 mg once daily  Increase Tums to 1000 mg 3 times daily as needed  Famotidine 10 mg once daily renally dosed in the setting of TITUS

## 2025-02-19 NOTE — PLAN OF CARE
Problem: PAIN - ADULT  Goal: Verbalizes/displays adequate comfort level or baseline comfort level  Description: Interventions:  - Encourage patient to monitor pain and request assistance  - Assess pain using appropriate pain scale  - Administer analgesics based on type and severity of pain and evaluate response  - Implement non-pharmacological measures as appropriate and evaluate response  - Consider cultural and social influences on pain and pain management  - Notify physician/advanced practitioner if interventions unsuccessful or patient reports new pain  Outcome: Progressing     Problem: INFECTION - ADULT  Goal: Absence or prevention of progression during hospitalization  Description: INTERVENTIONS:  - Assess and monitor for signs and symptoms of infection  - Monitor lab/diagnostic results  - Monitor all insertion sites, i.e. indwelling lines, tubes, and drains  - Monitor endotracheal if appropriate and nasal secretions for changes in amount and color  - [place] appropriate cooling/warming therapies per order  - Administer medications as ordered  - Instruct and encourage patient and family to use good hand hygiene technique  - Identify and instruct in appropriate isolation precautions for identified infection/condition  Outcome: Progressing  Goal: Absence of fever/infection during neutropenic period  Description: INTERVENTIONS:  - Monitor WBC    Outcome: Progressing     Problem: SAFETY ADULT  Goal: Patient will remain free of falls  Description: INTERVENTIONS:  - Educate patient/family on patient safety including physical limitations  - Instruct patient to call for assistance with activity   - Consult OT/PT to assist with strengthening/mobility   - Keep Call bell within reach  - Keep bed low and locked with side rails adjusted as appropriate  - Keep care items and personal belongings within reach  - Initiate and maintain comfort rounds  - Make Fall Risk Sign visible to staff  - Apply yellow socks and bracelet  for high fall risk patients  - Consider moving patient to room near nurses station  Outcome: Progressing  Goal: Maintain or return to baseline ADL function  Description: INTERVENTIONS:  -  Assess patient's ability to carry out ADLs; assess patient's baseline for ADL function and identify physical deficits which impact ability to perform ADLs (bathing, care of mouth/teeth, toileting, grooming, dressing, etc.)  - Assess/evaluate cause of self-care deficits   - Assess range of motion  - Assess patient's mobility; develop plan if impaired  - Assess patient's need for assistive devices and provide as appropriate  - Encourage maximum independence but intervene and supervise when necessary  - Involve family in performance of ADLs  - Assess for home care needs following discharge   - Consider OT consult to assist with ADL evaluation and planning for discharge  - Provide patient education as appropriate  Outcome: Progressing  Goal: Maintains/Returns to pre admission functional level  Description: INTERVENTIONS:  - Perform AM-PAC 6 Click Basic Mobility/ Daily Activity assessment daily.  - Set and communicate daily mobility goal to care team and patient/family/caregiver.   - Collaborate with rehabilitation services on mobility goals if consulted  - Out of bed for toileting  - Record patient progress and toleration of activity level   Outcome: Progressing     Problem: DISCHARGE PLANNING  Goal: Discharge to home or other facility with appropriate resources  Description: INTERVENTIONS:  - Identify barriers to discharge w/patient and caregiver  - Arrange for needed discharge resources and transportation as appropriate  - Identify discharge learning needs (meds, wound care, etc.)  - Arrange for interpretive services to assist at discharge as needed  - Refer to Case Management Department for coordinating discharge planning if the patient needs post-hospital services based on physician/advanced practitioner order or complex needs  related to functional status, cognitive ability, or social support system  Outcome: Progressing     Problem: Knowledge Deficit  Goal: Patient/family/caregiver demonstrates understanding of disease process, treatment plan, medications, and discharge instructions  Description: Complete learning assessment and assess knowledge base.  Interventions:  - Provide teaching at level of understanding  - Provide teaching via preferred learning methods  Outcome: Progressing

## 2025-02-19 NOTE — CASE MANAGEMENT
Case Management Assessment & Discharge Planning Note    Patient name Kari VENCES /S -01 MRN [id number]  : 1970 Date 2025       Current Admission Date: 2025  Current Admission Diagnosis:Acute kidney injury (HCC)   Patient Active Problem List    Diagnosis Date Noted Date Diagnosed    Thrombocytopenia (HCC) 2025     Anemia 02/15/2025     Hepatitis C antibody test positive 02/15/2025     SIRS without infection or organ dysfunction (HCC) 02/15/2025     Acute kidney injury (HCC) 2025     Undifferentiated connective tissue disease (HCC) 2025     Abnormal findings on imaging 2025     Hypokalemia 2025     Vitamin B12 deficiency 2024     AUGUSTO (obstructive sleep apnea) 2024     Tear of left acetabular labrum 2024     Fibroid 2024     Mild carotid artery disease (HCC) 2024     PCOS (polycystic ovarian syndrome) 2024     Class 1 obesity due to excess calories with serious comorbidity and body mass index (BMI) of 32.0 to 32.9 in adult 2024     AUGUSTO on CPAP      Primary osteoarthritis of left hip 2018     Asthma, chronic 2018     Psoriasis 2017     Controlled diabetes mellitus type II without complication (HCC) 11/15/2016     Chronic eczema 10/12/2016     Arthritis 2016     Fatty liver 2014     Hyperlipidemia 2013     Accelerated hypertension 2013       LOS (days): 5  Geometric Mean LOS (GMLOS) (days):   Days to GMLOS:     OBJECTIVE:    Risk of Unplanned Readmission Score: 19.9         Current admission status: Inpatient       Preferred Pharmacy:   [facility] ([place]) - [address]  [address]  [place] [address]  Phone: [phone] Fax: [phone]    Primary Care Provider: Melissa Matthew MD    Primary Insurance: CAPITAL  Secondary Insurance:     ASSESSMENT:  Active Health Care Proxies    There are no active Health  Care Proxies on file.    Readmission Root Cause  30 Day Readmission: No    Patient Information  Admitted from:: Home  Mental Status: Alert (per chart review)  Primary Caregiver: Self  Support Systems: Spouse/significant other, Son, Daughter  County of Residence: [place]  What city do you live in?: [place]  Living Arrangements: Lives w/ Spouse/significant other, Lives w/ Extended Family, Lives w/ Children    Activities of Daily Living Prior to Admission  Functional Status: Independent    Patient Information Continued  Income Source: Employed    DISCHARGE DETAILS:    Other Referral/Resources/Interventions Provided:  Referral Comments: Chart reviewed, patient with no current identified CM needs. CM remains available if needed.    Treatment Team Recommendation: Home  Discharge Destination Plan:: Home

## 2025-02-19 NOTE — CONSULTS
Podiatry - Consultation    Patient Information:   Kari Erazo 54 y.o. female MRN: [id number]  Unit/Bed#: S -01 Encounter: [id number]  PCP: Melissa Matthew MD  Date of Admission:  2/13/2025  Date of Consultation: 02/19/25  Requesting Physician: Dov Kerr MD      ASSESSMENT & PLAN:    Kari Erazo is a 54 y.o. female with:    Left posterior lateral heel wound  Wound overall appears stable, no local signs of infection.  No indication for antibiotics at this time.  Recommend wound care and offloading of the foot when nonambulatory in Prevalon boot.  Have patient follow-up with us in outpatient clinic where he can perform debridement, patient is sensate. No surgical intervention anticipated from podiatry this inpatient stay.   Recommendations as described below. Podiatry will follow intermittently while inpatient, but okay to discharge from our standpoint.    Dressing changes/wound care: Continue with wound care dressing changes as ordered  Activity/Weightbearing Status: Weightbearing as tolerated in surgical shoe  Follow-up: With [facility] wound care 1 week postdischarge.  Rest of care per primary team and consult teams.  Please page podiatry with any questions or concerns.        SUBJECTIVE:    History of Present Illness:    Kari Erazo is a 54 y.o. female with past medical history of undifferentiated connective tissue disease, AUGUSTO, and hypertension who was admitted 2/13/2025 due to abnormal outpatient labs and worsening renal function.  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  Podiatry was consulted for left heel wound.  Patient states she has had it for about 2 months.  It started as a healed fissure, and she been applying heel cream to it however has been increasing in size.  It is tender to touch.  Denies any previous similar wounds in the past.  She has not received any previous medical care for it.      Review of Systems:    See History for ROS. Review of systems otherwise negative,  "except for noted.    Past Medical and Surgical History:     Past Medical History:   Diagnosis Date    Allergic     latex, some tree nuts, seasonal    Arthritis     psoriatic arthritis    Asthma     CPAP (continuous positive airway pressure) dependence     Diabetes mellitus (HCC)     gestational    Gestational diabetes     Hypertension     patient reports    Obesity     PCOS (polycystic ovarian syndrome)     Sleep apnea     Varicella        Past Surgical History:   Procedure Laterality Date    TONSILLECTOMY      last assessed: 5/3/16       Meds/Allergies:      Medications Prior to Admission:     amLODIPine (NORVASC) 10 mg tablet    atorvastatin (LIPITOR) 10 mg tablet    Calcium-Magnesium-Vitamin D - MG-MG-UNIT TB24    chlorthalidone 25 mg tablet    Dulaglutide 4.5 MG/0.5ML SOAJ    etonogestrel-ethinyl estradiol (NuvaRing) 0.12-0.015 MG/24HR vaginal ring    folic acid (KP Folic Acid) 1 mg tablet    Icosapent Ethyl (Vascepa) 1 g CAPS    losartan (COZAAR) 25 mg tablet    metFORMIN (GLUCOPHAGE) 500 mg tablet    montelukast (SINGULAIR) 10 mg tablet    predniSONE 10 mg tablet    SF 5000 Plus 1.1 % CREA    Magnesium 400 MG TABS    methotrexate 50 MG/2ML injection    Tuberculin-Allergy Syringes (B-D ALLERGY SYRINGE 1CC/28G) 28G X 1/2\" 1 ML MISC    Allergies   Allergen Reactions    Ace Inhibitors Cough    Latex      Per patient    Nuts - Food Allergy     Shellfish-Derived Products - Food Allergy        Social History:     Marital Status: /Civil Union    Substance Use History:   Social History     Substance and Sexual Activity   Alcohol Use No     Social History     Tobacco Use   Smoking Status Never   Smokeless Tobacco Never     Social History     Substance and Sexual Activity   Drug Use No       Family History:    Family History   Problem Relation Age of Onset    Hypertension Mother     Hyperlipidemia Mother     Hypertension Father     Other Father         low serum HDL    Hyperlipidemia Father     " "Hypothyroidism Sister     Asthma Sister     Diabetes Sister     Thyroid disease Sister     Breast cancer Sister 48    No Known Problems Daughter     No Known Problems Son     Breast cancer Maternal Grandmother 79    Stroke Maternal Grandfather     No Known Problems Paternal Grandmother     No Known Problems Paternal Grandfather     No Known Problems Paternal Aunt     No Known Problems Paternal Aunt     No Known Problems Paternal Aunt          OBJECTIVE:    Vitals:   Blood Pressure: 147/85 (02/19/25 1504)  Pulse: 104 (02/19/25 1504)  Temperature: 99.2 °F (37.3 °C) (02/19/25 1504)  Temp Source: Oral (02/18/25 1129)  Respirations: 18 (02/19/25 1504)  Height: 5' 4\" (162.6 cm) (02/14/25 0148)  Weight - Scale: 84.4 kg (186 lb) (02/14/25 0148)  SpO2: 95 % (02/19/25 1504)    Physical Exam:    General Appearance: Alert, cooperative, no distress.  HEENT: Head normocephalic, atraumatic, without obvious abnormality.  Heart: Normal rate and rhythm.  Lungs: Non-labored breathing. No respiratory distress.  Abdomen: Without distension.  Psychiatric: AAOx3    Lower Extremity Focused Exam:    Vascular: DP PT pulses palpable left foot.  No edema noted.    Dermatological:  Lower extremity wound(s) as noted below:  Clinical Images 02/19/25:     Wound to plantar left heel extending down into superficial subcutaneous tissue.   No periwound erythema, edema. drainage is minimal.     Neurological:   Light touch sensation intact.  Decreased plantar sensation to plantar feet.    Musculoskeletal:  Left heel is tender to palpation.  No gross deformities noted.  No contributing osseous deformities.  MMT is 5 out of 5 in all 4 quadrants against resistance.        Additional data:     Lab Results: I have personally reviewed pertinent labs including:    Results from last 7 days   Lab Units 02/19/25  0546 02/18/25  0528   WBC Thousand/uL 9.44 11.65*   HEMOGLOBIN g/dL 9.6* 9.8*   HEMATOCRIT % 29.1* 29.0*   PLATELETS Thousands/uL 130* 124*   SEGS PCT % " " --  81*   LYMPHO PCT %  --  9*   MONO PCT %  --  6   EOS PCT %  --  2     Results from last 7 days   Lab Units 02/19/25  0546   POTASSIUM mmol/L 3.7   CHLORIDE mmol/L 103   CO2 mmol/L 26   BUN mg/dL 37*   CREATININE mg/dL 2.53*   CALCIUM mg/dL 7.8*   ALK PHOS U/L 39   ALT U/L 29   AST U/L 21     Results from last 7 days   Lab Units 02/13/25  1935   INR  1.04       Cultures: I have personally reviewed pertinent cultures including:    Results from last 7 days   Lab Units 02/14/25  1646   URINE CULTURE  No Growth <1000 cfu/mL           Imaging: I have personally reviewed pertinent reports in PACS.  EKG, Pathology, and Other Studies: I have personally reviewed pertinent reports.    Time Spent for Care: 30 minutes.  More than 50% of total time spent on counseling and coordination of care as described above.      ** Please Note: Portions of the record may have been created with voice recognition software. Occasional wrong word or \"sound a like\" substitutions may have occurred due to the inherent limitations of voice recognition software. Read the chart carefully and recognize, using context, where substitutions have occurred. **    "

## 2025-02-19 NOTE — ASSESSMENT & PLAN NOTE
Has been following with rheumatology in the outpatient setting due to progressively worsening edema in hands and feet and myalgias, Raynaud's symptoms after getting influenza vaccination around [facility]  Since then, blood pressures have been worsening. Has been on multiple prednisone tapers  Echo (2/12) - EF 70%. G2DD.  RV and LV mildly dilated.  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 To  MRI of the femur left-was done to rule out myositis-did not show any myositis or muscle atrophy but showed severe left hip osteoarthritis  Labs so far:  JAYESH positive, titer at 1280 with nucleolar pattern  Sjogren antibodies   Anticentromere antibody negative  CK within normal range  Cyclic Citrullinated peptide antibody negative  Rheumatoid factor negative  Anti-Jo1 antibody negative  Antiscleroderma antibody negative  RNA polymerase 3 IgG antibody elevated and positive   JAYESH screen with reflex: UPEP, SPEP negative  FABIANA screen and Anti-dsDNA ab negative   Ig Kappa and Lambda free light chains elevated  C3/C4 normal   Pending anti-Th/To ab, Anti-HMGCR ab, Anti-neutrophilic cytoplasmic ab   2/17 fine crackles heard on bilateral lower lung fields and noncontrast CT chest ordered for concern of ILD   CT chest high resolution - Mild bibasilar juxtapleural reticulation, nonspecific but could represent early, mild interstitial fibrosis. Mild, subtle scattered groundglass density in both lungs (302/112), nonspecific but may be infectious/inflammatory. No consolidation.   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 Need frequent hourly monitoring of blood pressure to up titrate the dose of captopril. Can up titrate in increments of 25 mg every 4 to 8 hrs (Total max dose in a day is 300 to 450 in divided doses)  Recommend adjusting the dose of captopril rather than increasing the dose of other anti-hypertensive agents  CT chest non contrast - nonspecific but could represent early, mild interstitial fibrosis, follow up with rheum

## 2025-02-19 NOTE — ASSESSMENT & PLAN NOTE
Current hemoglobin: 9.6mg/dL  Haptoglobin <10 with no schistocytes on blood smear  Some component of hemolysis in the settings of TMA  Treatment:  Transfuse for hemoglobin less than 7.0 per primary service

## 2025-02-19 NOTE — ASSESSMENT & PLAN NOTE
Lab Results   Component Value Date    K 3.7 02/19/2025    K 3.8 02/18/2025    K 3.7 02/17/2025     Monitor and replete

## 2025-02-19 NOTE — ASSESSMENT & PLAN NOTE
Volume: Hypervolemic   Etiology: Secondary to scleroderma crisis  Blood pressure: Hypertensive, /86, remains above goal  Continue with low-sodium diet   Captopril 50 mg 3 times daily   Stop amlodipine   Start nifedipine 60 starting tomorrow   Increase torsemide to 10   monitor urinary output

## 2025-02-19 NOTE — ASSESSMENT & PLAN NOTE
Lab Results   Component Value Date    CREATININE 2.53 (H) 02/19/2025    CREATININE 2.43 (H) 02/18/2025    CREATININE 2.27 (H) 02/17/2025     Here with abnormal outpatient lab work. 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  Increase likely secondary to hemodynamic changes in the settings of captopril  Continue with captopril 12.5 mg 3 times daily for BP control at her baseline which is 140-150/80-90. Need frequent hourly monitoring of blood pressure to up titrate the dose of captopril. Can up titrate in increments of 25 mg every 4 to 8 hrs (Total max dose in a day is 300 to 450 in divided doses)  Recommend adjusting the dose of captopril rather than increasing the dose of other anti-hypertensive agents  If SBP is well controlled <130 tomorrow, nephrology will schedule renal biopsy    Monitor urine output   Hold home losartan, chlorthalidone  No need for further IVF at this time  Follow-up on remainder of workup

## 2025-02-19 NOTE — ASSESSMENT & PLAN NOTE
Has been following with rheumatology in the outpatient setting due to progressively worsening edema in hands and feet and myalgias, Raynaud's symptoms after getting influenza vaccination around [facility]  Since then, blood pressures have been worsening. Has been on multiple prednisone tapers  Echo (2/12) - EF 70%. G2DD.  RV and LV mildly dilated.  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 To  MRI of the femur left-was done to rule out myositis-did not show any myositis or muscle atrophy but showed severe left hip osteoarthritis  Labs so far:  JAYESH positive, titer at 1280 with nucleolar pattern  Sjogren antibodies   Anticentromere antibody negative  CK within normal range  Cyclic Citrullinated peptide antibody negative  Rheumatoid factor negative  Anti-Jo1 antibody negative  Antiscleroderma antibody negative  RNA polymerase 3 IgG antibody elevated and positive   JAYESH screen with reflex: UPEP, SPEP negative  FABIANA screen and Anti-dsDNA ab negative   Ig Kappa and Lambda free light chains elevated  C3/C4 normal   Pending anti-Th/To ab, Anti-HMGCR ab, Anti-neutrophilic cytoplasmic ab   2/17 fine crackles heard on bilateral lower lung fields and noncontrast CT chest ordered for concern of ILD   CT chest high resolution - Mild bibasilar juxtapleural reticulation, nonspecific but could represent early, mild interstitial fibrosis. Mild, subtle scattered groundglass density in both lungs (302/112), nonspecific but may be infectious/inflammatory. No consolidation.   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 Need frequent hourly monitoring of blood pressure to up titrate the dose of captopril. Can up titrate in increments of 25 mg every 4 to 8 hrs (Total max dose in a day is 300 to 450 in divided doses)  Recommend adjusting the dose of captopril rather than increasing the dose of other anti-hypertensive agents  CT chest non contrast - nonspecific but could represent early, mild interstitial fibrosis, follow up with rheum

## 2025-02-19 NOTE — ASSESSMENT & PLAN NOTE
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  Increase likely secondary to hemodynamic changes in the settings of captopril  Peak creatinine: Trending  UA: Leukocyturia, no hematuria  UACR 665 mg/g  Renal imaging : No hydronephrosis, left kidney cortical cyst  GN workup  JAYESH positive   dsDNA neg  SSA, SSB neg  Anti 70 Ab neg   Anti-RNA polymerase 175 (elevated)  Treatment:  No indication of dialysis at this time  BP not at goal for biopsy at this time   Avoid NSAIDs, IV contrast    Adjust medications to GFR

## 2025-02-19 NOTE — ASSESSMENT & PLAN NOTE
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

## 2025-02-20 PROBLEM — E87.6 HYPOKALEMIA: Status: RESOLVED | Noted: 2025-02-14 | Resolved: 2025-02-20

## 2025-02-20 LAB
ANION GAP SERPL CALCULATED.3IONS-SCNC: 8 MMOL/L (ref 4–13)
BUN SERPL-MCNC: 38 MG/DL (ref 5–25)
CALCIUM SERPL-MCNC: 7.6 MG/DL (ref 8.4–10.2)
CHLORIDE SERPL-SCNC: 103 MMOL/L (ref 96–108)
CO2 SERPL-SCNC: 25 MMOL/L (ref 21–32)
CREAT SERPL-MCNC: 2.6 MG/DL (ref 0.6–1.3)
CRYOGLOB SER QL 1D COLD INC: POSITIVE
ERYTHROCYTE [DISTWIDTH] IN BLOOD BY AUTOMATED COUNT: 16.1 % (ref 11.6–15.1)
GFR SERPL CREATININE-BSD FRML MDRD: 20 ML/MIN/1.73SQ M
GLUCOSE SERPL-MCNC: 137 MG/DL (ref 65–140)
HCT VFR BLD AUTO: 28.8 % (ref 34.8–46.1)
HGB BLD-MCNC: 9.4 G/DL (ref 11.5–15.4)
MCH RBC QN AUTO: 31.8 PG (ref 26.8–34.3)
MCHC RBC AUTO-ENTMCNC: 32.6 G/DL (ref 31.4–37.4)
MCV RBC AUTO: 97 FL (ref 82–98)
MISCELLANEOUS LAB TEST RESULT: NORMAL
PLATELET # BLD AUTO: 132 THOUSANDS/UL (ref 149–390)
PMV BLD AUTO: 9.8 FL (ref 8.9–12.7)
POTASSIUM SERPL-SCNC: 4 MMOL/L (ref 3.5–5.3)
RBC # BLD AUTO: 2.96 MILLION/UL (ref 3.81–5.12)
SODIUM SERPL-SCNC: 136 MMOL/L (ref 135–147)
WBC # BLD AUTO: 8.16 THOUSAND/UL (ref 4.31–10.16)

## 2025-02-20 PROCEDURE — 80048 BASIC METABOLIC PNL TOTAL CA: CPT

## 2025-02-20 PROCEDURE — 85027 COMPLETE CBC AUTOMATED: CPT

## 2025-02-20 PROCEDURE — 99232 SBSQ HOSP IP/OBS MODERATE 35: CPT | Performed by: STUDENT IN AN ORGANIZED HEALTH CARE EDUCATION/TRAINING PROGRAM

## 2025-02-20 RX ORDER — GUAIFENESIN/DEXTROMETHORPHAN 100-10MG/5
10 SYRUP ORAL EVERY 4 HOURS PRN
Status: DISCONTINUED | OUTPATIENT
Start: 2025-02-20 | End: 2025-02-22 | Stop reason: HOSPADM

## 2025-02-20 RX ORDER — BENZONATATE 100 MG/1
200 CAPSULE ORAL 3 TIMES DAILY PRN
Status: DISCONTINUED | OUTPATIENT
Start: 2025-02-20 | End: 2025-02-22 | Stop reason: HOSPADM

## 2025-02-20 RX ORDER — ENOXAPARIN SODIUM 100 MG/ML
30 INJECTION SUBCUTANEOUS
Status: DISCONTINUED | OUTPATIENT
Start: 2025-02-21 | End: 2025-02-22 | Stop reason: HOSPADM

## 2025-02-20 RX ADMIN — CAPTOPRIL 50 MG: 12.5 TABLET ORAL at 08:29

## 2025-02-20 RX ADMIN — DOXAZOSIN 2 MG: 1 TABLET ORAL at 17:06

## 2025-02-20 RX ADMIN — BENZONATATE 100 MG: 100 CAPSULE ORAL at 13:48

## 2025-02-20 RX ADMIN — CAPTOPRIL 50 MG: 12.5 TABLET ORAL at 21:19

## 2025-02-20 RX ADMIN — CALCIUM CARBONATE (ANTACID) CHEW TAB 500 MG 1000 MG: 500 CHEW TAB at 11:50

## 2025-02-20 RX ADMIN — ATORVASTATIN CALCIUM 10 MG: 10 TABLET, FILM COATED ORAL at 08:29

## 2025-02-20 RX ADMIN — MONTELUKAST 10 MG: 10 TABLET, FILM COATED ORAL at 21:19

## 2025-02-20 RX ADMIN — HEPARIN SODIUM 5000 UNITS: 5000 INJECTION INTRAVENOUS; SUBCUTANEOUS at 05:01

## 2025-02-20 RX ADMIN — CAPTOPRIL 50 MG: 12.5 TABLET ORAL at 17:06

## 2025-02-20 RX ADMIN — GUAIFENESIN AND DEXTROMETHORPHAN 10 ML: 100; 10 SYRUP ORAL at 21:19

## 2025-02-20 RX ADMIN — NIFEDIPINE 60 MG: 30 TABLET, FILM COATED, EXTENDED RELEASE ORAL at 08:29

## 2025-02-20 RX ADMIN — FOLIC ACID 1 MG: 1 TABLET ORAL at 08:29

## 2025-02-20 RX ADMIN — BENZONATATE 200 MG: 100 CAPSULE ORAL at 19:14

## 2025-02-20 RX ADMIN — FAMOTIDINE 10 MG: 20 TABLET, FILM COATED ORAL at 21:19

## 2025-02-20 RX ADMIN — TORSEMIDE 10 MG: 10 TABLET ORAL at 08:29

## 2025-02-20 RX ADMIN — PANTOPRAZOLE SODIUM 40 MG: 40 TABLET, DELAYED RELEASE ORAL at 05:01

## 2025-02-20 RX ADMIN — DOXAZOSIN 2 MG: 1 TABLET ORAL at 08:29

## 2025-02-20 RX ADMIN — Medication 1 TABLET: at 08:29

## 2025-02-20 NOTE — ASSESSMENT & PLAN NOTE
Has been following with rheumatology in the outpatient setting due to progressively worsening edema in hands and feet and myalgias, Raynaud's symptoms after getting influenza vaccination around [facility]  Since then, blood pressures have been worsening. Has been on multiple prednisone tapers  Echo (2/12) - EF 70%. G2DD.  RV and LV mildly dilated.  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 To  MRI of the femur left-was done to rule out myositis-did not show any myositis or muscle atrophy but showed severe left hip osteoarthritis  Labs so far:  JAYESH positive, titer at 1280 with nucleolar pattern  Sjogren antibodies   Anticentromere antibody negative  CK within normal range  Cyclic Citrullinated peptide antibody negative  Rheumatoid factor negative  Anti-Jo1 antibody negative  Antiscleroderma antibody negative  RNA polymerase 3 IgG antibody elevated and positive   JAYESH screen with reflex: UPEP, SPEP negative  FABIANA screen and Anti-dsDNA ab negative   Ig Kappa and Lambda free light chains elevated  C3/C4 normal   Pending anti-Th/To ab, Anti-HMGCR ab, Anti-neutrophilic cytoplasmic ab   2/17 fine crackles heard on bilateral lower lung fields and noncontrast CT chest ordered for concern of ILD   CT chest high resolution - Mild bibasilar juxtapleural reticulation, nonspecific but could represent early, mild interstitial fibrosis. Mild, subtle scattered groundglass density in both lungs (302/112), nonspecific but may be infectious/inflammatory. No consolidation.   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

## 2025-02-20 NOTE — ASSESSMENT & PLAN NOTE
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

## 2025-02-20 NOTE — ASSESSMENT & PLAN NOTE
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   Baseline creatinine: 0.7 to 0.9 mg/dL  Current creatinine: 2.6 mg/dL slowly going up in the settings of hemodynamic changes    Peak creatinine: Continue to trend  UA: Leukocyturia, no hematuria  UACR 665 mg/g  Renal imaging : No hydronephrosis, left kidney cortical cyst  GN workup  JAYESH negative  dsDNA negative x 2  ANCA negative  SSA, SSB neg  Anti 70 Ab neg   Anti-RNA polymerase 175 (elevated)  Treatment:  No indication of dialysis at this time will monitor kidney function closely   BP not at goal   See BP regimen below  Inpatient versus outpatient biopsy depends of kidney function and blood pressure  Avoid NSAIDs, IV contrast    Adjust medications to GFR

## 2025-02-20 NOTE — PROGRESS NOTES
Progress Note - Nephrology   Name: Kari Erazo 54 y.o. female I MRN: [id number]  Unit/Bed#: S -01 I Date of Admission: 2/13/2025   Date of Service: 2/20/2025 I Hospital Day: 6     Assessment & Plan  Acute kidney injury (HCC)  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   Baseline creatinine: 0.7 to 0.9 mg/dL  Current creatinine: 2.6 mg/dL slowly going up in the settings of hemodynamic changes    Peak creatinine: Continue to trend  UA: Leukocyturia, no hematuria  UACR 665 mg/g  Renal imaging : No hydronephrosis, left kidney cortical cyst  GN workup  JAYESH negative  dsDNA negative x 2  ANCA negative  SSA, SSB neg  Anti 70 Ab neg   Anti-RNA polymerase 175 (elevated)  Treatment:  No indication of dialysis at this time will monitor kidney function closely   BP not at goal   See BP regimen below  Inpatient versus outpatient biopsy depends of kidney function and blood pressure  Avoid NSAIDs, IV contrast    Adjust medications to GFR    Accelerated hypertension  Volume: Fluid overload  Etiology: Secondary to scleroderma crisis  Blood pressure: Hypertensive, /85  Goal less than 140/90   low-sodium diet   Captopril 50 mg 3 times daily   Continue nifedipine 60, we can titrate up to 90 mg tomorrow if no improvement of blood pressure  Currently on doxazosin  Continue torsemide 10   If patient requires more blood pressure medication I would add a beta-blocker and discontinue the excess    Anemia  Current hemoglobin: 9.4 mg/dL  Haptoglobin <10 with no schistocytes on blood smear  Some component of hemolysis in the settings of TMA  Treatment:  Transfuse for hemoglobin less than 7.0 per primary service    Undifferentiated connective tissue disease (HCC)  Mixed connective tissue disorder, has alopecia, joint pain, right notes, skin changes   Management per rheumatology      I have reviewed the nephrology recommendations including continue with torsemide 10  nifedipine 60, will reassess tomorrow, with primary team, and we are in agreement with renal plan including the information outlined above. I have discussed the above management plan in detail with the primary service.     Subjective   Brief History of Admission - 53 yo woman with PMH of Raynaud's, psoriatic arthritis, hypertension, diabetes p/w accelerated hypertension. Nephrology is consulted for management of TITUS     Patient complains of lower extremity edema, bilateral hand edema.  No shortness of breath, no chest pain    Objective :  Temp:  [98.4 °F (36.9 °C)-99.5 °F (37.5 °C)] 99.2 °F (37.3 °C)  HR:  [] 104  BP: (138-174)/(72-98) 155/85  Resp:  [16-18] 16  SpO2:  [94 %-98 %] 98 %    Current Weight: Weight - Scale: 84.4 kg (186 lb)  First Weight: Weight - Scale: 84.4 kg (186 lb)  I/O         02/18 0701  02/19 0700 02/19 0701  02/20 0700 02/20 0701  02/21 0700    P.O.       Total Intake(mL/kg)       Urine (mL/kg/hr) 1250 (0.6) 950 (0.5)     Total Output 1250 950     Net -1250 -950                  Physical Exam  General:  no acute distress at this time  Skin:  No acute rash  Eyes:  No scleral icterus and noninjected  ENT:  mucous membranes moist  Neck:  no carotid bruits  Chest:  Clear to auscultation percussion, good respiratory effort, no use of accessory respiratory muscles  CVS:  Regular rate and rhythm without rub   Abdomen:  soft and nontender   Extremities: lower extremity edema, left foot wound  Neuro:  No gross focality  Psych:  Alert , cooperative     Medications:    Current Facility-Administered Medications:     acetaminophen (TYLENOL) tablet 650 mg, 650 mg, Oral, Q6H PRN, Haroon Chavis MD, 650 mg at 02/17/25 1110    atorvastatin (LIPITOR) tablet 10 mg, 10 mg, Oral, Daily, Thuy Patrick MD, 10 mg at 02/20/25 0829    benzonatate (TESSALON PERLES) capsule 100 mg, 100 mg, Oral, TID PRN, Trev Couch MD, 100 mg at 02/19/25 2112    calcium carbonate (TUMS) chewable tablet 1,000 mg, 1,000 mg, Oral, TID  PRN, Dov Kerr MD, 1,000 mg at 02/20/25 1150    calcium carbonate-vitamin D 500 mg-5 mcg tablet 1 tablet, 1 tablet, Oral, Daily With Breakfast, Thuy Patrick MD, 1 tablet at 02/20/25 0829    captopril (CAPOTEN) tablet 50 mg, 50 mg, Oral, TID, Trev Couch MD, 50 mg at 02/20/25 0829    doxazosin (CARDURA) tablet 2 mg, 2 mg, Oral, BID, Gavin Zazueta MD, 2 mg at 02/20/25 0829    [START ON 2/21/2025] enoxaparin (LOVENOX) subcutaneous injection 30 mg, 30 mg, Subcutaneous, Q24H TERRY, Helio Vegas MD    famotidine (PEPCID) tablet 10 mg, 10 mg, Oral, Daily PRN, Trev Couch MD, 10 mg at 02/19/25 2208    folic acid (FOLVITE) tablet 1 mg, 1 mg, Oral, Daily, Thuy Patrick MD, 1 mg at 02/20/25 0829    guaiFENesin (MUCINEX) 12 hr tablet 600 mg, 600 mg, Oral, Q12H PRN, Tisha Tolbert MD    montelukast (SINGULAIR) tablet 10 mg, 10 mg, Oral, HS, Thuy Patrick MD, 10 mg at 02/19/25 2112    NIFEdipine (PROCARDIA XL) 24 hr tablet 60 mg, 60 mg, Oral, Daily, Joselyn Reyes Bahamonde, MD, 60 mg at 02/20/25 0829    pantoprazole (PROTONIX) EC tablet 40 mg, 40 mg, Oral, Early Morning, Lakisha Woods DO, 40 mg at 02/20/25 0501    torsemide (DEMADEX) tablet 10 mg, 10 mg, Oral, Daily, Joselyn Reyes Bahamonde, MD, 10 mg at 02/20/25 0829      Lab Results: I have reviewed the following results:  Results from last 7 days   Lab Units 02/20/25  0537 02/19/25  0546 02/18/25  0528 02/17/25  0611 02/16/25  0413 02/15/25  0451 02/14/25  0622 02/13/25  1935   WBC Thousand/uL 8.16 9.44 11.65* 10.14 10.28* 13.76* 13.11* 18.52*   HEMOGLOBIN g/dL 9.4* 9.6* 9.8* 10.2* 10.3* 10.9* 10.5* 12.1   HEMATOCRIT % 28.8* 29.1* 29.0* 29.7* 30.2* 31.8* 30.3* 34.5*   PLATELETS Thousands/uL 132* 130* 124* 117* 136* 164 156 218   POTASSIUM mmol/L 4.0 3.7 3.8 3.7 3.1* 3.4* 3.1* 3.6   CHLORIDE mmol/L 103 103 100 103 101 100 101 98   CO2 mmol/L 25 26 25 26 26 25 28 26   BUN mg/dL 38* 37* 37* 37* 39* 45* 49* 52*   CREATININE mg/dL 2.60* 2.53* 2.43* 2.27*  "2.30* 2.11* 2.13* 2.29*   CALCIUM mg/dL 7.6* 7.8* 7.8* 7.9* 8.0* 8.5 8.6 9.5   MAGNESIUM mg/dL  --   --   --  2.2  --  2.3 2.0  --    PHOSPHORUS mg/dL  --   --   --   --   --  3.2 3.9  --    ALBUMIN g/dL  --  3.5  --  3.4*  --   --   --  4.5       Administrative Statements     Portions of the record may have been created with voice recognition software. Occasional wrong word or \"sound a like\" substitutions may have occurred due to the inherent limitations of voice recognition software. Read the chart carefully and recognize, using context, where substitutions have occurred.If you have any questions, please contact the dictating provider.  "

## 2025-02-20 NOTE — PROGRESS NOTES
Progress Note - Nephrology   Name: Kari Erazo 54 y.o. 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   Baseline creatinine: 0.7 to 0.9 mg/dL  Current creatinine: 2.6 mL/dL slowly trending down in the settings of hemodynamic changes    Peak creatinine: Continue to trend  UA: Leukocyturia, no hematuria  UACR 665 mg/g  Renal imaging : No hydronephrosis, left kidney cortical cyst  GN workup  JAYESH negative  dsDNA negative x 2  ANCA negative  SSA, SSB neg  Anti 70 Ab neg   Anti-RNA polymerase 175 (elevated)  Treatment:  No indication of dialysis at this time will monitor kidney function closely   BP not at goal   See BP regimen below  Inpatient versus outpatient biopsy depends of kidney function and blood pressure  Avoid NSAIDs, IV contrast    Adjust medications to GFR    Accelerated hypertension  Volume: Fluid overload  Etiology: Secondary to scleroderma crisis  Blood pressure: Hypertensive, /85  Goal less than 140/90   low-sodium diet   Captopril 50 mg 3 times daily   Continue nifedipine 60, we can titrate up to 90 mg tomorrow if no improvement of blood pressure  Currently on doxazosin  Continue torsemide 10   If patient requires more blood pressure medication I would add a beta-blocker and discontinue the excess    Anemia  Current hemoglobin: 9.4 mg/dL  Haptoglobin <10 with no schistocytes on blood smear  Some component of hemolysis in the settings of TMA  Treatment:  Transfuse for hemoglobin less than 7.0 per primary service    Undifferentiated connective tissue disease (HCC)  Mixed connective tissue disorder, has alopecia, joint pain, right notes, skin changes   Management per rheumatology      I have reviewed the nephrology recommendations including continue with torsemide  and nifedipine, will reassess blood pressure tomorrow with primary team, and we are in agreement with renal plan including the information outlined above. I have discussed the above management plan in detail with the primary service.     Subjective   Brief History of Admission - 53 yo woman with PMH of Raynaud's, psoriatic arthritis, hypertension, diabetes p/w accelerated hypertension. Nephrology is consulted for management of TITUS     Patient complains of lower extremity edema and some hand edema, no shortness of breath.  No chest pain.  Walking is limited by left foot    Objective :  Temp:  [98.4 °F (36.9 °C)-99.5 °F (37.5 °C)] 99.2 °F (37.3 °C)  HR:  [] 104  BP: (138-174)/(72-98) 155/85  Resp:  [16-18] 16  SpO2:  [94 %-98 %] 98 %    Current Weight: Weight - Scale: 84.4 kg (186 lb)  First Weight: Weight - Scale: 84.4 kg (186 lb)  I/O         02/18 0701 02/19 0700 02/19 0701 02/20 0700 02/20 0701  02/21 0700    P.O.       Total Intake(mL/kg)       Urine (mL/kg/hr) 1250 (0.6) 950 (0.5)     Total Output 1250 950     Net -1250 -950                  Physical Exam  General:  no acute distress at this time  Skin: Bilateral hand rash  Eyes:  No scleral icterus and noninjected  ENT:  mucous membranes moist  Neck:  no carotid bruits  Chest:  Clear to auscultation percussion, good respiratory effort, no use of accessory respiratory muscles  CVS:  Regular rate and rhythm without rub   Abdomen:  soft and nontender   Extremities: lower extremity edema, left foot wound  Neuro:  No gross focality  Psych:  Alert , cooperative     Medications:    Current Facility-Administered Medications:     acetaminophen (TYLENOL) tablet 650 mg, 650 mg, Oral, Q6H PRN, Haroon Chavis MD, 650 mg at 02/17/25 1110    atorvastatin (LIPITOR) tablet 10 mg, 10 mg, Oral, Daily, Thuy Patrick MD, 10 mg at 02/20/25 0829    benzonatate (TESSALON PERLES) capsule 100 mg, 100 mg, Oral, TID PRN, Trev Couch MD, 100 mg at 02/19/25 2112    calcium carbonate  (TUMS) chewable tablet 1,000 mg, 1,000 mg, Oral, TID PRN, Dov Kerr MD, 1,000 mg at 02/20/25 1150    calcium carbonate-vitamin D 500 mg-5 mcg tablet 1 tablet, 1 tablet, Oral, Daily With Breakfast, Thuy Patrick MD, 1 tablet at 02/20/25 0829    captopril (CAPOTEN) tablet 50 mg, 50 mg, Oral, TID, Trev Couch MD, 50 mg at 02/20/25 0829    doxazosin (CARDURA) tablet 2 mg, 2 mg, Oral, BID, Gavin Zazueta MD, 2 mg at 02/20/25 0829    famotidine (PEPCID) tablet 10 mg, 10 mg, Oral, Daily PRN, Trev Couch MD, 10 mg at 02/19/25 2208    folic acid (FOLVITE) tablet 1 mg, 1 mg, Oral, Daily, Thuy Patrick MD, 1 mg at 02/20/25 0829    guaiFENesin (MUCINEX) 12 hr tablet 600 mg, 600 mg, Oral, Q12H PRN, Tisha Tolbert MD    heparin (porcine) subcutaneous injection 5,000 Units, 5,000 Units, Subcutaneous, Q8H TERRY, Trev Couch MD, 5,000 Units at 02/20/25 0501    montelukast (SINGULAIR) tablet 10 mg, 10 mg, Oral, HS, Thuy Patrick MD, 10 mg at 02/19/25 2112    NIFEdipine (PROCARDIA XL) 24 hr tablet 60 mg, 60 mg, Oral, Daily, Joselyn Reyes Bahamonde, MD, 60 mg at 02/20/25 0829    NIFEdipine (PROCARDIA) capsule 30 mg, 30 mg, Oral, Q8H PRN, Trev Couch MD    pantoprazole (PROTONIX) EC tablet 40 mg, 40 mg, Oral, Early Morning, Lakisha Woods DO, 40 mg at 02/20/25 0501    torsemide (DEMADEX) tablet 10 mg, 10 mg, Oral, Daily, Joselyn Reyes Bahamonde, MD, 10 mg at 02/20/25 0829      Lab Results: I have reviewed the following results:  Results from last 7 days   Lab Units 02/20/25  0537 02/19/25  0546 02/18/25  0528 02/17/25  0611 02/16/25  0413 02/15/25  0451 02/14/25  0622 02/13/25  1935   WBC Thousand/uL 8.16 9.44 11.65* 10.14 10.28* 13.76* 13.11* 18.52*   HEMOGLOBIN g/dL 9.4* 9.6* 9.8* 10.2* 10.3* 10.9* 10.5* 12.1   HEMATOCRIT % 28.8* 29.1* 29.0* 29.7* 30.2* 31.8* 30.3* 34.5*   PLATELETS Thousands/uL 132* 130* 124* 117* 136* 164 156 218   POTASSIUM mmol/L 4.0 3.7 3.8 3.7 3.1* 3.4* 3.1* 3.6   CHLORIDE mmol/L  "103 103 100 103 101 100 101 98   CO2 mmol/L 25 26 25 26 26 25 28 26   BUN mg/dL 38* 37* 37* 37* 39* 45* 49* 52*   CREATININE mg/dL 2.60* 2.53* 2.43* 2.27* 2.30* 2.11* 2.13* 2.29*   CALCIUM mg/dL 7.6* 7.8* 7.8* 7.9* 8.0* 8.5 8.6 9.5   MAGNESIUM mg/dL  --   --   --  2.2  --  2.3 2.0  --    PHOSPHORUS mg/dL  --   --   --   --   --  3.2 3.9  --    ALBUMIN g/dL  --  3.5  --  3.4*  --   --   --  4.5       Administrative Statements     Portions of the record may have been created with voice recognition software. Occasional wrong word or \"sound a like\" substitutions may have occurred due to the inherent limitations of voice recognition software. Read the chart carefully and recognize, using context, where substitutions have occurred.If you have any questions, please contact the dictating provider.  "

## 2025-02-20 NOTE — ASSESSMENT & PLAN NOTE
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

## 2025-02-20 NOTE — ASSESSMENT & PLAN NOTE
Volume: Fluid overload  Etiology: Secondary to scleroderma crisis  Blood pressure: Hypertensive, /85  Goal less than 140/90   low-sodium diet   Captopril 50 mg 3 times daily   Continue nifedipine 60, we can titrate up to 90 mg tomorrow if no improvement of blood pressure  Currently on doxazosin  Continue torsemide 10   If patient requires more blood pressure medication I would add a beta-blocker and discontinue the excess

## 2025-02-20 NOTE — ASSESSMENT & PLAN NOTE
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  Hematology/oncology consulted, appreciate recommendations  mild drop in hemoglobin, haptoglobin <10 and mildly elevated LDH but peripheral smear did not show any signs of hemolysis/schistocytes. Her MARCO is negative. Her platelets are above 100 K. No hyperbilirubinemia. her PLASMIC score 3 points, Low Risk , 0% Risk of severe [id number] deficiency, so no need for urgent TTP w/u or treatment , would continue trend CBC , likely mild thrombocytopenia and anemia in context of the acute illness / SRC. If continues to trend down further or evidence of hemolysis further workup can be pursued.

## 2025-02-20 NOTE — PLAN OF CARE
Problem: PAIN - ADULT  Goal: Verbalizes/displays adequate comfort level or baseline comfort level  Description: Interventions:  - Encourage patient to monitor pain and request assistance  - Assess pain using appropriate pain scale  - Administer analgesics based on type and severity of pain and evaluate response  - Implement non-pharmacological measures as appropriate and evaluate response  - Consider cultural and social influences on pain and pain management  - Notify physician/advanced practitioner if interventions unsuccessful or patient reports new pain  Outcome: Progressing     Problem: INFECTION - ADULT  Goal: Absence or prevention of progression during hospitalization  Description: INTERVENTIONS:  - Assess and monitor for signs and symptoms of infection  - Monitor lab/diagnostic results  - Monitor all insertion sites, i.e. indwelling lines, tubes, and drains  - Monitor endotracheal if appropriate and nasal secretions for changes in amount and color  - Tracy appropriate cooling/warming therapies per order  - Administer medications as ordered  - Instruct and encourage patient and family to use good hand hygiene technique  - Identify and instruct in appropriate isolation precautions for identified infection/condition  Outcome: Progressing     Problem: SAFETY ADULT  Goal: Patient will remain free of falls  Description: INTERVENTIONS:  - Educate patient/family on patient safety including physical limitations  - Instruct patient to call for assistance with activity   - Consult OT/PT to assist with strengthening/mobility   - Keep Call bell within reach  - Keep bed low and locked with side rails adjusted as appropriate  - Keep care items and personal belongings within reach  - Initiate and maintain comfort rounds  - Make Fall Risk Sign visible to staff  - Apply yellow socks and bracelet for high fall risk patients  - Consider moving patient to room near nurses station  Outcome: Progressing     Problem: Knowledge  Deficit  Goal: Patient/family/caregiver demonstrates understanding of disease process, treatment plan, medications, and discharge instructions  Description: Complete learning assessment and assess knowledge base.  Interventions:  - Provide teaching at level of understanding  - Provide teaching via preferred learning methods  Outcome: Progressing

## 2025-02-20 NOTE — ASSESSMENT & PLAN NOTE
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   Baseline creatinine: 0.7 to 0.9 mg/dL  Current creatinine: 2.6 mL/dL slowly trending down in the settings of hemodynamic changes    Peak creatinine: Continue to trend  UA: Leukocyturia, no hematuria  UACR 665 mg/g  Renal imaging : No hydronephrosis, left kidney cortical cyst  GN workup  JAYESH negative  dsDNA negative x 2  ANCA negative  SSA, SSB neg  Anti 70 Ab neg   Anti-RNA polymerase 175 (elevated)  Treatment:  No indication of dialysis at this time will monitor kidney function closely   BP not at goal   See BP regimen below  Inpatient versus outpatient biopsy depends of kidney function and blood pressure  Avoid NSAIDs, IV contrast    Adjust medications to GFR

## 2025-02-20 NOTE — PROGRESS NOTES
Progress Note - Hospitalist   Name: Kari Erazo 54 y.o. female I MRN: [id number]  Unit/Bed#: S -01 I Date of Admission: 2/13/2025   Date of Service: 2/20/2025 I Hospital Day: 6    Assessment & Plan  Acute kidney injury (HCC)  Lab Results   Component Value Date    CREATININE 2.60 (H) 02/20/2025    CREATININE 2.53 (H) 02/19/2025    CREATININE 2.43 (H) 02/18/2025     Here with abnormal outpatient lab work. 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  2/20  Creatinine of 2.60    Plan:  Nephrology following, appreciate recommendations  Continue with captopril 50 mg 3 times daily  Goal blood pressure of 140/90  Plan for renal biopsy with improvement of blood pressures, can be scheduled as an outpatient if patient would like to be discharged during the weekend  Monitor urine output  Hold home losartan, chlorthalidone  No need for further IVF at this time  Follow-up on remainder of workup  Accelerated hypertension  Home meds: Amlodipine 10 mg, chlorthalidone 25 mg, losartan 25 mg  Upon presentation, blood pressure was as high as 200s/100s  Asymptomatic  VAS renal artery - no evidence of significant arterial occlusive disease  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  BP this morning better controlled 150s/80s on captopril 12.5 mg TID    Plan  Nephrology following, appreciate recommendations  Continue with captopril 50 mg 3 times daily,   Increased torsemide 10 mg due to fluid overload   Cardura 2 mg twice daily  Nifedipine 60 mg daily starting today  Avoid beta-blockers and other anti-hypertension agents in favor of increasing  captopril dose  Hold home losartan and chlorthalidone    Undifferentiated connective tissue disease (HCC)  Has been following with rheumatology in the outpatient setting due to progressively worsening edema in hands and feet and myalgias, Raynaud's symptoms after getting influenza vaccination around Thanksgiving  Since then, blood pressures have been worsening. Has been on multiple prednisone tapers  Echo (2/12) - EF 70%. G2DD.  RV and LV mildly dilated.  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 To  MRI of the femur left-was done to rule out myositis-did not show any myositis or muscle atrophy but showed severe left hip osteoarthritis  Labs so far:  JAYESH positive, titer at 1280 with nucleolar pattern  Sjogren antibodies   Anticentromere antibody negative  CK within normal range  Cyclic Citrullinated peptide antibody negative  Rheumatoid factor negative  Anti-Jo1 antibody negative  Antiscleroderma antibody negative  RNA polymerase 3 IgG antibody elevated and positive   JAYESH screen with reflex: UPEP, SPEP negative  FABIANA screen and Anti-dsDNA ab negative   Ig Kappa and Lambda free light chains elevated  C3/C4 normal   Pending anti-Th/To ab, Anti-HMGCR ab, Anti-neutrophilic cytoplasmic ab   2/17 fine crackles heard on bilateral lower lung fields and noncontrast CT chest ordered for concern of ILD   CT chest high resolution - Mild bibasilar juxtapleural reticulation, nonspecific but could represent early, mild interstitial fibrosis. Mild, subtle scattered groundglass density in both lungs (302/112), nonspecific but may be infectious/inflammatory. No consolidation.   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 for scleroderma renal crisis,   Continue with captopril 50 mg 3 times daily   CT chest non contrast - nonspecific but could represent early, mild interstitial fibrosis, follow up with rheum     Thrombocytopenia (HCC)  Recent Labs     02/18/25  0528 02/19/25  0546 02/20/25  0537   * 130* 132*      Platelets 130 today   Low haptoglobin, elevated LDH, peripheral smear without schistocytes, normal corrected reticulocyte percentage, negative MARCO   B12, folate, iron panel WNL, transferrin level 196  Total and direct bilirubin WNL  CMV IgG and IgM negative   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  Hematology/oncology consulted, appreciate recommendations  mild drop in hemoglobin, haptoglobin <10 and mildly elevated LDH but peripheral smear did not show any signs of hemolysis/schistocytes. Her MARCO is negative. Her platelets are above 100 K. No hyperbilirubinemia. her PLASMIC score 3 points, Low Risk , 0% Risk of severe [id number] deficiency, so no need for urgent TTP w/u or treatment , would continue trend CBC , likely mild thrombocytopenia and anemia in context of the acute illness / SRC. If continues to trend down further or evidence of hemolysis further workup can be pursued.   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  Homans test negative  B12, folate, iron panel WNL, transferrin  level 196  Iron 66 within normal limits and ferritin 72 within normal limits  Total and direct bilirubin WNL  Suspicion is for thrombotic microangiopathy in the setting of SRC     Plan:  Hematology/oncology consulted, appreciate recommendations above  AUGUSTO on CPAP  Continue CPAP  Abnormal findings on imaging  CT imaging showing cystic 2.1 cm lesion arising from tail of pancreas, recommend 6-month follow-up CT  CT imaging also showing indeterminate radiolucent lesion in L1 vertebral body  CT chest high resolution - 3 mm juxtapleural medial right upper lobe nodule. Based on current Fleischner Society 2017 Guidelines on incidental pulmonary nodule, no routine follow-up is needed if the patient is low risk. If the patient is high risk, optional follow-up chest CT at 12 months can be considered.  Discussed findings with patient.  Follow-up as outpatient    Hepatitis C antibody test positive  Hepatitis C RNA level negative, no further workup warranted   SIRS without infection or organ dysfunction (HCC)  Has SIRS criteria of leukocytosis and tachycardia  No concern for active infection  Leukocytosis is secondary to steroids  Open wound of heel  Patient has a 1 x 1 cm ulcerated wound on the left heel  Podiatry consulted  Commended wound care, offloading of left heel with foot in surgical shoe  Close follow-up outpatient  GERD (gastroesophageal reflux disease)  Will give pantoprazole 40 mg once daily  Increase Tums to 1000 mg 3 times daily as needed  Famotidine 10 mg once daily renally dosed in the setting of TITUS    VTE Pharmacologic Prophylaxis: VTE Score: 5 High Risk (Score >/= 5) - Pharmacological DVT Prophylaxis Ordered: heparin. Sequential Compression Devices Ordered.    Mobility:   Basic Mobility Inpatient Raw Score: 24  JH-HLM Goal: 8: Walk 250 feet or more  JH-HLM Achieved: 7: Walk 25 feet or more  JH-HLM Goal achieved. Continue to encourage appropriate mobility.    Patient Centered Rounds: I performed bedside rounds  with nursing staff today.   Discussions with Specialists or Other Care Team Provider: Nephrology    Education and Discussions with Family / Patient: Patient declined call to .     Current Length of Stay: 6 day(s)  Current Patient Status: Inpatient   Certification Statement: The patient will continue to require additional inpatient hospital stay due to treatment of TITUS and accelerated hypertension  Discharge Plan: Anticipate discharge in 24-48 hrs to home.    Code Status: Level 1 - Full Code    Subjective   Patient seen and evaluated bedside, no overnight events.  Patient states her intermittent cough has improved with the addition of Pepcid.  Overall patient is doing well, she does still endorse exertional dyspnea however she does state that she feels it is improving.  She denies any oliguria or decreased urine output.  Patient still also has complaints of mild left heel pain where her open wound is, but overall feels improved with the boot in place.  Patient states her leg swelling is about the same since yesterday.    Objective :  Temp:  [98.4 °F (36.9 °C)-99.5 °F (37.5 °C)] 99.2 °F (37.3 °C)  HR:  [] 111  BP: (138-174)/(72-98) 161/82  Resp:  [16-18] 16  SpO2:  [94 %-98 %] 97 %  O2 Device: None (Room air)    Body mass index is 31.93 kg/m².     Input and Output Summary (last 24 hours):     Intake/Output Summary (Last 24 hours) at 2/20/2025 1341  Last data filed at 2/20/2025 0201  Gross per 24 hour   Intake --   Output 950 ml   Net -950 ml       Physical Exam  Vitals and nursing note reviewed.   Constitutional:       General: She is not in acute distress.     Appearance: She is well-developed.   HENT:      Head: Normocephalic and atraumatic.      Right Ear: External ear normal.      Left Ear: External ear normal.      Nose: Nose normal.   Eyes:      Extraocular Movements: Extraocular movements intact.      Conjunctiva/sclera: Conjunctivae normal.   Cardiovascular:      Rate and Rhythm: Normal  rate.      Heart sounds: No murmur heard.  Pulmonary:      Effort: Pulmonary effort is normal. No respiratory distress.      Comments: Mild crackles at bases of lungs, greater on left than right.  Improving today.  Abdominal:      Palpations: Abdomen is soft.      Tenderness: There is no abdominal tenderness.   Musculoskeletal:      Cervical back: Neck supple.   Skin:     General: Skin is warm and dry.      Comments: Swelling of hands with Raynaud phenomena  Mild lower extremity swelling  Periorbital swelling  Left foot in Cam boot   Neurological:      General: No focal deficit present.      Mental Status: She is alert and oriented to person, place, and time.   Psychiatric:         Mood and Affect: Mood normal.         Behavior: Behavior normal.         Lines/Drains:              Lab Results: I have reviewed the following results:   Results from last 7 days   Lab Units 02/20/25  0537 02/19/25  0546 02/18/25  0528   WBC Thousand/uL 8.16   < > 11.65*   HEMOGLOBIN g/dL 9.4*   < > 9.8*   HEMATOCRIT % 28.8*   < > 29.0*   PLATELETS Thousands/uL 132*   < > 124*   SEGS PCT %  --   --  81*   LYMPHO PCT %  --   --  9*   MONO PCT %  --   --  6   EOS PCT %  --   --  2    < > = values in this interval not displayed.     Results from last 7 days   Lab Units 02/20/25  0537 02/19/25  0546   SODIUM mmol/L 136 136   POTASSIUM mmol/L 4.0 3.7   CHLORIDE mmol/L 103 103   CO2 mmol/L 25 26   BUN mg/dL 38* 37*   CREATININE mg/dL 2.60* 2.53*   ANION GAP mmol/L 8 7   CALCIUM mg/dL 7.6* 7.8*   ALBUMIN g/dL  --  3.5   TOTAL BILIRUBIN mg/dL  --  0.74   ALK PHOS U/L  --  39   ALT U/L  --  29   AST U/L  --  21   GLUCOSE RANDOM mg/dL 137 133     Results from last 7 days   Lab Units 02/13/25  1935   INR  1.04         Results from last 7 days   Lab Units 02/13/25  1935   HEMOGLOBIN A1C % 6.1*           Recent Cultures (last 7 days):   Results from last 7 days   Lab Units 02/14/25  1646   URINE CULTURE  No Growth <1000 cfu/mL       Last 24 Hours  Medication List:     Current Facility-Administered Medications:     acetaminophen (TYLENOL) tablet 650 mg, Q6H PRN    atorvastatin (LIPITOR) tablet 10 mg, Daily    benzonatate (TESSALON PERLES) capsule 100 mg, TID PRN    calcium carbonate (TUMS) chewable tablet 1,000 mg, TID PRN    calcium carbonate-vitamin D 500 mg-5 mcg tablet 1 tablet, Daily With Breakfast    captopril (CAPOTEN) tablet 50 mg, TID    doxazosin (CARDURA) tablet 2 mg, BID    [START ON 2/21/2025] enoxaparin (LOVENOX) subcutaneous injection 30 mg, Q24H TERRY    famotidine (PEPCID) tablet 10 mg, Daily PRN    folic acid (FOLVITE) tablet 1 mg, Daily    guaiFENesin (MUCINEX) 12 hr tablet 600 mg, Q12H PRN    montelukast (SINGULAIR) tablet 10 mg, HS    NIFEdipine (PROCARDIA XL) 24 hr tablet 60 mg, Daily    pantoprazole (PROTONIX) EC tablet 40 mg, Early Morning    torsemide (DEMADEX) tablet 10 mg, Daily    Administrative Statements   Today, Patient Was Seen By: Trev Couch MD    **Please Note: This note may have been constructed using a voice recognition system.**

## 2025-02-20 NOTE — ASSESSMENT & PLAN NOTE
Current hemoglobin: 9.4 mg/dL  Haptoglobin <10 with no schistocytes on blood smear  Some component of hemolysis in the settings of TMA  Treatment:  Transfuse for hemoglobin less than 7.0 per primary service

## 2025-02-20 NOTE — ASSESSMENT & PLAN NOTE
Lab Results   Component Value Date    CREATININE 2.60 (H) 02/20/2025    CREATININE 2.53 (H) 02/19/2025    CREATININE 2.43 (H) 02/18/2025     Here with abnormal outpatient lab work. 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

## 2025-02-21 ENCOUNTER — APPOINTMENT (INPATIENT)
Dept: RADIOLOGY | Facility: HOSPITAL | Age: 55
DRG: 546 | End: 2025-02-21
Attending: RADIOLOGY
Payer: COMMERCIAL

## 2025-02-21 LAB
ALBUMIN SERPL BCG-MCNC: 3.4 G/DL (ref 3.5–5)
ALP SERPL-CCNC: 37 U/L (ref 34–104)
ALT SERPL W P-5'-P-CCNC: 23 U/L (ref 7–52)
ANION GAP SERPL CALCULATED.3IONS-SCNC: 9 MMOL/L (ref 4–13)
AST SERPL W P-5'-P-CCNC: 17 U/L (ref 13–39)
BILIRUB SERPL-MCNC: 0.74 MG/DL (ref 0.2–1)
BUN SERPL-MCNC: 44 MG/DL (ref 5–25)
CALCIUM ALBUM COR SERPL-MCNC: 8.2 MG/DL (ref 8.3–10.1)
CALCIUM SERPL-MCNC: 7.7 MG/DL (ref 8.4–10.2)
CHLORIDE SERPL-SCNC: 103 MMOL/L (ref 96–108)
CO2 SERPL-SCNC: 24 MMOL/L (ref 21–32)
CREAT SERPL-MCNC: 2.91 MG/DL (ref 0.6–1.3)
ERYTHROCYTE [DISTWIDTH] IN BLOOD BY AUTOMATED COUNT: 15.9 % (ref 11.6–15.1)
GFR SERPL CREATININE-BSD FRML MDRD: 17 ML/MIN/1.73SQ M
GLUCOSE SERPL-MCNC: 140 MG/DL (ref 65–140)
HCT VFR BLD AUTO: 27.6 % (ref 34.8–46.1)
HGB BLD-MCNC: 9.1 G/DL (ref 11.5–15.4)
LDH SERPL-CCNC: 365 U/L (ref 140–271)
MCH RBC QN AUTO: 32.3 PG (ref 26.8–34.3)
MCHC RBC AUTO-ENTMCNC: 33 G/DL (ref 31.4–37.4)
MCV RBC AUTO: 98 FL (ref 82–98)
PLATELET # BLD AUTO: 168 THOUSANDS/UL (ref 149–390)
PMV BLD AUTO: 10.3 FL (ref 8.9–12.7)
POTASSIUM SERPL-SCNC: 3.9 MMOL/L (ref 3.5–5.3)
PROT SERPL-MCNC: 5.8 G/DL (ref 6.4–8.4)
RBC # BLD AUTO: 2.82 MILLION/UL (ref 3.81–5.12)
SODIUM SERPL-SCNC: 136 MMOL/L (ref 135–147)
WBC # BLD AUTO: 9.37 THOUSAND/UL (ref 4.31–10.16)

## 2025-02-21 PROCEDURE — 88348 ELECTRON MICROSCOPY DX: CPT | Performed by: STUDENT IN AN ORGANIZED HEALTH CARE EDUCATION/TRAINING PROGRAM

## 2025-02-21 PROCEDURE — 82595 ASSAY OF CRYOGLOBULIN: CPT

## 2025-02-21 PROCEDURE — 85027 COMPLETE CBC AUTOMATED: CPT

## 2025-02-21 PROCEDURE — 0TB03ZX EXCISION OF RIGHT KIDNEY, PERCUTANEOUS APPROACH, DIAGNOSTIC: ICD-10-PCS | Performed by: RADIOLOGY

## 2025-02-21 PROCEDURE — 88346 IMFLUOR 1ST 1ANTB STAIN PX: CPT | Performed by: STUDENT IN AN ORGANIZED HEALTH CARE EDUCATION/TRAINING PROGRAM

## 2025-02-21 PROCEDURE — 99232 SBSQ HOSP IP/OBS MODERATE 35: CPT | Performed by: INTERNAL MEDICINE

## 2025-02-21 PROCEDURE — 83615 LACTATE (LD) (LDH) ENZYME: CPT | Performed by: INTERNAL MEDICINE

## 2025-02-21 PROCEDURE — 99233 SBSQ HOSP IP/OBS HIGH 50: CPT | Performed by: STUDENT IN AN ORGANIZED HEALTH CARE EDUCATION/TRAINING PROGRAM

## 2025-02-21 PROCEDURE — 88350 IMFLUOR EA ADDL 1ANTB STN PX: CPT | Performed by: STUDENT IN AN ORGANIZED HEALTH CARE EDUCATION/TRAINING PROGRAM

## 2025-02-21 PROCEDURE — 80053 COMPREHEN METABOLIC PANEL: CPT

## 2025-02-21 PROCEDURE — 99152 MOD SED SAME PHYS/QHP 5/>YRS: CPT

## 2025-02-21 PROCEDURE — 76942 ECHO GUIDE FOR BIOPSY: CPT

## 2025-02-21 PROCEDURE — 50200 RENAL BIOPSY PERQ: CPT

## 2025-02-21 PROCEDURE — 88313 SPECIAL STAINS GROUP 2: CPT | Performed by: STUDENT IN AN ORGANIZED HEALTH CARE EDUCATION/TRAINING PROGRAM

## 2025-02-21 PROCEDURE — 88329 PATH CONSLTJ DRG SURG: CPT | Performed by: STUDENT IN AN ORGANIZED HEALTH CARE EDUCATION/TRAINING PROGRAM

## 2025-02-21 PROCEDURE — NC001 PR NO CHARGE: Performed by: RADIOLOGY

## 2025-02-21 PROCEDURE — 88305 TISSUE EXAM BY PATHOLOGIST: CPT | Performed by: STUDENT IN AN ORGANIZED HEALTH CARE EDUCATION/TRAINING PROGRAM

## 2025-02-21 PROCEDURE — 99232 SBSQ HOSP IP/OBS MODERATE 35: CPT | Performed by: HOSPITALIST

## 2025-02-21 RX ORDER — OXYCODONE HYDROCHLORIDE 5 MG/1
5 TABLET ORAL EVERY 4 HOURS PRN
Refills: 0 | Status: DISCONTINUED | OUTPATIENT
Start: 2025-02-21 | End: 2025-02-22 | Stop reason: HOSPADM

## 2025-02-21 RX ORDER — BUMETANIDE 0.25 MG/ML
1 INJECTION, SOLUTION INTRAMUSCULAR; INTRAVENOUS ONCE
Status: COMPLETED | OUTPATIENT
Start: 2025-02-21 | End: 2025-02-21

## 2025-02-21 RX ORDER — LIDOCAINE WITH 8.4% SOD BICARB 0.9%(10ML)
SYRINGE (ML) INJECTION AS NEEDED
Status: DISCONTINUED | OUTPATIENT
Start: 2025-02-21 | End: 2025-02-22 | Stop reason: HOSPADM

## 2025-02-21 RX ORDER — FENTANYL CITRATE 50 UG/ML
INJECTION, SOLUTION INTRAMUSCULAR; INTRAVENOUS AS NEEDED
Status: DISCONTINUED | OUTPATIENT
Start: 2025-02-21 | End: 2025-02-22 | Stop reason: HOSPADM

## 2025-02-21 RX ORDER — MIDAZOLAM HYDROCHLORIDE 2 MG/2ML
INJECTION, SOLUTION INTRAMUSCULAR; INTRAVENOUS AS NEEDED
Status: DISCONTINUED | OUTPATIENT
Start: 2025-02-21 | End: 2025-02-22 | Stop reason: HOSPADM

## 2025-02-21 RX ADMIN — GUAIFENESIN AND DEXTROMETHORPHAN 10 ML: 100; 10 SYRUP ORAL at 11:42

## 2025-02-21 RX ADMIN — PANTOPRAZOLE SODIUM 40 MG: 40 TABLET, DELAYED RELEASE ORAL at 05:30

## 2025-02-21 RX ADMIN — FOLIC ACID 1 MG: 1 TABLET ORAL at 11:43

## 2025-02-21 RX ADMIN — NIFEDIPINE 60 MG: 30 TABLET, FILM COATED, EXTENDED RELEASE ORAL at 11:43

## 2025-02-21 RX ADMIN — FENTANYL CITRATE 25 MCG: 50 INJECTION INTRAMUSCULAR; INTRAVENOUS at 09:19

## 2025-02-21 RX ADMIN — ACETAMINOPHEN 650 MG: 325 TABLET, FILM COATED ORAL at 15:50

## 2025-02-21 RX ADMIN — CAPTOPRIL 50 MG: 12.5 TABLET ORAL at 11:42

## 2025-02-21 RX ADMIN — MIDAZOLAM 1 MG: 1 INJECTION INTRAMUSCULAR; INTRAVENOUS at 09:10

## 2025-02-21 RX ADMIN — MIDAZOLAM 0.5 MG: 1 INJECTION INTRAMUSCULAR; INTRAVENOUS at 09:24

## 2025-02-21 RX ADMIN — BENZONATATE 200 MG: 100 CAPSULE ORAL at 21:03

## 2025-02-21 RX ADMIN — MONTELUKAST 10 MG: 10 TABLET, FILM COATED ORAL at 21:03

## 2025-02-21 RX ADMIN — FENTANYL CITRATE 50 MCG: 50 INJECTION INTRAMUSCULAR; INTRAVENOUS at 09:10

## 2025-02-21 RX ADMIN — BENZONATATE 200 MG: 100 CAPSULE ORAL at 04:04

## 2025-02-21 RX ADMIN — Medication 6 ML: at 09:21

## 2025-02-21 RX ADMIN — CAPTOPRIL 50 MG: 12.5 TABLET ORAL at 21:03

## 2025-02-21 RX ADMIN — MIDAZOLAM 0.5 MG: 1 INJECTION INTRAMUSCULAR; INTRAVENOUS at 09:19

## 2025-02-21 RX ADMIN — TORSEMIDE 10 MG: 10 TABLET ORAL at 11:43

## 2025-02-21 RX ADMIN — DOXAZOSIN 2 MG: 1 TABLET ORAL at 18:05

## 2025-02-21 RX ADMIN — Medication 1 TABLET: at 11:43

## 2025-02-21 RX ADMIN — ATORVASTATIN CALCIUM 10 MG: 10 TABLET, FILM COATED ORAL at 11:43

## 2025-02-21 RX ADMIN — FENTANYL CITRATE 25 MCG: 50 INJECTION INTRAMUSCULAR; INTRAVENOUS at 09:24

## 2025-02-21 RX ADMIN — CAPTOPRIL 50 MG: 12.5 TABLET ORAL at 15:50

## 2025-02-21 RX ADMIN — GUAIFENESIN AND DEXTROMETHORPHAN 10 ML: 100; 10 SYRUP ORAL at 15:50

## 2025-02-21 RX ADMIN — BUMETANIDE 1 MG: 0.25 INJECTION INTRAMUSCULAR; INTRAVENOUS at 15:51

## 2025-02-21 RX ADMIN — DOXAZOSIN 2 MG: 1 TABLET ORAL at 11:43

## 2025-02-21 NOTE — ASSESSMENT & PLAN NOTE
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

## 2025-02-21 NOTE — BRIEF OP NOTE (RAD/CATH)
INTERVENTIONAL RADIOLOGY PROCEDURE NOTE    Date: 2/21/2025    Procedure: IR BIOPSY KIDNEY RANDOM     Preoperative diagnosis:   1. TITUS (acute kidney injury) (HCC)    2. Hypertension    3. Edema    4. Renal cyst, left    5. Cyst of pancreas    6. Abnormal CT scan, lumbar spine    7. Abnormal CT of thoracic spine    8. Central stenosis of spinal canal    9. Degenerative joint disease of left hip    10. Undifferentiated connective tissue disease (HCC)    11. Acute kidney injury (HCC)    12. Hemolytic anemia (HCC)    13. Open wound of left foot, initial encounter         Postoperative diagnosis: Same.    Surgeon: Chanel Tilley MD     Assistant: None. No qualified resident was available.    Blood loss: Minimal    Specimens: 18-gauge core x 4    Findings:    Successful random kidney biopsy (right kidney)    Complications: None immediate.    Anesthesia: conscious sedation

## 2025-02-21 NOTE — ASSESSMENT & PLAN NOTE
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

## 2025-02-21 NOTE — ASSESSMENT & PLAN NOTE
Lab Results   Component Value Date    CREATININE 2.91 (H) 02/21/2025    CREATININE 2.60 (H) 02/20/2025    CREATININE 2.53 (H) 02/19/2025     Here with abnormal outpatient lab work. 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

## 2025-02-21 NOTE — TELEMEDICINE
e-Consult ([facility])  - Interventional Radiology  Kari Erazo 54 y.o. female MRN: [id number]  Unit/Bed#: S -01 Encounter: [id number]          Interventional Radiology has been consulted to evaluate Kari Erazo      Inpatient Consult to IR  Consult performed by: Chanel Tilley MD  Consult ordered by: Joselyn Reyes Bahamonde, MD        02/21/25    Assessment/Recommendation:   Patient presented on 2/14/2025 with TITUS.    IR consulted for random kidney biopsy.    PMH of Raynaud's, psoriatic arthritis, hypertension, diabetes p/w accelerated hypertension.     Plan to do today.    11-20 minutes, >50% of the total time devoted to medical consultative verbal/EMR discussion between providers. Written report will be generated in the EMR.     Thank you for allowing Interventional Radiology to participate in the care of Kari Erazo. Please don't hesitate to call or TigerText us with any questions.     Chanel Tilley MD

## 2025-02-21 NOTE — ASSESSMENT & PLAN NOTE
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  Hematology/oncology consulted, appreciate recommendations  mild drop in hemoglobin, haptoglobin <10 and mildly elevated LDH but peripheral smear did not show any signs of hemolysis/schistocytes. Her MARCO is negative. Her platelets are above 100 K. No hyperbilirubinemia. her PLASMIC score 3 points, Low Risk , 0% Risk of severe [id number] deficiency, so no need for urgent TTP w/u or treatment , would continue trend CBC , likely mild thrombocytopenia and anemia in context of the acute illness / SRC. If continues to trend down further or evidence of hemolysis further workup can be pursued.

## 2025-02-21 NOTE — ASSESSMENT & PLAN NOTE
Volume: Fluid overload .  etiology: Secondary to scleroderma crisis  Blood pressure: Normotensive, /75  Continue low-sodium diet   Goal less than 140/90  Continue captopril 50 mg 3 times daily   Continue nifedipine 60  Doxazosin twice a day torsemide 10 mg   Plan for 1 dose of Bumex IV later today

## 2025-02-21 NOTE — ASSESSMENT & PLAN NOTE
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  Preliminary result probably tomorrow afternoon  Avoid NSAIDs, avoid IV contrast    Adjust medications to GFR

## 2025-02-21 NOTE — ASSESSMENT & PLAN NOTE
Has been following with rheumatology in the outpatient setting due to progressively worsening edema in hands and feet and myalgias, Raynaud's symptoms after getting influenza vaccination around [facility]  Since then, blood pressures have been worsening. Has been on multiple prednisone tapers  Echo (2/12) - EF 70%. G2DD.  RV and LV mildly dilated.  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 To  MRI of the femur left-was done to rule out myositis-did not show any myositis or muscle atrophy but showed severe left hip osteoarthritis  Labs so far:  JAYESH positive, titer at 1280 with nucleolar pattern  Sjogren antibodies   Anticentromere antibody negative  CK within normal range  Cyclic Citrullinated peptide antibody negative  Rheumatoid factor negative  Anti-Jo1 antibody negative  Antiscleroderma antibody negative  RNA polymerase 3 IgG antibody elevated and positive   JAYESH screen with reflex: UPEP, SPEP negative  FABIANA screen and Anti-dsDNA ab negative   Ig Kappa and Lambda free light chains elevated  C3/C4 normal   Pending anti-Th/To ab, Anti-HMGCR ab, Anti-neutrophilic cytoplasmic ab   2/17 fine crackles heard on bilateral lower lung fields and noncontrast CT chest ordered for concern of ILD   CT chest high resolution - Mild bibasilar juxtapleural reticulation, nonspecific but could represent early, mild interstitial fibrosis. Mild, subtle scattered groundglass density in both lungs (302/112), nonspecific but may be infectious/inflammatory. No consolidation.   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

## 2025-02-21 NOTE — SEDATION DOCUMENTATION
Procedure ended. IR kidney biopsy completed by Dr. Tilley. Gel-foam and bandaid to site. IR post procedural bedrest x4 hours starting at 0935. Report and care assumed by primary RN

## 2025-02-21 NOTE — PROGRESS NOTES
Rheumatology Inpatient Progress note   Kari Erazo 54 y.o. 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 Positive high titre RNA polymerase , rapidly progressive skin thickening and recent steroids use all increased the risk for SRC development.     - Continue captopril 50 mg TID even on discharge  - Continue nifedipine 60 mg daily even on discharge (would help for underlying raynaud's as well)  - Will follow-up renal biopsy results  - Discussed with nephrology: no indication for dialysis as of now for worsening sCr  - Steroids are strictly contraindicated: please don't give steroids without checking with rheumatology  - Swelling and pain in hands is from underlying skin thickening/fibrosis from scleroderma, which would benefit from outpatient immunosuppressive therapy like mycophenolate (which would take weeks to months to have positive effects). No other inpatient recommendations to treat skin manifestations. No signs of synovitis      History of Present Illness:  Interval history:  Doing good. Has ongoing skin thickening and swelling of bilateral dorsal hands    Review of Systems  Review of Systems  Rest of ROS are negative except as noted in HPI    Allergies  Allergies   Allergen Reactions    Ace Inhibitors Cough    Latex      Per patient    Nuts - Food Allergy     Shellfish-Derived Products - Food Allergy        Current Medications  Current Facility-Administered Medications   Medication Dose Route Frequency Provider Last Rate    acetaminophen  650 mg Oral Q6H PRN Haroon Chavis MD      atorvastatin  10 mg Oral Daily Thuy Patrick MD      benzonatate  200 mg Oral TID PRN Dov Kerr MD      bumetanide  1 mg Intravenous Once Joselyn Reyes Bahamonde, MD      calcium  carbonate  1,000 mg Oral TID PRN Dov Kerr MD      calcium carbonate-vitamin D  1 tablet Oral Daily With Breakfast Thuy Patrick MD      captopril  50 mg Oral TID Trev Couch MD      dextromethorphan-guaiFENesin  10 mL Oral Q4H PRN Dov Kerr MD      doxazosin  2 mg Oral BID Gavin Zazueta MD      [Held by provider] enoxaparin  30 mg Subcutaneous Q24H [facility] Helio Vegas MD      famotidine  10 mg Oral Daily PRN Trev Couch MD      fentaNYL   Intravenous PRN Chanel Tilley MD      folic acid  1 mg Oral Daily Thuy Patrick MD      lidocaine 1% buffered   Infiltration PRN Chanel Tilley MD      midazolam    PRN Chanel Tilley MD      montelukast  10 mg Oral HS Thuy Patrikc MD      NIFEdipine  60 mg Oral Daily Joselyn Reyes Bahamonde, MD      oxyCODONE  5 mg Oral Q4H PRN Chanel Tilley MD      pantoprazole  40 mg Oral Early Morning Lakisha Woods DO      torsemide  10 mg Oral Daily Joselyn Reyes Bahamonde, MD         Past Medical History  Past Medical History:   Diagnosis Date    Allergic     latex, some tree nuts, seasonal    Arthritis     psoriatic arthritis    Asthma     CPAP (continuous positive airway pressure) dependence     Diabetes mellitus (HCC)     gestational    Gestational diabetes     Hypertension     patient reports    Obesity     PCOS (polycystic ovarian syndrome)     Sleep apnea     Varicella        Past Surgical History  Past Surgical History:   Procedure Laterality Date    TONSILLECTOMY      last assessed: 5/3/16       Family History  No known autoimmune or inflammatory diseases in the family.   Family History   Problem Relation Age of Onset    Hypertension Mother     Hyperlipidemia Mother     Hypertension Father     Other Father         low serum HDL    Hyperlipidemia Father     Hypothyroidism Sister     Asthma Sister     Diabetes Sister     Thyroid disease Sister     Breast cancer Sister 48    No Known Problems Daughter     No Known Problems Son     Breast cancer Maternal Grandmother 79    Stroke  "Maternal Grandfather     No Known Problems Paternal Grandmother     No Known Problems Paternal Grandfather     No Known Problems Paternal Aunt     No Known Problems Paternal Aunt     No Known Problems Paternal Aunt        Social History    Social History     Substance and Sexual Activity   Alcohol Use No     Social History     Substance and Sexual Activity   Drug Use No     Social History     Tobacco Use   Smoking Status Never   Smokeless Tobacco Never          Objective:  /75   Pulse 98   Temp 99.3 °F (37.4 °C)   Resp 18   Ht 5' 4\" (1.626 m)   Wt 84.4 kg (186 lb)   SpO2 97%   BMI 31.93 kg/m²     PHYSICAL EXAM  Physical Exam  Constitutional:       Appearance: Normal appearance.   HENT:      Head: Normocephalic and atraumatic.   Skin:     Comments: Bilateral dorsal surface of hands with prominent swelling and thickening  Left plantar surface has healed ulcer   Neurological:      Mental Status: She is alert.            Lab Results: I have personally reviewed pertinent reports.      CBC:   Results from last 7 days   Lab Units 02/21/25  0601 02/19/25  0546 02/18/25  0528   WBC Thousand/uL 9.37   < > 11.65*   RBC Million/uL 2.82*   < > 3.03*   HEMOGLOBIN g/dL 9.1*   < > 9.8*   HEMATOCRIT % 27.6*   < > 29.0*   MCV fL 98   < > 96   MCH pg 32.3   < > 32.3   MCHC g/dL 33.0   < > 33.8   RDW % 15.9*   < > 15.8*   MPV fL 10.3   < > 10.1   PLATELETS Thousands/uL 168   < > 124*   NRBC AUTO /100 WBCs  --   --  0   SEGS PCT %  --   --  81*   LYMPHO PCT %  --   --  9*   MONO PCT %  --   --  6   EOS PCT %  --   --  2   BASOS PCT %  --   --  0   TOTAL NEUT ABS Thousands/µL  --   --  9.43*   LYMPHS ABS Thousands/µL  --   --  1.01   MONOS ABS Thousand/µL  --   --  0.73   EOS ABS Thousand/µL  --   --  0.20    < > = values in this interval not displayed.   , Chemistry Profile:   Results from last 7 days   Lab Units 02/21/25  0601   POTASSIUM mmol/L 3.9   CHLORIDE mmol/L 103   CO2 mmol/L 24   BUN mg/dL 44*   CREATININE mg/dL " "2.91*   CALCIUM mg/dL 7.7*   AST U/L 17   ALT U/L 23   ALK PHOS U/L 37   EGFR ml/min/1.73sq m 17     Lab Results   Component Value Date    CRP 6.7 (H) 02/14/2025    JAYESH 0.60 02/13/2025    JAYESH Negative 12/03/2015    RF Negative 01/28/2025    RF Negative 12/03/2015    HAV Non-Reactive (q 11/21/2014    HEPBIGM Non-reactive 02/13/2025    HEPBCAB Non-reactive 02/13/2025    HEPCAB Reactive (A) 02/13/2025           Invalid input(s): \"URIBILINOGEN\"         Imaging: I have personally reviewed pertinent films in PACS   "

## 2025-02-21 NOTE — ASSESSMENT & PLAN NOTE
Current hemoglobin: 99.1.4 mg/dL  Haptoglobin <10 with no schistocytes on blood smear  Some component of hemolysis in the settings of TMA  Treatment:  Transfuse for hemoglobin less than 7.0 per primary service

## 2025-02-21 NOTE — PROGRESS NOTES
Progress Note - Nephrology   Name: Kari Erazo 54 y.o. female I MRN: [id number]  Unit/Bed#: S -01 I Date of Admission: 2/13/2025   Date of Service: 2/21/2025 I Hospital Day: 7     Assessment & Plan  Acute kidney injury (HCC)  #Non-Oliguric KDIGO severe  TITUS   Etiology: Most likely secondary to a TMA in the settings of scleroderma renal crisis   Baseline creatinine: 0.7 to 0.9 mg/dL  Current creatinine: 2.9 mg/dL, continue to worsening     Peak creatinine: Continue to trend  UA: Leukocyturia, no hematuria  UACR 665 mg/g  Renal imaging : No hydronephrosis, left kidney cortical cyst  GN workup  JAYESH negative  dsDNA negative x 2  ANCA negative  SSA, SSB neg  Anti 70 Ab neg   Anti-RNA polymerase 175 (elevated)  Treatment:  No indication of dialysis at this time   Patient had kidney biopsy today, tissue will be sent to [facility] labs.  Preliminary result probably tomorrow afternoon  Avoid NSAIDs, avoid IV contrast    Adjust medications to GFR    Accelerated hypertension  Volume: Fluid overload .  etiology: Secondary to scleroderma crisis  Blood pressure: Normotensive, /75  Continue low-sodium diet   Goal less than 140/90  Continue captopril 50 mg 3 times daily   Continue nifedipine 60  Doxazosin twice a day torsemide 10 mg   Plan for 1 dose of Bumex IV later today     Anemia  Current hemoglobin: 99.1.4 mg/dL  Haptoglobin <10 with no schistocytes on blood smear  Some component of hemolysis in the settings of TMA  Treatment:  Transfuse for hemoglobin less than 7.0 per primary service    Undifferentiated connective tissue disease (HCC)  Mixed connective tissue disorder, has alopecia, joint pain, right notes, skin changes   Overall all worsening symptoms  Management per rheumatology      I have reviewed the nephrology recommendations including kidney biopsy today, Bumex this afternoon, with primary team, and we are in agreement with renal plan including the information outlined above. I have discussed the above  management plan in detail with the primary service.     Subjective   Brief History of Admission -  53 yo woman with PMH of Raynaud's, psoriatic arthritis, hypertension, diabetes p/w accelerated hypertension. Nephrology is consulted for management of TITUS     Patient had biopsy this morning, feels well, no shortness of breath.  Lower extremity edema, complains of tachycardia    Objective :  Temp:  [98.6 °F (37 °C)-99.6 °F (37.6 °C)] 99.3 °F (37.4 °C)  HR:  [] 98  BP: (115-161)/(69-85) 138/75  Resp:  [16-18] 18  SpO2:  [93 %-100 %] 97 %  O2 Device: None (Room air)    Current Weight: Weight - Scale: 84.4 kg (186 lb)  First Weight: Weight - Scale: 84.4 kg (186 lb)  I/O         02/19 0701  02/20 0700 02/20 0701  02/21 0700 02/21 0701  02/22 0700    P.O.  900     Total Intake(mL/kg)  900 (10.7)     Urine (mL/kg/hr) 950 (0.5) 800 (0.4)     Total Output 950 800     Net -950 +100                  Physical Exam  General:  no acute distress at this time  Skin:  No acute rash  Eyes:  No scleral icterus and noninjected  ENT:  mucous membranes moist  Neck:  no carotid bruits  Chest:  Clear to auscultation percussion, good respiratory effort, no use of accessory respiratory muscles  CVS:  Regular rate and rhythm without rub   Abdomen:  soft and nontender   Extremities: lower extremity edema  Neuro:  No gross focality  Psych:  Alert , cooperative     Medications:    Current Facility-Administered Medications:     acetaminophen (TYLENOL) tablet 650 mg, 650 mg, Oral, Q6H PRN, Haroon Chavis MD, 650 mg at 02/17/25 1110    atorvastatin (LIPITOR) tablet 10 mg, 10 mg, Oral, Daily, Thuy Patrick MD, 10 mg at 02/21/25 1143    benzonatate (TESSALON PERLES) capsule 200 mg, 200 mg, Oral, TID PRN, Dov Kerr MD, 200 mg at 02/21/25 0404    calcium carbonate (TUMS) chewable tablet 1,000 mg, 1,000 mg, Oral, TID PRN, Dov Kerr MD, 1,000 mg at 02/20/25 1150    calcium carbonate-vitamin D 500 mg-5 mcg tablet 1 tablet, 1 tablet, Oral, Daily  With Breakfast, Thuy Patrick MD, 1 tablet at 02/21/25 1143    captopril (CAPOTEN) tablet 50 mg, 50 mg, Oral, TID, Trev Couch MD, 50 mg at 02/21/25 1142    dextromethorphan-guaiFENesin (ROBITUSSIN DM) oral syrup 10 mL, 10 mL, Oral, Q4H PRN, Dov Kerr MD, 10 mL at 02/21/25 1142    doxazosin (CARDURA) tablet 2 mg, 2 mg, Oral, BID, Gavin Zazueta MD, 2 mg at 02/21/25 1143    [Held by provider] enoxaparin (LOVENOX) subcutaneous injection 30 mg, 30 mg, Subcutaneous, Q24H TERRY, Helio Vegas MD    famotidine (PEPCID) tablet 10 mg, 10 mg, Oral, Daily PRN, Trev Couch MD, 10 mg at 02/20/25 2119    fentaNYL injection, , Intravenous, PRN, Chanel Tilley MD, 25 mcg at 02/21/25 0924    folic acid (FOLVITE) tablet 1 mg, 1 mg, Oral, Daily, Thuy Patrick MD, 1 mg at 02/21/25 1143    lidocaine 1% buffered, , Infiltration, PRN, Chanel Tilley MD, 6 mL at 02/21/25 0921    midazolam (VERSED) injection, , , PRN, Chanel Tilley MD, 0.5 mg at 02/21/25 0924    montelukast (SINGULAIR) tablet 10 mg, 10 mg, Oral, HS, Thuy Patrick MD, 10 mg at 02/20/25 2119    NIFEdipine (PROCARDIA XL) 24 hr tablet 60 mg, 60 mg, Oral, Daily, Joselyn Reyes Bahamonde, MD, 60 mg at 02/21/25 1143    oxyCODONE (ROXICODONE) IR tablet 5 mg, 5 mg, Oral, Q4H PRN, Chanel Tilley MD    pantoprazole (PROTONIX) EC tablet 40 mg, 40 mg, Oral, Early Morning, Lakisha Woods DO, 40 mg at 02/21/25 0530    torsemide (DEMADEX) tablet 10 mg, 10 mg, Oral, Daily, Joselyn Reyes Bahamonde, MD, 10 mg at 02/21/25 1143      Lab Results: I have reviewed the following results:  Results from last 7 days   Lab Units 02/21/25  0601 02/20/25  0537 02/19/25  0546 02/18/25  0528 02/17/25  0611 02/16/25  0413 02/15/25  0451   WBC Thousand/uL 9.37 8.16 9.44 11.65* 10.14 10.28* 13.76*   HEMOGLOBIN g/dL 9.1* 9.4* 9.6* 9.8* 10.2* 10.3* 10.9*   HEMATOCRIT % 27.6* 28.8* 29.1* 29.0* 29.7* 30.2* 31.8*   PLATELETS Thousands/uL 168 132* 130* 124* 117* 136* 164   POTASSIUM mmol/L 3.9 4.0 3.7 3.8 3.7 3.1*  "3.4*   CHLORIDE mmol/L 103 103 103 100 103 101 100   CO2 mmol/L 24 25 26 25 26 26 25   BUN mg/dL 44* 38* 37* 37* 37* 39* 45*   CREATININE mg/dL 2.91* 2.60* 2.53* 2.43* 2.27* 2.30* 2.11*   CALCIUM mg/dL 7.7* 7.6* 7.8* 7.8* 7.9* 8.0* 8.5   MAGNESIUM mg/dL  --   --   --   --  2.2  --  2.3   PHOSPHORUS mg/dL  --   --   --   --   --   --  3.2   ALBUMIN g/dL 3.4*  --  3.5  --  3.4*  --   --        Administrative Statements     Portions of the record may have been created with voice recognition software. Occasional wrong word or \"sound a like\" substitutions may have occurred due to the inherent limitations of voice recognition software. Read the chart carefully and recognize, using context, where substitutions have occurred.If you have any questions, please contact the dictating provider.  "

## 2025-02-21 NOTE — PROGRESS NOTES
Progress Note - Hospitalist   Name: Kari Erazo 54 y.o. female I MRN: [id number]  Unit/Bed#: S -01 I Date of Admission: 2/13/2025   Date of Service: 2/21/2025 I Hospital Day: 7    Assessment & Plan  Acute kidney injury (HCC)  Lab Results   Component Value Date    CREATININE 2.91 (H) 02/21/2025    CREATININE 2.60 (H) 02/20/2025    CREATININE 2.53 (H) 02/19/2025     Here with abnormal outpatient lab work. 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  02/20  Creatinine of 2.60  02/21 Creatine up-trending to 2.91    Plan:  Nephrology following, appreciate recommendations  Continue with captopril 50 mg 3 times daily  Continue with nifedipine 60 mg daily  Torsemide 10 mg daily  Status post renal biopsy today   Holding anticoagulation  Follow up renal biopsy results  Monitor urine output  Hold home losartan, chlorthalidone  No need for further IVF at this time  Follow-up on remainder of workup  Accelerated hypertension  Home meds: Amlodipine 10 mg, chlorthalidone 25 mg, losartan 25 mg  Upon presentation, blood pressure was as high as 200s/100s  Asymptomatic  VAS renal artery - no evidence of significant arterial occlusive disease  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  BP this morning better controlled 150s/80s on captopril 12.5 mg TID    Plan  Nephrology following, appreciate recommendations  Continue with captopril 50 mg 3 times daily,   Continue torsemide 10 mg   Cardura 2 mg twice daily  Nifedipine 60 mg daily starting today  Avoid beta-blockers and other anti-hypertension agents in favor of increasing captopril dose  Hold home  losartan and chlorthalidone    Undifferentiated connective tissue disease (HCC)  Has been following with rheumatology in the outpatient setting due to progressively worsening edema in hands and feet and myalgias, Raynaud's symptoms [facility]  Since then, blood pressures have been worsening. Has been on multiple prednisone tapers  Echo (2/12) - EF 70%. G2DD.  RV and LV mildly dilated.  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 To  MRI of the femur left-was done to rule out myositis-did not show any myositis or muscle atrophy but showed severe left hip osteoarthritis  Labs so far:  JAYESH positive, titer at 1280 with nucleolar pattern  Sjogren antibodies   Anticentromere antibody negative  CK within normal range  Cyclic Citrullinated peptide antibody negative  Rheumatoid factor negative  Anti-Jo1 antibody negative  Antiscleroderma antibody negative  RNA polymerase 3 IgG antibody elevated and positive   JAYESH screen with reflex: UPEP, SPEP negative  FABIANA screen and Anti-dsDNA ab negative   Ig Kappa and Lambda free light chains elevated  C3/C4 normal   Pending anti-Th/To ab, Anti-HMGCR ab, Anti-neutrophilic cytoplasmic ab   2/17 fine crackles heard on bilateral lower lung fields and noncontrast CT chest ordered for concern of ILD   CT chest high resolution - Mild bibasilar juxtapleural reticulation, nonspecific but could represent early, mild interstitial fibrosis. Mild, subtle scattered groundglass density in both lungs (302/112), nonspecific but may be infectious/inflammatory. No consolidation.   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,   Continue with captopril 50 mg 3 times daily   CT chest non contrast - nonspecific but could represent early, mild interstitial fibrosis, follow up with rheum     Thrombocytopenia (HCC)  Recent Labs     02/19/25  0546 02/20/25  0537 02/21/25  0601   * 132* 168      Platelets 130 today   Low haptoglobin, elevated LDH, peripheral smear without schistocytes, normal corrected reticulocyte percentage, negative MARCO   B12, folate, iron panel WNL, transferrin level 196  Total and direct bilirubin WNL  CMV IgG and IgM negative   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  Hematology/oncology consulted, appreciate recommendations  mild drop in hemoglobin, haptoglobin <10 and mildly elevated LDH but peripheral smear did not show any signs of hemolysis/schistocytes. Her MARCO is negative. Her platelets are above 100 K. No hyperbilirubinemia. her PLASMIC score 3 points, Low Risk , 0% Risk of severe [id number] deficiency, so no need for urgent TTP w/u or treatment , would continue trend CBC , likely mild thrombocytopenia and anemia in context of the acute illness / SRC. If continues to trend down further or evidence of hemolysis further workup can be pursued.   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  Homans test negative  B12,  folate, iron panel WNL, transferrin level 196  Iron 66 within normal limits and ferritin 72 within normal limits  Total and direct bilirubin WNL  Suspicion is for thrombotic microangiopathy in the setting of SRC   02/21 Hgb down trending 9.1    Plan:  Hematology/oncology consulted, appreciate recommendations above  F/u haptoglobin, LDH  AUGUSTO on CPAP  Continue CPAP  Abnormal findings on imaging  CT imaging showing cystic 2.1 cm lesion arising from tail of pancreas, recommend 6-month follow-up CT  CT imaging also showing indeterminate radiolucent lesion in L1 vertebral body  CT chest high resolution - 3 mm juxtapleural medial right upper lobe nodule. Based on current Fleischner Society 2017 Guidelines on incidental pulmonary nodule, no routine follow-up is needed if the patient is low risk. If the patient is high risk, optional follow-up chest CT at 12 months can be considered.  Discussed findings with patient.  Follow-up as outpatient    Hepatitis C antibody test positive  Hepatitis C RNA level negative, no further workup warranted   SIRS without infection or organ dysfunction (HCC)  Has SIRS criteria of leukocytosis and tachycardia  No concern for active infection  Leukocytosis is secondary to steroids  Open wound of heel  Patient has a 1 x 1 cm ulcerated wound on the left heel  Podiatry consulted  Commended wound care, offloading of left heel with foot in surgical shoe  Close follow-up outpatient  GERD (gastroesophageal reflux disease)  Will give pantoprazole 40 mg once daily  Increase Tums to 1000 mg 3 times daily as needed  Famotidine 10 mg once daily renally dosed in the setting of TITUS    VTE Pharmacologic Prophylaxis: VTE Score: 5 Moderate Risk (Score 3-4) - Pharmacological DVT Prophylaxis Contraindicated. Sequential Compression Devices Ordered.    Mobility:   Basic Mobility Inpatient Raw Score: 24  JH-HLM Goal: 8: Walk 250 feet or more  JH-HLM Achieved: 8: Walk 250 feet ot more  JH-HLM Goal achieved. Continue  to encourage appropriate mobility.    Patient Centered Rounds: I performed bedside rounds with nursing staff today.   Discussions with Specialists or Other Care Team Provider: Nephrology, rheumatology    Education and Discussions with Family / Patient: Patient declined call to .     Current Length of Stay: 7 day(s)  Current Patient Status: Inpatient   Certification Statement: The patient will continue to require additional inpatient hospital stay due to monitoring after renal biopsy, TITUS  Discharge Plan: Anticipate discharge tomorrow to home.    Code Status: Level 1 - Full Code    Subjective   Patient seen and evaluated bedside after renal biopsy this morning.  She states she is doing okay, feels little sedated after doing the procedure.  Patient still has complaints of dry intermittent cough, states it may be secondary to the ACE inhibitor as she has a history of a cough with ACE inhibitor's.  She has been taking Tessalon Perles as needed.  She does also have complaints of right second MCP joint pain and swelling.  She did have an episode of Raynaud's phenomenon in her hand yesterday when standing near the window.  Patient denying any chest pain or shortness of breath at this time.    Objective :  Temp:  [98.6 °F (37 °C)-99.6 °F (37.6 °C)] 99.3 °F (37.4 °C)  HR:  [] 98  BP: (115-161)/(69-85) 138/75  Resp:  [16-18] 18  SpO2:  [93 %-100 %] 97 %  O2 Device: None (Room air)    Body mass index is 31.93 kg/m².     Input and Output Summary (last 24 hours):     Intake/Output Summary (Last 24 hours) at 2/21/2025 1126  Last data filed at 2/21/2025 0400  Gross per 24 hour   Intake 900 ml   Output 800 ml   Net 100 ml       Physical Exam  Vitals and nursing note reviewed.   Constitutional:       General: She is not in acute distress.     Appearance: She is well-developed.   HENT:      Head: Normocephalic and atraumatic.      Right Ear: External ear normal.      Left Ear: External ear normal.      Nose: Nose  normal.   Eyes:      Extraocular Movements: Extraocular movements intact.      Conjunctiva/sclera: Conjunctivae normal.   Cardiovascular:      Rate and Rhythm: Normal rate.      Heart sounds: No murmur heard.  Pulmonary:      Effort: Pulmonary effort is normal. No respiratory distress.      Comments: Mild crackles at bases of lungs, greater on left than right.    Abdominal:      Palpations: Abdomen is soft.      Tenderness: There is no abdominal tenderness.      Comments: Renal biopsy site on posterior right flank with dressing over head no bleeding noted.   Musculoskeletal:      Cervical back: Neck supple.      Right lower leg: Edema present.      Left lower leg: Edema present.      Comments: Swelling of hands  2nd Right MCP swollen and TTP to palpation.  Left foot in Cam boot   Skin:     General: Skin is warm and dry.   Neurological:      General: No focal deficit present.      Mental Status: She is alert and oriented to person, place, and time.   Psychiatric:         Mood and Affect: Mood normal.         Behavior: Behavior normal.         Lines/Drains:              Lab Results: I have reviewed the following results:   Results from last 7 days   Lab Units 02/21/25  0601 02/19/25  0546 02/18/25  0528   WBC Thousand/uL 9.37   < > 11.65*   HEMOGLOBIN g/dL 9.1*   < > 9.8*   HEMATOCRIT % 27.6*   < > 29.0*   PLATELETS Thousands/uL 168   < > 124*   SEGS PCT %  --   --  81*   LYMPHO PCT %  --   --  9*   MONO PCT %  --   --  6   EOS PCT %  --   --  2    < > = values in this interval not displayed.     Results from last 7 days   Lab Units 02/21/25  0601   SODIUM mmol/L 136   POTASSIUM mmol/L 3.9   CHLORIDE mmol/L 103   CO2 mmol/L 24   BUN mg/dL 44*   CREATININE mg/dL 2.91*   ANION GAP mmol/L 9   CALCIUM mg/dL 7.7*   ALBUMIN g/dL 3.4*   TOTAL BILIRUBIN mg/dL 0.74   ALK PHOS U/L 37   ALT U/L 23   AST U/L 17   GLUCOSE RANDOM mg/dL 140                       Recent Cultures (last 7 days):   Results from last 7 days   Lab Units  02/14/25  1646   URINE CULTURE  No Growth <1000 cfu/mL         Last 24 Hours Medication List:     Current Facility-Administered Medications:     acetaminophen (TYLENOL) tablet 650 mg, Q6H PRN    atorvastatin (LIPITOR) tablet 10 mg, Daily    benzonatate (TESSALON PERLES) capsule 200 mg, TID PRN    calcium carbonate (TUMS) chewable tablet 1,000 mg, TID PRN    calcium carbonate-vitamin D 500 mg-5 mcg tablet 1 tablet, Daily With Breakfast    captopril (CAPOTEN) tablet 50 mg, TID    dextromethorphan-guaiFENesin (ROBITUSSIN DM) oral syrup 10 mL, Q4H PRN    doxazosin (CARDURA) tablet 2 mg, BID    [Held by provider] enoxaparin (LOVENOX) subcutaneous injection 30 mg, Q24H TERRY    famotidine (PEPCID) tablet 10 mg, Daily PRN    fentaNYL injection, PRN    folic acid (FOLVITE) tablet 1 mg, Daily    lidocaine 1% buffered, PRN    midazolam (VERSED) injection, PRN    montelukast (SINGULAIR) tablet 10 mg, HS    NIFEdipine (PROCARDIA XL) 24 hr tablet 60 mg, Daily    oxyCODONE (ROXICODONE) IR tablet 5 mg, Q4H PRN    pantoprazole (PROTONIX) EC tablet 40 mg, Early Morning    torsemide (DEMADEX) tablet 10 mg, Daily    Administrative Statements   Today, Patient Was Seen By: Trev Couch MD    **Please Note: This note may have been constructed using a voice recognition system.**

## 2025-02-21 NOTE — ASSESSMENT & PLAN NOTE
Mixed connective tissue disorder, has alopecia, joint pain, right notes, skin changes   Overall all worsening symptoms  Management per rheumatology

## 2025-02-21 NOTE — DISCHARGE INSTRUCTIONS
Percutaneous Kidney Biopsy   WHAT YOU NEED TO KNOW:   A percutaneous kidney biopsy is a procedure to remove a small sample of kidney tissue. It may also be done to check for kidney disease or cancer.     DISCHARGE INSTRUCTIONS:   Follow up with your healthcare provider as directed:  Write down your questions so you remember to ask them during your visits.   Wound care:  The Band-Aid may be removed in 24 hours.                                                                                                For more information:   National Kidney and Urologic Diseases Information Clearinghouse  [address] [place] [address]  Phone: [phone]  Web Address: http://kidney.niddk.nih.gov/   Care after your procedure:    1. Limit your activities for 36 hours after your biopsy.    2. No driving day of biopsy.    3. Return to your normal diet. Flat sips of flat soda helps with mild nausea.    4. Remove band-aid or dressing 24 hours after procedure.       Contact Interventional Radiology at [phone]    ([facility] PATIENTS: Contact Interventional Radiology at [phone]) ([facility] PATIENTS: Contact Interventional Radiology at [phone]) if:    1. Difficulty breathing, nausea or vomiting.    2  You feel weak or dizzy.    3. Chills or fever above 101 degrees F.     You have persistent nausea or vomiting    4. You have severe pain in your abdomen or where You feel weak or dizzy.your           procedure was done.    5  You have blood in your urine. You urinate small amounts or not at all.    4. Develop any redness, swelling, heat, unusual drainage, heavy bruising or bleeding     from biopsy site.

## 2025-02-22 VITALS
SYSTOLIC BLOOD PRESSURE: 131 MMHG | TEMPERATURE: 99.4 F | BODY MASS INDEX: 31.76 KG/M2 | WEIGHT: 186 LBS | HEIGHT: 64 IN | DIASTOLIC BLOOD PRESSURE: 69 MMHG | RESPIRATION RATE: 18 BRPM | HEART RATE: 98 BPM | OXYGEN SATURATION: 96 %

## 2025-02-22 PROBLEM — R05.1 ACUTE COUGH: Status: ACTIVE | Noted: 2025-02-22

## 2025-02-22 LAB
ALBUMIN SERPL BCG-MCNC: 3.4 G/DL (ref 3.5–5)
ALP SERPL-CCNC: 40 U/L (ref 34–104)
ALT SERPL W P-5'-P-CCNC: 23 U/L (ref 7–52)
ANION GAP SERPL CALCULATED.3IONS-SCNC: 9 MMOL/L (ref 4–13)
AST SERPL W P-5'-P-CCNC: 18 U/L (ref 13–39)
BILIRUB SERPL-MCNC: 0.59 MG/DL (ref 0.2–1)
BUN SERPL-MCNC: 40 MG/DL (ref 5–25)
CALCIUM ALBUM COR SERPL-MCNC: 8.4 MG/DL (ref 8.3–10.1)
CALCIUM SERPL-MCNC: 7.9 MG/DL (ref 8.4–10.2)
CHLORIDE SERPL-SCNC: 100 MMOL/L (ref 96–108)
CO2 SERPL-SCNC: 25 MMOL/L (ref 21–32)
CREAT SERPL-MCNC: 2.88 MG/DL (ref 0.6–1.3)
CREAT UR-MCNC: 101.4 MG/DL
ERYTHROCYTE [DISTWIDTH] IN BLOOD BY AUTOMATED COUNT: 15.7 % (ref 11.6–15.1)
GFR SERPL CREATININE-BSD FRML MDRD: 18 ML/MIN/1.73SQ M
GLUCOSE SERPL-MCNC: 138 MG/DL (ref 65–140)
HCT VFR BLD AUTO: 27.7 % (ref 34.8–46.1)
HGB BLD-MCNC: 9.3 G/DL (ref 11.5–15.4)
LDH SERPL-CCNC: 284 U/L (ref 140–271)
MCH RBC QN AUTO: 32.5 PG (ref 26.8–34.3)
MCHC RBC AUTO-ENTMCNC: 33.6 G/DL (ref 31.4–37.4)
MCV RBC AUTO: 97 FL (ref 82–98)
MICROALBUMIN UR-MCNC: 82.8 MG/L
MICROALBUMIN/CREAT 24H UR: 82 MG/G CREATININE (ref 0–30)
PLATELET # BLD AUTO: 163 THOUSANDS/UL (ref 149–390)
PMV BLD AUTO: 9.4 FL (ref 8.9–12.7)
POTASSIUM SERPL-SCNC: 3.9 MMOL/L (ref 3.5–5.3)
PROT SERPL-MCNC: 5.9 G/DL (ref 6.4–8.4)
RBC # BLD AUTO: 2.86 MILLION/UL (ref 3.81–5.12)
SODIUM SERPL-SCNC: 134 MMOL/L (ref 135–147)
WBC # BLD AUTO: 7.68 THOUSAND/UL (ref 4.31–10.16)

## 2025-02-22 PROCEDURE — 83615 LACTATE (LD) (LDH) ENZYME: CPT | Performed by: INTERNAL MEDICINE

## 2025-02-22 PROCEDURE — 85027 COMPLETE CBC AUTOMATED: CPT

## 2025-02-22 PROCEDURE — 83010 ASSAY OF HAPTOGLOBIN QUANT: CPT | Performed by: INTERNAL MEDICINE

## 2025-02-22 PROCEDURE — 99232 SBSQ HOSP IP/OBS MODERATE 35: CPT | Performed by: STUDENT IN AN ORGANIZED HEALTH CARE EDUCATION/TRAINING PROGRAM

## 2025-02-22 PROCEDURE — 99239 HOSP IP/OBS DSCHRG MGMT >30: CPT | Performed by: HOSPITALIST

## 2025-02-22 PROCEDURE — 80053 COMPREHEN METABOLIC PANEL: CPT

## 2025-02-22 PROCEDURE — 82570 ASSAY OF URINE CREATININE: CPT | Performed by: STUDENT IN AN ORGANIZED HEALTH CARE EDUCATION/TRAINING PROGRAM

## 2025-02-22 PROCEDURE — 82043 UR ALBUMIN QUANTITATIVE: CPT | Performed by: STUDENT IN AN ORGANIZED HEALTH CARE EDUCATION/TRAINING PROGRAM

## 2025-02-22 RX ORDER — BUMETANIDE 0.25 MG/ML
1 INJECTION, SOLUTION INTRAMUSCULAR; INTRAVENOUS ONCE
Status: DISCONTINUED | OUTPATIENT
Start: 2025-02-22 | End: 2025-02-22

## 2025-02-22 RX ORDER — TORSEMIDE 10 MG/1
10 TABLET ORAL DAILY
Qty: 30 TABLET | Refills: 0 | Status: SHIPPED | OUTPATIENT
Start: 2025-02-23 | End: 2025-03-03

## 2025-02-22 RX ORDER — CAPTOPRIL 50 MG/1
50 TABLET ORAL 3 TIMES DAILY
Qty: 90 TABLET | Refills: 0 | Status: SHIPPED | OUTPATIENT
Start: 2025-02-22 | End: 2025-03-02

## 2025-02-22 RX ORDER — TORSEMIDE 10 MG/1
10 TABLET ORAL DAILY
Status: DISCONTINUED | OUTPATIENT
Start: 2025-02-23 | End: 2025-02-22 | Stop reason: HOSPADM

## 2025-02-22 RX ORDER — DOXAZOSIN 2 MG/1
2 TABLET ORAL 2 TIMES DAILY
Qty: 60 TABLET | Refills: 0 | Status: SHIPPED | OUTPATIENT
Start: 2025-02-22 | End: 2025-03-24

## 2025-02-22 RX ORDER — FAMOTIDINE 10 MG
10 TABLET ORAL DAILY PRN
Qty: 30 TABLET | Refills: 0 | Status: SHIPPED | OUTPATIENT
Start: 2025-02-22 | End: 2025-03-24

## 2025-02-22 RX ORDER — BENZONATATE 200 MG/1
200 CAPSULE ORAL 3 TIMES DAILY PRN
Qty: 20 CAPSULE | Refills: 0 | Status: SHIPPED | OUTPATIENT
Start: 2025-02-22 | End: 2025-02-25 | Stop reason: SDUPTHER

## 2025-02-22 RX ORDER — BUMETANIDE 0.25 MG/ML
1 INJECTION, SOLUTION INTRAMUSCULAR; INTRAVENOUS ONCE
Status: COMPLETED | OUTPATIENT
Start: 2025-02-22 | End: 2025-02-22

## 2025-02-22 RX ADMIN — ACETAMINOPHEN 650 MG: 325 TABLET, FILM COATED ORAL at 10:57

## 2025-02-22 RX ADMIN — BUMETANIDE 1 MG: 0.25 INJECTION INTRAMUSCULAR; INTRAVENOUS at 09:58

## 2025-02-22 RX ADMIN — ATORVASTATIN CALCIUM 10 MG: 10 TABLET, FILM COATED ORAL at 08:34

## 2025-02-22 RX ADMIN — FOLIC ACID 1 MG: 1 TABLET ORAL at 08:34

## 2025-02-22 RX ADMIN — NIFEDIPINE 60 MG: 30 TABLET, FILM COATED, EXTENDED RELEASE ORAL at 08:34

## 2025-02-22 RX ADMIN — DOXAZOSIN 2 MG: 1 TABLET ORAL at 08:34

## 2025-02-22 RX ADMIN — CAPTOPRIL 50 MG: 12.5 TABLET ORAL at 08:34

## 2025-02-22 RX ADMIN — Medication 1 TABLET: at 08:34

## 2025-02-22 RX ADMIN — BENZONATATE 200 MG: 100 CAPSULE ORAL at 05:30

## 2025-02-22 RX ADMIN — PANTOPRAZOLE SODIUM 40 MG: 40 TABLET, DELAYED RELEASE ORAL at 05:30

## 2025-02-22 NOTE — PROGRESS NOTES
Progress Note - Nephrology   Name: Kari Erazo 54 y.o. 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, component of fluid overload  Baseline creatinine: 0.7 to 0.9 mg/dL  Peak creatinine: 2.9 mg/dL  Current creatinine: 2.8 mg/dL, slightly better today after diuresis     UA: Leukocyturia, no hematuria  UACR 665 mg/g  Renal imaging : No hydronephrosis, left kidney cortical cyst  GN workup  JAYESH negative  dsDNA negative x 2  ANCA negative  SSA, SSB neg  Anti 70 Ab neg   Anti-RNA polymerase 175 (elevated)  Treatment:  No indication of dialysis ice time   I am expecting to receive a preliminary result from [facility] labs this afternoon   Avoid NSAIDs, avoid contrast   Adjust medications to GFR  Patient will need follow-up with nephrology on discharge  Stable for discharge from my end    Accelerated hypertension  Volume: Hypervolemia  Good response to Bumex, urinary output 2.4 L  etiology: Scleroderma renal crisis  Blood pressure:Normotensive, /73  Continue low-sodium diet   Goal less than 140/90  Repeat Bumex 1 mg this morning   Resume torsemide tomorrow morning   Continue captopril 50 mg daily   Nifedipine 60   Doxazosin twice a day   Patient will require close follow-up of BP on discharge    Discussed with Dr. Jacobo to monitor BP at home this 3 times a day    Anemia  Current hemoglobin: 9.3  mg/dL  Haptoglobin <10 with no schistocytes on blood smear  Some component of hemolysis in the settings of TMA  Treatment:  Transfuse for hemoglobin less than 7.0 per primary service    Undifferentiated connective tissue disease (HCC)  Mixed connective tissue disorder, has alopecia, joint pain, right notes, skin changes   Overall all worsening symptoms  Management per  rheumatology  As per rheumatology steroids contraindicated at this time   rheumatology follow-up on discharge      I have reviewed the nephrology recommendations including Bumex 1 mg IV today, resume torsemide tomorrow, with primary team, and we are in agreement with renal plan including the information outlined above. Ok for discharge from Nephrology service perspective.    Subjective   Brief History of Admission - 53 yo woman with PMH of Raynaud's, psoriatic arthritis, hypertension, diabetes p/w accelerated hypertension. Nephrology is consulted for management of TITUS     Feels a little better, no hematuria overnight, clear urine.  No shortness of breath.  Received Bumex yesterday with very good response    Objective :  Temp:  [99.3 °F (37.4 °C)-99.6 °F (37.6 °C)] 99.4 °F (37.4 °C)  HR:  [] 94  BP: (115-154)/(66-85) 128/73  Resp:  [15-18] 18  SpO2:  [94 %-100 %] 97 %    Current Weight: Weight - Scale: 84.4 kg (186 lb)  First Weight: Weight - Scale: 84.4 kg (186 lb)  I/O         02/20 0701  02/21 0700 02/21 0701  02/22 0700    P.O. 900     Total Intake(mL/kg) 900 (10.7)     Urine (mL/kg/hr) 800 (0.4) 2400 (1.2)    Total Output 800 2400    Net +100 -2400                Physical Exam  General:  no acute distress at this time  Skin:  No acute rash  Eyes:  No scleral icterus and noninjected  ENT:  mucous membranes moist  Neck:  no carotid bruits  Chest: Fine crackles in right base, good respiratory effort, no use of accessory respiratory muscles  CVS:  Regular rate and rhythm without rub   Abdomen:  soft and nontender   Extremities: lower extremity edema  Neuro:  No gross focality  Psych:  Alert , cooperative     Medications:    Current Facility-Administered Medications:     acetaminophen (TYLENOL) tablet 650 mg, 650 mg, Oral, Q6H PRN, Haroon Chavis MD, 650 mg at 02/21/25 1550    atorvastatin (LIPITOR) tablet 10 mg, 10 mg, Oral, Daily, Thuy Patrick MD, 10 mg at 02/21/25 1143    benzonatate (TESSALON PERLES) capsule  200 mg, 200 mg, Oral, TID PRN, Dov Kerr MD, 200 mg at 02/22/25 0530    calcium carbonate (TUMS) chewable tablet 1,000 mg, 1,000 mg, Oral, TID PRN, Dov Kerr MD, 1,000 mg at 02/20/25 1150    calcium carbonate-vitamin D 500 mg-5 mcg tablet 1 tablet, 1 tablet, Oral, Daily With Breakfast, Thuy Patrick MD, 1 tablet at 02/21/25 1143    captopril (CAPOTEN) tablet 50 mg, 50 mg, Oral, TID, Trev Couch MD, 50 mg at 02/21/25 2103    dextromethorphan-guaiFENesin (ROBITUSSIN DM) oral syrup 10 mL, 10 mL, Oral, Q4H PRN, Dov Kerr MD, 10 mL at 02/21/25 1550    doxazosin (CARDURA) tablet 2 mg, 2 mg, Oral, BID, Gavin Zazueta MD, 2 mg at 02/21/25 1805    [Held by provider] enoxaparin (LOVENOX) subcutaneous injection 30 mg, 30 mg, Subcutaneous, Q24H TERRY, Helio Vegas MD    famotidine (PEPCID) tablet 10 mg, 10 mg, Oral, Daily PRN, Trev Couch MD, 10 mg at 02/20/25 2119    fentaNYL injection, , Intravenous, PRN, Chanel Tilley MD, 25 mcg at 02/21/25 0924    folic acid (FOLVITE) tablet 1 mg, 1 mg, Oral, Daily, Thuy Patrick MD, 1 mg at 02/21/25 1143    lidocaine 1% buffered, , Infiltration, PRN, Chanel Tilley MD, 6 mL at 02/21/25 0921    midazolam (VERSED) injection, , , PRN, Chanel Tilley MD, 0.5 mg at 02/21/25 0924    montelukast (SINGULAIR) tablet 10 mg, 10 mg, Oral, HS, Thuy Patrick MD, 10 mg at 02/21/25 2103    NIFEdipine (PROCARDIA XL) 24 hr tablet 60 mg, 60 mg, Oral, Daily, Joselyn Reyes Bahamonde, MD, 60 mg at 02/21/25 1143    oxyCODONE (ROXICODONE) IR tablet 5 mg, 5 mg, Oral, Q4H PRN, Chanel Tilley MD    pantoprazole (PROTONIX) EC tablet 40 mg, 40 mg, Oral, Early Morning, Lakisha Woods DO, 40 mg at 02/22/25 0530    torsemide (DEMADEX) tablet 10 mg, 10 mg, Oral, Daily, Joselyn Reyes Bahamonde, MD, 10 mg at 02/21/25 1143      Lab Results: I have reviewed the following results:  Results from last 7 days   Lab Units 02/22/25  0543 02/21/25  0601 02/20/25  0537 02/19/25  0546 02/18/25  0528 02/17/25  0611  "02/16/25  0413   WBC Thousand/uL 7.68 9.37 8.16 9.44 11.65* 10.14 10.28*   HEMOGLOBIN g/dL 9.3* 9.1* 9.4* 9.6* 9.8* 10.2* 10.3*   HEMATOCRIT % 27.7* 27.6* 28.8* 29.1* 29.0* 29.7* 30.2*   PLATELETS Thousands/uL 163 168 132* 130* 124* 117* 136*   POTASSIUM mmol/L 3.9 3.9 4.0 3.7 3.8 3.7 3.1*   CHLORIDE mmol/L 100 103 103 103 100 103 101   CO2 mmol/L 25 24 25 26 25 26 26   BUN mg/dL 40* 44* 38* 37* 37* 37* 39*   CREATININE mg/dL 2.88* 2.91* 2.60* 2.53* 2.43* 2.27* 2.30*   CALCIUM mg/dL 7.9* 7.7* 7.6* 7.8* 7.8* 7.9* 8.0*   MAGNESIUM mg/dL  --   --   --   --   --  2.2  --    ALBUMIN g/dL 3.4* 3.4*  --  3.5  --  3.4*  --        Administrative Statements     Portions of the record may have been created with voice recognition software. Occasional wrong word or \"sound a like\" substitutions may have occurred due to the inherent limitations of voice recognition software. Read the chart carefully and recognize, using context, where substitutions have occurred.If you have any questions, please contact the dictating provider.  "

## 2025-02-22 NOTE — ASSESSMENT & PLAN NOTE
Current hemoglobin: 9.3  mg/dL  Haptoglobin <10 with no schistocytes on blood smear  Some component of hemolysis in the settings of TMA  Treatment:  Transfuse for hemoglobin less than 7.0 per primary service

## 2025-02-22 NOTE — ASSESSMENT & PLAN NOTE
Volume: Hypervolemia  Good response to Bumex, urinary output 2.4 L  etiology: Scleroderma renal crisis  Blood pressure:Normotensive, /73  Continue low-sodium diet   Goal less than 140/90  Repeat Bumex 1 mg this morning   Resume torsemide tomorrow morning   Continue captopril 50 mg daily   Nifedipine 60   Doxazosin twice a day   Patient will require close follow-up of BP on discharge    Discussed with Dr. Jacobo to monitor BP at home this 3 times a day

## 2025-02-22 NOTE — ASSESSMENT & PLAN NOTE
Has been following with rheumatology in the outpatient setting due to progressively worsening edema in hands and feet and myalgias, Raynaud's symptoms after getting influenza vaccination around [facility]  Since then, blood pressures have been worsening. Has been on multiple prednisone tapers  Echo (2/12) - EF 70%. G2DD.  RV and LV mildly dilated.  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 To  MRI of the femur left-was done to rule out myositis-did not show any myositis or muscle atrophy but showed severe left hip osteoarthritis  Labs so far:  JAYESH positive, titer at 1280 with nucleolar pattern  Sjogren antibodies   Anticentromere antibody negative  CK within normal range  Cyclic Citrullinated peptide antibody negative  Rheumatoid factor negative  Anti-Jo1 antibody negative  Antiscleroderma antibody negative  RNA polymerase 3 IgG antibody elevated and positive   JAYESH screen with reflex: UPEP, SPEP negative  FABIANA screen and Anti-dsDNA ab negative   Ig Kappa and Lambda free light chains elevated  C3/C4 normal   Pending anti-Th/To ab, Anti-HMGCR ab, Anti-neutrophilic cytoplasmic ab   2/17 fine crackles heard on bilateral lower lung fields and noncontrast CT chest ordered for concern of ILD   CT chest high resolution - Mild bibasilar juxtapleural reticulation, nonspecific but could represent early, mild interstitial fibrosis. Mild, subtle scattered groundglass density in both lungs (302/112), nonspecific but may be infectious/inflammatory. No consolidation.   2/17 BP persistently elevated > 190s/100s despite captoril 6.5 mg TID, dose increased to 12.5 mg TID   2/18 BP better controlled this morning on captopril 12.5 mg TID, 150s/80s  02/19 BP better controlled with captopril  50 3 times daily and addition of torsemide 5 mg  02/21 BP better controlled with addition of nifedipine 60mg     Plan:  Rheumatology consulted, appreciate recommendations  Follow-up rheumatology outpatient  Follow up renal biopsy results  AVOID STEROIDS!

## 2025-02-22 NOTE — DISCHARGE SUMMARY
Discharge Summary - Hospitalist   Name: Kari Erazo 54 y.o. female I MRN: [id number]  Unit/Bed#: S -01 I Date of Admission: 2/13/2025   Date of Service: 2/22/2025 I Hospital Day: 8     Assessment & Plan  Acute kidney injury (HCC)  Lab Results   Component Value Date    CREATININE 2.88 (H) 02/22/2025    CREATININE 2.91 (H) 02/21/2025    CREATININE 2.60 (H) 02/20/2025     Here with abnormal outpatient lab work. 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  02/20  Creatinine of 2.60  02/21 Creatine up-trending to 2.91    Plan:  Nephrology following, appreciate recommendations  Continue with captopril 50 mg 3 times daily  Continue with nifedipine 60 mg daily  Doxazosin 2mg BID  Torsemide 10 mg daily  Follow up renal biopsy results  Follow-up nephrology outpatient  Follow-up rheumatology outpatient  Accelerated hypertension  Home meds: Amlodipine 10 mg, chlorthalidone 25 mg, losartan 25 mg  Upon presentation, blood pressure was as high as 200s/100s  Asymptomatic  VAS renal artery - no evidence of significant arterial occlusive disease  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  BP this morning better controlled 150s/80s on captopril 12.5 mg TID    Plan  Nephrology following, appreciate recommendations  Continue with captopril 50 mg 3 times daily,   Continue torsemide 10 mg   Cardura 2 mg twice daily  Nifedipine 60 mg daily  Follow-up nephrology  Follow-up rheumatology  Undifferentiated connective tissue disease (HCC)  Has been following with rheumatology in the outpatient setting due to progressively worsening edema in hands and feet  and myalgias, Raynaud's symptoms after getting influenza vaccination around [facility]  Since then, blood pressures have been worsening. Has been on multiple prednisone tapers  Echo (2/12) - EF 70%. G2DD.  RV and LV mildly dilated.  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 To  MRI of the femur left-was done to rule out myositis-did not show any myositis or muscle atrophy but showed severe left hip osteoarthritis  Labs so far:  JAYESH positive, titer at 1280 with nucleolar pattern  Sjogren antibodies   Anticentromere antibody negative  CK within normal range  Cyclic Citrullinated peptide antibody negative  Rheumatoid factor negative  Anti-Jo1 antibody negative  Antiscleroderma antibody negative  RNA polymerase 3 IgG antibody elevated and positive   JAYESH screen with reflex: UPEP, SPEP negative  FABIANA screen and Anti-dsDNA ab negative   Ig Kappa and Lambda free light chains elevated  C3/C4 normal   Pending anti-Th/To ab, Anti-HMGCR ab, Anti-neutrophilic cytoplasmic ab   2/17 fine crackles heard on bilateral lower lung fields and noncontrast CT chest ordered for concern of ILD   CT chest high resolution - Mild bibasilar juxtapleural reticulation, nonspecific but could represent early, mild interstitial fibrosis. Mild, subtle scattered groundglass density in both lungs (302/112), nonspecific but may be infectious/inflammatory. No consolidation.   2/17 BP persistently elevated > 190s/100s despite captoril 6.5 mg TID, dose increased to 12.5 mg TID   2/18 BP better controlled this morning on captopril 12.5 mg TID, 150s/80s  02/19 BP better controlled with captopril 50 3 times daily and addition of torsemide 5 mg  02/21 BP better controlled with addition of nifedipine 60mg      Plan:  Rheumatology consulted, appreciate recommendations  Follow-up rheumatology outpatient  Follow up renal biopsy results  AVOID STEROIDS!  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  Hematology/oncology consulted, appreciate recommendations  Mild drop in hemoglobin, haptoglobin <10 and mildly elevated LDH but peripheral smear did not show any signs of hemolysis/schistocytes. Her MARCO is negative. Her platelets are above 100 K. No hyperbilirubinemia. her PLASMIC score 3 points, Low Risk , 0% Risk of severe [id number] deficiency, so no need for urgent TTP w/u or treatment , would continue trend CBC , likely mild thrombocytopenia and anemia in context of the acute illness / SRC. If continues to trend down further or evidence of hemolysis further workup can be pursued.   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  Homans test negative  B12, folate, iron panel WNL, transferrin level 196  Iron 66 within normal limits and ferritin 72 within normal limits  Total and direct bilirubin WNL  Suspicion is for thrombotic microangiopathy in the setting of SRC   02/21 Hgb down trending 9.1    Plan:  CBC and Platelet in 1 week  AUGUSTO on CPAP  Continue CPAP  Abnormal findings on imaging  CT imaging showing cystic 2.1 cm lesion arising from tail of pancreas, recommend 6-month follow-up CT  CT imaging also showing indeterminate  radiolucent lesion in L1 vertebral body  CT chest high resolution - 3 mm juxtapleural medial right upper lobe nodule. Based on current Fleischner Society 2017 Guidelines on incidental pulmonary nodule, no routine follow-up is needed if the patient is low risk. If the patient is high risk, optional follow-up chest CT at 12 months can be considered.  Discussed findings with patient.  Follow-up as outpatient    Hepatitis C antibody test positive  Hepatitis C RNA level negative, no further workup warranted   SIRS without infection or organ dysfunction (HCC)  Has SIRS criteria of leukocytosis and tachycardia  No concern for active infection  Leukocytosis is secondary to steroids  Open wound of heel  Patient has a 1 x 1 cm ulcerated wound on the left heel  Podiatry consulted  Commended wound care, offloading of left heel with foot in surgical shoe  Close follow-up outpatient  GERD (gastroesophageal reflux disease)  Will give pantoprazole 40 mg once daily  Increase Tums to 1000 mg 3 times daily as needed  Famotidine 10 mg once daily renally dosed in the setting of TITUS  Acute cough  Tessalon pearls TIRUTH     Medical Problems       Resolved Problems  Date Reviewed: 10/16/2023          Resolved    TITUS (acute kidney injury) (HCC) 2/14/2025     Resolved by  Lakisha Woods DO    Hypokalemia 2/20/2025     Resolved by  Joselyn Reyes Bahamonde, MD    Hyponatremia 2/15/2025     Resolved by  Gavin Zazueta MD        Discharging Physician / Practitioner: Trev Couch MD  PCP: Melissa Matthew MD  Admission Date:   Admission Orders (From admission, onward)       Ordered        02/14/25 0028  INPATIENT ADMISSION  Once                          Discharge Date: 02/22/25    Consultations During Hospital Stay:  Nephrology, rheumatology, hematology oncology    Procedures Performed:   Renal biopsy    Significant Findings / Test Results:   TITUS with creatinine elevated above baseline  Positive JAYESH  RNA polymerase 3 IgG antibody  elevated  Elevated LDH and low haptoglobin  High-resolution CT of the chest showing mild bibasilar juxtapleural reticulation, nonspecific but could represent early mild interstitial fibrosis  Incidental Findings:   CT chest high resolution: Mild bibasilar juxtapleural reticulation, nonspecific but could represent early, mild interstitial fibrosis., Mild, subtle scattered groundglass density in both lungs (302/112), nonspecific but may be infectious/inflammatory. No consolidation., 3 mm juxtapleural medial right upper lobe nodule. Based on current Fleischner Society 2017 Guidelines on incidental pulmonary nodule, no routine follow-up is needed if the patient is low risk. If the patient is high risk, optional follow-up chest CT at , 12 months can be considered., Near 2 cm pancreatic tail cystic lesion, new from 2014. Given absence of IV contrast, MRI abdomen with IV contrast (Gadavist) is recommended in the near future (can be done on outpatient basis) for more definitive characterization., The study was marked in EPIC for immediate notification and follow-up.,    I reviewed the above mentioned incidental findings with the patient and/or family and they expressed understanding.    Test Results Pending at Discharge (will require follow up):   Renal biopsy  Cryoglobulin, qualitative  Aldosterone/renin ratio     Outpatient Tests Requested:  MRI abdomen with IV contrast  Consider optional chest CT at 12 months    Complications: None    Reason for Admission: Acute kidney injury    Hospital Course:   Kari Erazo is a 54 y.o. female patient who originally presented to the hospital on 2/13/2025 due to acute kidney injury and creatinine elevated from baseline.[age]-year-old female who presented with abnormal outpatient lab work, specifically creatinine 2.26 when her baseline is 0.6-0.7.  She presented with blood pressures as high as 210s/100s.  Given her past medical history of undifferentiated connective tissue disease,  psoriatic arthritis, Raynaud's syndrome and recent increased swelling of hands, feet, periorbital area, there was concern for systemic sclerosis and possibly scleroderma renal crisis.  Nephrology was consulted.  Extensive autoimmune and rheumatologic workup was started.  Patient's home antihypertensive agents included amlodipine 10 mg, chlorthalidone 25 mg, losartan 25 mg.  Due to TITUS, chlorthalidone and losartan were held.  She was started on labetalol 300 mg BID, doxazosin 2 mg, and home amlodipine 10 mg was continued with improved blood pressure control.  CTAP was significant for incidental cystic 2.1 cm lesion in tail of pancreas and indeterminate radiolucent lesion in L1 vertebral body; otherwise, no evidence of nephrolithiasis or obstructive uropathy.  VAS renal artery was negative for any significant arterial occlusive disease.  Due to anemia, high LDH, and low haptoglobin, there was concern for thrombotic microangiopathy for which hematology was consulted.  Due to high positive RNA polymerase III antibody, there was increased suspicion for scleroderma renal crisis.  As a result, she was started on low-dose captopril.  Rheumatology was also consulted.  They recommended discontinuing beta-blockers as this can worsen with vasospasms, discontinue home prednisone, and also recommended renal biopsy once blood pressure improves.  MRI of the left femur did not show any evidence of active inflammatory myositis. Captopril was uptitrated daily until reaching dosage of 50 mg 3 times a day.  Patient was also started on nifedipine which was uptitrated to 60 mg daily and torsemide 10 mg daily.  Blood pressures had better control with addition of nifedipine.  Patient had renal biopsy performed on 02/21/2025.  Biopsy results are pending at this time.  Patient will follow-up outpatient with nephrology and rheumatology.  She will also follow-up outpatient for wound care for a left heel wound.     Please see above list of  "diagnoses and related plan for additional information.     Condition at Discharge: good    Discharge Day Visit / Exam:   Subjective: Patient seen and evaluated bedside, she denies any fevers, chills, lightheadedness, dizziness, flank pain.  Does still have complaints of intermittent cough.  Swelling is improved today.    Vitals: Blood Pressure: 131/69 (02/22/25 0927)  Pulse: 98 (02/22/25 0927)  Temperature: 99.4 °F (37.4 °C) (02/21/25 1516)  Temp Source: Oral (02/18/25 1129)  Respirations: 18 (02/21/25 2040)  Height: 5' 4\" (162.6 cm) (02/14/25 0148)  Weight - Scale: 84.4 kg (186 lb) (02/14/25 0148)  SpO2: 96 % (02/22/25 0927)  Physical Exam  Vitals and nursing note reviewed.   Constitutional:       General: She is not in acute distress.     Appearance: She is well-developed.   HENT:      Head: Normocephalic and atraumatic.      Right Ear: External ear normal.      Left Ear: External ear normal.      Nose: Nose normal.   Eyes:      Extraocular Movements: Extraocular movements intact.      Conjunctiva/sclera: Conjunctivae normal.   Cardiovascular:      Rate and Rhythm: Normal rate.      Heart sounds: No murmur heard.  Pulmonary:      Effort: Pulmonary effort is normal. No respiratory distress.      Comments: Mild crackles at bases of lungs, greater on left than right.    Abdominal:      Palpations: Abdomen is soft.      Tenderness: There is no abdominal tenderness.      Comments: Renal biopsy site on posterior right flank with dressing over head no bleeding noted.   Musculoskeletal:      Cervical back: Neck supple.      Right lower leg: Edema present.      Left lower leg: Edema present.      Comments: Swelling of hands  2nd Right MCP swollen and TTP to palpation.  Left foot in Cam boot   Skin:     General: Skin is warm and dry.   Neurological:      General: No focal deficit present.      Mental Status: She is alert and oriented to person, place, and time.   Psychiatric:         Mood and Affect: Mood normal.         " Behavior: Behavior normal.         Discussion with Family: Patient declined call to .     Discharge instructions/Information to patient and family:   See after visit summary for information provided to patient and family.      Provisions for Follow-Up Care:  See after visit summary for information related to follow-up care and any pertinent home health orders.      Mobility at time of Discharge:   Basic Mobility Inpatient Raw Score: 24  JH-HLM Goal: 8: Walk 250 feet or more  JH-HLM Achieved: 8: Walk 250 feet ot more  HLM Goal achieved. Continue to encourage appropriate mobility.     Disposition:   Home    Planned Readmission: no    Discharge Medications:  See after visit summary for reconciled discharge medications provided to patient and/or family.      Administrative Statements   Discharge Statement:  I have spent a total time of 45 minutes in caring for this patient on the day of the visit/encounter.     **Please Note: This note may have been constructed using a voice recognition system**

## 2025-02-22 NOTE — ASSESSMENT & PLAN NOTE
Mixed connective tissue disorder, has alopecia, joint pain, right notes, skin changes   Overall all worsening symptoms  Management per rheumatology  As per rheumatology steroids contraindicated at this time   rheumatology follow-up on discharge

## 2025-02-22 NOTE — ASSESSMENT & PLAN NOTE
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  Hematology/oncology consulted, appreciate recommendations  Mild drop in hemoglobin, haptoglobin <10 and mildly elevated LDH but peripheral smear did not show any signs of hemolysis/schistocytes. Her MARCO is negative. Her platelets are above 100 K. No hyperbilirubinemia. her PLASMIC score 3 points, Low Risk , 0% Risk of severe [id number] deficiency, so no need for urgent TTP w/u or treatment , would continue trend CBC , likely mild thrombocytopenia and anemia in context of the acute illness / SRC. If continues to trend down further or evidence of hemolysis further workup can be pursued.

## 2025-02-22 NOTE — ASSESSMENT & PLAN NOTE
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

## 2025-02-22 NOTE — INCIDENTAL FINDINGS
The following findings require follow up:  Radiographic finding   Finding:  CT chest high resolution: Mild bibasilar juxtapleural reticulation, nonspecific but could represent early, mild interstitial fibrosis., Mild, subtle scattered groundglass density in both lungs (302/112), nonspecific but may be infectious/inflammatory. No consolidation., 3 mm juxtapleural medial right upper lobe nodule. Based on current Fleischner Society 2017 Guidelines on incidental pulmonary nodule, no routine follow-up is needed if the patient is low risk. If the patient is high risk, optional follow-up chest CT at , 12 months can be considered., Near 2 cm pancreatic tail cystic lesion, new from 2014. Given absence of IV contrast, MRI abdomen with IV contrast (Gadavist) is recommended in the near future (can be done on outpatient basis) for more definitive characterization., The study was marked in EPIC for immediate notification and follow-up., Workstation performed: [id number]    Follow up required: Yes   Follow up should be done within 1 month(s)    Please notify the following clinician to assist with the follow up:   Dr. Matthew    Incidental finding results were discussed with the Patient by Trev Couch MD on 02/22/25.   They expressed understanding and all questions answered.

## 2025-02-22 NOTE — ASSESSMENT & PLAN NOTE
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

## 2025-02-22 NOTE — ASSESSMENT & PLAN NOTE
Lab Results   Component Value Date    CREATININE 2.88 (H) 02/22/2025    CREATININE 2.91 (H) 02/21/2025    CREATININE 2.60 (H) 02/20/2025     Here with abnormal outpatient lab work. 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

## 2025-02-22 NOTE — ASSESSMENT & PLAN NOTE
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, component of fluid overload  Baseline creatinine: 0.7 to 0.9 mg/dL  Peak creatinine: 2.9 mg/dL  Current creatinine: 2.8 mg/dL, slightly better today after diuresis     UA: Leukocyturia, no hematuria  UACR 665 mg/g  Renal imaging : No hydronephrosis, left kidney cortical cyst  GN workup  JAYESH negative  dsDNA negative x 2  ANCA negative  SSA, SSB neg  Anti 70 Ab neg   Anti-RNA polymerase 175 (elevated)  Treatment:  No indication of dialysis ice time   I am expecting to receive a preliminary result from [employer] this afternoon   Avoid NSAIDs, avoid contrast   Adjust medications to GFR  Patient will need follow-up with nephrology on discharge  Stable for discharge from my end

## 2025-02-23 LAB — HAPTOGLOB SERPL-MCNC: 18 MG/DL (ref 33–346)

## 2025-02-24 ENCOUNTER — TRANSITIONAL CARE MANAGEMENT (OUTPATIENT)
Dept: INTERNAL MEDICINE CLINIC | Facility: CLINIC | Age: 55
End: 2025-02-24

## 2025-02-24 ENCOUNTER — TELEPHONE (OUTPATIENT)
Dept: NEPHROLOGY | Facility: CLINIC | Age: 55
End: 2025-02-24

## 2025-02-24 DIAGNOSIS — I10 ACCELERATED HYPERTENSION: Primary | ICD-10-CM

## 2025-02-24 DIAGNOSIS — N17.9 ACUTE KIDNEY INJURY (HCC): ICD-10-CM

## 2025-02-24 NOTE — PROGRESS NOTES
Rheumatology Follow-up Visit  Name: Kari Erazo      : 1970      MRN: [id number]  Encounter Provider: Bety Pacheco MD  Encounter Date: 2025   Encounter department: [facility] [address]  :  Assessment & Plan  Systemic sclerosis (HCC)  55 y/o F who presents for follow-up of recently diagnosed diffuse Ssc with high titer RNA-Partha-3 antibodies. Initial manifestations included SRC, sclerodactyly, Raynaud's, myalgias, and telangiectasias. In regards to her Ssc, recommend initiation of MMF to help with sclerodactyly. Start 500mg BID, labs in 2 weeks and if stable, increase to 1000mg BID. Anticipate will likely need to add second agent for skin disease. Screening TTE, high res CT complete; patient will schedule PFTs. Follow-up in 4 weeks.   Orders:    mycophenolate (CELLCEPT) 500 mg tablet; Take 1 tablet twice daily for 2 weeks, then increase to 2 tablets twice daily.    CBC and differential; Future    Comprehensive metabolic panel; Future    Acute scleroderma renal crisis (HCC)  he post-hospital discharge after admission for SRC - now on captopril and will be following with Nephro.       Raynaud's disease without gangrene  Stable on CCB; may need to add PDE in the future       Long-term use of immunosuppressant medication             History of Present Illness   HPI  Kari Erazo is a 54 y.o. female who presents for follow-up.    Interval History:  Patient reports she is slowly feeling better. BP has been okay since being at home. She has not had any worsening of Raynaud's, fingertips are tender. Swelling is improving. Myalgias improved.     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  She also reported new onset Raynaud's over the preceding 2 to 3 years. Initial exam showed sclerodactyly up to the MCPs of the bilateral hands with  "periungual erythema and nailfold capillary changes.  She also had noticeable periorbital edema with swelling of the bilateral feet.  Muscle strength was 5 out of 5 throughout.  She reported a prior diagnosis of PsA, but never required specific immunosuppressing treatment.  Initial differential included systemic sclerosis versus myositis.  Workup was significant for high titer JAYESH 1: 1280 and nucleolar pattern and positive RNA polymerase III at 175; negative RF, CCP, SSA, SSB, RNP, Chavez, SCL 70 centromere, Kassie 1; normal CK.  Shortly after her initial visit, patient was found to have acute kidney injury as well as increasing proteinuria with accelerated hypertension.  She was admitted to the hospital due to concern for scleroderma renal crisis likely precipitated by previous steroid use.  Additional workup showed evidence of TMA.  She underwent kidney biopsy with results still pending.  She was started on captopril for treatment with improvement in her blood pressure.  Nifedipine which she had been on prior to admission was also increased to help with Raynaud's symptoms.  She underwent TTE which showed abnormal diastolic dysfunction with a small pericardial effusion and without evidence of pulmonary hypertension.  She did undergo MRI of the thigh to evaluate for myositis which was negative.  A high-resolution CT was performed which showed nonspecific mild bibasilar juxtapleural reticulation and mild subtle scattered groundglass density in both lungs.  She will be undergoing PFTs for further evaluation.  A myomarker panel is still pending.      Review of Systems  Complete ROS conducted as per HPI.     Objective   /80 (BP Location: Left arm, Patient Position: Sitting, Cuff Size: Adult)   Pulse (!) 118   Temp 98.3 °F (36.8 °C) (Tympanic)   Resp 16   Ht 5' 4\" (1.626 m)   Wt 87.5 kg (193 lb)   SpO2 94%   BMI 33.13 kg/m²      Physical Exam  Physical Exam  Constitutional: well appearing, no acute " distress  HEENT: normocephalic, sclera clear,+teleangiectasia on lips  Neck: supple, no palpable cervical adenopathy  CV: regular rate and rhythm  Pulm: normal respiratory effort, comfortable on room air  Skin: +skin thickening up to wrist, periungal erythema  Extremities: warm and well perfused, no edema  MSK: slight tenderness at b/l 2nd MCP      Labs and Imaging  I have personally reviewed pertinent labs and imaging.

## 2025-02-24 NOTE — TELEPHONE ENCOUNTER
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

## 2025-02-24 NOTE — UTILIZATION REVIEW
NOTIFICATION OF ADMISSION DISCHARGE   This is a Notification of Discharge from [facility]. Please be advised that this patient has been discharge from our facility. Below you will find the admission and discharge date and time including the patient’s disposition.   UTILIZATION REVIEW CONTACT:  Linda Eagle  Utilization   Network Utilization Review Department  Phone: [phone] x carefully listen to the prompts. All voicemails are confidential.  Email: [email or web address]     ADMISSION INFORMATION  PRESENTATION DATE: 2/13/2025  7:28 PM  OBERVATION ADMISSION DATE: N/A  INPATIENT ADMISSION DATE: 2/14/25 12:28 AM   DISCHARGE DATE: 2/22/2025  2:00 PM   DISPOSITION:Home/Self Care    Network Utilization Review Department  ATTENTION: Please call with any questions or concerns to [phone] and carefully listen to the prompts so that you are directed to the right person. All voicemails are confidential.   For Discharge needs, contact Care Management DC Support Team at [phone] opt. 2  Send all requests for admission clinical reviews, approved or denied determinations and any other requests to dedicated fax number below belonging to the campus where the patient is receiving treatment. List of dedicated fax numbers for the Facilities:  FACILITY NAME UR FAX NUMBER   ADMISSION DENIALS (Administrative/Medical Necessity) [phone]   DISCHARGE SUPPORT TEAM ([facility]) [phone]   PARENT CHILD HEALTH (Maternity/NICU/Pediatrics) [phone]   [facility] [phone]   [facility] [phone]   [facility] [phone]   [facility] [phone]   [facility] [phone]   [facility] [phone]   [facility] [phone]   [facility] [phone]    [facility] [phone]   [facility] [phone]   [facility] [phone]   [facility] [phone]

## 2025-02-25 ENCOUNTER — APPOINTMENT (OUTPATIENT)
Dept: LAB | Facility: CLINIC | Age: 55
End: 2025-02-25
Payer: COMMERCIAL

## 2025-02-25 ENCOUNTER — OFFICE VISIT (OUTPATIENT)
Age: 55
End: 2025-02-25
Payer: COMMERCIAL

## 2025-02-25 VITALS
OXYGEN SATURATION: 94 % | HEIGHT: 64 IN | TEMPERATURE: 98.3 F | DIASTOLIC BLOOD PRESSURE: 80 MMHG | HEART RATE: 118 BPM | RESPIRATION RATE: 16 BRPM | BODY MASS INDEX: 32.95 KG/M2 | WEIGHT: 193 LBS | SYSTOLIC BLOOD PRESSURE: 142 MMHG

## 2025-02-25 DIAGNOSIS — N17.9 ACUTE KIDNEY INJURY (HCC): ICD-10-CM

## 2025-02-25 DIAGNOSIS — Z79.60 LONG-TERM USE OF IMMUNOSUPPRESSANT MEDICATION: ICD-10-CM

## 2025-02-25 DIAGNOSIS — I10 ACCELERATED HYPERTENSION: ICD-10-CM

## 2025-02-25 DIAGNOSIS — M34.9 ACUTE SCLERODERMA RENAL CRISIS (HCC): ICD-10-CM

## 2025-02-25 DIAGNOSIS — N28.0 ACUTE SCLERODERMA RENAL CRISIS (HCC): ICD-10-CM

## 2025-02-25 DIAGNOSIS — I73.00 RAYNAUD'S DISEASE WITHOUT GANGRENE: ICD-10-CM

## 2025-02-25 DIAGNOSIS — M34.9 SYSTEMIC SCLEROSIS (HCC): ICD-10-CM

## 2025-02-25 DIAGNOSIS — R05.1 ACUTE COUGH: ICD-10-CM

## 2025-02-25 DIAGNOSIS — M34.9 SYSTEMIC SCLEROSIS (HCC): Primary | ICD-10-CM

## 2025-02-25 LAB
ALBUMIN SERPL BCG-MCNC: 3.5 G/DL (ref 3.5–5)
ALP SERPL-CCNC: 37 U/L (ref 34–104)
ALT SERPL W P-5'-P-CCNC: 25 U/L (ref 7–52)
ANION GAP SERPL CALCULATED.3IONS-SCNC: 9 MMOL/L (ref 4–13)
AST SERPL W P-5'-P-CCNC: 23 U/L (ref 13–39)
BASOPHILS # BLD AUTO: 0.03 THOUSANDS/ÂΜL (ref 0–0.1)
BASOPHILS NFR BLD AUTO: 0 % (ref 0–1)
BILIRUB SERPL-MCNC: 0.54 MG/DL (ref 0.2–1)
BUN SERPL-MCNC: 38 MG/DL (ref 5–25)
CALCIUM SERPL-MCNC: 8.5 MG/DL (ref 8.4–10.2)
CHLORIDE SERPL-SCNC: 102 MMOL/L (ref 96–108)
CO2 SERPL-SCNC: 23 MMOL/L (ref 21–32)
CREAT SERPL-MCNC: 2.99 MG/DL (ref 0.6–1.3)
EOSINOPHIL # BLD AUTO: 0.2 THOUSAND/ÂΜL (ref 0–0.61)
EOSINOPHIL NFR BLD AUTO: 3 % (ref 0–6)
ERYTHROCYTE [DISTWIDTH] IN BLOOD BY AUTOMATED COUNT: 15.3 % (ref 11.6–15.1)
GFR SERPL CREATININE-BSD FRML MDRD: 17 ML/MIN/1.73SQ M
GLUCOSE SERPL-MCNC: 321 MG/DL (ref 65–140)
HCT VFR BLD AUTO: 30.8 % (ref 34.8–46.1)
HGB BLD-MCNC: 9.8 G/DL (ref 11.5–15.4)
IMM GRANULOCYTES # BLD AUTO: 0.04 THOUSAND/UL (ref 0–0.2)
IMM GRANULOCYTES NFR BLD AUTO: 1 % (ref 0–2)
LYMPHOCYTES # BLD AUTO: 0.62 THOUSANDS/ÂΜL (ref 0.6–4.47)
LYMPHOCYTES NFR BLD AUTO: 9 % (ref 14–44)
MCH RBC QN AUTO: 31.1 PG (ref 26.8–34.3)
MCHC RBC AUTO-ENTMCNC: 31.8 G/DL (ref 31.4–37.4)
MCV RBC AUTO: 98 FL (ref 82–98)
MONOCYTES # BLD AUTO: 0.63 THOUSAND/ÂΜL (ref 0.17–1.22)
MONOCYTES NFR BLD AUTO: 9 % (ref 4–12)
NEUTROPHILS # BLD AUTO: 5.47 THOUSANDS/ÂΜL (ref 1.85–7.62)
NEUTS SEG NFR BLD AUTO: 78 % (ref 43–75)
NRBC BLD AUTO-RTO: 0 /100 WBCS
PLATELET # BLD AUTO: 230 THOUSANDS/UL (ref 149–390)
PMV BLD AUTO: 8.9 FL (ref 8.9–12.7)
POTASSIUM SERPL-SCNC: 3.9 MMOL/L (ref 3.5–5.3)
PROT SERPL-MCNC: 6.3 G/DL (ref 6.4–8.4)
RBC # BLD AUTO: 3.15 MILLION/UL (ref 3.81–5.12)
SODIUM SERPL-SCNC: 134 MMOL/L (ref 135–147)
WBC # BLD AUTO: 6.99 THOUSAND/UL (ref 4.31–10.16)

## 2025-02-25 PROCEDURE — 36415 COLL VENOUS BLD VENIPUNCTURE: CPT

## 2025-02-25 PROCEDURE — 85025 COMPLETE CBC W/AUTO DIFF WBC: CPT

## 2025-02-25 PROCEDURE — 99214 OFFICE O/P EST MOD 30 MIN: CPT | Performed by: STUDENT IN AN ORGANIZED HEALTH CARE EDUCATION/TRAINING PROGRAM

## 2025-02-25 PROCEDURE — 80053 COMPREHEN METABOLIC PANEL: CPT

## 2025-02-25 RX ORDER — NEEDLES, SAFETY 22GX1 1/2"
NEEDLE, DISPOSABLE MISCELLANEOUS
COMMUNITY
Start: 2025-02-10 | End: 2025-03-04

## 2025-02-25 RX ORDER — MYCOPHENOLATE MOFETIL 500 MG/1
TABLET ORAL
Qty: 120 TABLET | Refills: 6 | Status: SHIPPED | OUTPATIENT
Start: 2025-02-25

## 2025-02-25 RX ORDER — FOLIC ACID 1 MG/1
1 TABLET ORAL DAILY
COMMUNITY

## 2025-02-25 RX ORDER — CAPTOPRIL 25 MG/1
TABLET ORAL
COMMUNITY
Start: 2025-02-22 | End: 2025-02-25

## 2025-02-25 NOTE — ASSESSMENT & PLAN NOTE
53 y/o F who presents for follow-up of recently diagnosed diffuse Ssc with high titer RNA-Partha-3 antibodies. Initial manifestations included SRC, sclerodactyly, Raynaud's, myalgias, and telangiectasias. In regards to her Ssc, recommend initiation of MMF to help with sclerodactyly. Start 500mg BID, labs in 2 weeks and if stable, increase to 1000mg BID. Anticipate will likely need to add second agent for skin disease. Screening TTE, high res CT complete; patient will schedule PFTs. Follow-up in 4 weeks.   Orders:    mycophenolate (CELLCEPT) 500 mg tablet; Take 1 tablet twice daily for 2 weeks, then increase to 2 tablets twice daily.    CBC and differential; Future    Comprehensive metabolic panel; Future

## 2025-02-26 ENCOUNTER — RESULTS FOLLOW-UP (OUTPATIENT)
Age: 55
End: 2025-02-26

## 2025-02-26 LAB
ALDOST SERPL-MCNC: 9.1 NG/DL (ref 0–30)
ALDOST/RENIN PLAS-RTO: 0.2 {RATIO} (ref 0–30)
RENIN PLAS-CCNC: 37.88 NG/ML/HR (ref 0.17–5.38)

## 2025-02-26 RX ORDER — BENZONATATE 200 MG/1
200 CAPSULE ORAL 3 TIMES DAILY PRN
Qty: 30 CAPSULE | Refills: 0 | Status: SHIPPED | OUTPATIENT
Start: 2025-02-26 | End: 2025-03-04 | Stop reason: ALTCHOICE

## 2025-02-27 ENCOUNTER — TELEPHONE (OUTPATIENT)
Dept: NEPHROLOGY | Facility: CLINIC | Age: 55
End: 2025-02-27

## 2025-02-27 DIAGNOSIS — N28.0 ACUTE SCLERODERMA RENAL CRISIS (HCC): ICD-10-CM

## 2025-02-27 DIAGNOSIS — N17.9 ACUTE KIDNEY INJURY (HCC): Primary | ICD-10-CM

## 2025-02-27 DIAGNOSIS — M34.9 ACUTE SCLERODERMA RENAL CRISIS (HCC): ICD-10-CM

## 2025-02-27 LAB — CRYOGLOB SER QL 1D COLD INC: NORMAL

## 2025-02-27 NOTE — TELEPHONE ENCOUNTER
Follow-up TITUS/scleroderma renal crisis.  Kidney biopsy consistent with vascular microangiopathic changes, no fibrin thrombi (vessels display severe arterial intimal fibrosis as well as mucoid intimal edema, swollen endothelial cells and fibrinoid necrosis, no fibrin thrombi or red blood cell fragments, immunofluorescence negative for all stains and glomeruli, nuclear type staining tissue JAYESH pattern for IgG, kappa and lambda, kappa and lambda stain equally).    Blood pressure in the range of 110/69 to 146/88.    Creatinine on admission 2.26 on 02/12.  Creatinine on discharge 2.88 on 02/22.  Outpatient creatinine 2.99 02/25.    Some increase in creatinine is most likely due to hemodynamic/autoregulatory changes due to ongoing use of captopril and torsemide.    Current Rx: Captopril 50 mg 3 times daily, Cardura 2 mg twice daily, torsemide 10 mg daily, nifedipine 60 mg daily    Nephrology follow-up arranged on 03/03 at 12:30 PM at [facility].    Addendum 03/02  Blood pressure readings  02/27 117/70, 131/80  02/28 147/89, 130/71, 143/79  03/01 143/84, 146/82, 152/86, 03/02 145/88, 128/76    Medication changes: Increase captopril to 75 mg 3 times daily

## 2025-02-28 LAB
EJ AB SER QL: NEGATIVE
ENA JO1 AB SER IA-ACNC: <20 UNITS
ENA PM/SCL AB SER-ACNC: <20 UNITS
ENA SS-A 52KD IGG SER IA-ACNC: 46 UNITS
FIBRILLARIN AB SER QL: NEGATIVE
KU AB SER QL: NEGATIVE
MDA5 AB SER LINE BLOT-ACNC: <20 UNITS
MI2 AB SER QL: NEGATIVE
MJ AB SER LINE BLOT-ACNC: <20 UNITS
OJ AB SER QL: NEGATIVE
PL12 AB SER QL: NEGATIVE
PL7 AB SER QL: NEGATIVE
SAE1 IGG SER QL LINE BLOT: <20 UNITS
SRP AB SERPL QL: NEGATIVE
TIF1-GAMMA AB SER LINE BLOT-ACNC: <20 UNITS
U1 SNRNP AB SER IA-ACNC: <20 UNITS
U2 SNRNP AB SER QL: NEGATIVE

## 2025-03-02 ENCOUNTER — DOCUMENTATION (OUTPATIENT)
Dept: NEPHROLOGY | Facility: CLINIC | Age: 55
End: 2025-03-02

## 2025-03-02 DIAGNOSIS — N28.0 ACUTE SCLERODERMA RENAL CRISIS (HCC): Primary | ICD-10-CM

## 2025-03-02 DIAGNOSIS — M34.9 ACUTE SCLERODERMA RENAL CRISIS (HCC): Primary | ICD-10-CM

## 2025-03-02 RX ORDER — CAPTOPRIL 50 MG/1
TABLET ORAL
Qty: 45 TABLET | Refills: 0 | Status: SHIPPED | OUTPATIENT
Start: 2025-03-02 | End: 2025-03-07

## 2025-03-03 ENCOUNTER — OFFICE VISIT (OUTPATIENT)
Dept: NEPHROLOGY | Facility: CLINIC | Age: 55
End: 2025-03-03
Payer: COMMERCIAL

## 2025-03-03 ENCOUNTER — APPOINTMENT (OUTPATIENT)
Dept: LAB | Facility: CLINIC | Age: 55
End: 2025-03-03
Payer: COMMERCIAL

## 2025-03-03 ENCOUNTER — TELEPHONE (OUTPATIENT)
Dept: OBGYN CLINIC | Facility: CLINIC | Age: 55
End: 2025-03-03

## 2025-03-03 ENCOUNTER — RESULTS FOLLOW-UP (OUTPATIENT)
Dept: NEPHROLOGY | Facility: CLINIC | Age: 55
End: 2025-03-03

## 2025-03-03 VITALS
HEIGHT: 64 IN | HEART RATE: 103 BPM | DIASTOLIC BLOOD PRESSURE: 81 MMHG | WEIGHT: 192 LBS | SYSTOLIC BLOOD PRESSURE: 152 MMHG | BODY MASS INDEX: 32.78 KG/M2

## 2025-03-03 DIAGNOSIS — I15.1 HYPERTENSION SECONDARY TO OTHER RENAL DISORDERS: ICD-10-CM

## 2025-03-03 DIAGNOSIS — M34.89: ICD-10-CM

## 2025-03-03 DIAGNOSIS — E11.9 TYPE 2 DIABETES MELLITUS WITHOUT COMPLICATION, WITHOUT LONG-TERM CURRENT USE OF INSULIN (HCC): ICD-10-CM

## 2025-03-03 DIAGNOSIS — N17.9 ACUTE KIDNEY INJURY (HCC): Primary | ICD-10-CM

## 2025-03-03 DIAGNOSIS — N17.9 ACUTE KIDNEY INJURY (HCC): ICD-10-CM

## 2025-03-03 DIAGNOSIS — N28.0 ACUTE SCLERODERMA RENAL CRISIS (HCC): ICD-10-CM

## 2025-03-03 DIAGNOSIS — D64.9 ANEMIA, UNSPECIFIED TYPE: ICD-10-CM

## 2025-03-03 DIAGNOSIS — N08: ICD-10-CM

## 2025-03-03 DIAGNOSIS — M34.9 ACUTE SCLERODERMA RENAL CRISIS (HCC): ICD-10-CM

## 2025-03-03 DIAGNOSIS — Z91.89 ELECTROLYTE IMBALANCE RISK: ICD-10-CM

## 2025-03-03 LAB
ALBUMIN SERPL BCG-MCNC: 3.7 G/DL (ref 3.5–5)
ALBUMIN SERPL BCG-MCNC: 3.7 G/DL (ref 3.5–5)
ALP SERPL-CCNC: 37 U/L (ref 34–104)
ALT SERPL W P-5'-P-CCNC: 19 U/L (ref 7–52)
ANION GAP SERPL CALCULATED.3IONS-SCNC: 10 MMOL/L (ref 4–13)
ANION GAP SERPL CALCULATED.3IONS-SCNC: 9 MMOL/L (ref 4–13)
AST SERPL W P-5'-P-CCNC: 17 U/L (ref 13–39)
BASOPHILS # BLD AUTO: 0.05 THOUSANDS/ÂΜL (ref 0–0.1)
BASOPHILS NFR BLD AUTO: 1 % (ref 0–1)
BILIRUB SERPL-MCNC: 0.6 MG/DL (ref 0.2–1)
BUN SERPL-MCNC: 44 MG/DL (ref 5–25)
BUN SERPL-MCNC: 45 MG/DL (ref 5–25)
CALCIUM SERPL-MCNC: 9.2 MG/DL (ref 8.4–10.2)
CALCIUM SERPL-MCNC: 9.2 MG/DL (ref 8.4–10.2)
CHLORIDE SERPL-SCNC: 103 MMOL/L (ref 96–108)
CHLORIDE SERPL-SCNC: 103 MMOL/L (ref 96–108)
CO2 SERPL-SCNC: 23 MMOL/L (ref 21–32)
CO2 SERPL-SCNC: 24 MMOL/L (ref 21–32)
CREAT SERPL-MCNC: 3.17 MG/DL (ref 0.6–1.3)
CREAT SERPL-MCNC: 3.17 MG/DL (ref 0.6–1.3)
EOSINOPHIL # BLD AUTO: 0.32 THOUSAND/ÂΜL (ref 0–0.61)
EOSINOPHIL NFR BLD AUTO: 5 % (ref 0–6)
ERYTHROCYTE [DISTWIDTH] IN BLOOD BY AUTOMATED COUNT: 15 % (ref 11.6–15.1)
GFR SERPL CREATININE-BSD FRML MDRD: 16 ML/MIN/1.73SQ M
GFR SERPL CREATININE-BSD FRML MDRD: 16 ML/MIN/1.73SQ M
GLUCOSE P FAST SERPL-MCNC: 149 MG/DL (ref 65–99)
GLUCOSE P FAST SERPL-MCNC: 152 MG/DL (ref 65–99)
HCT VFR BLD AUTO: 29.5 % (ref 34.8–46.1)
HGB BLD-MCNC: 9.7 G/DL (ref 11.5–15.4)
IMM GRANULOCYTES # BLD AUTO: 0.05 THOUSAND/UL (ref 0–0.2)
IMM GRANULOCYTES NFR BLD AUTO: 1 % (ref 0–2)
LDH SERPL-CCNC: 266 U/L (ref 140–271)
LYMPHOCYTES # BLD AUTO: 0.67 THOUSANDS/ÂΜL (ref 0.6–4.47)
LYMPHOCYTES NFR BLD AUTO: 10 % (ref 14–44)
MCH RBC QN AUTO: 31.7 PG (ref 26.8–34.3)
MCHC RBC AUTO-ENTMCNC: 32.9 G/DL (ref 31.4–37.4)
MCV RBC AUTO: 96 FL (ref 82–98)
MONOCYTES # BLD AUTO: 0.53 THOUSAND/ÂΜL (ref 0.17–1.22)
MONOCYTES NFR BLD AUTO: 8 % (ref 4–12)
NEUTROPHILS # BLD AUTO: 5.23 THOUSANDS/ÂΜL (ref 1.85–7.62)
NEUTS SEG NFR BLD AUTO: 75 % (ref 43–75)
NRBC BLD AUTO-RTO: 0 /100 WBCS
PHOSPHATE SERPL-MCNC: 4.6 MG/DL (ref 2.7–4.5)
PLATELET # BLD AUTO: 304 THOUSANDS/UL (ref 149–390)
PMV BLD AUTO: 8.7 FL (ref 8.9–12.7)
POTASSIUM SERPL-SCNC: 4 MMOL/L (ref 3.5–5.3)
POTASSIUM SERPL-SCNC: 4.1 MMOL/L (ref 3.5–5.3)
PROT SERPL-MCNC: 6.8 G/DL (ref 6.4–8.4)
RBC # BLD AUTO: 3.06 MILLION/UL (ref 3.81–5.12)
SODIUM SERPL-SCNC: 136 MMOL/L (ref 135–147)
SODIUM SERPL-SCNC: 136 MMOL/L (ref 135–147)
WBC # BLD AUTO: 6.85 THOUSAND/UL (ref 4.31–10.16)

## 2025-03-03 PROCEDURE — 36415 COLL VENOUS BLD VENIPUNCTURE: CPT

## 2025-03-03 PROCEDURE — 85025 COMPLETE CBC W/AUTO DIFF WBC: CPT

## 2025-03-03 PROCEDURE — 80053 COMPREHEN METABOLIC PANEL: CPT

## 2025-03-03 PROCEDURE — 83615 LACTATE (LD) (LDH) ENZYME: CPT

## 2025-03-03 PROCEDURE — 83010 ASSAY OF HAPTOGLOBIN QUANT: CPT

## 2025-03-03 PROCEDURE — 99214 OFFICE O/P EST MOD 30 MIN: CPT | Performed by: INTERNAL MEDICINE

## 2025-03-03 PROCEDURE — 80069 RENAL FUNCTION PANEL: CPT

## 2025-03-03 NOTE — ASSESSMENT & PLAN NOTE
Baseline creatinine 0.6-0.7  Creatinine slightly higher than baseline at 0.9 in 12/2024  Creatinine on admission 2.3 on 02/13/2025  Creatinine on discharge 2.88 02/22/2025 after initiation of ACE inhibitor and loop diuretic  Creatinine today increased to 3.17 on 03/03/2025  Workup  RNA polymerase  (strongly positive)  Rheumatoid factor/CCP/anti-Jo1 antibody/antiscleroderma antibody negative  UA: 1+ protein, 30-50 WBC, 1-2 RBC  UACR 82 mg/g 02/22/2025 improved from 675 mg/g/UPCR 1.4 g 02/12/2025  SPEP no monoclonal band, urine immunofixation with no monoclonal immunoglobulin, KL ratio 0.94  CT abdomen: No hydronephrosis, left renal cortical cyst 2.5 cm  JAYESH elevated with titer of 1218 January 2025, double-stranded DNA negative   03/03 improved from peak of 365 on 02/21  Haptoglobin 18 02/22 improved from undetectable less than 10  Hemolysis smear: No schistocytes or helmet cells  Direct Diana negative  Hep C antibody reactive, RNA not detected  Cryoglobulin detected 02/13, repeat - 02/21/2025  Urine eosinophil 0%  No hypocomplementemia  Kidney biopsy consistent with vascular microangiopathic changes:   Glomeruli show ischemic changes with wrinkling of capillary loops, 2 glomeruli show swollen endothelial cells and mesangial lysis, no fibrin thrombi or red blood cell fragments, no hypercellularity, segmental sclerosis, fibrinoid necrosis or crescent formation, glomerular basement membranes without holes, spikes with double contours on silver stain,   Severe interstitial fibrosis and tubular atrophy approximately 50 to 60% of cortex  Vessels display severe arterial intimal fibrosis as well as mucoid intimal edema, swollen endothelial cells and fibrinoid necrosis, no fibrin thrombi or red blood cell fragments  Immunofluorescence negative for all stains in glomeruli, nuclear type staining tissue JAYESH pattern for IgG, kappa and lambda, kappa and lambda stain equally).     Plan  Maintain RAAS blockade with  captopril for blood pressure control (increase in creatinine is partly related to decreased intraglomerular pressure from escalating doses of RAAS blocker)  Discontinue torsemide to see if we can decrease some prerenal component  Overall hematologic parameters are improving and there is no evidence of thrombotic microangiopathy on kidney biopsy and in such no indication for Eculizumab  We will focus on treatment of hypertension primarily with ACE inhibitor (captopril) in addition to other antihypertensives as below   Second opinion arranged at [facility] with Dr. New Hinojosa  Continue to monitor renal function panel and CBC q. weekly  Nephrology follow-up in 4-6 weeks  No indication for renal replacement therapy  If an indication arises, will consider peritoneal dialysis

## 2025-03-03 NOTE — PROGRESS NOTES
Name: Kari Erazo      : 1970      MRN: [id number]  Encounter Provider: Gavin Zazueta MD  Encounter Date: 3/3/2025   Encounter department: [facility] [place]  :  Assessment & Plan  Acute kidney injury (HCC)  Baseline creatinine 0.6-0.7  Creatinine slightly higher than baseline at 0.9 in 2024  Creatinine on admission 2.3 on 2025  Creatinine on discharge 2.88 2025 after initiation of ACE inhibitor and loop diuretic  Creatinine today increased to 3.17 on 2025  Workup  RNA polymerase  (strongly positive)  Rheumatoid factor/CCP/anti-Jo1 antibody/antiscleroderma antibody negative  UA: 1+ protein, 30-50 WBC, 1-2 RBC  UACR 82 mg/g 2025 improved from 675 mg/g/UPCR 1.4 g 2025  SPEP no monoclonal band, urine immunofixation with no monoclonal immunoglobulin, KL ratio 0.94  CT abdomen: No hydronephrosis, left renal cortical cyst 2.5 cm  JAYESH elevated with titer of 1218 2025, double-stranded DNA negative    improved from peak of 365 on   Haptoglobin 18  improved from undetectable less than 10  Hemolysis smear: No schistocytes or helmet cells  Direct Diana negative  Hep C antibody reactive, RNA not detected  Cryoglobulin detected , repeat - 2025  Urine eosinophil 0%  No hypocomplementemia  Kidney biopsy consistent with vascular microangiopathic changes:   Glomeruli show ischemic changes with wrinkling of capillary loops, 2 glomeruli show swollen endothelial cells and mesangial lysis, no fibrin thrombi or red blood cell fragments, no hypercellularity, segmental sclerosis, fibrinoid necrosis or crescent formation, glomerular basement membranes without holes, spikes with double contours on silver stain,   Severe interstitial fibrosis and tubular atrophy approximately 50 to 60% of cortex  Vessels display severe arterial intimal fibrosis as well as mucoid intimal edema, swollen endothelial cells and fibrinoid  necrosis, no fibrin thrombi or red blood cell fragments  Immunofluorescence negative for all stains in glomeruli, nuclear type staining tissue JAYESH pattern for IgG, kappa and lambda, kappa and lambda stain equally).     Plan  Maintain RAAS blockade with captopril for blood pressure control (increase in creatinine is partly related to decreased intraglomerular pressure from escalating doses of RAAS blocker)  Discontinue torsemide to see if we can decrease some prerenal component  Overall hematologic parameters are improving and there is no evidence of thrombotic microangiopathy on kidney biopsy and in such no indication for Eculizumab  We will focus on treatment of hypertension primarily with ACE inhibitor (captopril) in addition to other antihypertensives as below   Second opinion arranged at [facility] with Dr. New Hinojosa  Continue to monitor renal function panel and CBC q. weekly  Nephrology follow-up in 4-6 weeks  No indication for renal replacement therapy  If an indication arises, will consider peritoneal dialysis  Scleroderma with renal involvement  (HCC)  Notes reviewed from rheumatology, following with Dr. Pacheco  Current Rx: CellCept 500 mg twice daily with plan to increase to 1000 mg twice daily after 2 weeks  Ongoing follow-up with rheumatology at [facility]  Hypertension secondary to other renal disorders  Renin 37/aldosterone 9.1/ARR 0.2 02/14/2025  Creatinine likely elevated in setting of RAAS blockade, was on losartan prior to admission  Metanephrine/normetanephrine within normal limits  Renal artery Doppler: No renal artery stenosis  Current Rx: Captopril 75 mg 3 times daily (dose increased on 03/02), nifedipine 60 mg daily, torsemide 10 mg daily, Cardura 2 mg twice daily  Changes: Discontinue torsemide and monitor blood pressure with increased dose of captopril  Dr. Erazo will provide me with an update every 2 to 3 days  Anemia, unspecified type  Baseline hemoglobin within normal  limits  Hemoglobin level 12.2 on 02/12/2025 with progressive decline  Needed hemoglobin level 9.3 02/22/2025  Most recent hemoglobin 9.7 03/03/2025   03/03 improved from peak of 365 on 02/21  Haptoglobin 18 02/22 improved from undetectable less than 10  Hemolysis smear: No schistocytes or helmet cells  Direct Diana negative  Continue to monitor CBC weekly  Electrolyte imbalance risk  Most recent potassium level 4.1 despite escalating doses of captopril  Continue to monitor electrolytes on a weekly basis  Type 2 diabetes mellitus without complication, without long-term current use of insulin (HCC)  Most recent HbA1c 6.1  Metformin has been discontinued due to TITUS  Management per PCP      History of Present Illness   HPI  Kari Erazo is a 54 y.o. 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  Hand swelling and ankle swelling has improved slightly.  There is ongoing dyspnea with exertion.    54-year-old female who was admitted to the hospital on 02/13/2025 due to acute kidney injury.  She was noted to have very high blood pressure.  Given her history of undifferentiated connective tissue disease, psoriatic arthritis, Raynaud's phenomenon there was a concern for systemic sclerosis.  Extensive autoimmune and rheumatologic workup was completed.  RNA polymerase III antibody was positive and a kidney biopsy was performed.  Diagnosis of scleroderma renal crisis was made and she was treated with escalating doses of captopril and other antihypertensives.    History obtained from: patient    Review of Systems   Constitutional:  Negative for chills and fever.   HENT:  Negative for ear pain and sore throat.    Eyes:  Negative for pain and visual disturbance.   Respiratory:  Positive for shortness of breath. Negative for cough.    Cardiovascular:  Negative for chest pain and palpitations.   Gastrointestinal:  Negative for abdominal pain and vomiting.   Genitourinary:  Negative for  "dysuria and hematuria.   Musculoskeletal:  Negative for back pain.        Upper and lower extremity swelling   Skin:  Negative for color change and rash.   Neurological:  Negative for syncope.   All other systems reviewed and are negative.    Past Medical History   Past Medical History:   Diagnosis Date    Allergic     latex, some tree nuts, seasonal    Arthritis     psoriatic arthritis    Asthma     CPAP (continuous positive airway pressure) dependence     Diabetes mellitus (HCC)     gestational    Gestational diabetes     Hypertension     patient reports    Obesity     PCOS (polycystic ovarian syndrome)     Sleep apnea     Varicella      Past Surgical History:   Procedure Laterality Date    IR BIOPSY KIDNEY RANDOM  2/21/2025    TONSILLECTOMY      last assessed: 5/3/16     Family History   Problem Relation Age of Onset    Hypertension Mother     Hyperlipidemia Mother     Hypertension Father     Other Father         low serum HDL    Hyperlipidemia Father     Hypothyroidism Sister     Asthma Sister     Diabetes Sister     Thyroid disease Sister     Breast cancer Sister 48    No Known Problems Daughter     No Known Problems Son     Breast cancer Maternal Grandmother 79    Stroke Maternal Grandfather     No Known Problems Paternal Grandmother     No Known Problems Paternal Grandfather     No Known Problems Paternal Aunt     No Known Problems Paternal Aunt     No Known Problems Paternal Aunt       reports that she has never smoked. She has never used smokeless tobacco. She reports that she does not drink alcohol and does not use drugs.  Current Outpatient Medications   Medication Instructions    atorvastatin (LIPITOR) 10 mg, Oral, Daily    B-D TB SYRINGE 1CC/27GX1/2\" 27G X 1/2\" 1 ML MISC     benzonatate (TESSALON) 200 mg, Oral, 3 times daily PRN    Calcium-Magnesium-Vitamin D - MG-MG-UNIT TB24 1 tablet, Daily    captopril (CAPOTEN) 50 mg tablet 75 mg 3 times daily (3 tablets of 25 mg 3 times daily)    " "doxazosin (CARDURA) 2 mg, Oral, 2 times daily    Dulaglutide 4.5 MG/0.5ML SOAJ 0.5 mL, Subcutaneous, Every 7 days    etonogestrel-ethinyl estradiol (NuvaRing) 0.12-0.015 MG/24HR vaginal ring INSERT 1 RING VAGINALLY LEAVE IN FOR 3 WEEKS REPLACE RING WITH NO WITHDRAWL BLEED    famotidine (PEPCID) 10 mg, Oral, Daily PRN    folic acid (FOLVITE) 1 mg, Daily    Icosapent Ethyl (VASCEPA) 1 g, Oral, 4 times daily    [Paused] metFORMIN (GLUCOPHAGE) 500 mg, Oral, Every 12 hours    montelukast (SINGULAIR) 10 mg, Oral, Daily at bedtime    mycophenolate (CELLCEPT) 500 mg tablet Take 1 tablet twice daily for 2 weeks, then increase to 2 tablets twice daily.    NIFEdipine ER (ADALAT CC) 60 mg, Oral, Daily    SF 5000 Plus 1.1 % CREA      Allergies   Allergen Reactions    Ace Inhibitors Cough    Latex      Per patient    Nuts - Food Allergy     Shellfish-Derived Products - Food Allergy          Objective   /81 (BP Location: Left arm, Patient Position: Sitting, Cuff Size: Large)   Pulse 103   Ht 5' 4\" (1.626 m)   Wt 87.1 kg (192 lb)   BMI 32.96 kg/m²      Physical Exam  Vitals and nursing note reviewed.   Constitutional:       General: She is not in acute distress.     Appearance: She is well-developed.   HENT:      Head: Normocephalic and atraumatic.   Eyes:      Conjunctiva/sclera: Conjunctivae normal.   Cardiovascular:      Rate and Rhythm: Normal rate and regular rhythm.      Pulses: Normal pulses.      Heart sounds: Normal heart sounds. No murmur heard.  Pulmonary:      Effort: Pulmonary effort is normal. No respiratory distress.      Breath sounds: Normal breath sounds.   Abdominal:      Tenderness: There is no right CVA tenderness or left CVA tenderness.   Musculoskeletal:         General: No swelling.      Cervical back: Neck supple.      Comments: Bilateral upper and lower extremity swelling noted   Skin:     General: Skin is warm and dry.      Capillary Refill: Capillary refill takes less than 2 seconds. "   Neurological:      Mental Status: She is alert.   Psychiatric:         Mood and Affect: Mood normal.

## 2025-03-03 NOTE — PATIENT INSTRUCTIONS
Continue to monitor blood pressure at home.    Continue to provide an update on blood pressure every 2 to 3 days.    Discontinue torsemide and monitor weights.    Notify for weight gain of more than 3 pounds in a day or more than 5 pounds in a week.    Continue to monitor lab work on a weekly basis.

## 2025-03-03 NOTE — ASSESSMENT & PLAN NOTE
Baseline hemoglobin within normal limits  Hemoglobin level 12.2 on 02/12/2025 with progressive decline  Needed hemoglobin level 9.3 02/22/2025  Most recent hemoglobin 9.7 03/03/2025   03/03 improved from peak of 365 on 02/21  Haptoglobin 18 02/22 improved from undetectable less than 10  Hemolysis smear: No schistocytes or helmet cells  Direct Diana negative  Continue to monitor CBC weekly

## 2025-03-04 ENCOUNTER — OFFICE VISIT (OUTPATIENT)
Dept: INTERNAL MEDICINE CLINIC | Facility: CLINIC | Age: 55
End: 2025-03-04
Payer: COMMERCIAL

## 2025-03-04 VITALS
BODY MASS INDEX: 32.78 KG/M2 | SYSTOLIC BLOOD PRESSURE: 136 MMHG | HEIGHT: 64 IN | TEMPERATURE: 96.5 F | WEIGHT: 192 LBS | OXYGEN SATURATION: 98 % | HEART RATE: 112 BPM | DIASTOLIC BLOOD PRESSURE: 64 MMHG

## 2025-03-04 DIAGNOSIS — G47.33 OSA ON CPAP: ICD-10-CM

## 2025-03-04 DIAGNOSIS — N17.9 ACUTE KIDNEY INJURY (HCC): ICD-10-CM

## 2025-03-04 DIAGNOSIS — E78.01 FAMILIAL HYPERCHOLESTEROLEMIA: Chronic | ICD-10-CM

## 2025-03-04 DIAGNOSIS — D64.89 ANEMIA DUE TO OTHER CAUSE, NOT CLASSIFIED: ICD-10-CM

## 2025-03-04 DIAGNOSIS — N28.0 ACUTE SCLERODERMA RENAL CRISIS (HCC): ICD-10-CM

## 2025-03-04 DIAGNOSIS — I10 ACCELERATED HYPERTENSION: ICD-10-CM

## 2025-03-04 DIAGNOSIS — M34.9 ACUTE SCLERODERMA RENAL CRISIS (HCC): ICD-10-CM

## 2025-03-04 DIAGNOSIS — K21.9 GASTROESOPHAGEAL REFLUX DISEASE, UNSPECIFIED WHETHER ESOPHAGITIS PRESENT: ICD-10-CM

## 2025-03-04 DIAGNOSIS — M34.9 SYSTEMIC SCLEROSIS (HCC): Primary | ICD-10-CM

## 2025-03-04 DIAGNOSIS — S91.309S: ICD-10-CM

## 2025-03-04 DIAGNOSIS — E11.9 CONTROLLED TYPE 2 DIABETES MELLITUS WITHOUT COMPLICATION, WITHOUT LONG-TERM CURRENT USE OF INSULIN (HCC): Chronic | ICD-10-CM

## 2025-03-04 LAB — HAPTOGLOB SERPL-MCNC: 57 MG/DL (ref 33–346)

## 2025-03-04 PROCEDURE — 99495 TRANSJ CARE MGMT MOD F2F 14D: CPT | Performed by: INTERNAL MEDICINE

## 2025-03-04 NOTE — ASSESSMENT & PLAN NOTE
Saw nephrology yesterday, has appointment for 2nd opinion at [facility].  Weekly labs.

## 2025-03-04 NOTE — ASSESSMENT & PLAN NOTE
Lab Results   Component Value Date    HGBA1C 6.1 (H) 02/13/2025     Off metformin due to TITUS.  Trulicity on hold.  Will monitor off meds. May stop using CGM for now.

## 2025-03-04 NOTE — ASSESSMENT & PLAN NOTE
BP controlled: on captopril 75 mg tid, doxazosin 2 mg bid, nifedipine 60 mg daily.  Off torsemide since yesterday.

## 2025-03-04 NOTE — PROGRESS NOTES
Name: Kari Erazo      : 1970      MRN: [id number]  Encounter Provider: Melissa Matthew MD  Encounter Date: 3/4/2025   Encounter department: [facility] INTERNAL MEDICINE  :  Assessment & Plan  Systemic sclerosis (HCC)  Now on Cellcept 500 mg bid.  Per rheum.       Acute kidney injury (HCC)  Saw nephrology yesterday, has appointment for 2nd opinion at [facility].  Weekly labs.       Acute scleroderma renal crisis (HCC)  As above.       Accelerated hypertension  BP controlled: on captopril 75 mg tid, doxazosin 2 mg bid, nifedipine 60 mg daily.  Off torsemide since yesterday.       Anemia due to other cause, not classified  Will monitor, Hgb 9.7.       Open wound of heel, unspecified laterality, sequela  Mostly resolved. Follow up with wound care tomorrow.       Controlled type 2 diabetes mellitus without complication, without long-term current use of insulin ([facility]ly [facility])    Lab Results   Component Value Date    HGBA1C 6.1 (H) 2025     Off metformin due to TITUS.  Trulicity on hold.  Will monitor off meds. May stop using CGM for now.       Gastroesophageal reflux disease, unspecified whether esophagitis present  On famotidine 10 mg qHS.         Familial hypercholesterolemia  On atorvastatin, Vascepa.  Discussed stopping Vascepa temporarily.       AUGUSTO on CPAP  Off CPAP for now.       Follow up as scheduled or as needed.       History of Present Illness   She reports that she is feeling slightly better since her hospital discharge.  She is feeling tired but not as bad.  She reports swelling has improved.  She is still short of breath, needs to rest frequently.  She reports frequent tachycardia with any kind of activity.    She is worried about her kidney function, hoping it would bounce back.  She had seen the nephrologist yesterday, had stopped torsemide as instructed.    She reached out to sleep, stopped using CPAP in the meantime also.  She stopped taking Trulicity due to bad reflux symptoms.  She  "has been taking famotidine at bedtime. She tries to stay upright for a few minutes after taking her evening medications before going to bed.    She feels her heel wound has mostly healed.  She does have an appointment with wound care tomorrow.  She tries to stay off when she can, tiptoes when she walks at home.      Review of Systems   Constitutional:  Positive for activity change, appetite change and fatigue.   HENT:  Negative for congestion.    Eyes:  Negative for visual disturbance.   Respiratory:  Positive for shortness of breath. Negative for cough.    Cardiovascular:  Negative for chest pain.   Gastrointestinal:  Negative for abdominal pain, constipation and diarrhea.   Genitourinary:  Negative for dysuria.   Musculoskeletal:  Negative for arthralgias.   Skin:  Positive for wound. Negative for rash.   Neurological:  Positive for weakness. Negative for dizziness and headaches.       Objective   /64   Pulse (!) 112   Temp (!) 96.5 °F (35.8 °C)   Ht 5' 4\" (1.626 m)   Wt 87.1 kg (192 lb)   SpO2 98%   BMI 32.96 kg/m²      Physical Exam  Vitals and nursing note reviewed.   Constitutional:       General: She is not in acute distress.     Appearance: She is well-developed.   HENT:      Head: Normocephalic and atraumatic.      Mouth/Throat:      Mouth: Mucous membranes are moist.   Eyes:      Pupils: Pupils are equal, round, and reactive to light.   Cardiovascular:      Rate and Rhythm: Normal rate.   Pulmonary:      Effort: Pulmonary effort is normal.   Abdominal:      Palpations: Abdomen is soft.   Musculoskeletal:         General: No swelling.        Feet:    Skin:     General: Skin is warm.   Neurological:      General: No focal deficit present.      Mental Status: She is alert and oriented to person, place, and time.   Psychiatric:         Mood and Affect: Mood and affect normal. Mood is not anxious or depressed.         Behavior: Behavior normal.           Labs & imaging results reviewed with " patient.      TCM Call     Date and time call was made  2/24/2025 10:06 AM    Hospital care reviewed  Records reviewed    Patient was hospitialized at  [facility]    Date of Admission  02/13/25    Date of discharge  02/22/25    Diagnosis  Acute Kidney Injury    Disposition  Home    Were the patients medications reviewed and updated  Yes    Current Symptoms  None      TCM Call     Post hospital issues  None    Scheduled for follow up?  Yes    I have advised the patient to call PCP with any new or worsening symptoms  Soraya García    Living Arrangements  Family members

## 2025-03-05 ENCOUNTER — OFFICE VISIT (OUTPATIENT)
Dept: WOUND CARE | Facility: HOSPITAL | Age: 55
End: 2025-03-05
Payer: COMMERCIAL

## 2025-03-05 VITALS
DIASTOLIC BLOOD PRESSURE: 80 MMHG | HEIGHT: 64 IN | SYSTOLIC BLOOD PRESSURE: 169 MMHG | WEIGHT: 194 LBS | HEART RATE: 107 BPM | RESPIRATION RATE: 16 BRPM | TEMPERATURE: 98.4 F | BODY MASS INDEX: 33.12 KG/M2

## 2025-03-05 DIAGNOSIS — L97.411 CHRONIC HEEL ULCER, RIGHT, LIMITED TO BREAKDOWN OF SKIN (HCC): Primary | ICD-10-CM

## 2025-03-05 PROCEDURE — 99213 OFFICE O/P EST LOW 20 MIN: CPT | Performed by: PODIATRIST

## 2025-03-05 RX ORDER — BENZONATATE 100 MG/1
100 CAPSULE ORAL
COMMUNITY
Start: 2025-03-05

## 2025-03-05 NOTE — PROGRESS NOTES
Patient ID: Kari Erazo is a 54 y.o. female Date of Birth 1970     Diagnosis:  1. Chronic heel ulcer, right, limited to breakdown of skin (HCC)  -     Wound cleansing and dressings Left Heel; Future     Diagnosis ICD-10-CM Associated Orders   1. Chronic heel ulcer, right, limited to breakdown of skin (HCC)  L97.411 Wound cleansing and dressings Left Heel           Assessment & Plan:  Wound is doing well at this point has scabbed over. Will use moisturizer to the area. Continue offloading.    Reviewed notes from hospitalization. Reviewed labs.        If the patient notices any systemic signs of infection including but not limited to fever, chills, nausea, vomiting or significant worsening of wound with increased drainage, redness or streaking up the foot or leg the patient should notify the office or present to the emergency room.      If wound debridement was completed today this was done in an attempt to promote healing, decrease risk of infection and for limb salvage efforts.     Goal of treatment: wound healing     Efforts to decrease pressure on the wound(s), evaluation and discussion of nutritional status, peripheral vascular status, and infection control have all been addressed with this patient.    No follow-ups on file.      Chief Complaint   Patient presents with   • No Wound Consult     Left heel wound is scabbed and dry.            Subjective:   Patient presents to the wound center for evaluation of left heel ulceration.  Her wound appears to be improved scabbed over today.  She does still have pain and discomfort to the area.  Has a history of controlled diabetes type 2.  She was seen at [facility] previously she was using Acticoat and dry sterile dressing to the area.  Denies any fevers chills nausea vomiting has not had any issues with infection previously.    The following portions of the patient's history were reviewed and updated as appropriate:   Patient Active Problem List   Diagnosis    • Primary osteoarthritis of left hip   • Arthritis   • Asthma, chronic   • Chronic eczema   • Controlled diabetes mellitus type II without complication (HCC)   • Fatty liver   • Hyperlipidemia   • Accelerated hypertension   • Psoriasis   • AUGUSTO on CPAP   • Tear of left acetabular labrum   • Fibroid   • Mild carotid artery disease (HCC)   • PCOS (polycystic ovarian syndrome)   • Class 1 obesity due to excess calories with serious comorbidity and body mass index (BMI) of 32.0 to 32.9 in adult   • AUGUSTO (obstructive sleep apnea)   • Vitamin B12 deficiency   • Acute kidney injury (HCC)   • Abnormal findings on imaging   • Anemia   • Hepatitis C antibody test positive   • SIRS without infection or organ dysfunction (HCC)   • Thrombocytopenia (HCC)   • Open wound of heel   • GERD (gastroesophageal reflux disease)   • Acute cough   • Systemic sclerosis (HCC)   • Acute scleroderma renal crisis (HCC)     Past Medical History:   Diagnosis Date   • Allergic     latex, some tree nuts, seasonal   • Arthritis     psoriatic arthritis   • Asthma    • CPAP (continuous positive airway pressure) dependence    • Diabetes mellitus (HCC)     gestational   • Gestational diabetes    • Hypertension     patient reports   • Obesity    • PCOS (polycystic ovarian syndrome)    • Sleep apnea    • Varicella      Past Surgical History:   Procedure Laterality Date   • IR BIOPSY KIDNEY RANDOM  2/21/2025   • TONSILLECTOMY      last assessed: 5/3/16     Social History     Socioeconomic History   • Marital status: /Civil Union     Spouse name: Not on file   • Number of children: Not on file   • Years of education: Not on file   • Highest education level: Not on file   Occupational History   • Not on file   Tobacco Use   • Smoking status: Never   • Smokeless tobacco: Never   Vaping Use   • Vaping status: Never Used   Substance and Sexual Activity   • Alcohol use: No   • Drug use: No   • Sexual activity: Yes     Partners: Male     Birth  control/protection: Ring   Other Topics Concern   • Not on file   Social History Narrative        2 children son in college, daughter finished college    MD -OB/gyne     Social Drivers of Health     Financial Resource Strain: Not on file   Food Insecurity: No Food Insecurity (2025)    Nursing - Inadequate Food Risk Classification    • Worried About Running Out of Food in the Last Year: Not on file    • Ran Out of Food in the Last Year: Not on file    • Ran Out of Food in the Last Year: Never true   Transportation Needs: No Transportation Needs (2025)    Nursing - Transportation Risk Classification    • Lack of Transportation: Not on file    • Lack of Transportation: No   Physical Activity: Not on file   Stress: Not on file   Social Connections: Not on file   Intimate Partner Violence: Unknown (2025)    Nursing IPS    • Feels Physically and Emotionally Safe: Not on file    • Physically Hurt by Someone: Not on file    • Humiliated or Emotionally Abused by Someone: Not on file    • Physically Hurt by Someone: No    • Hurt or Threatened by Someone: No   Housing Stability: Unknown (2025)    Nursing: Inadequate Housing Risk Classification    • Has Housing: Not on file    • Worried About Losing Housing: Not on file    • Unable to Get Utilities: Not on file    • Unable to Pay for Housing in the Last Year: No    • Has Housin        Current Outpatient Medications:   •  benzonatate (TESSALON PERLES) 100 mg capsule, Take 100 mg by mouth daily at bedtime, Disp: , Rfl:   •  atorvastatin (LIPITOR) 10 mg tablet, Take 1 tablet (10 mg total) by mouth daily, Disp: 90 tablet, Rfl: 3  •  Calcium-Magnesium-Vitamin D - MG-MG-UNIT TB24, Take 1 tablet by mouth daily, Disp: , Rfl:   •  captopril (CAPOTEN) 50 mg tablet, 75 mg 3 times daily (3 tablets of 25 mg 3 times daily), Disp: 45 tablet, Rfl: 0  •  doxazosin (CARDURA) 2 mg tablet, Take 1 tablet (2 mg total) by mouth 2 (two) times a day, Disp: 60  tablet, Rfl: 0  •  Dulaglutide 4.5 MG/0.5ML SOAJ, Inject 0.5 mL under the skin every 7 days (Patient not taking: Reported on 3/3/2025), Disp: 2 mL, Rfl: 5  •  etonogestrel-ethinyl estradiol (NuvaRing) 0.12-0.015 MG/24HR vaginal ring, INSERT 1 RING VAGINALLY LEAVE IN FOR 3 WEEKS REPLACE RING WITH NO WITHDRAWL BLEED, Disp: 4 each, Rfl: 4  •  famotidine (PEPCID) 10 mg tablet, Take 1 tablet (10 mg total) by mouth daily as needed for heartburn or indigestion, Disp: 30 tablet, Rfl: 0  •  folic acid (FOLVITE) 1 mg tablet, Take 1 mg by mouth daily, Disp: , Rfl:   •  Icosapent Ethyl (Vascepa) 1 g CAPS, Take 1 capsule (1 g total) by mouth 4 (four) times a day, Disp: 360 capsule, Rfl: 4  •  montelukast (SINGULAIR) 10 mg tablet, Take 1 tablet (10 mg total) by mouth daily at bedtime, Disp: 90 tablet, Rfl: 1  •  mycophenolate (CELLCEPT) 500 mg tablet, Take 1 tablet twice daily for 2 weeks, then increase to 2 tablets twice daily., Disp: 120 tablet, Rfl: 6  •  NIFEdipine (ADALAT CC) 60 MG 24 hr tablet, Take 1 tablet (60 mg total) by mouth daily, Disp: 30 tablet, Rfl: 0  •  SF 5000 Plus 1.1 % CREA, , Disp: , Rfl:   Family History   Problem Relation Age of Onset   • Hypertension Mother    • Hyperlipidemia Mother    • Hypertension Father    • Other Father         low serum HDL   • Hyperlipidemia Father    • Hypothyroidism Sister    • Asthma Sister    • Diabetes Sister    • Thyroid disease Sister    • Breast cancer Sister 48   • No Known Problems Daughter    • No Known Problems Son    • Breast cancer Maternal Grandmother 79   • Stroke Maternal Grandfather    • No Known Problems Paternal Grandmother    • No Known Problems Paternal Grandfather    • No Known Problems Paternal Aunt    • No Known Problems Paternal Aunt    • No Known Problems Paternal Aunt       Review of Systems  Allergies:  Ace inhibitors, Latex, Nuts - food allergy, and Shellfish-derived products - food allergy      Objective:  /80   Pulse (!) 107   Temp 98.4 °F  "(36.9 °C)   Resp 16   Ht 5' 4\" (1.626 m)   Wt 88 kg (194 lb)   BMI 33.30 kg/m²     Physical Exam      [REMOVED] Wound 02/20/25 Heel Left (Removed)   Wound Image Images linked 03/05/25 1319   Wound Description Dry;Brown;Eschar 03/05/25 1323   Ana-wound Assessment Dry 03/05/25 1323   Wound Length (cm) 0 cm 03/05/25 1323   Wound Width (cm) 0 cm 03/05/25 1323   Wound Depth (cm) 0 cm 03/05/25 1323   Wound Surface Area (cm^2) 0 cm^2 03/05/25 1323   Wound Volume (cm^3) 0 cm^3 03/05/25 1323   Calculated Wound Volume (cm^3) 0 cm^3 03/05/25 1323   Drainage Amount None 03/05/25 1323   Non-staged Wound Description Not applicable 03/05/25 1323   Dressing Status Intact 03/05/25 1323                    Procedures             Wound Instructions:  Orders Placed This Encounter   Procedures   • Wound cleansing and dressings Left Heel     At this time, your wound is now scabbed over.  Dr. Dalton would like to see you in his podiatry office for follow up.  You do not need to return to wound center unless the wound would reopen.  If so, call .     Please call Dr. Dalton's office to schedule appt.       Please continue to cleanse area daily with mild soap and water, pat dry.  Continue to apply a nonscented moisturizer to area daily to prevent cracking.     Standing Status:   Future     Expiration Date:   3/12/2025         Thony Dalton DPM      Portions of the record may have been created with voice recognition software. Occasional wrong word or \"sound a like\" substitutions may have occurred due to the inherent limitations of voice recognition software. Read the chart carefully and recognize, using context, where substitutions have occurred.    "

## 2025-03-05 NOTE — PATIENT INSTRUCTIONS
Orders Placed This Encounter   Procedures    Wound cleansing and dressings Left Heel     At this time, your wound is now scabbed over.  Dr. Dalton would like to see you in his podiatry office for follow up.  You do not need to return to wound center unless the wound would reopen.  If so, call .     Please call Dr. Dalton's office to schedule appt.       Please continue to cleanse area daily with mild soap and water, pat dry.  Continue to apply a nonscented moisturizer to area daily to prevent cracking.     Standing Status:   Future     Expiration Date:   3/12/2025

## 2025-03-06 ENCOUNTER — HOSPITAL ENCOUNTER (OUTPATIENT)
Dept: PULMONOLOGY | Facility: HOSPITAL | Age: 55
End: 2025-03-06
Payer: COMMERCIAL

## 2025-03-06 DIAGNOSIS — R06.09 DOE (DYSPNEA ON EXERTION): ICD-10-CM

## 2025-03-06 PROCEDURE — 94726 PLETHYSMOGRAPHY LUNG VOLUMES: CPT | Performed by: INTERNAL MEDICINE

## 2025-03-06 PROCEDURE — 94729 DIFFUSING CAPACITY: CPT

## 2025-03-06 PROCEDURE — 94010 BREATHING CAPACITY TEST: CPT | Performed by: INTERNAL MEDICINE

## 2025-03-06 PROCEDURE — 94760 N-INVAS EAR/PLS OXIMETRY 1: CPT

## 2025-03-06 PROCEDURE — 94729 DIFFUSING CAPACITY: CPT | Performed by: INTERNAL MEDICINE

## 2025-03-06 PROCEDURE — 94726 PLETHYSMOGRAPHY LUNG VOLUMES: CPT

## 2025-03-06 PROCEDURE — 94010 BREATHING CAPACITY TEST: CPT

## 2025-03-07 ENCOUNTER — APPOINTMENT (OUTPATIENT)
Dept: LAB | Facility: CLINIC | Age: 55
End: 2025-03-07
Payer: COMMERCIAL

## 2025-03-07 DIAGNOSIS — N08: Primary | ICD-10-CM

## 2025-03-07 DIAGNOSIS — N28.0 ACUTE SCLERODERMA RENAL CRISIS (HCC): ICD-10-CM

## 2025-03-07 DIAGNOSIS — M34.9 ACUTE SCLERODERMA RENAL CRISIS (HCC): ICD-10-CM

## 2025-03-07 DIAGNOSIS — N17.9 ACUTE KIDNEY INJURY (HCC): ICD-10-CM

## 2025-03-07 DIAGNOSIS — M34.89: Primary | ICD-10-CM

## 2025-03-07 LAB
ALBUMIN SERPL BCG-MCNC: 3.8 G/DL (ref 3.5–5)
ANION GAP SERPL CALCULATED.3IONS-SCNC: 10 MMOL/L (ref 4–13)
BASOPHILS # BLD AUTO: 0.04 THOUSANDS/ÂΜL (ref 0–0.1)
BASOPHILS NFR BLD AUTO: 1 % (ref 0–1)
BUN SERPL-MCNC: 36 MG/DL (ref 5–25)
CALCIUM SERPL-MCNC: 9.2 MG/DL (ref 8.4–10.2)
CHLORIDE SERPL-SCNC: 107 MMOL/L (ref 96–108)
CO2 SERPL-SCNC: 22 MMOL/L (ref 21–32)
CREAT SERPL-MCNC: 2.81 MG/DL (ref 0.6–1.3)
EOSINOPHIL # BLD AUTO: 0.34 THOUSAND/ÂΜL (ref 0–0.61)
EOSINOPHIL NFR BLD AUTO: 6 % (ref 0–6)
ERYTHROCYTE [DISTWIDTH] IN BLOOD BY AUTOMATED COUNT: 14.9 % (ref 11.6–15.1)
GFR SERPL CREATININE-BSD FRML MDRD: 18 ML/MIN/1.73SQ M
GLUCOSE P FAST SERPL-MCNC: 143 MG/DL (ref 65–99)
HCT VFR BLD AUTO: 29.7 % (ref 34.8–46.1)
HGB BLD-MCNC: 9.7 G/DL (ref 11.5–15.4)
IMM GRANULOCYTES # BLD AUTO: 0.05 THOUSAND/UL (ref 0–0.2)
IMM GRANULOCYTES NFR BLD AUTO: 1 % (ref 0–2)
LDH SERPL-CCNC: 268 U/L (ref 140–271)
LYMPHOCYTES # BLD AUTO: 0.73 THOUSANDS/ÂΜL (ref 0.6–4.47)
LYMPHOCYTES NFR BLD AUTO: 12 % (ref 14–44)
MCH RBC QN AUTO: 31.8 PG (ref 26.8–34.3)
MCHC RBC AUTO-ENTMCNC: 32.7 G/DL (ref 31.4–37.4)
MCV RBC AUTO: 97 FL (ref 82–98)
MONOCYTES # BLD AUTO: 0.68 THOUSAND/ÂΜL (ref 0.17–1.22)
MONOCYTES NFR BLD AUTO: 11 % (ref 4–12)
NEUTROPHILS # BLD AUTO: 4.3 THOUSANDS/ÂΜL (ref 1.85–7.62)
NEUTS SEG NFR BLD AUTO: 69 % (ref 43–75)
NRBC BLD AUTO-RTO: 0 /100 WBCS
PHOSPHATE SERPL-MCNC: 4.3 MG/DL (ref 2.7–4.5)
PLATELET # BLD AUTO: 263 THOUSANDS/UL (ref 149–390)
PMV BLD AUTO: 8.4 FL (ref 8.9–12.7)
POTASSIUM SERPL-SCNC: 4.3 MMOL/L (ref 3.5–5.3)
RBC # BLD AUTO: 3.05 MILLION/UL (ref 3.81–5.12)
SODIUM SERPL-SCNC: 139 MMOL/L (ref 135–147)
WBC # BLD AUTO: 6.14 THOUSAND/UL (ref 4.31–10.16)

## 2025-03-07 PROCEDURE — 80069 RENAL FUNCTION PANEL: CPT

## 2025-03-07 PROCEDURE — 36415 COLL VENOUS BLD VENIPUNCTURE: CPT

## 2025-03-07 PROCEDURE — 85025 COMPLETE CBC W/AUTO DIFF WBC: CPT

## 2025-03-07 PROCEDURE — 83010 ASSAY OF HAPTOGLOBIN QUANT: CPT

## 2025-03-07 PROCEDURE — 83615 LACTATE (LD) (LDH) ENZYME: CPT

## 2025-03-07 RX ORDER — CAPTOPRIL 100 MG/1
100 TABLET ORAL 3 TIMES DAILY
Qty: 90 TABLET | Refills: 3 | Status: SHIPPED | OUTPATIENT
Start: 2025-03-07 | End: 2025-04-06

## 2025-03-08 LAB — HAPTOGLOB SERPL-MCNC: 23 MG/DL (ref 33–346)

## 2025-03-10 ENCOUNTER — DOCUMENTATION (OUTPATIENT)
Dept: OBGYN CLINIC | Facility: CLINIC | Age: 55
End: 2025-03-10

## 2025-03-10 DIAGNOSIS — M34.9 SYSTEMIC SCLEROSIS (HCC): Primary | ICD-10-CM

## 2025-03-10 RX ORDER — MYCOPHENOLATE MOFETIL 500 MG/1
500 TABLET ORAL EVERY 12 HOURS SCHEDULED
Qty: 10 TABLET | Refills: 0 | Status: SHIPPED | OUTPATIENT
Start: 2025-03-10 | End: 2025-03-15

## 2025-03-11 LAB
MISCELLANEOUS LAB TEST RESULT: NORMAL
SCAN RESULT: NORMAL

## 2025-03-15 ENCOUNTER — APPOINTMENT (OUTPATIENT)
Dept: LAB | Facility: CLINIC | Age: 55
End: 2025-03-15
Payer: COMMERCIAL

## 2025-03-15 DIAGNOSIS — M34.9 ACUTE SCLERODERMA RENAL CRISIS (HCC): ICD-10-CM

## 2025-03-15 DIAGNOSIS — N17.9 ACUTE KIDNEY INJURY (HCC): ICD-10-CM

## 2025-03-15 DIAGNOSIS — N28.0 ACUTE SCLERODERMA RENAL CRISIS (HCC): ICD-10-CM

## 2025-03-15 LAB
ALBUMIN SERPL BCG-MCNC: 3.7 G/DL (ref 3.5–5)
ANION GAP SERPL CALCULATED.3IONS-SCNC: 8 MMOL/L (ref 4–13)
BASOPHILS # BLD AUTO: 0.05 THOUSANDS/ÂΜL (ref 0–0.1)
BASOPHILS NFR BLD AUTO: 1 % (ref 0–1)
BUN SERPL-MCNC: 36 MG/DL (ref 5–25)
CALCIUM SERPL-MCNC: 9.2 MG/DL (ref 8.4–10.2)
CHLORIDE SERPL-SCNC: 110 MMOL/L (ref 96–108)
CO2 SERPL-SCNC: 19 MMOL/L (ref 21–32)
CREAT SERPL-MCNC: 2.94 MG/DL (ref 0.6–1.3)
EOSINOPHIL # BLD AUTO: 0.26 THOUSAND/ÂΜL (ref 0–0.61)
EOSINOPHIL NFR BLD AUTO: 4 % (ref 0–6)
ERYTHROCYTE [DISTWIDTH] IN BLOOD BY AUTOMATED COUNT: 14.4 % (ref 11.6–15.1)
GFR SERPL CREATININE-BSD FRML MDRD: 17 ML/MIN/1.73SQ M
GLUCOSE P FAST SERPL-MCNC: 148 MG/DL (ref 65–99)
HCT VFR BLD AUTO: 29 % (ref 34.8–46.1)
HGB BLD-MCNC: 9.2 G/DL (ref 11.5–15.4)
IMM GRANULOCYTES # BLD AUTO: 0.07 THOUSAND/UL (ref 0–0.2)
IMM GRANULOCYTES NFR BLD AUTO: 1 % (ref 0–2)
LDH SERPL-CCNC: 242 U/L (ref 140–271)
LYMPHOCYTES # BLD AUTO: 0.73 THOUSANDS/ÂΜL (ref 0.6–4.47)
LYMPHOCYTES NFR BLD AUTO: 12 % (ref 14–44)
MCH RBC QN AUTO: 31 PG (ref 26.8–34.3)
MCHC RBC AUTO-ENTMCNC: 31.7 G/DL (ref 31.4–37.4)
MCV RBC AUTO: 98 FL (ref 82–98)
MONOCYTES # BLD AUTO: 0.56 THOUSAND/ÂΜL (ref 0.17–1.22)
MONOCYTES NFR BLD AUTO: 9 % (ref 4–12)
NEUTROPHILS # BLD AUTO: 4.6 THOUSANDS/ÂΜL (ref 1.85–7.62)
NEUTS SEG NFR BLD AUTO: 73 % (ref 43–75)
NRBC BLD AUTO-RTO: 0 /100 WBCS
PHOSPHATE SERPL-MCNC: 4.2 MG/DL (ref 2.7–4.5)
PLATELET # BLD AUTO: 213 THOUSANDS/UL (ref 149–390)
PMV BLD AUTO: 8.9 FL (ref 8.9–12.7)
POTASSIUM SERPL-SCNC: 4.3 MMOL/L (ref 3.5–5.3)
RBC # BLD AUTO: 2.97 MILLION/UL (ref 3.81–5.12)
SODIUM SERPL-SCNC: 137 MMOL/L (ref 135–147)
WBC # BLD AUTO: 6.27 THOUSAND/UL (ref 4.31–10.16)

## 2025-03-15 PROCEDURE — 85025 COMPLETE CBC W/AUTO DIFF WBC: CPT

## 2025-03-15 PROCEDURE — 36415 COLL VENOUS BLD VENIPUNCTURE: CPT

## 2025-03-15 PROCEDURE — 80069 RENAL FUNCTION PANEL: CPT

## 2025-03-15 PROCEDURE — 83010 ASSAY OF HAPTOGLOBIN QUANT: CPT

## 2025-03-15 PROCEDURE — 83615 LACTATE (LD) (LDH) ENZYME: CPT

## 2025-03-17 ENCOUNTER — OFFICE VISIT (OUTPATIENT)
Dept: GASTROENTEROLOGY | Facility: CLINIC | Age: 55
End: 2025-03-17
Payer: COMMERCIAL

## 2025-03-17 VITALS
WEIGHT: 198 LBS | DIASTOLIC BLOOD PRESSURE: 77 MMHG | BODY MASS INDEX: 33.8 KG/M2 | TEMPERATURE: 98.9 F | HEIGHT: 64 IN | SYSTOLIC BLOOD PRESSURE: 138 MMHG

## 2025-03-17 DIAGNOSIS — R05.3 CHRONIC COUGH: ICD-10-CM

## 2025-03-17 DIAGNOSIS — K21.9 GASTROESOPHAGEAL REFLUX DISEASE, UNSPECIFIED WHETHER ESOPHAGITIS PRESENT: Primary | ICD-10-CM

## 2025-03-17 DIAGNOSIS — D64.9 NORMOCYTIC ANEMIA: ICD-10-CM

## 2025-03-17 LAB — HAPTOGLOB SERPL-MCNC: 18 MG/DL (ref 33–346)

## 2025-03-17 PROCEDURE — 99214 OFFICE O/P EST MOD 30 MIN: CPT | Performed by: PHYSICIAN ASSISTANT

## 2025-03-17 RX ORDER — SODIUM CHLORIDE, SODIUM LACTATE, POTASSIUM CHLORIDE, CALCIUM CHLORIDE 600; 310; 30; 20 MG/100ML; MG/100ML; MG/100ML; MG/100ML
125 INJECTION, SOLUTION INTRAVENOUS CONTINUOUS
Status: CANCELLED | OUTPATIENT
Start: 2025-03-17

## 2025-03-17 RX ORDER — FAMOTIDINE 20 MG/1
20 TABLET, FILM COATED ORAL
Qty: 90 TABLET | Refills: 2 | Status: SHIPPED | OUTPATIENT
Start: 2025-03-17

## 2025-03-17 NOTE — H&P (VIEW-ONLY)
"Name: Kari Erazo      : 1970      MRN: [id number]  Encounter Provider: Shona Vega PA-C  Encounter Date: 3/17/2025   Encounter department: [facility] [place]  :  Assessment & Plan  Gastroesophageal reflux disease, unspecified whether esophagitis present  Patient was recently diagnosed with scleroderma.  She has found that her reflux symptoms have worsened however she was also on Trulicity for her diabetes, but this was d/c.  She says that she was having regurgitation of food without trouble swallowing but after the Trulicity was discontinued, this resolved for the most part. However she continues to have episodes where she feels some mild burning in her upper esophagus and a daily dry cough.  She says she also has wheezing with productive cough due to her ACE inhibitor but when she lays down at night, she ends up having a dry cough and irritation in her throat.  She is on Pepcid 10 mg nightly.She says she does feel \"gagging\" at times when she eats but has not had much regurgitation since stopping the Trulicity.    -Though patients with scleroderma certainly tend to have reflux symptoms due to dysmotility and reduced LES competence, being on a GLP-1 agonist like Trulicity has been causing many patients to have worsening GI symptoms, including her reflux and dyspepsia.  I would like for her to remain off of GLP-1 agonists if possible   Orders:    EGD; Future    famotidine (PEPCID) 20 mg tablet; Take 1 tablet (20 mg total) by mouth daily at bedtime    Chronic cough  See above.she continues to have episodes where she feels some mild burning in her upper esophagus and a daily dry cough.  She says she also has wheezing with productive cough due to her ACE inhibitor but when she lays down at night, she ends up having a dry cough and irritation in her throat.  -I explained to patient that I suspect her cough is multifactorial as reflux related coughs are usually dry in nature " however a cough from an ACE inhibitor can certainly be associated with wheezing and  can be productive  - increase Pepcid to 20 mg nightly  -EGD ordered for further evaluation       Normocytic anemia  Patient with recent normocytic anemia since her admission with hemoglobin ranging around 9.  Has remained stable at 9.2 as of 2 days ago.  Her hematologist explained that this is likely anemia of chronic disease.  Colonoscopy in 2023 noted a small polyp but was otherwise normal.  -I explained to the patient that without overt GI bleeding I do agree that this is potentially anemia of chronic disease however we can proceed with colonoscopy at the same time as her EGD to evaluate however she prefers to hold off on colonoscopy for now  -I explained to the patient that if her hematologist ends up wanting her to do a colonoscopy in the future  or if her anemia worsens or if she has overt GI bleeding in the future, we can certainly proceed with colonoscopy at that time. She verbalized understanding            History of Present Illness   HPI  Kari Erazo is a 54 y.o. female who presents for evaluation of reflux.She says that she was having regurgitation of food without trouble swallowing but after the Trulicity was discontinued, this resolved for the most part. However she continues to have episodes where she feels some mild burning in her upper esophagus and a daily dry cough.  She says she also has wheezing with productive cough due to her ACE inhibitor but when she lays down at night, she ends up having a dry cough and irritation in her throat.  She says that since being on the CellCept her bowels have been a bit looser at times but taking Imodium as needed does help.  She otherwise denies abdominal pain, pain with swallowing, nausea or vomiting, heartburn and that she feels burning coming up as she explains she usually feels the burning already in her upper throat, unintentional weight loss, fevers, chills, night  sweats, frequent NSAID use, bloody or black bowel movements.  She says she does feel gagging at times when she eats but has not had much regurgitation since stopping the Trulicity.  She denies family history of colon cancer.  Patient denies prior abdominal surgical history.  Patient is not on blood thinners.  Patient denies tobacco and alcohol use.  History obtained from: patient    Review of Systems   Constitutional:  Negative for chills, fever and unexpected weight change.   HENT:  Positive for trouble swallowing. Negative for ear pain and sore throat.    Respiratory:  Positive for cough. Negative for shortness of breath.    Cardiovascular:  Negative for chest pain and palpitations.   Gastrointestinal:  Positive for diarrhea. Negative for abdominal distention, abdominal pain, anal bleeding, blood in stool, constipation, nausea, rectal pain and vomiting.   Musculoskeletal:  Negative for arthralgias and back pain.   Skin:  Negative for color change and rash.   All other systems reviewed and are negative.    Medical History Reviewed by provider this encounter:     .  Past Medical History   Past Medical History:   Diagnosis Date    Allergic     latex, some tree nuts, seasonal    Arthritis     psoriatic arthritis    Asthma     CPAP (continuous positive airway pressure) dependence     Diabetes mellitus (HCC)     gestational    Gestational diabetes     Hypertension     patient reports    Obesity     PCOS (polycystic ovarian syndrome)     Sleep apnea     Varicella      Past Surgical History:   Procedure Laterality Date    IR BIOPSY KIDNEY RANDOM  2/21/2025    TONSILLECTOMY      last assessed: 5/3/16     Family History   Problem Relation Age of Onset    Hypertension Mother     Hyperlipidemia Mother     Hypertension Father     Other Father         low serum HDL    Hyperlipidemia Father     Hypothyroidism Sister     Asthma Sister     Diabetes Sister     Thyroid disease Sister     Breast cancer Sister 48    No Known Problems  Daughter     No Known Problems Son     Breast cancer Maternal Grandmother 79    Stroke Maternal Grandfather     No Known Problems Paternal Grandmother     No Known Problems Paternal Grandfather     No Known Problems Paternal Aunt     No Known Problems Paternal Aunt     No Known Problems Paternal Aunt       reports that she has never smoked. She has never used smokeless tobacco. She reports that she does not drink alcohol and does not use drugs.  Current Outpatient Medications   Medication Instructions    atorvastatin (LIPITOR) 10 mg, Oral, Daily    benzonatate (TESSALON PERLES) 100 mg, Daily at bedtime    Calcium-Magnesium-Vitamin D - MG-MG-UNIT TB24 1 tablet, Daily    captopril (CAPOTEN) 100 mg, Oral, 3 times daily    doxazosin (CARDURA) 2 mg, Oral, 2 times daily    Dulaglutide 4.5 MG/0.5ML SOAJ 0.5 mL, Subcutaneous, Every 7 days    etonogestrel-ethinyl estradiol (NuvaRing) 0.12-0.015 MG/24HR vaginal ring INSERT 1 RING VAGINALLY LEAVE IN FOR 3 WEEKS REPLACE RING WITH NO WITHDRAWL BLEED    famotidine (PEPCID) 20 mg, Oral, Daily at bedtime    folic acid (FOLVITE) 1 mg, Daily    Icosapent Ethyl (VASCEPA) 1 g, Oral, 4 times daily    montelukast (SINGULAIR) 10 mg, Oral, Daily at bedtime    NIFEdipine ER (ADALAT CC) 60 mg, Oral, Daily    SF 5000 Plus 1.1 % CREA      Allergies   Allergen Reactions    Ace Inhibitors Cough    Latex      Per patient    Nuts - Food Allergy     Shellfish-Derived Products - Food Allergy       Current Outpatient Medications on File Prior to Visit   Medication Sig Dispense Refill    atorvastatin (LIPITOR) 10 mg tablet Take 1 tablet (10 mg total) by mouth daily 90 tablet 3    benzonatate (TESSALON PERLES) 100 mg capsule Take 100 mg by mouth daily at bedtime      Calcium-Magnesium-Vitamin D - MG-MG-UNIT TB24 Take 1 tablet by mouth daily      captopril (CAPOTEN) 100 MG tablet Take 1 tablet (100 mg total) by mouth 3 (three) times a day 90 tablet 3    doxazosin (CARDURA) 2 mg  tablet Take 1 tablet (2 mg total) by mouth 2 (two) times a day 60 tablet 0    etonogestrel-ethinyl estradiol (NuvaRing) 0.12-0.015 MG/24HR vaginal ring INSERT 1 RING VAGINALLY LEAVE IN FOR 3 WEEKS REPLACE RING WITH NO WITHDRAWL BLEED 4 each 4    folic acid (FOLVITE) 1 mg tablet Take 1 mg by mouth daily      Icosapent Ethyl (Vascepa) 1 g CAPS Take 1 capsule (1 g total) by mouth 4 (four) times a day 360 capsule 4    montelukast (SINGULAIR) 10 mg tablet Take 1 tablet (10 mg total) by mouth daily at bedtime 90 tablet 1    NIFEdipine (ADALAT CC) 60 MG 24 hr tablet Take 1 tablet (60 mg total) by mouth daily 30 tablet 0    SF 5000 Plus 1.1 % CREA       [DISCONTINUED] famotidine (PEPCID) 10 mg tablet Take 1 tablet (10 mg total) by mouth daily as needed for heartburn or indigestion 30 tablet 0    Dulaglutide 4.5 MG/0.5ML SOAJ Inject 0.5 mL under the skin every 7 days (Patient not taking: Reported on 2/25/2025) 2 mL 5     No current facility-administered medications on file prior to visit.      Social History     Tobacco Use    Smoking status: Never    Smokeless tobacco: Never   Vaping Use    Vaping status: Never Used   Substance and Sexual Activity    Alcohol use: No    Drug use: No    Sexual activity: Yes     Partners: Male     Birth control/protection: Ring        Objective   There were no vitals taken for this visit.     Physical Exam  Constitutional:       General: She is not in acute distress.     Appearance: Normal appearance.   Abdominal:      General: Bowel sounds are normal. There is no distension.      Palpations: There is no mass.      Tenderness: There is no abdominal tenderness. There is no guarding or rebound.   Neurological:      Mental Status: She is alert.         Administrative Statements   I have spent a total time of 30 minutes in caring for this patient on the day of the visit/encounter including Diagnostic results, Prognosis, Risks and benefits of tx options, Instructions for management, Patient and  family education, Importance of tx compliance, Risk factor reductions, Impressions, Counseling / Coordination of care, Documenting in the medical record, Reviewing/placing orders in the medical record (including tests, medications, and/or procedures), Obtaining or reviewing history  , and Communicating with other healthcare professionals .

## 2025-03-17 NOTE — ASSESSMENT & PLAN NOTE
"Patient was recently diagnosed with scleroderma.  She has found that her reflux symptoms have worsened however she was also on Trulicity for her diabetes, but this was d/c.  She says that she was having regurgitation of food without trouble swallowing but after the Trulicity was discontinued, this resolved for the most part. However she continues to have episodes where she feels some mild burning in her upper esophagus and a daily dry cough.  She says she also has wheezing with productive cough due to her ACE inhibitor but when she lays down at night, she ends up having a dry cough and irritation in her throat.  She is on Pepcid 10 mg nightly.She says she does feel \"gagging\" at times when she eats but has not had much regurgitation since stopping the Trulicity.    -Though patients with scleroderma certainly tend to have reflux symptoms due to dysmotility and reduced LES competence, being on a GLP-1 agonist like Trulicity has been causing many patients to have worsening GI symptoms, including her reflux and dyspepsia.  I would like for her to remain off of GLP-1 agonists if possible   Orders:    EGD; Future    famotidine (PEPCID) 20 mg tablet; Take 1 tablet (20 mg total) by mouth daily at bedtime    "

## 2025-03-17 NOTE — PATIENT INSTRUCTIONS
Scheduled date of EGD (as of today): 3/20/2025  Physician performing EGD: Dr. ELY Craft  [facility] of E[facility]  Desired bowel prep reviewed with patient: EGD proc prep instructions given to pt at 3/17/25 ov  Instructions reviewed with patient by: Rocio  Clearances:  N/A    F/U after procedure scheduled on 7/29/25 with JEANINE Singer

## 2025-03-17 NOTE — PROGRESS NOTES
"Name: Kari Erazo      : 1970      MRN: [id number]  Encounter Provider: Shona Vega PA-C  Encounter Date: 3/17/2025   Encounter department: [facility] [place]  :  Assessment & Plan  Gastroesophageal reflux disease, unspecified whether esophagitis present  Patient was recently diagnosed with scleroderma.  She has found that her reflux symptoms have worsened however she was also on Trulicity for her diabetes, but this was d/c.  She says that she was having regurgitation of food without trouble swallowing but after the Trulicity was discontinued, this resolved for the most part. However she continues to have episodes where she feels some mild burning in her upper esophagus and a daily dry cough.  She says she also has wheezing with productive cough due to her ACE inhibitor but when she lays down at night, she ends up having a dry cough and irritation in her throat.  She is on Pepcid 10 mg nightly.She says she does feel \"gagging\" at times when she eats but has not had much regurgitation since stopping the Trulicity.    -Though patients with scleroderma certainly tend to have reflux symptoms due to dysmotility and reduced LES competence, being on a GLP-1 agonist like Trulicity has been causing many patients to have worsening GI symptoms, including her reflux and dyspepsia.  I would like for her to remain off of GLP-1 agonists if possible   Orders:    EGD; Future    famotidine (PEPCID) 20 mg tablet; Take 1 tablet (20 mg total) by mouth daily at bedtime    Chronic cough  See above.she continues to have episodes where she feels some mild burning in her upper esophagus and a daily dry cough.  She says she also has wheezing with productive cough due to her ACE inhibitor but when she lays down at night, she ends up having a dry cough and irritation in her throat.  -I explained to patient that I suspect her cough is multifactorial as reflux related coughs are usually dry in nature " however a cough from an ACE inhibitor can certainly be associated with wheezing and  can be productive  - increase Pepcid to 20 mg nightly  -EGD ordered for further evaluation       Normocytic anemia  Patient with recent normocytic anemia since her admission with hemoglobin ranging around 9.  Has remained stable at 9.2 as of 2 days ago.  Her hematologist explained that this is likely anemia of chronic disease.  Colonoscopy in 2023 noted a small polyp but was otherwise normal.  -I explained to the patient that without overt GI bleeding I do agree that this is potentially anemia of chronic disease however we can proceed with colonoscopy at the same time as her EGD to evaluate however she prefers to hold off on colonoscopy for now  -I explained to the patient that if her hematologist ends up wanting her to do a colonoscopy in the future  or if her anemia worsens or if she has overt GI bleeding in the future, we can certainly proceed with colonoscopy at that time. She verbalized understanding            History of Present Illness   HPI  Kari Erazo is a 54 y.o. female who presents for evaluation of reflux.She says that she was having regurgitation of food without trouble swallowing but after the Trulicity was discontinued, this resolved for the most part. However she continues to have episodes where she feels some mild burning in her upper esophagus and a daily dry cough.  She says she also has wheezing with productive cough due to her ACE inhibitor but when she lays down at night, she ends up having a dry cough and irritation in her throat.  She says that since being on the CellCept her bowels have been a bit looser at times but taking Imodium as needed does help.  She otherwise denies abdominal pain, pain with swallowing, nausea or vomiting, heartburn and that she feels burning coming up as she explains she usually feels the burning already in her upper throat, unintentional weight loss, fevers, chills, night  sweats, frequent NSAID use, bloody or black bowel movements.  She says she does feel gagging at times when she eats but has not had much regurgitation since stopping the Trulicity.  She denies family history of colon cancer.  Patient denies prior abdominal surgical history.  Patient is not on blood thinners.  Patient denies tobacco and alcohol use.  History obtained from: patient    Review of Systems   Constitutional:  Negative for chills, fever and unexpected weight change.   HENT:  Positive for trouble swallowing. Negative for ear pain and sore throat.    Respiratory:  Positive for cough. Negative for shortness of breath.    Cardiovascular:  Negative for chest pain and palpitations.   Gastrointestinal:  Positive for diarrhea. Negative for abdominal distention, abdominal pain, anal bleeding, blood in stool, constipation, nausea, rectal pain and vomiting.   Musculoskeletal:  Negative for arthralgias and back pain.   Skin:  Negative for color change and rash.   All other systems reviewed and are negative.    Medical History Reviewed by provider this encounter:     .  Past Medical History   Past Medical History:   Diagnosis Date    Allergic     latex, some tree nuts, seasonal    Arthritis     psoriatic arthritis    Asthma     CPAP (continuous positive airway pressure) dependence     Diabetes mellitus (HCC)     gestational    Gestational diabetes     Hypertension     patient reports    Obesity     PCOS (polycystic ovarian syndrome)     Sleep apnea     Varicella      Past Surgical History:   Procedure Laterality Date    IR BIOPSY KIDNEY RANDOM  2/21/2025    TONSILLECTOMY      last assessed: 5/3/16     Family History   Problem Relation Age of Onset    Hypertension Mother     Hyperlipidemia Mother     Hypertension Father     Other Father         low serum HDL    Hyperlipidemia Father     Hypothyroidism Sister     Asthma Sister     Diabetes Sister     Thyroid disease Sister     Breast cancer Sister 48    No Known Problems  Daughter     No Known Problems Son     Breast cancer Maternal Grandmother 79    Stroke Maternal Grandfather     No Known Problems Paternal Grandmother     No Known Problems Paternal Grandfather     No Known Problems Paternal Aunt     No Known Problems Paternal Aunt     No Known Problems Paternal Aunt       reports that she has never smoked. She has never used smokeless tobacco. She reports that she does not drink alcohol and does not use drugs.  Current Outpatient Medications   Medication Instructions    atorvastatin (LIPITOR) 10 mg, Oral, Daily    benzonatate (TESSALON PERLES) 100 mg, Daily at bedtime    Calcium-Magnesium-Vitamin D - MG-MG-UNIT TB24 1 tablet, Daily    captopril (CAPOTEN) 100 mg, Oral, 3 times daily    doxazosin (CARDURA) 2 mg, Oral, 2 times daily    Dulaglutide 4.5 MG/0.5ML SOAJ 0.5 mL, Subcutaneous, Every 7 days    etonogestrel-ethinyl estradiol (NuvaRing) 0.12-0.015 MG/24HR vaginal ring INSERT 1 RING VAGINALLY LEAVE IN FOR 3 WEEKS REPLACE RING WITH NO WITHDRAWL BLEED    famotidine (PEPCID) 20 mg, Oral, Daily at bedtime    folic acid (FOLVITE) 1 mg, Daily    Icosapent Ethyl (VASCEPA) 1 g, Oral, 4 times daily    montelukast (SINGULAIR) 10 mg, Oral, Daily at bedtime    NIFEdipine ER (ADALAT CC) 60 mg, Oral, Daily    SF 5000 Plus 1.1 % CREA      Allergies   Allergen Reactions    Ace Inhibitors Cough    Latex      Per patient    Nuts - Food Allergy     Shellfish-Derived Products - Food Allergy       Current Outpatient Medications on File Prior to Visit   Medication Sig Dispense Refill    atorvastatin (LIPITOR) 10 mg tablet Take 1 tablet (10 mg total) by mouth daily 90 tablet 3    benzonatate (TESSALON PERLES) 100 mg capsule Take 100 mg by mouth daily at bedtime      Calcium-Magnesium-Vitamin D - MG-MG-UNIT TB24 Take 1 tablet by mouth daily      captopril (CAPOTEN) 100 MG tablet Take 1 tablet (100 mg total) by mouth 3 (three) times a day 90 tablet 3    doxazosin (CARDURA) 2 mg  tablet Take 1 tablet (2 mg total) by mouth 2 (two) times a day 60 tablet 0    etonogestrel-ethinyl estradiol (NuvaRing) 0.12-0.015 MG/24HR vaginal ring INSERT 1 RING VAGINALLY LEAVE IN FOR 3 WEEKS REPLACE RING WITH NO WITHDRAWL BLEED 4 each 4    folic acid (FOLVITE) 1 mg tablet Take 1 mg by mouth daily      Icosapent Ethyl (Vascepa) 1 g CAPS Take 1 capsule (1 g total) by mouth 4 (four) times a day 360 capsule 4    montelukast (SINGULAIR) 10 mg tablet Take 1 tablet (10 mg total) by mouth daily at bedtime 90 tablet 1    NIFEdipine (ADALAT CC) 60 MG 24 hr tablet Take 1 tablet (60 mg total) by mouth daily 30 tablet 0    SF 5000 Plus 1.1 % CREA       [DISCONTINUED] famotidine (PEPCID) 10 mg tablet Take 1 tablet (10 mg total) by mouth daily as needed for heartburn or indigestion 30 tablet 0    Dulaglutide 4.5 MG/0.5ML SOAJ Inject 0.5 mL under the skin every 7 days (Patient not taking: Reported on 2/25/2025) 2 mL 5     No current facility-administered medications on file prior to visit.      Social History     Tobacco Use    Smoking status: Never    Smokeless tobacco: Never   Vaping Use    Vaping status: Never Used   Substance and Sexual Activity    Alcohol use: No    Drug use: No    Sexual activity: Yes     Partners: Male     Birth control/protection: Ring        Objective   There were no vitals taken for this visit.     Physical Exam  Constitutional:       General: She is not in acute distress.     Appearance: Normal appearance.   Abdominal:      General: Bowel sounds are normal. There is no distension.      Palpations: There is no mass.      Tenderness: There is no abdominal tenderness. There is no guarding or rebound.   Neurological:      Mental Status: She is alert.         Administrative Statements   I have spent a total time of 30 minutes in caring for this patient on the day of the visit/encounter including Diagnostic results, Prognosis, Risks and benefits of tx options, Instructions for management, Patient and  family education, Importance of tx compliance, Risk factor reductions, Impressions, Counseling / Coordination of care, Documenting in the medical record, Reviewing/placing orders in the medical record (including tests, medications, and/or procedures), Obtaining or reviewing history  , and Communicating with other healthcare professionals .

## 2025-03-18 ENCOUNTER — DOCUMENTATION (OUTPATIENT)
Dept: GENETICS | Facility: CLINIC | Age: 55
End: 2025-03-18

## 2025-03-19 ENCOUNTER — ANESTHESIA (OUTPATIENT)
Dept: ANESTHESIOLOGY | Facility: HOSPITAL | Age: 55
End: 2025-03-19

## 2025-03-19 ENCOUNTER — TELEPHONE (OUTPATIENT)
Dept: OTHER | Facility: HOSPITAL | Age: 55
End: 2025-03-19

## 2025-03-19 ENCOUNTER — ANESTHESIA EVENT (OUTPATIENT)
Dept: ANESTHESIOLOGY | Facility: HOSPITAL | Age: 55
End: 2025-03-19

## 2025-03-19 DIAGNOSIS — D63.8 ANEMIA IN OTHER CHRONIC DISEASES CLASSIFIED ELSEWHERE: ICD-10-CM

## 2025-03-19 DIAGNOSIS — N28.0 ACUTE SCLERODERMA RENAL CRISIS (HCC): ICD-10-CM

## 2025-03-19 DIAGNOSIS — N17.9 ACUTE KIDNEY INJURY (HCC): Primary | ICD-10-CM

## 2025-03-19 DIAGNOSIS — M34.9 ACUTE SCLERODERMA RENAL CRISIS (HCC): ICD-10-CM

## 2025-03-20 ENCOUNTER — OFFICE VISIT (OUTPATIENT)
Dept: GENETICS | Facility: CLINIC | Age: 55
End: 2025-03-20

## 2025-03-20 ENCOUNTER — ANESTHESIA (OUTPATIENT)
Dept: GASTROENTEROLOGY | Facility: AMBULARY SURGERY CENTER | Age: 55
End: 2025-03-20
Payer: COMMERCIAL

## 2025-03-20 ENCOUNTER — HOSPITAL ENCOUNTER (OUTPATIENT)
Dept: GASTROENTEROLOGY | Facility: AMBULARY SURGERY CENTER | Age: 55
Setting detail: OUTPATIENT SURGERY
Discharge: HOME | End: 2025-03-20
Payer: COMMERCIAL

## 2025-03-20 VITALS
DIASTOLIC BLOOD PRESSURE: 86 MMHG | TEMPERATURE: 97.8 F | WEIGHT: 197 LBS | SYSTOLIC BLOOD PRESSURE: 155 MMHG | BODY MASS INDEX: 33.63 KG/M2 | OXYGEN SATURATION: 98 % | RESPIRATION RATE: 22 BRPM | HEIGHT: 64 IN | HEART RATE: 96 BPM

## 2025-03-20 DIAGNOSIS — Z80.3 FAMILY HISTORY OF BREAST CANCER: Primary | ICD-10-CM

## 2025-03-20 DIAGNOSIS — K21.9 GASTROESOPHAGEAL REFLUX DISEASE, UNSPECIFIED WHETHER ESOPHAGITIS PRESENT: ICD-10-CM

## 2025-03-20 PROCEDURE — 43239 EGD BIOPSY SINGLE/MULTIPLE: CPT | Performed by: INTERNAL MEDICINE

## 2025-03-20 PROCEDURE — 88305 TISSUE EXAM BY PATHOLOGIST: CPT | Performed by: PATHOLOGY

## 2025-03-20 PROCEDURE — 88341 IMHCHEM/IMCYTCHM EA ADD ANTB: CPT | Performed by: PATHOLOGY

## 2025-03-20 PROCEDURE — 88342 IMHCHEM/IMCYTCHM 1ST ANTB: CPT | Performed by: PATHOLOGY

## 2025-03-20 PROCEDURE — PBNCHG PB NO CHARGE PLACEHOLDER: Performed by: GENETIC COUNSELOR, MS

## 2025-03-20 RX ORDER — SODIUM CHLORIDE, SODIUM LACTATE, POTASSIUM CHLORIDE, CALCIUM CHLORIDE 600; 310; 30; 20 MG/100ML; MG/100ML; MG/100ML; MG/100ML
INJECTION, SOLUTION INTRAVENOUS CONTINUOUS PRN
Status: DISCONTINUED | OUTPATIENT
Start: 2025-03-20 | End: 2025-03-20

## 2025-03-20 RX ORDER — PROPOFOL 10 MG/ML
INJECTION, EMULSION INTRAVENOUS AS NEEDED
Status: DISCONTINUED | OUTPATIENT
Start: 2025-03-20 | End: 2025-03-20

## 2025-03-20 RX ORDER — LIDOCAINE HYDROCHLORIDE 20 MG/ML
INJECTION, SOLUTION EPIDURAL; INFILTRATION; INTRACAUDAL; PERINEURAL AS NEEDED
Status: DISCONTINUED | OUTPATIENT
Start: 2025-03-20 | End: 2025-03-20

## 2025-03-20 RX ORDER — SODIUM CHLORIDE, SODIUM LACTATE, POTASSIUM CHLORIDE, CALCIUM CHLORIDE 600; 310; 30; 20 MG/100ML; MG/100ML; MG/100ML; MG/100ML
125 INJECTION, SOLUTION INTRAVENOUS CONTINUOUS
Status: DISCONTINUED | OUTPATIENT
Start: 2025-03-20 | End: 2025-03-24 | Stop reason: HOSPADM

## 2025-03-20 RX ADMIN — PROPOFOL 40 MG: 10 INJECTION, EMULSION INTRAVENOUS at 08:57

## 2025-03-20 RX ADMIN — PROPOFOL 40 MG: 10 INJECTION, EMULSION INTRAVENOUS at 08:59

## 2025-03-20 RX ADMIN — SODIUM CHLORIDE, SODIUM LACTATE, POTASSIUM CHLORIDE, AND CALCIUM CHLORIDE: .6; .31; .03; .02 INJECTION, SOLUTION INTRAVENOUS at 08:53

## 2025-03-20 RX ADMIN — PROPOFOL 30 MG: 10 INJECTION, EMULSION INTRAVENOUS at 08:55

## 2025-03-20 RX ADMIN — PROPOFOL 170 MG: 10 INJECTION, EMULSION INTRAVENOUS at 08:53

## 2025-03-20 RX ADMIN — LIDOCAINE HYDROCHLORIDE 100 MG: 20 INJECTION, SOLUTION EPIDURAL; INFILTRATION; INTRACAUDAL at 08:53

## 2025-03-20 NOTE — TELEPHONE ENCOUNTER
Follow-up scleroderma renal crisis.    Notes reviewed from nephrology and rheumatology at [facility].    Changes made by nephrology team at [facility]:  Captopril switched to ramipril 20 mg twice daily.  Doxazosin decreased to 2 mg once a day.  Torsemide 20 mg daily initiated.    Also discussed with hematology team at [facility] regarding anemia.  Anemia is likely multifactorial due to anemia of chronic disease/kidney disease.  Aranesp 200 mcg x 1 ordered (blood pressure now is well-controlled, no prior history of stroke or malignancy).    For now, we will continue to monitor renal function panel, CBC and hemolysis parameters q. weekly.

## 2025-03-20 NOTE — INTERVAL H&P NOTE
H&P reviewed. After examining the patient I find no changes in the patients condition since the H&P had been written.    Vitals:    03/20/25 0802   BP: (!) 189/84   Pulse: (!) 112   Resp: 18   Temp: 97.6 °F (36.4 °C)   SpO2: 96%

## 2025-03-20 NOTE — PROGRESS NOTES
Pre-Test Genetic Counseling Consult Note    Patient Name: Kari Erazo   /Age: 1970/54 y.o.  Referring Provider: Self-referred    Date of Service: 3/20/2025  Genetic Counselor: Manju Alexis MS, [facility] [place]  Interpretation Services: None  Location: Telephone consult   Length of Visit: 30 Minutes    Kari was referred to the [facility] due to her family history of breast cancer . she presents today to discuss the possibility of a hereditary cancer syndrome, options for genetic testing, and implications for her and her family.        Cancer History and Treatment:   Personal History:   Oncology History    No history exists.       Screening Hx:   Breast:  Breast Imagin24  Breast Density: Scattered fibroglandular density     Colon:  Colonoscopy: 23  FINDINGS:  The cecum appeared normal.  One sessile, adenomatous-appearing polyp measuring smaller than 5 mm in the ascending colon; removed by cold snare  Few small mild diverticula in the ascending colon and sigmoid colon  The rectum was normal on direct and retroflexed views.    Gynecologic:  Ovaries and Uterus intact     Skin:  Screening with PCP    Other screening: none    Reproductive History  Age at menarche: 14  Age at first live birth:  30  Menopause: Perimenopausal  Hormone replacement: none    Medical and Surgical History  Pertinent surgical history:   Past Surgical History:   Procedure Laterality Date    IR BIOPSY KIDNEY RANDOM  2025    TONSILLECTOMY      last assessed: 5/3/16      Pertinent medical history:  Past Medical History:   Diagnosis Date    Allergic     latex, some tree nuts, seasonal    Arthritis     psoriatic arthritis    Asthma     CPAP (continuous positive airway pressure) dependence     Diabetes mellitus (HCC)     gestational    Gestational diabetes     Hypertension     patient reports    Obesity     PCOS (polycystic ovarian syndrome)     Sleep apnea     Varicella          Other  "History:  Height:   Ht Readings from Last 1 Encounters:   03/20/25 5' 4\" (1.626 m)     Weight:   Wt Readings from Last 1 Encounters:   03/20/25 89.4 kg (197 lb)       Relevant Family History   Patient reports no Ashkenazi Mandaeism ancestry.       Please refer to the scanned pedigree in the Media Tab for a complete family history     *All history is reported as provided by the patient; records are not available for review, except where indicated.     Assessment:  We discussed sporadic, familial and hereditary cancer.  We also discussed the many factors that influence our risk for cancer such as age, environmental exposures, lifestyle choices and family history.      We reviewed the indications suggestive of a hereditary predisposition to cancer.    Of note, While testing an affected family member is most informative, it is also appropriate to test unaffected family members who meet testing criteria (NCCN Guidelines for Genetic/Familial High-Risk Assessment: Breast, Ovarian, and Pancreatic).      Genetic testing is indicated for Kari based on the following criteria: Meets NCCN V2.2025 Testing Criteria for High-Penetrance Breast Cancer Susceptibility Genes: family history of a first-degree relative diagnosed with breast cancer under 50    The risks, benefits, and limitations of genetic testing were reviewed with the patient, as well as genetic discrimination laws, and possible test results (positive, negative, variants of uncertain significance) and their clinical implications. If positive for a mutation, options for managing cancer risk including increased surveillance, chemoprevention, and in some cases prophylactic surgery were discussed. Kari was informed that if a hereditary cancer syndrome was identified in her, first degree relatives (parents, siblings, and children) have a chance of also inheriting the condition. Genetic testing would allow for predictive genetic testing in other relatives, who may also " be at risk depending on their degree of relation.     Billing:  Most individuals pay <$100 for hereditary cancer genetic testing. If insurance covers the cost of the testing, individuals may still pay out of pocket secondary to co-pays, co-insurance, or deductibles. If the cost of the testing exceeds $100, the lab will reach out to the patient via phone or e-mail. The patient will then have the option to proceed with the testing, cancel the testing, or elect the self-pay option of $250. Kari verbalized understanding.     Plan: Patient decided to proceed with testing and provided consent.    Summary:     Sample Collection:  An order was placed and the patient was instructed to go to a [facility] lab for a blood collection    Genetic Testing Preformed: CustomNext: Cancer® +RNAinsight® (59 genes): APC, LUIS, AXIN2, BAP1, BARD1, BMPR1A, BRCA1, BRCA2, BRIP1, CDH1, CDK4, CDKN1B, CDKN2A, CHEK2, CTNNA1, DICER1, EGLN1, EPCAM, FH, FLCN, GREM1, HOXB13, KIF1B, KIT, MAX, MEN1, MET, MITF, MLH1, MSH2, MSH3, MSH6, MUTYH, NF1, NTHL1, PALB2, PDGFRA PMS2, POLD1, POLE, POT1, PTEN, RAD51C, RAD51D, RB1, RET, SDHA, SDHAF2, SDHB, SDHC, SDHD, SMAD4, SMARCA4, STK11, VVZJ722, TP53, TSC1, TSC2, VHL     Result Call Information:  In the event that we need to reach Kari via telephone:  I confirmed the patient's mobile number on file as the best number to call with results  I confirmed with the patient that we can leave a voicemail on the provided numbers    Results take approximately 2-3 weeks to complete once test is started.    Kari will be notified via vufind once results are available.      Additional recommendations for surveillance/medical management will be made pending genetic test results.       PAST MEDICAL HISTORY:  Asthma     COPD (chronic obstructive pulmonary disease)     Diabetes     Prostate cancer

## 2025-03-20 NOTE — ANESTHESIA PREPROCEDURE EVALUATION
Procedure:  EGD    Relevant Problems   CARDIO   (+) Accelerated hypertension   (+) Hyperlipidemia   (+) Mild carotid artery disease (HCC)      ENDO   (+) Controlled diabetes mellitus type II without complication (HCC)      GI/HEPATIC   (+) Fatty liver   (+) GERD (gastroesophageal reflux disease)      /RENAL   (+) Acute kidney injury (HCC)   (+) Acute scleroderma renal crisis (HCC)      HEMATOLOGY   (+) Anemia in other chronic diseases classified elsewhere   (+) Thrombocytopenia (HCC)      MUSCULOSKELETAL   (+) Arthritis   (+) Primary osteoarthritis of left hip      PULMONARY   (+) Asthma, chronic   (+) AUGUSTO (obstructive sleep apnea)   (+) AUGUSTO on CPAP      Obstetrics/Gynecology   (+) PCOS (polycystic ovarian syndrome)      Rheumatology   (+) Systemic sclerosis (HCC)      Dermatology   (+) Psoriasis      Other   (+) Class 1 obesity due to excess calories with serious comorbidity and body mass index (BMI) of 32.0 to 32.9 in adult       Latest Reference Range & Units 03/15/25 10:47   Sodium 135 - 147 mmol/L 137   Potassium 3.5 - 5.3 mmol/L 4.3   Chloride 96 - 108 mmol/L 110 (H)   Carbon Dioxide 21 - 32 mmol/L 19 (L)   ANION GAP 4 - 13 mmol/L 8   BUN 5 - 25 mg/dL 36 (H)   Creatinine 0.60 - 1.30 mg/dL 2.94 (H)   GLUCOSE, FASTING 65 - 99 mg/dL 148 (H)   Calcium 8.4 - 10.2 mg/dL 9.2   (H): Data is abnormally high  (L): Data is abnormally low    ECHO 2/12/25:      Left Ventricle: Left ventricular cavity size is normal. Wall thickness is moderately increased. The left ventricular ejection fraction is 70%. Systolic function is vigorous. Wall motion is normal. Diastolic function is moderately abnormal, consistent with grade II (pseudonormal) relaxation.    Right Ventricle: Right ventricular cavity size is mildly dilated.    Right Atrium: The atrium is mildly dilated.    Mitral Valve: There is mild regurgitation.    Tricuspid Valve: There is mild regurgitation.    Pericardium: There is a small pericardial effusion anterior to  the heart.  Physical Exam    Airway    Mallampati score: III  TM Distance: >3 FB  Neck ROM: full     Dental   No notable dental hx     Cardiovascular      Pulmonary      Other Findings  post-pubertal.      Anesthesia Plan  ASA Score- 3     Anesthesia Type- IV sedation with anesthesia with ASA Monitors.         Additional Monitors:     Airway Plan:            Plan Factors-Exercise tolerance (METS): <4 METS.    Chart reviewed.   Existing labs reviewed. Patient summary reviewed.    Patient is not a current smoker.  Patient did not smoke on day of surgery.            Induction- intravenous.    Postoperative Plan-     Perioperative Resuscitation Plan - Level 1 - Full Code.       Informed Consent- Anesthetic plan and risks discussed with patient.  I personally reviewed this patient with the CRNA. Discussed and agreed on the Anesthesia Plan with the CRNA..      NPO Status:  No vitals data found for the desired time range.

## 2025-03-21 ENCOUNTER — TELEPHONE (OUTPATIENT)
Dept: OTHER | Facility: HOSPITAL | Age: 55
End: 2025-03-21

## 2025-03-21 ENCOUNTER — APPOINTMENT (OUTPATIENT)
Dept: LAB | Facility: CLINIC | Age: 55
End: 2025-03-21
Payer: COMMERCIAL

## 2025-03-21 DIAGNOSIS — M34.9 ACUTE SCLERODERMA RENAL CRISIS (HCC): ICD-10-CM

## 2025-03-21 DIAGNOSIS — N17.9 ACUTE KIDNEY INJURY (HCC): ICD-10-CM

## 2025-03-21 DIAGNOSIS — Z79.69 NEED FOR PROPHYLACTIC CHEMOTHERAPY: ICD-10-CM

## 2025-03-21 DIAGNOSIS — D63.8 ANEMIA IN OTHER CHRONIC DISEASES CLASSIFIED ELSEWHERE: ICD-10-CM

## 2025-03-21 DIAGNOSIS — Z80.3 FAMILY HISTORY OF BREAST CANCER: ICD-10-CM

## 2025-03-21 DIAGNOSIS — N28.0 ACUTE SCLERODERMA RENAL CRISIS (HCC): ICD-10-CM

## 2025-03-21 DIAGNOSIS — M34.9 SYSTEMIC SCLEROSIS (HCC): ICD-10-CM

## 2025-03-21 LAB
ALBUMIN SERPL BCG-MCNC: 3.8 G/DL (ref 3.5–5)
ANION GAP SERPL CALCULATED.3IONS-SCNC: 8 MMOL/L (ref 4–13)
BASOPHILS # BLD AUTO: 0.07 THOUSANDS/ÂΜL (ref 0–0.1)
BASOPHILS NFR BLD AUTO: 1 % (ref 0–1)
BUN SERPL-MCNC: 38 MG/DL (ref 5–25)
CALCIUM SERPL-MCNC: 8.7 MG/DL (ref 8.4–10.2)
CHLORIDE SERPL-SCNC: 107 MMOL/L (ref 96–108)
CO2 SERPL-SCNC: 21 MMOL/L (ref 21–32)
CREAT SERPL-MCNC: 3.23 MG/DL (ref 0.6–1.3)
CREAT UR-MCNC: 18.2 MG/DL
EOSINOPHIL # BLD AUTO: 0.26 THOUSAND/ÂΜL (ref 0–0.61)
EOSINOPHIL NFR BLD AUTO: 3 % (ref 0–6)
ERYTHROCYTE [DISTWIDTH] IN BLOOD BY AUTOMATED COUNT: 14.2 % (ref 11.6–15.1)
GFR SERPL CREATININE-BSD FRML MDRD: 15 ML/MIN/1.73SQ M
GLUCOSE P FAST SERPL-MCNC: 132 MG/DL (ref 65–99)
HCT VFR BLD AUTO: 26.8 % (ref 34.8–46.1)
HGB BLD-MCNC: 8.8 G/DL (ref 11.5–15.4)
IMM GRANULOCYTES # BLD AUTO: 0.06 THOUSAND/UL (ref 0–0.2)
IMM GRANULOCYTES NFR BLD AUTO: 1 % (ref 0–2)
LDH SERPL-CCNC: 264 U/L (ref 140–271)
LYMPHOCYTES # BLD AUTO: 0.73 THOUSANDS/ÂΜL (ref 0.6–4.47)
LYMPHOCYTES NFR BLD AUTO: 9 % (ref 14–44)
MCH RBC QN AUTO: 31.1 PG (ref 26.8–34.3)
MCHC RBC AUTO-ENTMCNC: 32.8 G/DL (ref 31.4–37.4)
MCV RBC AUTO: 95 FL (ref 82–98)
MONOCYTES # BLD AUTO: 0.65 THOUSAND/ÂΜL (ref 0.17–1.22)
MONOCYTES NFR BLD AUTO: 8 % (ref 4–12)
NEUTROPHILS # BLD AUTO: 6.26 THOUSANDS/ÂΜL (ref 1.85–7.62)
NEUTS SEG NFR BLD AUTO: 78 % (ref 43–75)
NRBC BLD AUTO-RTO: 0 /100 WBCS
PHOSPHATE SERPL-MCNC: 4.1 MG/DL (ref 2.7–4.5)
PLATELET # BLD AUTO: 207 THOUSANDS/UL (ref 149–390)
PMV BLD AUTO: 8.5 FL (ref 8.9–12.7)
POTASSIUM SERPL-SCNC: 4.4 MMOL/L (ref 3.5–5.3)
PROT UR-MCNC: 15.7 MG/DL
PROT/CREAT UR: 0.9 MG/G{CREAT} (ref 0–0.1)
RBC # BLD AUTO: 2.83 MILLION/UL (ref 3.81–5.12)
SODIUM SERPL-SCNC: 136 MMOL/L (ref 135–147)
WBC # BLD AUTO: 8.03 THOUSAND/UL (ref 4.31–10.16)

## 2025-03-21 PROCEDURE — 84156 ASSAY OF PROTEIN URINE: CPT

## 2025-03-21 PROCEDURE — 83615 LACTATE (LD) (LDH) ENZYME: CPT

## 2025-03-21 PROCEDURE — 85613 RUSSELL VIPER VENOM DILUTED: CPT

## 2025-03-21 PROCEDURE — 85025 COMPLETE CBC W/AUTO DIFF WBC: CPT

## 2025-03-21 PROCEDURE — 82085 ASSAY OF ALDOLASE: CPT

## 2025-03-21 PROCEDURE — 86146 BETA-2 GLYCOPROTEIN ANTIBODY: CPT

## 2025-03-21 PROCEDURE — 83010 ASSAY OF HAPTOGLOBIN QUANT: CPT

## 2025-03-21 PROCEDURE — 36415 COLL VENOUS BLD VENIPUNCTURE: CPT

## 2025-03-21 PROCEDURE — 80069 RENAL FUNCTION PANEL: CPT

## 2025-03-21 PROCEDURE — 86147 CARDIOLIPIN ANTIBODY EA IG: CPT

## 2025-03-21 PROCEDURE — 82570 ASSAY OF URINE CREATININE: CPT

## 2025-03-21 NOTE — TELEPHONE ENCOUNTER
Follow-up TITUS due to scleroderma renal crisis.    Creatinine today increased to 3.2 after amelia of 2.81 on 03/07 (after starting torsemide 20 mg daily for generalized swelling).    Hemoglobin has been progressively dropping, now 8.8.    Platelet count has been progressively dropping, now 207.    LDH remains within normal limits    Haptoglobin pending, most recently decreased to 18 on 03/15 after normalization on 03/03    Plan  We will have to accept some increase in creatinine with ongoing use of RAAS blockade and diuretics  Continue ramipril for blood pressure control at 20 mg twice daily (recently switched from captopril 100 mg 3 times daily by nephrology at [facility])  Continue torsemide as this has led to improvement in swelling and weight  Aranesp 200 mcg has been ordered for worsening anemia  To discuss with nephrology at [place] regarding their plan for potential use of Eculizumab      Addendum 03/24  Blood pressure remains well-controlled  Weight down to 190 pounds and swelling significantly improved  Recommended to decrease torsemide to 10 mg daily today  Continue weekly labs

## 2025-03-22 LAB — HAPTOGLOB SERPL-MCNC: 23 MG/DL (ref 33–346)

## 2025-03-23 LAB
DRVVT IMM 1:2 NP PPP: 44.4 SEC (ref 0–40.4)
DRVVT SCREEN TO CONFIRM RATIO: 1.3 RATIO (ref 0.8–1.2)
LA PPP-IMP: ABNORMAL
SCREEN APTT: 38.6 SEC (ref 0–43.5)
SCREEN DRVVT: 50.5 SEC (ref 0–47)

## 2025-03-24 DIAGNOSIS — M34.9 ACUTE SCLERODERMA RENAL CRISIS (HCC): ICD-10-CM

## 2025-03-24 DIAGNOSIS — R05.1 ACUTE COUGH: Primary | ICD-10-CM

## 2025-03-24 DIAGNOSIS — N28.0 ACUTE SCLERODERMA RENAL CRISIS (HCC): ICD-10-CM

## 2025-03-24 DIAGNOSIS — J45.909 CHRONIC ASTHMA, UNSPECIFIED ASTHMA SEVERITY, UNSPECIFIED WHETHER COMPLICATED, UNSPECIFIED WHETHER PERSISTENT: Chronic | ICD-10-CM

## 2025-03-24 DIAGNOSIS — E78.2 MIXED HYPERLIPIDEMIA: ICD-10-CM

## 2025-03-24 LAB — ALDOLASE SERPL-CCNC: 4 U/L (ref 3.3–10.3)

## 2025-03-24 RX ORDER — TORSEMIDE 20 MG/1
20 TABLET ORAL DAILY
COMMUNITY
End: 2025-03-24

## 2025-03-24 RX ORDER — DOXAZOSIN 2 MG/1
2 TABLET ORAL
Qty: 60 TABLET | Refills: 3 | Status: SHIPPED | OUTPATIENT
Start: 2025-03-24 | End: 2025-04-23

## 2025-03-24 RX ORDER — TORSEMIDE 20 MG/1
10 TABLET ORAL DAILY
Qty: 15 TABLET | Refills: 3 | Status: SHIPPED | OUTPATIENT
Start: 2025-03-24

## 2025-03-24 RX ORDER — RAMIPRIL 10 MG/1
20 CAPSULE ORAL 2 TIMES DAILY
COMMUNITY

## 2025-03-24 NOTE — PROGRESS NOTES
Rheumatology Follow-up Visit  Name: Kari Erazo      : 1970      MRN: [id number]  Encounter Provider: Bety Pacheco MD  Encounter Date: 2025   Encounter department: [facility] [address]  :  Assessment & Plan  Systemic sclerosis (HCC)  54-year-old female who presents for follow-up of diffuse systemic sclerosis with positive RNA polymerase 3 antibody.  Manifestations include Raynaud's, sclerodactyly, GERD, renal crisis, and possible mild ILD.  At last visit she was started on mycophenolate which she has overall been tolerating fairly well.  Plan to continue with mycophenolate and update labs.  If labs stable, slow titration up to 1.5 g BID.  Discussed she may also need additional therapy in the future for her skin disease, such as IVIG or Benlysta.  Follow-up in 2 months.  Orders:    Hepatic function panel; Future    Acute scleroderma renal crisis (HCC)  Present did with renal crisis.  Renal function relatively stable and blood pressures improved.  Continues to follow closely with nephrology.       Raynaud's disease without gangrene  Currently stable.  Continue with nifedipine which is also being used to manage blood pressure.       Gastroesophageal reflux disease with esophagitis without hemorrhage  GERD slightly worsened, also found to have H. pylori on recent EGD.  Continuing on Pepcid; avoiding PPI due to renal dysfunction.       Lupus anticoagulant positive  Found to have positive lupus anticoagulant.  Negative beta-2 glycoprotein and negative anticardiolipin.  No personal history of blood clots.  Repeat in 12 weeks.  Orders:    Lupus anticoagulant; Future    Long-term use of immunosuppressant medication             History of Present Illness   HPI  Kari Erazo is a 54 y.o. female who presents for follow-up.    Interval History:  Patient reports since last visit she has noticed worsening of her skin disease.  Right arm feels worse than the left.  Tightening of the hand and  forearm.  She has been doing OT.  She has been tolerating the mycophenolate fairly well, she does report some mild loose stools but not very bothersome.    Continues to follow with nephrology.  Recently received dose of Aranesp.  Blood pressure medicines continue to be adjusted but overall her blood pressure has been much improved.    Recently saw GI and underwent EGD.  She was found to have H. pylori.  Due to renal dysfunction, will be working with GI to determine timing of treatment.  Reflux feels worse.  They did increase Pepcid.    Raynaud's has been better.  No digital ulcerations currently.    Will be seeing pulmonary medicine this month as well.  Still having some cough.    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  She also reported new onset Raynaud's over the preceding 2 to 3 years. Initial exam showed sclerodactyly up to the MCPs of the bilateral hands with periungual erythema and nailfold capillary changes.  She also had noticeable periorbital edema with swelling of the bilateral feet.  Muscle strength was 5 out of 5 throughout.  She reported a prior diagnosis of PsA, but never required specific immunosuppressing treatment.  Initial differential included systemic sclerosis versus myositis.  Workup was significant for high titer JAYESH 1: 1280 and nucleolar pattern and positive RNA polymerase III at 175; myomarker with SSA 52Kd 46, +LAC; negative RF, CCP, SSA, SSB, RNP, Chavez, SCL 70 centromere, Kassie 1, Beta-2, cardiolipin; normal CK, aldolase.  Shortly after her initial visit, patient was found to have acute kidney injury as well as increasing proteinuria with accelerated hypertension.  She was admitted to the hospital due to concern for scleroderma renal crisis likely precipitated by previous steroid use.  Additional workup showed evidence of TMA.  She  "underwent kidney biopsy with results c/w SRC.  She was started on captopril for treatment with improvement in her blood pressure.  Nifedipine which she had been on prior to admission was also increased to help with Raynaud's symptoms.  She underwent TTE which showed abnormal diastolic dysfunction with a small pericardial effusion and without evidence of pulmonary hypertension.  She did undergo MRI of the thigh to evaluate for myositis which was negative.  A high-resolution CT was performed which showed nonspecific mild bibasilar juxtapleural reticulation and mild subtle scattered groundglass density in both lungs. Baseline PFTs showed mild restrictive lung disease.     Started on MMF in Feb 2025.       Review of Systems  Complete ROS conducted as per HPI.     Objective   /64 (BP Location: Left arm, Patient Position: Sitting, Cuff Size: Standard)   Pulse 104   Temp 97.7 °F (36.5 °C) (Tympanic)   Ht 5' 4\" (1.626 m)   Wt 84.8 kg (187 lb)   SpO2 95%   BMI 32.10 kg/m²      Physical Exam  Physical Exam  Constitutional: well appearing, no acute distress  HEENT: normocephalic, sclera clear,+teleangiectasia on lips  Neck: supple, no palpable cervical adenopathy  CV: regular rate and rhythm  Pulm: normal respiratory effort, comfortable on room air  Skin: Bilateral sclerodactyly of both hands up to  proximal forearm bilaterally, diminished oral aperture, few telangiectasias on the hands  Extremities: warm and well perfused, no edema  MSK: No significant synovitis or effusions      Labs and Imaging  I have personally reviewed pertinent labs and imaging.   "

## 2025-03-25 LAB
B2 GLYCOPROT1 IGA SERPL IA-ACNC: 1
B2 GLYCOPROT1 IGG SERPL IA-ACNC: <0.8
B2 GLYCOPROT1 IGM SERPL IA-ACNC: <2.4
CARDIOLIPIN IGA SER IA-ACNC: 2.1
CARDIOLIPIN IGG SER IA-ACNC: 1.3
CARDIOLIPIN IGM SER IA-ACNC: 3.4

## 2025-03-25 RX ORDER — BENZONATATE 100 MG/1
100 CAPSULE ORAL DAILY PRN
Qty: 30 CAPSULE | Refills: 0 | Status: SHIPPED | OUTPATIENT
Start: 2025-03-25

## 2025-03-25 RX ORDER — ATORVASTATIN CALCIUM 10 MG/1
10 TABLET, FILM COATED ORAL DAILY
Qty: 90 TABLET | Refills: 1 | Status: SHIPPED | OUTPATIENT
Start: 2025-03-25

## 2025-03-25 RX ORDER — MONTELUKAST SODIUM 10 MG/1
10 TABLET ORAL
Qty: 90 TABLET | Refills: 1 | Status: SHIPPED | OUTPATIENT
Start: 2025-03-25

## 2025-03-26 ENCOUNTER — TELEPHONE (OUTPATIENT)
Dept: INFUSION CENTER | Facility: CLINIC | Age: 55
End: 2025-03-26

## 2025-03-26 ENCOUNTER — TELEPHONE (OUTPATIENT)
Dept: NEPHROLOGY | Facility: CLINIC | Age: 55
End: 2025-03-26

## 2025-03-26 ENCOUNTER — OFFICE VISIT (OUTPATIENT)
Dept: PODIATRY | Facility: CLINIC | Age: 55
End: 2025-03-26
Payer: COMMERCIAL

## 2025-03-26 VITALS — BODY MASS INDEX: 32.68 KG/M2 | HEIGHT: 64 IN | WEIGHT: 191.4 LBS

## 2025-03-26 DIAGNOSIS — N17.9 ACUTE KIDNEY INJURY (HCC): Primary | ICD-10-CM

## 2025-03-26 DIAGNOSIS — M34.9 ACUTE SCLERODERMA RENAL CRISIS (HCC): ICD-10-CM

## 2025-03-26 DIAGNOSIS — M62.462 GASTROCNEMIUS EQUINUS OF LEFT LOWER EXTREMITY: Primary | ICD-10-CM

## 2025-03-26 DIAGNOSIS — N28.0 ACUTE SCLERODERMA RENAL CRISIS (HCC): ICD-10-CM

## 2025-03-26 DIAGNOSIS — D63.8 ANEMIA IN OTHER CHRONIC DISEASES CLASSIFIED ELSEWHERE: ICD-10-CM

## 2025-03-26 DIAGNOSIS — M77.42 METATARSALGIA, LEFT FOOT: ICD-10-CM

## 2025-03-26 PROCEDURE — 99213 OFFICE O/P EST LOW 20 MIN: CPT | Performed by: PODIATRIST

## 2025-03-26 NOTE — TELEPHONE ENCOUNTER
Placed call re: New patient information for the [facility]fusion [facility]. Went over location, what to expect, and policies. All questions answered.   Patient

## 2025-03-26 NOTE — TELEPHONE ENCOUNTER
Prior auth started for Aranes for appt on 3/28.  Reference number [id number]    Clinical information faxed to [phone]

## 2025-03-26 NOTE — PATIENT INSTRUCTIONS
My recommendation for over-the-counter inserts for you are:    Power[employer] [place] Insoles    These can be obtained directly from the  at [email or web address]    You can also find these online at [employer], [employer] and other retailers.      Patient Education     Calf Stretches   About this topic   Your calf muscles can get tight and are often injured while playing sports. Calf stretches help to make the muscles longer. There are many ways to stretch the calf muscles.  General   Before starting with a program, ask your doctor if you are healthy enough to do these exercises. Your doctor may have you work with a  or physical therapist to make a safe exercise program to meet your needs.  Stretching Exercises   Stretching exercises keep your muscles flexible. They also stop them from getting tight. Start by doing each of these stretches 2 to 3 times. In order for your body to make changes, you will need to hold these stretches for 20 to 30 seconds. Repeat each exercise 2 to 3 times each day. Do all exercises slowly.  Calf stretches standing ? Stand about 12 to 18 inches (30 to 45 cm) away from a wall. Place your hands on the wall at shoulder level. Lean forward.  Knee straight - Stretch your left leg straight behind you. Make sure the left heel is flat on the floor and the left knee is straight. Now, bend the knee of the right leg until you feel a stretch in your left calf. Be sure that the heel does not come up. Repeat on the other side.  Knee bent - Take a small step forward with your left leg so your feet are slightly closer together. With your right leg bent, bend your left knee forward until you feel a stretch in the back of the calf of your left leg. This will feel strange, but it is the best way to stretch this calf muscle. Repeat on the other side.  Calf stretches seated with belt or towel ? Sit on a chair or on the floor with your legs straight out in front of you. Loop a belt or towel  around the ball of one foot. Pull on the towel until you feel a stretch in the back of your calf. Repeat on the other foot.  Calf stretches lying down with belt or towel ? Lie on your back with both legs straight. Loop a belt or towel around the ball of one foot. Lift the leg up, keeping the knee straight until you feel a stretch in the back of your thigh. Pull down on the belt or towel to bend your foot more for a better stretch. Repeat on the other foot. This stretch gets both your calf and the hamstrings on the back of your thigh.  Calf stretches on stairs ? Stand on a step and hold onto a rail. Position your feet so only the balls of your feet are on the step. Lower both heels until you feel a stretch in the back of your calves. Do not do this stretch if you have trouble with balance.  Counter stretches ? Stand a few feet away from a counter. Put your hands on the counter, shoulder width apart. Lean forward and bend your elbows as if you were doing a push-up on the counter. Be sure to keep your back straight and your heels flat on the floor. You should feel a stretch in the back of your calves.             What will the results be?   Less pain and stiffness  Better flexibility and range of motion  Less cramping  Better ankle mobility  Easier to walk and do other activities  Less chance of injury during sports  Helpful tips   Stay active and work out to keep your muscles strong and flexible.  Keep a healthy weight so there is not extra stress on your joints. Eat a healthy diet to keep your muscles healthy.  Be sure you do not hold your breath when exercising. This can raise your blood pressure. If you tend to hold your breath, try counting out loud when exercising. If any exercise bothers you, stop right away.  Always warm up before stretching. Heated muscles stretch much easier than cool muscles. Stretching cool muscles can lead to injury.  Try walking or cycling at an easy pace for a few minutes to warm up your  muscles. Do this again after exercising.  Never bounce when doing stretches.  Doing exercises before a meal may be a good way to get into a routine.  Exercise may be slightly uncomfortable, but you should not have sharp pains. If you do get sharp pains, stop what you are doing. If the sharp pains continue, call your doctor.  Last Reviewed Date   2021-07-26  Consumer Information Use and Disclaimer   This generalized information is a limited summary of diagnosis, treatment, and/or medication information. It is not meant to be comprehensive and should be used as a tool to help the user understand and/or assess potential diagnostic and treatment options. It does NOT include all information about conditions, treatments, medications, side effects, or risks that may apply to a specific patient. It is not intended to be medical advice or a substitute for the medical advice, diagnosis, or treatment of a health care provider based on the health care provider's examination and assessment of a patient’s specific and unique circumstances. Patients must speak with a health care provider for complete information about their health, medical questions, and treatment options, including any risks or benefits regarding use of medications. This information does not endorse any treatments or medications as safe, effective, or approved for treating a specific patient. Up[employer], Inc[employer] and its affiliates disclaim any warranty or liability relating to this information or the use thereof. The use of this information is governed by the Terms of Use, available at https://www.[employer].com/en/know/clinical-effectiveness-terms   Copyright   Copyright © 2024 Up[employer], Inc. and its affiliates and/or licensors. All rights reserved.

## 2025-03-26 NOTE — PROGRESS NOTES
Name: Kari Erazo      : 1970      MRN: [id number]  Encounter Provider: Thony Dalton DPM  Encounter Date: 3/26/2025   Encounter department: [facility] PODIATRY [place]    Assessment & Plan     1. Gastrocnemius equinus of left lower extremity  2. Metatarsalgia, left foot      Stretches, powersteps.  Wound is healed.  Return in 3 months. If doing well notify the office and can cancel appointment.     I personally discussed with patient at the visit today:     The diagnosis of this encounter  2.   Possible etiologies of the diagnosis  3.   Treatment options including advantages and disadvantages of treatment with potential risks and complications associated with these treatments  4.   Prevention strategies and ways to decrease pain     I answered all of the patients questions.      Return in about 3 months (around 2025).    Subjective     Pt doing well at this point. Has healed wound to the left foot.  Has some pain to the 5th metatarsal head with ambulation.         Constitutional:  Negative for chills and fever.   Respiratory:  Negative for chest tightness and shortness of breath.    Gastrointestinal:  Negative for nausea and vomiting.     Current Outpatient Medications on File Prior to Visit   Medication Sig   • albuterol (ProAir HFA) 90 mcg/act inhaler Inhale 2 puffs every 6 (six) hours as needed for wheezing   • atorvastatin (LIPITOR) 10 mg tablet Take 1 tablet (10 mg total) by mouth daily   • benzonatate (TESSALON PERLES) 100 mg capsule Take 1 capsule (100 mg total) by mouth daily as needed for cough   • Calcium-Magnesium-Vitamin D - MG-MG-UNIT TB24 Take 1 tablet by mouth daily   • doxazosin (CARDURA) 2 mg tablet Take 1 tablet (2 mg total) by mouth daily at bedtime   • etonogestrel-ethinyl estradiol (NuvaRing) 0.12-0.015 MG/24HR vaginal ring INSERT 1 RING VAGINALLY LEAVE IN FOR 3 WEEKS REPLACE RING WITH NO WITHDRAWL BLEED   • famotidine (PEPCID) 20 mg tablet Take 1  "tablet (20 mg total) by mouth daily at bedtime   • folic acid (FOLVITE) 1 mg tablet Take 1 mg by mouth daily   • Icosapent Ethyl (Vascepa) 1 g CAPS Take 1 capsule (1 g total) by mouth 4 (four) times a day   • montelukast (SINGULAIR) 10 mg tablet Take 1 tablet (10 mg total) by mouth daily at bedtime   • NIFEdipine ER (ADALAT CC) 60 MG 24 hr tablet Take 1 tablet (60 mg total) by mouth daily   • ramipril (ALTACE) 10 MG capsule Take 20 mg by mouth 2 (two) times a day   • SF 5000 Plus 1.1 % CREA    • torsemide (DEMADEX) 20 mg tablet Take 0.5 tablets (10 mg total) by mouth daily   • Dulaglutide 4.5 MG/0.5ML SOAJ Inject 0.5 mL under the skin every 7 days (Patient not taking: Reported on 2/25/2025)       Objective     Ht 5' 4\" (1.626 m) Comment: verbal  Wt 86.8 kg (191 lb 6.4 oz)   BMI 32.85 kg/m²     No open lesions or ulcerations noted at this time. No pain to palpation to the 5th met or with movement of the 5th MPJ.  No other areas of pain or discomfort.  Intact Pedal pulses.   "

## 2025-03-27 ENCOUNTER — RESULTS FOLLOW-UP (OUTPATIENT)
Dept: GASTROENTEROLOGY | Facility: CLINIC | Age: 55
End: 2025-03-27

## 2025-03-27 PROCEDURE — 88342 IMHCHEM/IMCYTCHM 1ST ANTB: CPT | Performed by: PATHOLOGY

## 2025-03-27 PROCEDURE — 88341 IMHCHEM/IMCYTCHM EA ADD ANTB: CPT | Performed by: PATHOLOGY

## 2025-03-27 PROCEDURE — 88305 TISSUE EXAM BY PATHOLOGIST: CPT | Performed by: PATHOLOGY

## 2025-03-28 ENCOUNTER — HOSPITAL ENCOUNTER (OUTPATIENT)
Dept: INFUSION CENTER | Facility: CLINIC | Age: 55
End: 2025-03-28
Payer: COMMERCIAL

## 2025-03-28 ENCOUNTER — APPOINTMENT (OUTPATIENT)
Dept: LAB | Facility: CLINIC | Age: 55
End: 2025-03-28
Payer: COMMERCIAL

## 2025-03-28 VITALS — SYSTOLIC BLOOD PRESSURE: 138 MMHG | DIASTOLIC BLOOD PRESSURE: 72 MMHG

## 2025-03-28 DIAGNOSIS — N17.9 ACUTE KIDNEY INJURY (HCC): ICD-10-CM

## 2025-03-28 DIAGNOSIS — N28.0 ACUTE SCLERODERMA RENAL CRISIS (HCC): ICD-10-CM

## 2025-03-28 DIAGNOSIS — M34.9 ACUTE SCLERODERMA RENAL CRISIS (HCC): ICD-10-CM

## 2025-03-28 DIAGNOSIS — N17.9 ACUTE KIDNEY INJURY (HCC): Primary | ICD-10-CM

## 2025-03-28 DIAGNOSIS — D63.8 ANEMIA IN OTHER CHRONIC DISEASES CLASSIFIED ELSEWHERE: ICD-10-CM

## 2025-03-28 LAB
ALBUMIN SERPL BCG-MCNC: 3.8 G/DL (ref 3.5–5)
ANION GAP SERPL CALCULATED.3IONS-SCNC: 8 MMOL/L (ref 4–13)
BASOPHILS # BLD AUTO: 0.05 THOUSANDS/ÂΜL (ref 0–0.1)
BASOPHILS NFR BLD AUTO: 1 % (ref 0–1)
BUN SERPL-MCNC: 43 MG/DL (ref 5–25)
CALCIUM SERPL-MCNC: 8.8 MG/DL (ref 8.4–10.2)
CHLORIDE SERPL-SCNC: 105 MMOL/L (ref 96–108)
CO2 SERPL-SCNC: 23 MMOL/L (ref 21–32)
CREAT SERPL-MCNC: 3.04 MG/DL (ref 0.6–1.3)
EOSINOPHIL # BLD AUTO: 0.34 THOUSAND/ÂΜL (ref 0–0.61)
EOSINOPHIL NFR BLD AUTO: 4 % (ref 0–6)
ERYTHROCYTE [DISTWIDTH] IN BLOOD BY AUTOMATED COUNT: 13.9 % (ref 11.6–15.1)
GFR SERPL CREATININE-BSD FRML MDRD: 16 ML/MIN/1.73SQ M
GLUCOSE P FAST SERPL-MCNC: 235 MG/DL (ref 65–99)
HCT VFR BLD AUTO: 28.4 % (ref 34.8–46.1)
HGB BLD-MCNC: 9 G/DL (ref 11.5–15.4)
IMM GRANULOCYTES # BLD AUTO: 0.04 THOUSAND/UL (ref 0–0.2)
IMM GRANULOCYTES NFR BLD AUTO: 1 % (ref 0–2)
LDH SERPL-CCNC: 231 U/L (ref 140–271)
LYMPHOCYTES # BLD AUTO: 0.74 THOUSANDS/ÂΜL (ref 0.6–4.47)
LYMPHOCYTES NFR BLD AUTO: 9 % (ref 14–44)
MCH RBC QN AUTO: 30.5 PG (ref 26.8–34.3)
MCHC RBC AUTO-ENTMCNC: 31.7 G/DL (ref 31.4–37.4)
MCV RBC AUTO: 96 FL (ref 82–98)
MONOCYTES # BLD AUTO: 0.7 THOUSAND/ÂΜL (ref 0.17–1.22)
MONOCYTES NFR BLD AUTO: 9 % (ref 4–12)
NEUTROPHILS # BLD AUTO: 6.23 THOUSANDS/ÂΜL (ref 1.85–7.62)
NEUTS SEG NFR BLD AUTO: 76 % (ref 43–75)
NRBC BLD AUTO-RTO: 0 /100 WBCS
PHOSPHATE SERPL-MCNC: 3.8 MG/DL (ref 2.7–4.5)
PLATELET # BLD AUTO: 252 THOUSANDS/UL (ref 149–390)
PMV BLD AUTO: 9.1 FL (ref 8.9–12.7)
POTASSIUM SERPL-SCNC: 4.3 MMOL/L (ref 3.5–5.3)
RBC # BLD AUTO: 2.95 MILLION/UL (ref 3.81–5.12)
SODIUM SERPL-SCNC: 136 MMOL/L (ref 135–147)
WBC # BLD AUTO: 8.1 THOUSAND/UL (ref 4.31–10.16)

## 2025-03-28 PROCEDURE — 36415 COLL VENOUS BLD VENIPUNCTURE: CPT

## 2025-03-28 PROCEDURE — 80069 RENAL FUNCTION PANEL: CPT

## 2025-03-28 PROCEDURE — 83615 LACTATE (LD) (LDH) ENZYME: CPT

## 2025-03-28 PROCEDURE — 96372 THER/PROPH/DIAG INJ SC/IM: CPT

## 2025-03-28 PROCEDURE — 85025 COMPLETE CBC W/AUTO DIFF WBC: CPT

## 2025-03-28 PROCEDURE — 83010 ASSAY OF HAPTOGLOBIN QUANT: CPT

## 2025-03-28 RX ADMIN — DARBEPOETIN ALFA 200 MCG: 200 INJECTION, SOLUTION INTRAVENOUS; SUBCUTANEOUS at 11:31

## 2025-03-29 LAB — HAPTOGLOB SERPL-MCNC: 43 MG/DL (ref 33–346)

## 2025-03-31 ENCOUNTER — EVALUATION (OUTPATIENT)
Dept: OCCUPATIONAL THERAPY | Facility: CLINIC | Age: 55
End: 2025-03-31
Payer: COMMERCIAL

## 2025-03-31 DIAGNOSIS — N28.0 ACUTE SCLERODERMA RENAL CRISIS (HCC): Primary | ICD-10-CM

## 2025-03-31 DIAGNOSIS — M34.9 ACUTE SCLERODERMA RENAL CRISIS (HCC): Primary | ICD-10-CM

## 2025-03-31 PROCEDURE — 97110 THERAPEUTIC EXERCISES: CPT

## 2025-03-31 PROCEDURE — 97165 OT EVAL LOW COMPLEX 30 MIN: CPT

## 2025-03-31 NOTE — LETTER
2025    Zully Reynaga MD  [address] [place] [address]    Patient: Kari Erazo   YOB: 1970   Date of Visit: 3/31/2025     Encounter Diagnosis     ICD-10-CM    1. Acute scleroderma renal crisis (HCC)  N28.0     M34.9           Dear Dr. Reynaga:    Thank you for your recent referral of Kari Erazo. Please review the attached evaluation summary from Kari's recent visit.     Please verify that you agree with the plan of care by signing the attached order.     If you have any questions or concerns, please do not hesitate to call.     I sincerely appreciate the opportunity to share in the care of one of your patients and hope to have another opportunity to work with you in the near future.     Sincerely,    Matthew Rowland, OT      Referring Provider:     I certify that I have read the below Plan of Care and certify the need for these services furnished under this plan of treatment while under my care.                    Zully Reynaga MD  [address] [place], [place] [id number]  Via Fax: [phone]        OT Evaluation     Today's date: 3/31/2025  Patient name: Kari Erazo  : 1970  MRN: [id number]  Referring provider: Zully Reynaga MD  Dx:   Encounter Diagnosis     ICD-10-CM    1. Acute scleroderma renal crisis (HCC)  N28.0     M34.9                      Assessment  Impairments: abnormal or restricted ROM, abnormal movement, activity intolerance, impaired physical strength, lacks appropriate home exercise program, pain with function and weight-bearing intolerance  Symptom irritability: low    Assessment details: Patient presents to OP OT hand therapy services secondary to Acute scleroderma. Patient symptoms began around 6 months ago and resulted in a admission to the hospital. Patient presents with significant tightness of the skin and muscles of the bilateral hands and forearms. Patient displays significantly decreased wrist and digit flexion.  Patient is unable to form composite fists bilaterally. Opposition is limited bilaterally with worse symptoms in the R. Wrist AROM is decreased bilaterally in all planes. Forearm AROM is WNL. Patient displays significant edema in both hands. Pain is reported to be moderate.  and pinch strength is significantly decreased bilaterally secondary to decreased ROM. Fine motor coordination is impaired bilaterally likely due to decreased dexterity caused by the developing tightness. Patient reports paresthesia bilaterally in the dorsal forearms and hands. [place]-Kat monofilament testing displayed slightly decreased light touch sensation in the bilateral thumbs. Patient would benefit from OP OT hand therapy services to increase ROM, increase strength, and improve overall fine motor coordination to facilitate a full return to work and independence with ADLs. See below for a more detailed assessment.      Goals  STG:    Patient will increase wrist ROM in all planes by an arch of motion of >30 degrees in 4 weeks    Patient will display increased digit AROM by at least >30 degrees of PORRAS per digit in 4 weeks    Patient will report decreased pain by 1-2 grades in the wrist during functional movement in 4 weeks    Patient will decrease edema at the wrist crease by 1-3 mm in 4 weeks    Patient will decrease 9 hole peg test time by > 30 seconds bilaterally in 4 weeks        LTG:    Patient will display ROM WFL in the wrist in 8 weeks or discharge    Patient will display digit ROM WFL in 8 weeks or discharge    Patient will report resolution of pain in the wrist during all activities in 8 weeks    Patient will display resolution of edema at the wrist crease in 8 weeks or discharge    Patient will display improved fine motor coordination as evident by a decreased 9 hole peg test by > 1 min in 4 weeks    Patient will increase FOTO score by >20 points in 8 weeks or discharge    Plan  Patient would benefit from: custom splinting  "and OT eval  Referral necessary: No  Planned modality interventions: ultrasound, thermotherapy: paraffin bath, thermotherapy: hydrocollator packs and TENS    Planned therapy interventions: IASTM, joint mobilization, manual therapy, massage, orthotic fitting/training, patient education, strengthening, stretching, therapeutic activities, therapeutic exercise, home exercise program, functional ROM exercises, coordination and ADL retraining    Frequency: 2x week  Duration in weeks: 10  Plan of Care beginning date: 3/31/2025  Plan of Care expiration date: 5/31/2025  Treatment plan discussed with: patient  Plan details: Patient would benefit from skilled OP OT hand therapy services 2x per week for 8 weeks to increase ROM and return to full functional status.         Subjective Evaluation    History of Present Illness  Mechanism of injury: Subjective:  \"The symptoms really started in December\". \"I had Raynaud's previous and that escalated\". \"It kind of came out of nowhere\". \"It could take up to 6 months to see improvement\". \"It really started as tightening in the back of the hands and forearms\". \"I try to stretch but it is tight\". \"I had some muscle and joint aches around thanksgiving, but that was it\". \"I have a hard time making a full fist\". \"Opening medication is hard\". \"I switched to the easy open caps\". \"Dressing, bathing, and hygiene isn't an issue\". \"I sometimes need to use two hands to use a toothbrush\". \"When I got hospitalized the Raynaud's was so sensitive that if I went close to the window my oxygen dropped\". 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 \"Sutures and doing incisions doesn't seem like it is going to happen at the moment\". \"My touch discrimination is off in the palm and my fingers\". \"I don't have pain until I try to make a fist\". \"I get weird sensations in the back of the forearm and hands\". \"The skin is super hypersensitive\".  \"I wake up with swelling, but stretching helps\". \"To cold and too hot " "seems to irritate it\".           Recurrent probem    Quality of life: good    Patient Goals  Patient goals for therapy: decreased edema, decreased pain, increased motion, increased strength, return to work and return to sport/leisure activities    Pain  Current pain ratin  At best pain ratin  At worst pain ratin  Location: B/l hands and forearms  Quality: tight    Social Support    Employment status: working  Hand dominance: right    Treatments  Previous treatment: medication  Current treatment: medication and occupational therapy        Objective     Observations     Left Wrist/Hand   Positive for edema.     Right Wrist/Hand   Positive for edema.     Neurological Testing     Sensation     Wrist/Hand   Left   Intact: light touch  Diminished: light touch    Right   Intact: light touch  Diminished: light touch    Comments   Right light touch: 1st digit bilaterally 3.61 all other digits WNL    Active Range of Motion     Left Wrist   Wrist flexion: 60 degrees   Wrist extension: 55 degrees   Radial deviation: 16 degrees   Ulnar deviation: 28 degrees     Right Wrist   Wrist flexion: 45 degrees   Wrist extension: 58 degrees   Radial deviation: 10 degrees   Ulnar deviation: 25 degrees     Left Thumb   Flexion     MP: 36 degrees    DIP: 62 degrees  Palmar Abduction     CMC: 38 degrees  Radial abduction    CMC: 35 degrees    Opposition: L:  Opposition to lateral aspect of 5th digit    Right Thumb   Flexion     MP: 36    DIP: 48  Palmar Abduction    CMC: 40  Radial Abduction    CMC: 38  Opposition: R:  Opposition to lateral aspect of 4th digit    Left Digits    Flexion   Index     MCP: 56    PIP: 70    DIP: 40  Middle     MCP: 62    PIP: 72    DIP: 60  Ring     MCP: 60    PIP: 82    DIP: 56  Little     MCP: 58    PIP: 70    DIP: 38    Right Digits   Flexion   Index     MCP: 56    PIP: 70    DIP: 48  Middle     MCP: 62    PIP: 66    DIP: 60  Ring     MCP: 60    PIP: 70    DIP: 56  Little     MCP: 56    PIP: 62    " DIP: 54    Additional Active Range of Motion Details  R:  Extensor tightness 15 degrees flexon    L:   Extensor tightness 15 degrees flexion  R:  MF 3.7 cm from DPC    L:  MF 2.4 cm from DPC    Strength/Myotome Testing     Left Wrist/Hand      (2nd hand position)     Trial 1: 26.6    Thumb Strength  Key/Lateral Pinch     Trial 1: 13  Tip/Two-Point Pinch     Trial 1: 8  Palmar/Three-Point Pinch     Trial 1: 9    Right Wrist/Hand      (2nd hand position)     Trial 1: 25.1    Thumb Strength   Key/Lateral Pinch     Trial 1: 10  Tip/Two-Point Pinch     Trial 1: 6  Palmar/Three-Point Pinch     Trial 1: 8    Tests     Additional Tests Details  9 hole peg:  R: 26.45  L: 21.65    Purdue Peg Time for 10 sets:  R: 3:02  L: 2:30     Swelling     Left Wrist/Hand   Index     Proximal: 6.5 cm  Circumference MCP: 19.3 cm  Circumference wrist: 16.3 cm    Right Wrist/Hand   Index     Proximal: 6.8 cm  Circumference MCP: 19.4 cm  Circumference wrist: 16.2 cm             Precautions: Raynaud's, Acute scleroderma     Manuals 3/31            IASTM             [facility]                                       Neuro Re-Ed                                                                                                        Ther Ex             HEP Digit/Wrist/Forearm AROM/PROM            Wrist PROM             Digit PROM             Thumb PROM             Dex balls             Wrist Maze             Digiflex             Power web                                                                 Ther Activity             Keypegs             [place] sort             [employer]                                       Modalities             [facility]             Paraffin

## 2025-03-31 NOTE — PROGRESS NOTES
OT Evaluation     Today's date: 3/31/2025  Patient name: Kari Erazo  : 1970  MRN: [id number]  Referring provider: Zully Reynaga MD  Dx:   Encounter Diagnosis     ICD-10-CM    1. Acute scleroderma renal crisis (HCC)  N28.0     M34.9                      Assessment  Impairments: abnormal or restricted ROM, abnormal movement, activity intolerance, impaired physical strength, lacks appropriate home exercise program, pain with function and weight-bearing intolerance  Symptom irritability: low    Assessment details: Patient presents to OP OT hand therapy services secondary to Acute scleroderma. Patient symptoms began around 6 months ago and resulted in a admission to the hospital. Patient presents with significant tightness of the skin and muscles of the bilateral hands and forearms. Patient displays significantly decreased wrist and digit flexion. Patient is unable to form composite fists bilaterally. Opposition is limited bilaterally with worse symptoms in the R. Wrist AROM is decreased bilaterally in all planes. Forearm AROM is WNL. Patient displays significant edema in both hands. Pain is reported to be moderate.  and pinch strength is significantly decreased bilaterally secondary to decreased ROM. Fine motor coordination is impaired bilaterally likely due to decreased dexterity caused by the developing tightness. Patient reports paresthesia bilaterally in the dorsal forearms and hands. [place]-Kat monofilament testing displayed slightly decreased light touch sensation in the bilateral thumbs. Patient would benefit from OP OT hand therapy services to increase ROM, increase strength, and improve overall fine motor coordination to facilitate a full return to work and independence with ADLs. See below for a more detailed assessment.      Goals  STG:    Patient will increase wrist ROM in all planes by an arch of motion of >30 degrees in 4 weeks    Patient will display increased digit AROM by at  "least >30 degrees of PORRAS per digit in 4 weeks    Patient will report decreased pain by 1-2 grades in the wrist during functional movement in 4 weeks    Patient will decrease edema at the wrist crease by 1-3 mm in 4 weeks    Patient will decrease 9 hole peg test time by > 30 seconds bilaterally in 4 weeks        LTG:    Patient will display ROM WFL in the wrist in 8 weeks or discharge    Patient will display digit ROM WFL in 8 weeks or discharge    Patient will report resolution of pain in the wrist during all activities in 8 weeks    Patient will display resolution of edema at the wrist crease in 8 weeks or discharge    Patient will display improved fine motor coordination as evident by a decreased 9 hole peg test by > 1 min in 4 weeks    Patient will increase FOTO score by >20 points in 8 weeks or discharge    Plan  Patient would benefit from: custom splinting and OT eval  Referral necessary: No  Planned modality interventions: ultrasound, thermotherapy: paraffin bath, thermotherapy: hydrocollator packs and TENS    Planned therapy interventions: IASTM, joint mobilization, manual therapy, massage, orthotic fitting/training, patient education, strengthening, stretching, therapeutic activities, therapeutic exercise, home exercise program, functional ROM exercises, coordination and ADL retraining    Frequency: 2x week  Duration in weeks: 10  Plan of Care beginning date: 3/31/2025  Plan of Care expiration date: 5/31/2025  Treatment plan discussed with: patient  Plan details: Patient would benefit from skilled OP OT hand therapy services 2x per week for 8 weeks to increase ROM and return to full functional status.         Subjective Evaluation    History of Present Illness  Mechanism of injury: Subjective:  \"The symptoms really started in December\". \"I had Raynaud's previous and that escalated\". \"It kind of came out of nowhere\". \"It could take up to 6 months to see improvement\". \"It really started as tightening in the " "back of the hands and forearms\". \"I try to stretch but it is tight\". \"I had some muscle and joint aches around giving, but that was it\". \"I have a hard time making a full fist\". \"Opening medication is hard\". \"I switched to the easy open caps\". \"Dressing, bathing, and hygiene isn't an issue\". \"I sometimes need to use two hands to use a toothbrush\". \"When I got hospitalized the Raynaud's was so sensitive that if I went close to the window my oxygen dropped\". 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 \"Sutures and doing incisions doesn't seem like it is going to happen at the moment\". \"My touch discrimination is off in the palm and my fingers\". \"I don't have pain until I try to make a fist\". \"I get weird sensations in the back of the forearm and hands\". \"The skin is super hypersensitive\".  \"I wake up with swelling, but stretching helps\". \"To cold and too hot seems to irritate it\".           Recurrent probem    Quality of life: good    Patient Goals  Patient goals for therapy: decreased edema, decreased pain, increased motion, increased strength, return to work and return to sport/leisure activities    Pain  Current pain ratin  At best pain ratin  At worst pain ratin  Location: B/l hands and forearms  Quality: tight    Social Support    Employment status: working  Hand dominance: right    Treatments  Previous treatment: medication  Current treatment: medication and occupational therapy        Objective     Observations     Left Wrist/Hand   Positive for edema.     Right Wrist/Hand   Positive for edema.     Neurological Testing     Sensation     Wrist/Hand   Left   Intact: light touch  Diminished: light touch    Right   Intact: light touch  Diminished: light touch    Comments   Right light touch: 1st digit bilaterally 3.61 all other digits WNL    Active Range of Motion     Left Wrist   Wrist flexion: 60 degrees   Wrist extension: 55 degrees   Radial deviation: 16 degrees   Ulnar deviation: 28 degrees "     Right Wrist   Wrist flexion: 45 degrees   Wrist extension: 58 degrees   Radial deviation: 10 degrees   Ulnar deviation: 25 degrees     Left Thumb   Flexion     MP: 36 degrees    DIP: 62 degrees  Palmar Abduction     CMC: 38 degrees  Radial abduction    CMC: 35 degrees    Opposition: L:  Opposition to lateral aspect of 5th digit    Right Thumb   Flexion     MP: 36    DIP: 48  Palmar Abduction    CMC: 40  Radial Abduction    CMC: 38  Opposition: R:  Opposition to lateral aspect of 4th digit    Left Digits    Flexion   Index     MCP: 56    PIP: 70    DIP: 40  Middle     MCP: 62    PIP: 72    DIP: 60  Ring     MCP: 60    PIP: 82    DIP: 56  Little     MCP: 58    PIP: 70    DIP: 38    Right Digits   Flexion   Index     MCP: 56    PIP: 70    DIP: 48  Middle     MCP: 62    PIP: 66    DIP: 60  Ring     MCP: 60    PIP: 70    DIP: 56  Little     MCP: 56    PIP: 62    DIP: 54    Additional Active Range of Motion Details  R:  Extensor tightness 15 degrees flexon    L:   Extensor tightness 15 degrees flexion  R:  MF 3.7 cm from DPC    L:  MF 2.4 cm from DPC    Strength/Myotome Testing     Left Wrist/Hand      (2nd hand position)     Trial 1: 26.6    Thumb Strength  Key/Lateral Pinch     Trial 1: 13  Tip/Two-Point Pinch     Trial 1: 8  Palmar/Three-Point Pinch     Trial 1: 9    Right Wrist/Hand      (2nd hand position)     Trial 1: 25.1    Thumb Strength   Key/Lateral Pinch     Trial 1: 10  Tip/Two-Point Pinch     Trial 1: 6  Palmar/Three-Point Pinch     Trial 1: 8    Tests     Additional Tests Details  9 hole peg:  R: 26.45  L: 21.65    Purdue Peg Time for 10 sets:  R: 3:02  L: 2:30     Swelling     Left Wrist/Hand   Index     Proximal: 6.5 cm  Circumference MCP: 19.3 cm  Circumference wrist: 16.3 cm    Right Wrist/Hand   Index     Proximal: 6.8 cm  Circumference MCP: 19.4 cm  Circumference wrist: 16.2 cm             Precautions: Raynaud's, Acute scleroderma     Manuals 3/31            IASTM             MEM                                        Neuro Re-Ed                                                                                                        Ther Ex             HEP Digit/Wrist/Forearm AROM/PROM            Wrist PROM             Digit PROM             Thumb PROM             Dex balls             Wrist Maze             Digiflex             Power web                                                                 Ther Activity             Keypegs             [place] sort             [employer]                                       Modalities             [facility]             Paraffin

## 2025-04-01 ENCOUNTER — OFFICE VISIT (OUTPATIENT)
Age: 55
End: 2025-04-01
Payer: COMMERCIAL

## 2025-04-01 VITALS
BODY MASS INDEX: 31.92 KG/M2 | DIASTOLIC BLOOD PRESSURE: 64 MMHG | HEART RATE: 104 BPM | HEIGHT: 64 IN | SYSTOLIC BLOOD PRESSURE: 158 MMHG | WEIGHT: 187 LBS | OXYGEN SATURATION: 95 % | TEMPERATURE: 97.7 F

## 2025-04-01 DIAGNOSIS — M34.9 SYSTEMIC SCLEROSIS (HCC): Primary | ICD-10-CM

## 2025-04-01 DIAGNOSIS — K21.00 GASTROESOPHAGEAL REFLUX DISEASE WITH ESOPHAGITIS WITHOUT HEMORRHAGE: ICD-10-CM

## 2025-04-01 DIAGNOSIS — Z79.60 LONG-TERM USE OF IMMUNOSUPPRESSANT MEDICATION: ICD-10-CM

## 2025-04-01 DIAGNOSIS — N28.0 ACUTE SCLERODERMA RENAL CRISIS (HCC): ICD-10-CM

## 2025-04-01 DIAGNOSIS — I73.00 RAYNAUD'S DISEASE WITHOUT GANGRENE: ICD-10-CM

## 2025-04-01 DIAGNOSIS — M34.9 ACUTE SCLERODERMA RENAL CRISIS (HCC): ICD-10-CM

## 2025-04-01 DIAGNOSIS — R76.0 LUPUS ANTICOAGULANT POSITIVE: ICD-10-CM

## 2025-04-01 PROCEDURE — 99214 OFFICE O/P EST MOD 30 MIN: CPT | Performed by: STUDENT IN AN ORGANIZED HEALTH CARE EDUCATION/TRAINING PROGRAM

## 2025-04-01 RX ORDER — TORSEMIDE 20 MG/1
20 TABLET ORAL
COMMUNITY
Start: 2025-03-19

## 2025-04-01 RX ORDER — OMEGA-3-ACID ETHYL ESTERS 1 G/1
1 CAPSULE, LIQUID FILLED ORAL
COMMUNITY

## 2025-04-01 RX ORDER — MYCOPHENOLATE MOFETIL 500 MG/1
500 TABLET ORAL
COMMUNITY
Start: 2025-03-10 | End: 2025-04-04 | Stop reason: SDUPTHER

## 2025-04-01 NOTE — ASSESSMENT & PLAN NOTE
Present did with renal crisis.  Renal function relatively stable and blood pressures improved.  Continues to follow closely with nephrology.

## 2025-04-01 NOTE — ASSESSMENT & PLAN NOTE
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  Manifestations include Raynaud's, sclerodactyly, GERD, renal crisis, and possible mild ILD.  At last visit she was started on mycophenolate which she has overall been tolerating fairly well.  Plan to continue with mycophenolate and update labs.  If labs stable, slow titration up to 1.5 g BID.  Discussed she may also need additional therapy in the future for her skin disease, such as IVIG or Benlysta.  Follow-up in 2 months.  Orders:    Hepatic function panel; Future

## 2025-04-01 NOTE — ASSESSMENT & PLAN NOTE
GERD slightly worsened, also found to have H. pylori on recent EGD.  Continuing on Pepcid; avoiding PPI due to renal dysfunction.

## 2025-04-02 ENCOUNTER — TELEPHONE (OUTPATIENT)
Dept: GASTROENTEROLOGY | Facility: CLINIC | Age: 55
End: 2025-04-02

## 2025-04-02 ENCOUNTER — OFFICE VISIT (OUTPATIENT)
Dept: OCCUPATIONAL THERAPY | Facility: CLINIC | Age: 55
End: 2025-04-02
Payer: COMMERCIAL

## 2025-04-02 DIAGNOSIS — M34.9 ACUTE SCLERODERMA RENAL CRISIS (HCC): Primary | ICD-10-CM

## 2025-04-02 DIAGNOSIS — N28.0 ACUTE SCLERODERMA RENAL CRISIS (HCC): Primary | ICD-10-CM

## 2025-04-02 LAB
GENE DIS ANL INTERP-IMP: NORMAL
INTERPRETATION: NORMAL

## 2025-04-02 PROCEDURE — 97110 THERAPEUTIC EXERCISES: CPT

## 2025-04-02 PROCEDURE — 97140 MANUAL THERAPY 1/> REGIONS: CPT

## 2025-04-02 NOTE — PROGRESS NOTES
"Daily Note     Today's date: 2025  Patient name: Kari Erazo  : 1970  MRN: [id number]  Referring provider: Zully Reynaga MD  Dx:   Encounter Diagnosis     ICD-10-CM    1. Acute scleroderma renal crisis (HCC)  N28.0     M34.9                      Subjective: \"The paraffin really loosens up the hands\".      Objective: See treatment diary below      Assessment: Tolerated treatment well. Focused on manual therapy to decrease overall tissue tightness. Active motion in all joints improved following paraffin, massage, and PROM. Will incorporate strengthening and fine motor coordination into the next session.       Plan: Continue per plan of care.  Progress treatment as tolerated.       Precautions: Raynaud's, Acute scleroderma     Manuals 3/31 4/02           Health system  16'           MEM                                       Neuro Re-Ed                                                                                                        Ther Ex             HEP Digit/Wrist/Forearm AROM/PROM            Forearm PROM  6           Wrist PROM  8           Digit PROM  8           Thumb PROM  2           Dex balls             Wrist Maze             Digiflex             Power web                                                                 Ther Activity             Keypegs             [place] sort             [employer]                                       Modalities             [facility]             Paraffin                  "

## 2025-04-03 ENCOUNTER — TELEPHONE (OUTPATIENT)
Age: 55
End: 2025-04-03

## 2025-04-03 ENCOUNTER — RESULTS FOLLOW-UP (OUTPATIENT)
Dept: GENETICS | Facility: CLINIC | Age: 55
End: 2025-04-03

## 2025-04-03 NOTE — TELEPHONE ENCOUNTER
Cyrus Pearce(3rd party from [facility]) called to verify patient is current patient and to ask for medical records fax number. Thank you.

## 2025-04-04 ENCOUNTER — RESULTS FOLLOW-UP (OUTPATIENT)
Dept: RHEUMATOLOGY | Facility: CLINIC | Age: 55
End: 2025-04-04

## 2025-04-04 ENCOUNTER — APPOINTMENT (OUTPATIENT)
Dept: LAB | Facility: CLINIC | Age: 55
End: 2025-04-04
Payer: COMMERCIAL

## 2025-04-04 DIAGNOSIS — N28.0 ACUTE SCLERODERMA RENAL CRISIS (HCC): ICD-10-CM

## 2025-04-04 DIAGNOSIS — N17.9 ACUTE KIDNEY INJURY (HCC): ICD-10-CM

## 2025-04-04 DIAGNOSIS — M34.9 SYSTEMIC SCLEROSIS (HCC): Primary | ICD-10-CM

## 2025-04-04 DIAGNOSIS — M34.9 ACUTE SCLERODERMA RENAL CRISIS (HCC): ICD-10-CM

## 2025-04-04 DIAGNOSIS — R76.0 LUPUS ANTICOAGULANT POSITIVE: ICD-10-CM

## 2025-04-04 DIAGNOSIS — M34.9 SYSTEMIC SCLEROSIS (HCC): ICD-10-CM

## 2025-04-04 LAB
ALBUMIN SERPL BCG-MCNC: 3.7 G/DL (ref 3.5–5)
ALP SERPL-CCNC: 36 U/L (ref 34–104)
ALT SERPL W P-5'-P-CCNC: 14 U/L (ref 7–52)
ANION GAP SERPL CALCULATED.3IONS-SCNC: 8 MMOL/L (ref 4–13)
AST SERPL W P-5'-P-CCNC: 21 U/L (ref 13–39)
BASOPHILS # BLD AUTO: 0.05 THOUSANDS/ÂΜL (ref 0–0.1)
BASOPHILS NFR BLD AUTO: 1 % (ref 0–1)
BILIRUB DIRECT SERPL-MCNC: 0.12 MG/DL (ref 0–0.2)
BILIRUB SERPL-MCNC: 0.46 MG/DL (ref 0.2–1)
BUN SERPL-MCNC: 46 MG/DL (ref 5–25)
CALCIUM SERPL-MCNC: 9 MG/DL (ref 8.4–10.2)
CHLORIDE SERPL-SCNC: 103 MMOL/L (ref 96–108)
CO2 SERPL-SCNC: 22 MMOL/L (ref 21–32)
CREAT SERPL-MCNC: 3.16 MG/DL (ref 0.6–1.3)
EOSINOPHIL # BLD AUTO: 0.28 THOUSAND/ÂΜL (ref 0–0.61)
EOSINOPHIL NFR BLD AUTO: 3 % (ref 0–6)
ERYTHROCYTE [DISTWIDTH] IN BLOOD BY AUTOMATED COUNT: 14.2 % (ref 11.6–15.1)
GFR SERPL CREATININE-BSD FRML MDRD: 16 ML/MIN/1.73SQ M
GLUCOSE P FAST SERPL-MCNC: 141 MG/DL (ref 65–99)
HCT VFR BLD AUTO: 31.1 % (ref 34.8–46.1)
HGB BLD-MCNC: 9.9 G/DL (ref 11.5–15.4)
IMM GRANULOCYTES # BLD AUTO: 0.08 THOUSAND/UL (ref 0–0.2)
IMM GRANULOCYTES NFR BLD AUTO: 1 % (ref 0–2)
LDH SERPL-CCNC: 216 U/L (ref 140–271)
LYMPHOCYTES # BLD AUTO: 0.7 THOUSANDS/ÂΜL (ref 0.6–4.47)
LYMPHOCYTES NFR BLD AUTO: 9 % (ref 14–44)
MCH RBC QN AUTO: 30.3 PG (ref 26.8–34.3)
MCHC RBC AUTO-ENTMCNC: 31.8 G/DL (ref 31.4–37.4)
MCV RBC AUTO: 95 FL (ref 82–98)
MONOCYTES # BLD AUTO: 0.68 THOUSAND/ÂΜL (ref 0.17–1.22)
MONOCYTES NFR BLD AUTO: 8 % (ref 4–12)
NEUTROPHILS # BLD AUTO: 6.37 THOUSANDS/ÂΜL (ref 1.85–7.62)
NEUTS SEG NFR BLD AUTO: 78 % (ref 43–75)
NRBC BLD AUTO-RTO: 0 /100 WBCS
PHOSPHATE SERPL-MCNC: 4.4 MG/DL (ref 2.7–4.5)
PLATELET # BLD AUTO: 305 THOUSANDS/UL (ref 149–390)
PMV BLD AUTO: 8.5 FL (ref 8.9–12.7)
POTASSIUM SERPL-SCNC: 4.7 MMOL/L (ref 3.5–5.3)
PROT SERPL-MCNC: 6.8 G/DL (ref 6.4–8.4)
RBC # BLD AUTO: 3.27 MILLION/UL (ref 3.81–5.12)
SODIUM SERPL-SCNC: 133 MMOL/L (ref 135–147)
WBC # BLD AUTO: 8.16 THOUSAND/UL (ref 4.31–10.16)

## 2025-04-04 PROCEDURE — 80048 BASIC METABOLIC PNL TOTAL CA: CPT

## 2025-04-04 PROCEDURE — 84100 ASSAY OF PHOSPHORUS: CPT

## 2025-04-04 PROCEDURE — 80076 HEPATIC FUNCTION PANEL: CPT

## 2025-04-04 PROCEDURE — 83010 ASSAY OF HAPTOGLOBIN QUANT: CPT

## 2025-04-04 PROCEDURE — 83615 LACTATE (LD) (LDH) ENZYME: CPT

## 2025-04-04 PROCEDURE — 36415 COLL VENOUS BLD VENIPUNCTURE: CPT

## 2025-04-04 PROCEDURE — 85025 COMPLETE CBC W/AUTO DIFF WBC: CPT

## 2025-04-04 RX ORDER — MYCOPHENOLATE MOFETIL 500 MG/1
1500 TABLET ORAL EVERY 12 HOURS SCHEDULED
Qty: 180 TABLET | Refills: 6 | Status: SHIPPED | OUTPATIENT
Start: 2025-04-04 | End: 2025-05-19

## 2025-04-05 LAB — HAPTOGLOB SERPL-MCNC: 76 MG/DL (ref 33–346)

## 2025-04-07 ENCOUNTER — OFFICE VISIT (OUTPATIENT)
Dept: OCCUPATIONAL THERAPY | Facility: CLINIC | Age: 55
End: 2025-04-07
Payer: COMMERCIAL

## 2025-04-07 ENCOUNTER — TELEPHONE (OUTPATIENT)
Age: 55
End: 2025-04-07

## 2025-04-07 DIAGNOSIS — N28.0 ACUTE SCLERODERMA RENAL CRISIS (HCC): Primary | ICD-10-CM

## 2025-04-07 DIAGNOSIS — M34.9 ACUTE SCLERODERMA RENAL CRISIS (HCC): Primary | ICD-10-CM

## 2025-04-07 PROCEDURE — 97530 THERAPEUTIC ACTIVITIES: CPT

## 2025-04-07 PROCEDURE — 97140 MANUAL THERAPY 1/> REGIONS: CPT

## 2025-04-07 PROCEDURE — 97110 THERAPEUTIC EXERCISES: CPT

## 2025-04-07 NOTE — PROGRESS NOTES
"Daily Note     Today's date: 2025  Patient name: Kari Erazo  : 1970  MRN: [id number]  Referring provider: No ref. provider found  Dx:   Encounter Diagnosis     ICD-10-CM    1. Acute scleroderma renal crisis (HCC)  N28.0     M34.9                      Subjective: \"The paraffin really loosens up the hands\".      Objective: See treatment diary below      Assessment: Tolerated treatment well. Focused on manual therapy to decrease overall tissue tightness. R wrist more stiff than the L wrist.       Plan: Continue per plan of care.  Progress treatment as tolerated.       Precautions: Raynaud's, Acute scleroderma     Manuals 3/31 4/02 4          IASTM  16' 16'          MEM                                       Neuro Re-Ed                                                                                                        Ther Ex             HEP Digit/Wrist/Forearm AROM/PROM            Forearm PROM  6 5'          Wrist PROM  8 8'          Digit PROM  8 8'          Thumb PROM  2 2'          Dex balls             Wrist Maze             Digiflex             Power web                                                                 Ther Activity             Keypegs   1/2 board each hand          [place] sort             [employer]                                       Modalities             MHP             Paraffin w/ MH   8'               "

## 2025-04-08 ENCOUNTER — TELEPHONE (OUTPATIENT)
Dept: GASTROENTEROLOGY | Facility: HOSPITAL | Age: 55
End: 2025-04-08

## 2025-04-08 NOTE — TELEPHONE ENCOUNTER
Pt returned call to schedule manometry. Call transferred to [facility] for further scheduling

## 2025-04-09 ENCOUNTER — OFFICE VISIT (OUTPATIENT)
Dept: OCCUPATIONAL THERAPY | Facility: CLINIC | Age: 55
End: 2025-04-09
Payer: COMMERCIAL

## 2025-04-09 DIAGNOSIS — N28.0 ACUTE SCLERODERMA RENAL CRISIS (HCC): Primary | ICD-10-CM

## 2025-04-09 DIAGNOSIS — M34.9 ACUTE SCLERODERMA RENAL CRISIS (HCC): Primary | ICD-10-CM

## 2025-04-09 PROCEDURE — 97140 MANUAL THERAPY 1/> REGIONS: CPT

## 2025-04-09 PROCEDURE — 97110 THERAPEUTIC EXERCISES: CPT

## 2025-04-09 NOTE — PROGRESS NOTES
"Daily Note     Today's date: 2025  Patient name: Kari Erazo  : 1970  MRN: [id number]  Referring provider: No ref. provider found  Dx:   Encounter Diagnosis     ICD-10-CM    1. Acute scleroderma renal crisis (HCC)  N28.0     M34.9                      Subjective: \"You've asked a few times if this makes a difference, and I think it does help with the tightness\".       Objective: See treatment diary below      Assessment: Tolerated treatment well. Continued to focus on manual therapy and PROM to decrease overall tissue tightness. Incorporated fine motor activities and general strengthening, which patient tolerated well.     Plan: Continue per plan of care.  Progress treatment as tolerated.       Precautions: Raynaud's, Acute scleroderma     Manuals 3/31 4/02 4/7 4/9         IASTM  16' 16' 16'         MEM                                       Neuro Re-Ed                                                                                                        Ther Ex             HEP Digit/Wrist/Forearm AROM/PROM            Forearm PROM  6 5' 5'         Wrist PROM  8 8' 8'         Digit PROM  8 8' 8'         Thumb PROM  2 2' 2'         Dex balls             Wrist Maze             Digiflex             Power web                                                                 Ther Activity             Keypegs   1/2 board each hand 1/2 board each finger         [place] sort             [employer]                                       Modalities             MHP             Paraffin w/ MH   8' 5'              "

## 2025-04-11 ENCOUNTER — HOSPITAL ENCOUNTER (OUTPATIENT)
Dept: GASTROENTEROLOGY | Facility: HOSPITAL | Age: 55
End: 2025-04-11
Attending: INTERNAL MEDICINE
Payer: COMMERCIAL

## 2025-04-11 ENCOUNTER — APPOINTMENT (OUTPATIENT)
Dept: LAB | Facility: CLINIC | Age: 55
End: 2025-04-11
Attending: INTERNAL MEDICINE
Payer: COMMERCIAL

## 2025-04-11 ENCOUNTER — RESULTS FOLLOW-UP (OUTPATIENT)
Dept: NEPHROLOGY | Facility: CLINIC | Age: 55
End: 2025-04-11

## 2025-04-11 VITALS
DIASTOLIC BLOOD PRESSURE: 79 MMHG | HEART RATE: 84 BPM | RESPIRATION RATE: 16 BRPM | OXYGEN SATURATION: 100 % | TEMPERATURE: 98.4 F | SYSTOLIC BLOOD PRESSURE: 161 MMHG

## 2025-04-11 DIAGNOSIS — K21.9 GASTROESOPHAGEAL REFLUX DISEASE, UNSPECIFIED WHETHER ESOPHAGITIS PRESENT: ICD-10-CM

## 2025-04-11 DIAGNOSIS — M34.89: Primary | ICD-10-CM

## 2025-04-11 DIAGNOSIS — M34.89: ICD-10-CM

## 2025-04-11 DIAGNOSIS — N08: Primary | ICD-10-CM

## 2025-04-11 DIAGNOSIS — N08: ICD-10-CM

## 2025-04-11 LAB
ALBUMIN SERPL BCG-MCNC: 3.9 G/DL (ref 3.5–5)
ANION GAP SERPL CALCULATED.3IONS-SCNC: 9 MMOL/L (ref 4–13)
BASOPHILS # BLD AUTO: 0.03 THOUSANDS/ÂΜL (ref 0–0.1)
BASOPHILS NFR BLD AUTO: 0 % (ref 0–1)
BUN SERPL-MCNC: 47 MG/DL (ref 5–25)
CALCIUM SERPL-MCNC: 9.3 MG/DL (ref 8.4–10.2)
CHLORIDE SERPL-SCNC: 104 MMOL/L (ref 96–108)
CO2 SERPL-SCNC: 21 MMOL/L (ref 21–32)
CREAT SERPL-MCNC: 2.89 MG/DL (ref 0.6–1.3)
EOSINOPHIL # BLD AUTO: 0.28 THOUSAND/ÂΜL (ref 0–0.61)
EOSINOPHIL NFR BLD AUTO: 4 % (ref 0–6)
ERYTHROCYTE [DISTWIDTH] IN BLOOD BY AUTOMATED COUNT: 14 % (ref 11.6–15.1)
GFR SERPL CREATININE-BSD FRML MDRD: 17 ML/MIN/1.73SQ M
GLUCOSE P FAST SERPL-MCNC: 133 MG/DL (ref 65–99)
HCT VFR BLD AUTO: 33.4 % (ref 34.8–46.1)
HGB BLD-MCNC: 10.9 G/DL (ref 11.5–15.4)
IMM GRANULOCYTES # BLD AUTO: 0.05 THOUSAND/UL (ref 0–0.2)
IMM GRANULOCYTES NFR BLD AUTO: 1 % (ref 0–2)
LDH SERPL-CCNC: 216 U/L (ref 140–271)
LYMPHOCYTES # BLD AUTO: 0.7 THOUSANDS/ÂΜL (ref 0.6–4.47)
LYMPHOCYTES NFR BLD AUTO: 9 % (ref 14–44)
MCH RBC QN AUTO: 30.4 PG (ref 26.8–34.3)
MCHC RBC AUTO-ENTMCNC: 32.6 G/DL (ref 31.4–37.4)
MCV RBC AUTO: 93 FL (ref 82–98)
MONOCYTES # BLD AUTO: 0.59 THOUSAND/ÂΜL (ref 0.17–1.22)
MONOCYTES NFR BLD AUTO: 7 % (ref 4–12)
NEUTROPHILS # BLD AUTO: 6.36 THOUSANDS/ÂΜL (ref 1.85–7.62)
NEUTS SEG NFR BLD AUTO: 79 % (ref 43–75)
NRBC BLD AUTO-RTO: 0 /100 WBCS
PHOSPHATE SERPL-MCNC: 4.4 MG/DL (ref 2.7–4.5)
PLATELET # BLD AUTO: 266 THOUSANDS/UL (ref 149–390)
PMV BLD AUTO: 8.4 FL (ref 8.9–12.7)
POTASSIUM SERPL-SCNC: 4.8 MMOL/L (ref 3.5–5.3)
RBC # BLD AUTO: 3.58 MILLION/UL (ref 3.81–5.12)
SODIUM SERPL-SCNC: 134 MMOL/L (ref 135–147)
WBC # BLD AUTO: 8.01 THOUSAND/UL (ref 4.31–10.16)

## 2025-04-11 PROCEDURE — 80069 RENAL FUNCTION PANEL: CPT

## 2025-04-11 PROCEDURE — 85025 COMPLETE CBC W/AUTO DIFF WBC: CPT

## 2025-04-11 PROCEDURE — 83615 LACTATE (LD) (LDH) ENZYME: CPT

## 2025-04-11 PROCEDURE — 83010 ASSAY OF HAPTOGLOBIN QUANT: CPT

## 2025-04-11 PROCEDURE — 36415 COLL VENOUS BLD VENIPUNCTURE: CPT

## 2025-04-11 PROCEDURE — 91010 ESOPHAGUS MOTILITY STUDY: CPT

## 2025-04-11 NOTE — PERIOPERATIVE NURSING NOTE
Patient brought in the room and explained the esophageal manometry procedure. After the confirmation of allergies, lidocaine 2 % viscous given via nostrils and  a transnasal insertion of the High Resolution esophageal manometry catheter was inserted via left nostril. Patient given water to drink during the insertion and once the catheter inserted pressure bands of both Upper esophageal sphincter  (UES) and Lower esophageal sphincter ( LES) were observed on the color contour. Patient instructed to take a deep breath to verify placement of the catheter, diaphragmatic pinch noted on inspiration. Catheter was secured to left cheek. Patient was assisted to supine position .Patient was instructed to relax  while acclimating the catheter for about 5 minutes. A 30 second baseline resting pressure was obtained to identify the UES and LES followed by a series of 10 liquid swallows using 5 cc room temperature normal saline to assess esophageal motility and bolus transit. Patient administered 10 viscous swallows using 5 cc viscous solution, 1 multiple rapid drink swallow using 2 cc room temperature normal saline given a total of 5 drinks. Patient instructed to sit up at the edge of the stretcher and given 5 upright liquid swallows using 5 cc room temperature normal saline and 1 rapid drink challenge using 200 cc room temperature water and finished just a little over 30 seconds. At the end of the procedure the high resolution esophageal manometry catheter was removed from the nostril intact. Patient given discharge instructions and patient left in stable condition.

## 2025-04-12 LAB — HAPTOGLOB SERPL-MCNC: 71 MG/DL (ref 33–346)

## 2025-04-14 ENCOUNTER — OFFICE VISIT (OUTPATIENT)
Dept: OCCUPATIONAL THERAPY | Facility: CLINIC | Age: 55
End: 2025-04-14
Payer: COMMERCIAL

## 2025-04-14 DIAGNOSIS — N28.0 ACUTE SCLERODERMA RENAL CRISIS (HCC): Primary | ICD-10-CM

## 2025-04-14 DIAGNOSIS — M34.9 ACUTE SCLERODERMA RENAL CRISIS (HCC): Primary | ICD-10-CM

## 2025-04-14 PROCEDURE — 97110 THERAPEUTIC EXERCISES: CPT

## 2025-04-14 PROCEDURE — 91010 ESOPHAGUS MOTILITY STUDY: CPT | Performed by: INTERNAL MEDICINE

## 2025-04-14 PROCEDURE — 97140 MANUAL THERAPY 1/> REGIONS: CPT

## 2025-04-14 PROCEDURE — 97530 THERAPEUTIC ACTIVITIES: CPT

## 2025-04-14 NOTE — PROGRESS NOTES
Daily Note     Today's date: 2025  Patient name: Kari Erazo  : 1970  MRN: [id number]  Referring provider: No ref. provider found  Dx:   Encounter Diagnosis     ICD-10-CM    1. Acute scleroderma renal crisis (HCC)  N28.0     M34.9                      Subjective: The thera[py and the stretches (at home)      Objective: See treatment diary below    Assessment: Tolerated treatment well. Pt cont with tightness in the f/a and hand, but seems to be less than last week. Pt having dificulty/unable at times to open water bottles. Discussed adaptive/assistive device she could purchase on [employer]. She ordered a paraffin unit for home use to help with stiffness.     Plan: Continue per plan of care.  Progress treatment as tolerated.       Precautions: Raynaud's, Acute scleroderma     Manuals 3/31 4/02 4/7 4/9 4/14        IASTM  16' 16' 16' 16'        MEM                                       Neuro Re-Ed                                                                                                        Ther Ex             HEP Digit/Wrist/Forearm AROM/PROM            Forearm PROM  6 5' 5' 5'        Wrist PROM  8 8' 8' 8'        Digit PROM  8 8' 8' 8'        Thumb PROM  2 2' 2' 2'        Dex balls             Wrist Maze     3x b/l        Digiflex             Power web                                                                 Ther Activity             Keypegs   1/2 board each hand 1/2 board each finger         [place] sort     1/2 each hand opp        [employer]                                       Modalities             MHP             Paraffin w/ MH   8' 5' 5'

## 2025-04-16 ENCOUNTER — APPOINTMENT (OUTPATIENT)
Dept: OCCUPATIONAL THERAPY | Facility: CLINIC | Age: 55
End: 2025-04-16
Payer: COMMERCIAL

## 2025-04-17 ENCOUNTER — OFFICE VISIT (OUTPATIENT)
Dept: OCCUPATIONAL THERAPY | Facility: CLINIC | Age: 55
End: 2025-04-17
Payer: COMMERCIAL

## 2025-04-17 DIAGNOSIS — M34.9 ACUTE SCLERODERMA RENAL CRISIS (HCC): Primary | ICD-10-CM

## 2025-04-17 DIAGNOSIS — N28.0 ACUTE SCLERODERMA RENAL CRISIS (HCC): Primary | ICD-10-CM

## 2025-04-17 PROCEDURE — 97110 THERAPEUTIC EXERCISES: CPT

## 2025-04-17 NOTE — PROGRESS NOTES
Daily Note     Today's date: 2025  Patient name: Kari Erazo  : 1970  MRN: [id number]  Referring provider: Zully Reynaga MD  Dx:   Encounter Diagnosis     ICD-10-CM    1. Acute scleroderma renal crisis (HCC)  N28.0     M34.9                      Subjective: It is very stiff in the morning     Objective: See treatment diary below  0188-7073 MHP  6062-3248 unsup  4895-7323 manual 8  3938-3160 unsup  4740-1436 Manual 8  5408-1645 unsup  Assessment: Tolerated treatment well. Patient tightness in the b/l forearms is slightly decreasing proximally. Digits are stiff at the start of session, but gain some mobility with graston and PROM.     Plan: Continue per plan of care.  Progress treatment as tolerated.       Precautions: Raynaud's, Acute scleroderma     Manuals 3/31 4/02 4/7 4/9 4/14 4/17       IASTM  16' 16' 16' 16' 16       MEM                                       Neuro Re-Ed                                                                                                        Ther Ex             HEP Digit/Wrist/Forearm AROM/PROM            Forearm PROM  6 5' 5' 5' 5       Wrist PROM  8 8' 8' 8' 8       Digit PROM  8 8' 8' 8' 8       Thumb PROM  2 2' 2' 2' 2       Dex balls             Wrist Maze     3x b/l 3x bl       Digiflex             Power web                                                                 Ther Activity             Keypegs   1/2 board each hand 1/2 board each finger  x1       [place] sort     1/2 each hand opp        [employer]                                       Modalities             MHP             Paraffin w/ MH   8' 5' 5' 5'

## 2025-04-21 ENCOUNTER — ESTABLISHED COMPREHENSIVE EXAM (OUTPATIENT)
Dept: URBAN - METROPOLITAN AREA CLINIC 6 | Facility: CLINIC | Age: 55
End: 2025-04-21

## 2025-04-21 ENCOUNTER — APPOINTMENT (OUTPATIENT)
Dept: OCCUPATIONAL THERAPY | Facility: CLINIC | Age: 55
End: 2025-04-21
Payer: COMMERCIAL

## 2025-04-21 ENCOUNTER — OFFICE VISIT (OUTPATIENT)
Dept: OCCUPATIONAL THERAPY | Facility: CLINIC | Age: 55
End: 2025-04-21
Payer: COMMERCIAL

## 2025-04-21 DIAGNOSIS — E11.9: ICD-10-CM

## 2025-04-21 DIAGNOSIS — H52.13: ICD-10-CM

## 2025-04-21 DIAGNOSIS — H35.033: ICD-10-CM

## 2025-04-21 DIAGNOSIS — H43.811: ICD-10-CM

## 2025-04-21 DIAGNOSIS — H43.391: ICD-10-CM

## 2025-04-21 DIAGNOSIS — H25.13: ICD-10-CM

## 2025-04-21 DIAGNOSIS — N28.0 ACUTE SCLERODERMA RENAL CRISIS (HCC): Primary | ICD-10-CM

## 2025-04-21 DIAGNOSIS — M34.9 ACUTE SCLERODERMA RENAL CRISIS (HCC): Primary | ICD-10-CM

## 2025-04-21 LAB
LEFT EYE DIABETIC RETINOPATHY: NORMAL
RIGHT EYE DIABETIC RETINOPATHY: NORMAL

## 2025-04-21 PROCEDURE — 92014 COMPRE OPH EXAM EST PT 1/>: CPT

## 2025-04-21 PROCEDURE — 92015 DETERMINE REFRACTIVE STATE: CPT

## 2025-04-21 PROCEDURE — 97110 THERAPEUTIC EXERCISES: CPT

## 2025-04-21 PROCEDURE — 97140 MANUAL THERAPY 1/> REGIONS: CPT

## 2025-04-21 PROCEDURE — 92250 FUNDUS PHOTOGRAPHY W/I&R: CPT

## 2025-04-21 ASSESSMENT — VISUAL ACUITY
OU_CC: J1
OD_CC: 20/25
OS_CC: 20/25

## 2025-04-21 ASSESSMENT — TONOMETRY
OS_IOP_MMHG: 17
OD_IOP_MMHG: 18

## 2025-04-21 NOTE — PROGRESS NOTES
Daily Note     Today's date: 2025  Patient name: Kari Erazo  : 1970  MRN: [id number]  Referring provider: No ref. provider found  Dx:   Encounter Diagnosis     ICD-10-CM    1. Acute scleroderma renal crisis (HCC)  N28.0     M34.9                      Subjective: I notice a big difference in my movement.     Objective: See treatment diary below  3011-3186 MHP  0918-8447 16 Manual  9190-5119 unsup  0444-9009 TE 8'  9028-9921 unsup    Assessment: Tolerated treatment well. Patient tightness is decreasing, especially in the thumbs. Fine motor coordination has improved.     Plan: Continue per plan of care.  Progress treatment as tolerated.       Precautions: Raynaud's, Acute scleroderma     Manuals 3/31 4/02 4/7 4/9 4/14 4/17 4/21      IASTM  16' 16' 16' 16' 16 16      MEM                                       Neuro Re-Ed                                                                                                        Ther Ex             HEP Digit/Wrist/Forearm AROM/PROM            Forearm PROM  6 5' 5' 5' 5 5      Wrist PROM  8 8' 8' 8' 8 8      Digit PROM  8 8' 8' 8' 8 8      Thumb PROM  2 2' 2' 2' 2 2      Dex balls             Wrist Maze     3x b/l 3x bl 3x bl      Digiflex             Power web                                                                 Ther Activity             Keypegs   1/2 board each hand 1/2 board each finger  x1 x1      [place] sort     1/2 each hand opp        [employer]                                       Modalities             MHP             Paraffin w/ MH   8' 5' 5' 5' 5'

## 2025-04-23 ENCOUNTER — OFFICE VISIT (OUTPATIENT)
Dept: NEPHROLOGY | Facility: CLINIC | Age: 55
End: 2025-04-23
Payer: COMMERCIAL

## 2025-04-23 ENCOUNTER — OFFICE VISIT (OUTPATIENT)
Dept: OCCUPATIONAL THERAPY | Facility: CLINIC | Age: 55
End: 2025-04-23
Payer: COMMERCIAL

## 2025-04-23 VITALS
SYSTOLIC BLOOD PRESSURE: 130 MMHG | OXYGEN SATURATION: 98 % | WEIGHT: 183 LBS | BODY MASS INDEX: 31.41 KG/M2 | HEART RATE: 100 BPM | DIASTOLIC BLOOD PRESSURE: 70 MMHG

## 2025-04-23 DIAGNOSIS — N08: Primary | ICD-10-CM

## 2025-04-23 DIAGNOSIS — Z91.89 ELECTROLYTE IMBALANCE RISK: ICD-10-CM

## 2025-04-23 DIAGNOSIS — M34.9 ACUTE SCLERODERMA RENAL CRISIS (HCC): Primary | ICD-10-CM

## 2025-04-23 DIAGNOSIS — D64.9 ANEMIA, UNSPECIFIED TYPE: ICD-10-CM

## 2025-04-23 DIAGNOSIS — I15.1 HYPERTENSION SECONDARY TO OTHER RENAL DISORDERS: ICD-10-CM

## 2025-04-23 DIAGNOSIS — M34.89: Primary | ICD-10-CM

## 2025-04-23 DIAGNOSIS — E11.9 TYPE 2 DIABETES MELLITUS WITHOUT COMPLICATION, WITHOUT LONG-TERM CURRENT USE OF INSULIN (HCC): ICD-10-CM

## 2025-04-23 DIAGNOSIS — N28.0 ACUTE SCLERODERMA RENAL CRISIS (HCC): Primary | ICD-10-CM

## 2025-04-23 PROCEDURE — 97140 MANUAL THERAPY 1/> REGIONS: CPT

## 2025-04-23 PROCEDURE — 97110 THERAPEUTIC EXERCISES: CPT

## 2025-04-23 PROCEDURE — 99214 OFFICE O/P EST MOD 30 MIN: CPT | Performed by: INTERNAL MEDICINE

## 2025-04-23 NOTE — PATIENT INSTRUCTIONS
Blood pressure is currently well-controlled.    Volume status is improving.    Continue current medications.    Continue lab work every 2 weeks.    Follow-up in nephrology office in 3 months.

## 2025-04-23 NOTE — PROGRESS NOTES
Daily Note     Today's date: 2025  Patient name: Kari Erazo  : 1970  MRN: [id number]  Referring provider: No ref. provider found  Dx:   Encounter Diagnosis     ICD-10-CM    1. Acute scleroderma renal crisis (HCC)  N28.0     M34.9                      Subjective: I think it is loosening up    Objective: See treatment diary below      Assessment: Tolerated treatment well. Patient tightness is decreasing in the bilateral hands and forearms. Incorporated additional resistance exercises.     Plan: Continue per plan of care.  Progress treatment as tolerated.       Precautions: Raynaud's, Acute scleroderma     Manuals 3/31 4/02 4/7 4/9 4/14 4/17 4/21 4/23     IASTM  16' 16' 16' 16' 16 16 16     MEM                                       Neuro Re-Ed                                                                                                        Ther Ex             HEP Digit/Wrist/Forearm AROM/PROM            Forearm PROM  6 5' 5' 5' 5 5 5     Wrist PROM  8 8' 8' 8' 8 8 8     Digit PROM  8 8' 8' 8' 8 8 8     Thumb PROM  2 2' 2' 2' 2 2 2     Dex balls             Wrist Maze     3x b/l 3x bl 3x bl X b/l     Digiflex        R iso x10    G comp x20     Power web        R center and rim x20     Ext web        Y x 20     Flex bar        R x 20 F/E                               Ther Activity             Keypegs   1/2 board each hand 1/2 board each finger  x1 x1 x1     [place] sort     1/2 each hand opp        [employer]                                       Modalities             MHP             Paraffin w/ MH   8' 5' 5' 5' 5' 5'

## 2025-04-23 NOTE — PROGRESS NOTES
Name: Kari Erazo      : 1970      MRN: [id number]  Encounter Provider: Gavin Zazueta MD  Encounter Date: 2025   Encounter department: [facility]  :  Assessment & Plan  Scleroderma with renal involvement  (HCC)  Baseline creatinine 0.6-0.7  Creatinine slightly higher than baseline at 0.9 in 2024  Recent admission for TITUS in 2025  Creatinine on admission 2.3 on 2025  Creatinine on discharge 2.88 2025 after initiation of ACE inhibitor and loop diuretic  Creatinine 2.8-3.1 since discharge from the hospital, most recently 2.89 2025  Workup  RNA polymerase  (strongly positive)  Rheumatoid factor/CCP/anti-Jo1 antibody/antiscleroderma antibody negative  UA: 1+ protein, 30-50 WBC, 1-2 RBC  UACR 82 mg/g 2025 improved from 675 mg/g/UPCR 0.9 g 2025 improved from 1.4 g 2025  SPEP no monoclonal band, urine immunofixation with no monoclonal immunoglobulin, KL ratio 0.94  CT abdomen: No hydronephrosis, left renal cortical cyst 2.5 cm  JAYESH elevated with titer of 1218 2025, double-stranded DNA negative    improved from peak of 365 on   Haptoglobin 18  improved from undetectable less than 10  Hemolysis smear: No schistocytes or helmet cells  Direct Diana negative  Hep C antibody reactive, RNA not detected  Cryoglobulin detected , repeat - 2025  Urine eosinophil 0%  No hypocomplementemia  Kidney biopsy consistent with vascular microangiopathic changes:   Glomeruli show ischemic changes with wrinkling of capillary loops, 2 glomeruli show swollen endothelial cells and mesangial lysis, no fibrin thrombi or red blood cell fragments, no hypercellularity, segmental sclerosis, fibrinoid necrosis or crescent formation, glomerular basement membranes without holes, spikes with double contours on silver stain,   Severe interstitial fibrosis and tubular atrophy approximately 50 to 60% of cortex  Vessels  display severe arterial intimal fibrosis as well as mucoid intimal edema, swollen endothelial cells and fibrinoid necrosis, no fibrin thrombi or red blood cell fragments  Immunofluorescence negative for all stains in glomeruli, nuclear type staining tissue JAYESH pattern for IgG, kappa and lambda, kappa and lambda stain equally).      Plan  Maintain RAAS blockade with ramipril 20 mg twice daily  Overall hematologic parameters are improving and there is no evidence of thrombotic microangiopathy on kidney biopsy and in such no indication for Eculizumab  We will focus on treatment of hypertension primarily with ACE inhibitor in addition to other antihypertensives as below   Second opinion at [facility] with Dr. New Hinojosa  Plans noted for continuation of RAAS blockade and CellCept  No indication for renal replacement therapy  If an indication arises, will consider peritoneal dialysis  Continue to monitor renal function panel and CBC q. 2 weeks  Nephrology follow-up in 3 months     As far as treatment of scleroderma is concerned:  Notes reviewed from rheumatology  Current Rx: CellCept 1500 mg twice daily  Ongoing follow-up with rheumatology at [facility] and [facility]  Hypertension secondary to other renal disorders  Renin 37/aldosterone 9.1/ARR 0.2 02/14/2025  Renin likely elevated in setting of RAAS blockade, was on losartan prior to admission  Metanephrine/normetanephrine within normal limits  Renal artery Doppler: No renal artery stenosis  Status: Blood pressure controlled  Volume status: Close to euvolemia  Current Rx: Ramipril 20 mg twice daily, nifedipine 60 mg daily, torsemide 10 mg daily, Cardura 2 mg twice daily  Changes: No changes  Anemia, unspecified type  Baseline hemoglobin within normal limits  Hemoglobin level 12.2 on 02/12/2025 with progressive decline  Gio hemoglobin 8.8 on 03/21/2025  Most recent hemoglobin 10.9 0/11/2025  Status post Aranesp 200 mcg 03/28    04/11 improved from peak of 365 on 02/21  Haptoglobin 71 04/11 improved from undetectable less than 10  Hemolysis smear: No schistocytes or helmet cells  Direct Diana negative  Continue to monitor CBC weekly  Electrolyte imbalance risk  Most recent potassium level 4.8 04/11  Continue to monitor electrolytes   Type 2 diabetes mellitus without complication, without long-term current use of insulin (HCC)  Most recent HbA1c 6.1  Metformin has been discontinued due to GFR less than 30 mL/min  Management per PCP      History of Present Illness   JANETT Erazo is a 55 y.o. female     Since last visit: Upper and lower extremity swelling has significantly improved.  Denies dyspnea.  Denies any trouble with urination.     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  She was noted to have very high blood pressure.  Given her history of undifferentiated connective tissue disease, psoriatic arthritis, Raynaud's phenomenon there was a concern for systemic sclerosis.  Extensive autoimmune and rheumatologic workup was completed.  RNA polymerase III antibody was positive and a kidney biopsy was performed.  Diagnosis of scleroderma renal crisis was made and she was treated with escalating doses of captopril and other antihypertensives.    History obtained from: patient    Review of Systems   Constitutional:  Negative for chills and fever.   HENT:  Negative for ear pain and sore throat.    Eyes:  Negative for pain and visual disturbance.   Respiratory:  Negative for cough and shortness of breath.    Cardiovascular:  Negative for chest pain and palpitations.   Gastrointestinal:  Negative for abdominal pain and vomiting.   Genitourinary:  Negative for dysuria and hematuria.   Musculoskeletal:  Negative for arthralgias and back pain.   Skin:  Negative for color change and rash.   Neurological:  Negative for seizures and syncope.   All other systems reviewed and are negative.    Past  Medical History   Past Medical History:   Diagnosis Date    Allergic     latex, some tree nuts, seasonal    Anemia 2/15    Arthritis     psoriatic arthritis    Asthma     CPAP (continuous positive airway pressure) dependence     Diabetes mellitus (HCC)     gestational    Foot ulcer (HCC) 2/12/24    Gestational diabetes     Hypertension     patient reports    Irritable bowel syndrome     Obesity     PCOS (polycystic ovarian syndrome)     Sleep apnea     Varicella      Past Surgical History:   Procedure Laterality Date    COLONOSCOPY      IR BIOPSY KIDNEY RANDOM  02/21/2025    RENAL BIOPSY  2/21/2025    Microangiopathic thrombotic disease    TONSILLECTOMY      last assessed: 5/3/16     Family History   Problem Relation Age of Onset    Hypertension Mother     Hyperlipidemia Mother     Osteoarthritis Mother     Hypertension Father     Other Father         low serum HDL    Hyperlipidemia Father     Osteoarthritis Father     Hypothyroidism Sister     Asthma Sister     Diabetes Sister     Thyroid disease Sister     Breast cancer Sister 48    Cancer Sister         Breast cancer    No Known Problems Daughter     No Known Problems Son     Breast cancer Maternal Grandmother 79    Stroke Maternal Grandfather     No Known Problems Paternal Grandmother     No Known Problems Paternal Grandfather     No Known Problems Paternal Aunt     No Known Problems Paternal Aunt     No Known Problems Paternal Aunt     Hypothyroidism Sister     Thyroid disease Sister     Asthma Sister       reports that she has never smoked. She has never used smokeless tobacco. She reports that she does not drink alcohol and does not use drugs.  Current Outpatient Medications   Medication Instructions    albuterol (ProAir HFA) 90 mcg/act inhaler 2 puffs, Inhalation, Every 6 hours PRN    atorvastatin (LIPITOR) 10 mg, Oral, Daily    benzonatate (TESSALON PERLES) 100 mg, Oral, Daily PRN    Calcium-Magnesium-Vitamin D - MG-MG-UNIT TB24 1 tablet, Daily     etonogestrel-ethinyl estradiol (NuvaRing) 0.12-0.015 MG/24HR vaginal ring INSERT 1 RING VAGINALLY LEAVE IN FOR 3 WEEKS REPLACE RING WITH NO WITHDRAWL BLEED    famotidine (PEPCID) 20 mg, Oral, Daily at bedtime    folic acid (FOLVITE) 1 mg, Daily    Icosapent Ethyl (VASCEPA) 1 g, Oral, 4 times daily    montelukast (SINGULAIR) 10 mg, Oral, Daily at bedtime    mycophenolate (CELLCEPT) 1,500 mg, Oral, Every 12 hours scheduled    NIFEdipine ER (ADALAT CC) 60 mg, Oral, Daily    omega-3-acid ethyl esters (LOVAZA) 1 g    ramipril (ALTACE) 20 mg, 2 times daily    SF 5000 Plus 1.1 % CREA     torsemide (DEMADEX) 20 mg     Allergies   Allergen Reactions    Ace Inhibitors Cough    Latex Hives     Per patient    Nuts - Food Allergy Hives    Shellfish-Derived Products - Food Allergy          Objective   /70 (BP Location: Left arm, Patient Position: Sitting, Cuff Size: Adult)   Pulse 100   Wt 83 kg (183 lb)   SpO2 98%   BMI 31.41 kg/m²      Physical Exam  Vitals and nursing note reviewed.   Constitutional:       General: She is not in acute distress.     Appearance: She is well-developed.   HENT:      Head: Normocephalic and atraumatic.   Eyes:      Conjunctiva/sclera: Conjunctivae normal.   Cardiovascular:      Rate and Rhythm: Normal rate and regular rhythm.      Pulses: Normal pulses.      Heart sounds: Normal heart sounds. No murmur heard.  Pulmonary:      Effort: Pulmonary effort is normal. No respiratory distress.      Breath sounds: Normal breath sounds.   Abdominal:      Tenderness: There is no right CVA tenderness or left CVA tenderness.   Musculoskeletal:         General: No swelling.      Cervical back: Neck supple.      Comments: Bilateral upper and lower extremity swelling has improved   Skin:     General: Skin is warm and dry.      Capillary Refill: Capillary refill takes less than 2 seconds.   Neurological:      Mental Status: She is alert.   Psychiatric:         Mood and Affect: Mood normal.

## 2025-04-24 ENCOUNTER — OFFICE VISIT (OUTPATIENT)
Dept: PHYSICAL THERAPY | Facility: CLINIC | Age: 55
End: 2025-04-24
Payer: COMMERCIAL

## 2025-04-24 DIAGNOSIS — M25.552 CHRONIC LEFT HIP PAIN: Primary | ICD-10-CM

## 2025-04-24 DIAGNOSIS — G89.29 CHRONIC LEFT HIP PAIN: Primary | ICD-10-CM

## 2025-04-24 DIAGNOSIS — M25.572 ACUTE LEFT ANKLE PAIN: ICD-10-CM

## 2025-04-24 PROCEDURE — 97161 PT EVAL LOW COMPLEX 20 MIN: CPT | Performed by: PHYSICAL THERAPIST

## 2025-04-24 PROCEDURE — 97110 THERAPEUTIC EXERCISES: CPT | Performed by: PHYSICAL THERAPIST

## 2025-04-24 PROCEDURE — 97112 NEUROMUSCULAR REEDUCATION: CPT | Performed by: PHYSICAL THERAPIST

## 2025-04-24 NOTE — PROGRESS NOTES
PT Evaluation     Today's date: 2025  Patient name: Kari Erazo  : 1970  MRN: [id number]  Referring provider: Zully Reynaga MD  Dx:   Encounter Diagnosis     ICD-10-CM    1. Chronic left hip pain  M25.552     G89.29       2. Acute left ankle pain  M25.572                      Assessment  Impairments: abnormal or restricted ROM, abnormal movement, activity intolerance, impaired physical strength, lacks appropriate home exercise program and pain with function  Symptom irritability: moderate    Assessment details: Kari Erazo is a pleasant 55 y.o. female who presents with chronic L foot and L hip pain. Patient has hx of chronic L hip pain related to a suspected labral tear that was diagnosed approx 10 years ago. In 2025 she was admitted for an 8 day hospital stay that resulted in a drastic decrease in activity level and formation of an ulcer on her L heel. She walked with a significant limp after this, which has contributed to the onset of both foot and hip pain. I suspect that the foot pain was related to a stress reaction to walking on her toe and was an inflammatory response to this acute change in load. I am not concerned about the possibility of stress fracture based on the recent improvements and lack of significant TTP over bony prominence. L hip pain appears to be related to chronic symptoms and was exacerbated due to decrease in activity level and change in gait pattern. Primary movement impairment diagnosis of L ankle hypomobility, L hip hypomobility, L hip weakness. I discussed exam findings at length with patient, and prescribed an initial HEP to address above listed impairments. Patient verbalized understanding of HEP and POC. No further referral appears necessary at this time based upon examination results. Pt. will benefit from skilled PT services to help return patient to status at [facility].             Understanding of Dx/Px/POC: good     Prognosis: good    Goals  Impairment based  "goals  Patient will achieve full hip flexion PROM.   Patient will achieve full hip IR @ 90 deg PROM.   Patient will report 50% reduction in VAS at worst.     Function based goals  Patient will be independent in comprehensive [facility] upon discharge.  Patient will achieve goal FOTO score upon discharge.  Patient will return to community ambulation at [facility].     Plan  Patient would benefit from: skilled physical therapy    Planned therapy interventions: activity modification, manual therapy, motor coordination training, neuromuscular re-education, patient education, self care, therapeutic activities, therapeutic exercise, graded activity, home exercise program, graded exercise, functional ROM exercises and strengthening    Frequency: 1-2x week  Duration in weeks: 8  Plan of Care expiration date: 6/19/2025  Treatment plan discussed with: patient        Subjective    HPI: 10 year hx of \"arthritis and a labral tear\" in the L hip. Ortho consult at the time. She has had physical therapy and injection treatment that was beneficial for managing these symptoms. In Feb 2025 she was admitted to the hospital for scleroderma with renal crisis and was very immobile during this time. She also developed an ulcer on her L heel which caused her to limp. L 5th met also developed some pain during this time due walking abnormally. She began doing L hip exercises and stretching. She was experiencing a \"bumping out\" of the head of the 5th metatarsal. She is very limited in her ability to stand/walk due to pain and also weakness/anemia related to kidney dysfunction.    Pain: L hip and L foot; 0/10 current; 4/10 worst   Aggravating Factors: (hip): standing, R sidelying; (foot): standing/walking, heel raise   Alleviating Factors: rest   Goals: improve walking tolerance      Objective    Hip Passive Range of Motion:   Flexion:   L 100 deg; end range pain      Extension:   L 5 deg            ER @ 90 deg:  L 30 deg    R 60 deg   IR @ 90 deg:  L 0 " deg    R 20 deg      Manual Muscle Testing:   Hip ER:  L 3+/5   Hip IR:   L 4/5   Ankle DF:   L 4/5   Ankle PF:   L 4/5   Ankle eversion: L 4/5   Ankle inversion: L 4/5     Clinical Tests:   FLAKO: neg  FADIR: unable to achieve test position  Scour: neg       Foot Posture  Weight bearing: pes planus     Gait: LLE ER throughout gait     Passive Range of Motion:   Dorsiflexion:   L 0 deg    Plantarflexion:  L full     Inversion:   L full      Eversion:   L full    Great Toe Extension: L 50 deg      Palpation: mild TTP head of 5th met         Pertinent Findings:      POC End Date: 6/19/25                                                                                          Test / Measure  4/24/2025     FOTO (Predicted 60) 41   Hip flexion PROM 100 deg; ERP   Hip IR @ 90 deg PROM 0 deg; ERP           Precautions: HTN, DMII, scleradoma, kidney disease      Manuals 4/24                                                                Neuro Re-Ed                                                                                           Education HEP and POC            Ther Ex             ALEX HEP            Sidelying clamshell HEP            Reverse slamshell HEP            Gastroc stretch HEP            Seated heel raise HEP            Standing heel raise HEP                                      Ther Activity                                       Gait Training                                       Modalities

## 2025-04-25 ENCOUNTER — RESULTS FOLLOW-UP (OUTPATIENT)
Dept: NEPHROLOGY | Facility: CLINIC | Age: 55
End: 2025-04-25

## 2025-04-25 ENCOUNTER — APPOINTMENT (OUTPATIENT)
Dept: LAB | Facility: CLINIC | Age: 55
End: 2025-04-25
Attending: INTERNAL MEDICINE
Payer: COMMERCIAL

## 2025-04-25 DIAGNOSIS — M34.89: ICD-10-CM

## 2025-04-25 DIAGNOSIS — N08: ICD-10-CM

## 2025-04-25 LAB
ALBUMIN SERPL BCG-MCNC: 3.7 G/DL (ref 3.5–5)
ANION GAP SERPL CALCULATED.3IONS-SCNC: 8 MMOL/L (ref 4–13)
BASOPHILS # BLD AUTO: 0.03 THOUSANDS/ÂΜL (ref 0–0.1)
BASOPHILS NFR BLD AUTO: 0 % (ref 0–1)
BUN SERPL-MCNC: 50 MG/DL (ref 5–25)
CALCIUM SERPL-MCNC: 9 MG/DL (ref 8.4–10.2)
CHLORIDE SERPL-SCNC: 106 MMOL/L (ref 96–108)
CO2 SERPL-SCNC: 20 MMOL/L (ref 21–32)
CREAT SERPL-MCNC: 2.89 MG/DL (ref 0.6–1.3)
EOSINOPHIL # BLD AUTO: 0.23 THOUSAND/ÂΜL (ref 0–0.61)
EOSINOPHIL NFR BLD AUTO: 3 % (ref 0–6)
ERYTHROCYTE [DISTWIDTH] IN BLOOD BY AUTOMATED COUNT: 13.7 % (ref 11.6–15.1)
GFR SERPL CREATININE-BSD FRML MDRD: 17 ML/MIN/1.73SQ M
GLUCOSE P FAST SERPL-MCNC: 135 MG/DL (ref 65–99)
HCT VFR BLD AUTO: 32.1 % (ref 34.8–46.1)
HGB BLD-MCNC: 10.2 G/DL (ref 11.5–15.4)
IMM GRANULOCYTES # BLD AUTO: 0.04 THOUSAND/UL (ref 0–0.2)
IMM GRANULOCYTES NFR BLD AUTO: 1 % (ref 0–2)
LDH SERPL-CCNC: 211 U/L (ref 140–271)
LYMPHOCYTES # BLD AUTO: 0.69 THOUSANDS/ÂΜL (ref 0.6–4.47)
LYMPHOCYTES NFR BLD AUTO: 10 % (ref 14–44)
MCH RBC QN AUTO: 29.4 PG (ref 26.8–34.3)
MCHC RBC AUTO-ENTMCNC: 31.8 G/DL (ref 31.4–37.4)
MCV RBC AUTO: 93 FL (ref 82–98)
MONOCYTES # BLD AUTO: 0.69 THOUSAND/ÂΜL (ref 0.17–1.22)
MONOCYTES NFR BLD AUTO: 10 % (ref 4–12)
NEUTROPHILS # BLD AUTO: 5.33 THOUSANDS/ÂΜL (ref 1.85–7.62)
NEUTS SEG NFR BLD AUTO: 76 % (ref 43–75)
NRBC BLD AUTO-RTO: 0 /100 WBCS
PHOSPHATE SERPL-MCNC: 4 MG/DL (ref 2.7–4.5)
PLATELET # BLD AUTO: 303 THOUSANDS/UL (ref 149–390)
PMV BLD AUTO: 8.8 FL (ref 8.9–12.7)
POTASSIUM SERPL-SCNC: 5 MMOL/L (ref 3.5–5.3)
RBC # BLD AUTO: 3.47 MILLION/UL (ref 3.81–5.12)
SODIUM SERPL-SCNC: 134 MMOL/L (ref 135–147)
WBC # BLD AUTO: 7.01 THOUSAND/UL (ref 4.31–10.16)

## 2025-04-25 PROCEDURE — 83615 LACTATE (LD) (LDH) ENZYME: CPT

## 2025-04-25 PROCEDURE — 80069 RENAL FUNCTION PANEL: CPT

## 2025-04-25 PROCEDURE — 85025 COMPLETE CBC W/AUTO DIFF WBC: CPT

## 2025-04-25 PROCEDURE — 36415 COLL VENOUS BLD VENIPUNCTURE: CPT

## 2025-04-25 PROCEDURE — 83010 ASSAY OF HAPTOGLOBIN QUANT: CPT

## 2025-04-26 LAB — HAPTOGLOB SERPL-MCNC: 103 MG/DL (ref 33–346)

## 2025-04-28 ENCOUNTER — APPOINTMENT (OUTPATIENT)
Dept: OCCUPATIONAL THERAPY | Facility: CLINIC | Age: 55
End: 2025-04-28
Payer: COMMERCIAL

## 2025-04-30 ENCOUNTER — OFFICE VISIT (OUTPATIENT)
Dept: PHYSICAL THERAPY | Facility: CLINIC | Age: 55
End: 2025-04-30
Payer: COMMERCIAL

## 2025-04-30 ENCOUNTER — OFFICE VISIT (OUTPATIENT)
Dept: OCCUPATIONAL THERAPY | Facility: CLINIC | Age: 55
End: 2025-04-30
Payer: COMMERCIAL

## 2025-04-30 DIAGNOSIS — M34.9 ACUTE SCLERODERMA RENAL CRISIS (HCC): Primary | ICD-10-CM

## 2025-04-30 DIAGNOSIS — N28.0 ACUTE SCLERODERMA RENAL CRISIS (HCC): Primary | ICD-10-CM

## 2025-04-30 DIAGNOSIS — M25.552 CHRONIC LEFT HIP PAIN: ICD-10-CM

## 2025-04-30 DIAGNOSIS — G89.29 CHRONIC LEFT HIP PAIN: ICD-10-CM

## 2025-04-30 DIAGNOSIS — M25.572 ACUTE LEFT ANKLE PAIN: Primary | ICD-10-CM

## 2025-04-30 PROCEDURE — 97140 MANUAL THERAPY 1/> REGIONS: CPT

## 2025-04-30 PROCEDURE — 97110 THERAPEUTIC EXERCISES: CPT | Performed by: PHYSICAL THERAPIST

## 2025-04-30 PROCEDURE — 97110 THERAPEUTIC EXERCISES: CPT

## 2025-04-30 NOTE — PROGRESS NOTES
Daily Note     Today's date: 2025  Patient name: Kari Erazo  : 1970  MRN: [id number]  Referring provider: Zully Reynaga MD  Dx:   Encounter Diagnosis     ICD-10-CM    1. Acute left ankle pain  M25.572       2. Chronic left hip pain  M25.552     G89.29                      Subjective: Patient reports that she feels improvement in her sx      Objective: See treatment diary below      Assessment: Tolerated treatment well. Patient would benefit from continued PT      Plan: Continue per plan of care.      Pertinent Findings:      POC End Date: 25                                                                                          Test / Measure  2025     FOTO (Predicted 60) 41   Hip flexion PROM 100 deg; ERP   Hip IR @ 90 deg PROM 0 deg; ERP           Precautions: HTN, DMII, scleradoma, kidney disease      Manuals                                                                Neuro Re-Ed                                                                                           Education HEP and POC            Ther Ex             ALEX HEP            Sidelying clamshell HEP GTB x 20           Reverse clamshell HEP x20           Gastroc stretch HEP Wedge st 5 x :10           Seated heel raise HEP C/tennis ball x 20           Standing heel raise HEP 10           Bike  5'                        Ther Activity                                       Gait Training                                       Modalities

## 2025-04-30 NOTE — PROGRESS NOTES
Daily Note     Today's date: 2025  Patient name: Kari Erazo  : 1970  MRN: [id number]  Referring provider: No ref. provider found  Dx:   Encounter Diagnosis     ICD-10-CM    1. Acute scleroderma renal crisis (HCC)  N28.0     M34.9                      Subjective: Above the elbow on the R arm feels a little tighter    Objective: See treatment diary below      Assessment: Tolerated treatment well. Completed exercises as outlined below with no change as patient is currently challenged by resistance. Tissue density continues to decrease subjectively. Discontinued [place] in exchange of STM.     Plan: Continue per plan of care.  Progress treatment as tolerated.       Precautions: Raynaud's, Acute scleroderma     Manuals 3/31 4/02 4/7 4/9 4/14 4/17 4/21 4/23 4/30    IASTM  16' 16' 16' 16' 16 16 16 STM 16'    MEM                                       Neuro Re-Ed                                                                                                        Ther Ex             HEP Digit/Wrist/Forearm AROM/PROM            Forearm PROM  6 5' 5' 5' 5 5 5 5    Wrist PROM  8 8' 8' 8' 8 8 8 8    Digit PROM  8 8' 8' 8' 8 8 8 8    Thumb PROM  2 2' 2' 2' 2 2 2 2    Dex balls             Wrist Maze     3x b/l 3x bl 3x bl X b/l X b/l    Digiflex        R iso x10    G comp x20 R iso x10    G comp x20    Power web        R center and rim x20 R center and rim x20    Ext web        Y x 20 Y x 20    Flex bar        R x 20 F/E R x 20 F/E                              Ther Activity             Keypegs   1/2 board each hand 1/2 board each finger  x1 x1 x1 x1    [place] sort     1/2 each hand opp        [employer]                                       Modalities             MHP             Paraffin w/ MH   8' 5' 5' 5' 5' 5' 5'

## 2025-05-07 ENCOUNTER — OFFICE VISIT (OUTPATIENT)
Dept: OCCUPATIONAL THERAPY | Facility: CLINIC | Age: 55
End: 2025-05-07
Payer: COMMERCIAL

## 2025-05-07 ENCOUNTER — APPOINTMENT (OUTPATIENT)
Dept: PHYSICAL THERAPY | Facility: CLINIC | Age: 55
End: 2025-05-07
Payer: COMMERCIAL

## 2025-05-07 DIAGNOSIS — N28.0 ACUTE SCLERODERMA RENAL CRISIS (HCC): Primary | ICD-10-CM

## 2025-05-07 DIAGNOSIS — M34.9 ACUTE SCLERODERMA RENAL CRISIS (HCC): Primary | ICD-10-CM

## 2025-05-07 PROCEDURE — 97140 MANUAL THERAPY 1/> REGIONS: CPT

## 2025-05-07 PROCEDURE — 97110 THERAPEUTIC EXERCISES: CPT

## 2025-05-07 NOTE — PROGRESS NOTES
"Daily Note     Today's date: 2025  Patient name: Kari Erazo  : 1970  MRN: [id number]  Referring provider: Zully Reynaga MD  Dx:   Encounter Diagnosis     ICD-10-CM    1. Acute scleroderma renal crisis (HCC)  N28.0     M34.9                      Subjective: \"I notice a difference\".     Objective: See treatment diary below      Assessment: Tolerated treatment well. Continued with current exercise plan. Mildly improved digit and wrist PROM    Plan: Continue per plan of care.  Progress treatment as tolerated.       Precautions: Raynaud's, Acute scleroderma     Manuals 3/31 4/02 4/7 4/9 4/14 4/17 4/21 4/23 4/30 5/7   IASTM  16' 16' 16' 16' 16 16 16 STM 16' STM 10'   MEM                                       Neuro Re-Ed                                                                                                        Ther Ex             HEP Digit/Wrist/Forearm AROM/PROM            Forearm PROM  6 5' 5' 5' 5 5 5 5 5   Wrist PROM  8 8' 8' 8' 8 8 8 8 8   Digit PROM  8 8' 8' 8' 8 8 8 8 8   Thumb PROM  2 2' 2' 2' 2 2 2 2 2   Dex balls             Wrist Maze     3x b/l 3x bl 3x bl X b/l X b/l x5   Digiflex        R iso x10    G comp x20 R iso x10    G comp x20 R iso x10    G comp x20   Power web        R center and rim x20 R center and rim x20 R center and rim x20   Ext web        Y x 20 Y x 20 Y x 20   Flex bar        R x 20 F/E R x 20 F/E R x 20 F/E                             Ther Activity             Keypegs   1/2 board each hand 1/2 board each finger  x1 x1 x1 x1 x1   [place] sort     1/2 each hand opp        [employer]                                       Modalities             MHP             Paraffin w/ MH   8' 5' 5' 5' 5' 5' 5' 5'        "

## 2025-05-08 ENCOUNTER — DOCUMENTATION (OUTPATIENT)
Dept: OTHER | Facility: HOSPITAL | Age: 55
End: 2025-05-08

## 2025-05-08 ENCOUNTER — RESULTS FOLLOW-UP (OUTPATIENT)
Dept: OTHER | Facility: HOSPITAL | Age: 55
End: 2025-05-08

## 2025-05-08 ENCOUNTER — APPOINTMENT (OUTPATIENT)
Dept: LAB | Facility: CLINIC | Age: 55
End: 2025-05-08
Attending: INTERNAL MEDICINE
Payer: COMMERCIAL

## 2025-05-08 DIAGNOSIS — N17.9 ACUTE KIDNEY INJURY (HCC): Primary | ICD-10-CM

## 2025-05-08 DIAGNOSIS — D63.8 ANEMIA IN OTHER CHRONIC DISEASES CLASSIFIED ELSEWHERE: ICD-10-CM

## 2025-05-08 DIAGNOSIS — N28.0 ACUTE SCLERODERMA RENAL CRISIS (HCC): ICD-10-CM

## 2025-05-08 DIAGNOSIS — M34.89: ICD-10-CM

## 2025-05-08 DIAGNOSIS — M34.9 ACUTE SCLERODERMA RENAL CRISIS (HCC): ICD-10-CM

## 2025-05-08 DIAGNOSIS — N08: ICD-10-CM

## 2025-05-08 LAB
ALBUMIN SERPL BCG-MCNC: 3.7 G/DL (ref 3.5–5)
ANION GAP SERPL CALCULATED.3IONS-SCNC: 7 MMOL/L (ref 4–13)
BASOPHILS # BLD AUTO: 0.03 THOUSANDS/ÂΜL (ref 0–0.1)
BASOPHILS NFR BLD AUTO: 1 % (ref 0–1)
BUN SERPL-MCNC: 39 MG/DL (ref 5–25)
CALCIUM SERPL-MCNC: 8.7 MG/DL (ref 8.4–10.2)
CHLORIDE SERPL-SCNC: 108 MMOL/L (ref 96–108)
CO2 SERPL-SCNC: 18 MMOL/L (ref 21–32)
CREAT SERPL-MCNC: 2.89 MG/DL (ref 0.6–1.3)
EOSINOPHIL # BLD AUTO: 0.22 THOUSAND/ÂΜL (ref 0–0.61)
EOSINOPHIL NFR BLD AUTO: 3 % (ref 0–6)
ERYTHROCYTE [DISTWIDTH] IN BLOOD BY AUTOMATED COUNT: 13.5 % (ref 11.6–15.1)
GFR SERPL CREATININE-BSD FRML MDRD: 17 ML/MIN/1.73SQ M
GLUCOSE SERPL-MCNC: 120 MG/DL (ref 65–140)
HCT VFR BLD AUTO: 28.4 % (ref 34.8–46.1)
HGB BLD-MCNC: 9 G/DL (ref 11.5–15.4)
IMM GRANULOCYTES # BLD AUTO: 0.03 THOUSAND/UL (ref 0–0.2)
IMM GRANULOCYTES NFR BLD AUTO: 1 % (ref 0–2)
LDH SERPL-CCNC: 182 U/L (ref 140–271)
LYMPHOCYTES # BLD AUTO: 0.61 THOUSANDS/ÂΜL (ref 0.6–4.47)
LYMPHOCYTES NFR BLD AUTO: 10 % (ref 14–44)
MCH RBC QN AUTO: 29.2 PG (ref 26.8–34.3)
MCHC RBC AUTO-ENTMCNC: 31.7 G/DL (ref 31.4–37.4)
MCV RBC AUTO: 92 FL (ref 82–98)
MONOCYTES # BLD AUTO: 0.59 THOUSAND/ÂΜL (ref 0.17–1.22)
MONOCYTES NFR BLD AUTO: 9 % (ref 4–12)
NEUTROPHILS # BLD AUTO: 4.95 THOUSANDS/ÂΜL (ref 1.85–7.62)
NEUTS SEG NFR BLD AUTO: 76 % (ref 43–75)
NRBC BLD AUTO-RTO: 0 /100 WBCS
PHOSPHATE SERPL-MCNC: 3.8 MG/DL (ref 2.7–4.5)
PLATELET # BLD AUTO: 278 THOUSANDS/UL (ref 149–390)
PMV BLD AUTO: 8.5 FL (ref 8.9–12.7)
POTASSIUM SERPL-SCNC: 4.5 MMOL/L (ref 3.5–5.3)
RBC # BLD AUTO: 3.08 MILLION/UL (ref 3.81–5.12)
SODIUM SERPL-SCNC: 133 MMOL/L (ref 135–147)
WBC # BLD AUTO: 6.43 THOUSAND/UL (ref 4.31–10.16)

## 2025-05-08 PROCEDURE — 83615 LACTATE (LD) (LDH) ENZYME: CPT

## 2025-05-08 PROCEDURE — 83010 ASSAY OF HAPTOGLOBIN QUANT: CPT

## 2025-05-08 PROCEDURE — 85732 THROMBOPLASTIN TIME PARTIAL: CPT

## 2025-05-08 PROCEDURE — 85670 THROMBIN TIME PLASMA: CPT

## 2025-05-08 PROCEDURE — 85598 HEXAGNAL PHOSPH PLTLT NEUTRL: CPT

## 2025-05-08 PROCEDURE — 85025 COMPLETE CBC W/AUTO DIFF WBC: CPT

## 2025-05-08 PROCEDURE — 80069 RENAL FUNCTION PANEL: CPT

## 2025-05-08 PROCEDURE — 85613 RUSSELL VIPER VENOM DILUTED: CPT

## 2025-05-08 PROCEDURE — 85705 THROMBOPLASTIN INHIBITION: CPT

## 2025-05-08 RX ORDER — SODIUM BICARBONATE 325 MG/1
325 TABLET ORAL 2 TIMES DAILY
Qty: 60 TABLET | Refills: 3 | Status: SHIPPED | OUTPATIENT
Start: 2025-05-08 | End: 2025-06-07

## 2025-05-09 LAB — HAPTOGLOB SERPL-MCNC: 105 MG/DL (ref 33–346)

## 2025-05-10 ENCOUNTER — RESULTS FOLLOW-UP (OUTPATIENT)
Dept: OTHER | Facility: HOSPITAL | Age: 55
End: 2025-05-10

## 2025-05-10 ENCOUNTER — APPOINTMENT (OUTPATIENT)
Dept: LAB | Facility: CLINIC | Age: 55
End: 2025-05-10
Payer: COMMERCIAL

## 2025-05-10 DIAGNOSIS — M34.9 ACUTE SCLERODERMA RENAL CRISIS (HCC): ICD-10-CM

## 2025-05-10 DIAGNOSIS — N17.9 ACUTE KIDNEY INJURY (HCC): Primary | ICD-10-CM

## 2025-05-10 DIAGNOSIS — D63.8 ANEMIA IN OTHER CHRONIC DISEASES CLASSIFIED ELSEWHERE: ICD-10-CM

## 2025-05-10 DIAGNOSIS — D63.1 ANEMIA IN CHRONIC KIDNEY DISEASE, UNSPECIFIED CKD STAGE: ICD-10-CM

## 2025-05-10 DIAGNOSIS — N18.9 ANEMIA IN CHRONIC KIDNEY DISEASE, UNSPECIFIED CKD STAGE: ICD-10-CM

## 2025-05-10 DIAGNOSIS — N28.0 ACUTE SCLERODERMA RENAL CRISIS (HCC): ICD-10-CM

## 2025-05-10 LAB
FERRITIN SERPL-MCNC: 168 NG/ML (ref 30–307)
IRON SATN MFR SERPL: 38 % (ref 15–50)
IRON SERPL-MCNC: 93 UG/DL (ref 50–212)
TIBC SERPL-MCNC: 246.4 UG/DL (ref 250–450)
TRANSFERRIN SERPL-MCNC: 176 MG/DL (ref 203–362)
UIBC SERPL-MCNC: 153 UG/DL (ref 155–355)

## 2025-05-10 PROCEDURE — 36415 COLL VENOUS BLD VENIPUNCTURE: CPT

## 2025-05-10 PROCEDURE — 83540 ASSAY OF IRON: CPT

## 2025-05-10 PROCEDURE — 82728 ASSAY OF FERRITIN: CPT

## 2025-05-10 PROCEDURE — 83550 IRON BINDING TEST: CPT

## 2025-05-13 ENCOUNTER — HOSPITAL ENCOUNTER (OUTPATIENT)
Dept: INFUSION CENTER | Facility: CLINIC | Age: 55
Discharge: HOME | End: 2025-05-13
Attending: INTERNAL MEDICINE
Payer: COMMERCIAL

## 2025-05-13 VITALS — DIASTOLIC BLOOD PRESSURE: 71 MMHG | SYSTOLIC BLOOD PRESSURE: 123 MMHG

## 2025-05-13 DIAGNOSIS — N18.9 ANEMIA IN CHRONIC KIDNEY DISEASE, UNSPECIFIED CKD STAGE: Primary | ICD-10-CM

## 2025-05-13 DIAGNOSIS — D63.1 ANEMIA IN CHRONIC KIDNEY DISEASE, UNSPECIFIED CKD STAGE: Primary | ICD-10-CM

## 2025-05-13 DIAGNOSIS — D63.8 ANEMIA IN OTHER CHRONIC DISEASES CLASSIFIED ELSEWHERE: ICD-10-CM

## 2025-05-13 DIAGNOSIS — M34.9 ACUTE SCLERODERMA RENAL CRISIS (HCC): ICD-10-CM

## 2025-05-13 DIAGNOSIS — N28.0 ACUTE SCLERODERMA RENAL CRISIS (HCC): ICD-10-CM

## 2025-05-13 PROCEDURE — 96372 THER/PROPH/DIAG INJ SC/IM: CPT

## 2025-05-13 RX ADMIN — DARBEPOETIN ALFA 100 MCG: 100 INJECTION, SOLUTION INTRAVENOUS; SUBCUTANEOUS at 15:05

## 2025-05-13 NOTE — PLAN OF CARE
Problem: Knowledge Deficit  Goal: Patient/family/caregiver demonstrates understanding of disease process, treatment plan, medications, and discharge instructions  Description: Complete learning assessment and assess knowledge base.Interventions:- Provide teaching at level of understanding- Provide teaching via preferred learning methods  Outcome: Progressing     
as per past

## 2025-05-13 NOTE — PROGRESS NOTES
Patient arrives for [facility], BP within parameters. Patient tolerated treatment well, injection given in right arm. No future appts needed at this time.

## 2025-05-15 ENCOUNTER — APPOINTMENT (OUTPATIENT)
Dept: OCCUPATIONAL THERAPY | Facility: CLINIC | Age: 55
End: 2025-05-15
Payer: COMMERCIAL

## 2025-05-15 ENCOUNTER — APPOINTMENT (OUTPATIENT)
Dept: PHYSICAL THERAPY | Facility: CLINIC | Age: 55
End: 2025-05-15
Payer: COMMERCIAL

## 2025-05-15 LAB
APTT HEX PL PPP: 2 SEC (ref 0–11)
APTT SCREEN TO CONFIRM RATIO: 0.92 RATIO (ref 0–1.34)
APTT-LA 1H NP PPP: 41.7 SEC (ref 0–40.5)
APTT-LA IMM 4:1 NP PPP: 40.5 SEC (ref 0–40.5)
CONFIRM APTT/NORMAL: 32.7 SEC (ref 0–47.6)
LA PPP-IMP: ABNORMAL
SCREEN APTT: 44.9 SEC (ref 0–43.5)
SCREEN DRVVT: 43.5 SEC (ref 0–47)
THROMBIN TIME: 16.7 SEC (ref 0–23)

## 2025-05-17 DIAGNOSIS — E78.2 MIXED HYPERLIPIDEMIA: ICD-10-CM

## 2025-05-19 RX ORDER — ATORVASTATIN CALCIUM 10 MG/1
10 TABLET, FILM COATED ORAL DAILY
Qty: 90 TABLET | Refills: 1 | Status: SHIPPED | OUTPATIENT
Start: 2025-05-19

## 2025-05-21 ENCOUNTER — HOSPITAL ENCOUNTER (OUTPATIENT)
Dept: MAMMOGRAPHY | Facility: HOSPITAL | Age: 55
Discharge: HOME | End: 2025-05-21
Payer: COMMERCIAL

## 2025-05-21 VITALS — HEIGHT: 64 IN | WEIGHT: 182.98 LBS | BODY MASS INDEX: 31.24 KG/M2

## 2025-05-21 DIAGNOSIS — Z12.31 VISIT FOR SCREENING MAMMOGRAM: ICD-10-CM

## 2025-05-21 PROCEDURE — 77063 BREAST TOMOSYNTHESIS BI: CPT

## 2025-05-21 PROCEDURE — 77067 SCR MAMMO BI INCL CAD: CPT

## 2025-05-22 ENCOUNTER — RESULTS FOLLOW-UP (OUTPATIENT)
Dept: NEPHROLOGY | Facility: CLINIC | Age: 55
End: 2025-05-22

## 2025-05-22 ENCOUNTER — OFFICE VISIT (OUTPATIENT)
Dept: PHYSICAL THERAPY | Facility: CLINIC | Age: 55
End: 2025-05-22
Payer: COMMERCIAL

## 2025-05-22 ENCOUNTER — APPOINTMENT (OUTPATIENT)
Dept: LAB | Facility: CLINIC | Age: 55
End: 2025-05-22
Attending: PREVENTIVE MEDICINE
Payer: COMMERCIAL

## 2025-05-22 ENCOUNTER — OFFICE VISIT (OUTPATIENT)
Dept: OCCUPATIONAL THERAPY | Facility: CLINIC | Age: 55
End: 2025-05-22
Payer: COMMERCIAL

## 2025-05-22 ENCOUNTER — APPOINTMENT (OUTPATIENT)
Dept: LAB | Facility: CLINIC | Age: 55
End: 2025-05-22
Payer: COMMERCIAL

## 2025-05-22 DIAGNOSIS — M25.552 CHRONIC LEFT HIP PAIN: ICD-10-CM

## 2025-05-22 DIAGNOSIS — M34.9 ACUTE SCLERODERMA RENAL CRISIS (HCC): Primary | ICD-10-CM

## 2025-05-22 DIAGNOSIS — E53.8 VITAMIN B12 DEFICIENCY: ICD-10-CM

## 2025-05-22 DIAGNOSIS — Z00.8 HEALTH EXAMINATION IN POPULATION SURVEY: ICD-10-CM

## 2025-05-22 DIAGNOSIS — M25.572 ACUTE LEFT ANKLE PAIN: Primary | ICD-10-CM

## 2025-05-22 DIAGNOSIS — E11.9 CONTROLLED TYPE 2 DIABETES MELLITUS WITHOUT COMPLICATION, WITHOUT LONG-TERM CURRENT USE OF INSULIN (HCC): Chronic | ICD-10-CM

## 2025-05-22 DIAGNOSIS — M34.89: ICD-10-CM

## 2025-05-22 DIAGNOSIS — I10 PRIMARY HYPERTENSION: Chronic | ICD-10-CM

## 2025-05-22 DIAGNOSIS — N08: ICD-10-CM

## 2025-05-22 DIAGNOSIS — G89.29 CHRONIC LEFT HIP PAIN: ICD-10-CM

## 2025-05-22 DIAGNOSIS — N28.0 ACUTE SCLERODERMA RENAL CRISIS (HCC): Primary | ICD-10-CM

## 2025-05-22 DIAGNOSIS — E78.01 FAMILIAL HYPERCHOLESTEROLEMIA: Chronic | ICD-10-CM

## 2025-05-22 LAB
ALBUMIN SERPL BCG-MCNC: 3.7 G/DL (ref 3.5–5)
ALP SERPL-CCNC: 34 U/L (ref 34–104)
ALT SERPL W P-5'-P-CCNC: 8 U/L (ref 7–52)
ANION GAP SERPL CALCULATED.3IONS-SCNC: 9 MMOL/L (ref 4–13)
AST SERPL W P-5'-P-CCNC: 16 U/L (ref 13–39)
BASOPHILS # BLD AUTO: 0.04 THOUSANDS/ÂΜL (ref 0–0.1)
BASOPHILS NFR BLD AUTO: 1 % (ref 0–1)
BILIRUB SERPL-MCNC: 0.36 MG/DL (ref 0.2–1)
BUN SERPL-MCNC: 28 MG/DL (ref 5–25)
CALCIUM SERPL-MCNC: 8.3 MG/DL (ref 8.4–10.2)
CHLORIDE SERPL-SCNC: 107 MMOL/L (ref 96–108)
CHOLEST SERPL-MCNC: 88 MG/DL (ref ?–200)
CO2 SERPL-SCNC: 19 MMOL/L (ref 21–32)
CREAT SERPL-MCNC: 2.43 MG/DL (ref 0.6–1.3)
EOSINOPHIL # BLD AUTO: 0.27 THOUSAND/ÂΜL (ref 0–0.61)
EOSINOPHIL NFR BLD AUTO: 4 % (ref 0–6)
ERYTHROCYTE [DISTWIDTH] IN BLOOD BY AUTOMATED COUNT: 14.7 % (ref 11.6–15.1)
EST. AVERAGE GLUCOSE BLD GHB EST-MCNC: 143 MG/DL
GFR SERPL CREATININE-BSD FRML MDRD: 22 ML/MIN/1.73SQ M
GLUCOSE P FAST SERPL-MCNC: 109 MG/DL (ref 65–99)
HBA1C MFR BLD: 6.6 %
HCT VFR BLD AUTO: 29.6 % (ref 34.8–46.1)
HDLC SERPL-MCNC: 19 MG/DL
HGB BLD-MCNC: 9.4 G/DL (ref 11.5–15.4)
IMM GRANULOCYTES # BLD AUTO: 0.03 THOUSAND/UL (ref 0–0.2)
IMM GRANULOCYTES NFR BLD AUTO: 0 % (ref 0–2)
LDH SERPL-CCNC: 192 U/L (ref 140–271)
LDLC SERPL CALC-MCNC: 17 MG/DL (ref 0–100)
LYMPHOCYTES # BLD AUTO: 0.73 THOUSANDS/ÂΜL (ref 0.6–4.47)
LYMPHOCYTES NFR BLD AUTO: 11 % (ref 14–44)
MCH RBC QN AUTO: 28.7 PG (ref 26.8–34.3)
MCHC RBC AUTO-ENTMCNC: 31.8 G/DL (ref 31.4–37.4)
MCV RBC AUTO: 91 FL (ref 82–98)
MONOCYTES # BLD AUTO: 0.54 THOUSAND/ÂΜL (ref 0.17–1.22)
MONOCYTES NFR BLD AUTO: 8 % (ref 4–12)
NEUTROPHILS # BLD AUTO: 5.12 THOUSANDS/ÂΜL (ref 1.85–7.62)
NEUTS SEG NFR BLD AUTO: 76 % (ref 43–75)
NONHDLC SERPL-MCNC: 69 MG/DL
NRBC BLD AUTO-RTO: 0 /100 WBCS
PHOSPHATE SERPL-MCNC: 2.8 MG/DL (ref 2.7–4.5)
PLATELET # BLD AUTO: 324 THOUSANDS/UL (ref 149–390)
PMV BLD AUTO: 8.6 FL (ref 8.9–12.7)
POTASSIUM SERPL-SCNC: 4 MMOL/L (ref 3.5–5.3)
PROT SERPL-MCNC: 6.4 G/DL (ref 6.4–8.4)
RBC # BLD AUTO: 3.27 MILLION/UL (ref 3.81–5.12)
SODIUM SERPL-SCNC: 135 MMOL/L (ref 135–147)
TRIGL SERPL-MCNC: 262 MG/DL (ref ?–150)
VIT B12 SERPL-MCNC: 371 PG/ML (ref 180–914)
WBC # BLD AUTO: 6.73 THOUSAND/UL (ref 4.31–10.16)

## 2025-05-22 PROCEDURE — 80061 LIPID PANEL: CPT

## 2025-05-22 PROCEDURE — 83036 HEMOGLOBIN GLYCOSYLATED A1C: CPT

## 2025-05-22 PROCEDURE — 83615 LACTATE (LD) (LDH) ENZYME: CPT

## 2025-05-22 PROCEDURE — 80053 COMPREHEN METABOLIC PANEL: CPT

## 2025-05-22 PROCEDURE — 97110 THERAPEUTIC EXERCISES: CPT | Performed by: PHYSICAL THERAPIST

## 2025-05-22 PROCEDURE — 83010 ASSAY OF HAPTOGLOBIN QUANT: CPT

## 2025-05-22 PROCEDURE — 97140 MANUAL THERAPY 1/> REGIONS: CPT | Performed by: PHYSICAL THERAPIST

## 2025-05-22 PROCEDURE — 97140 MANUAL THERAPY 1/> REGIONS: CPT

## 2025-05-22 PROCEDURE — 82607 VITAMIN B-12: CPT

## 2025-05-22 PROCEDURE — 85025 COMPLETE CBC W/AUTO DIFF WBC: CPT

## 2025-05-22 PROCEDURE — 97110 THERAPEUTIC EXERCISES: CPT

## 2025-05-22 PROCEDURE — 36415 COLL VENOUS BLD VENIPUNCTURE: CPT

## 2025-05-22 PROCEDURE — 84100 ASSAY OF PHOSPHORUS: CPT

## 2025-05-22 NOTE — PROGRESS NOTES
"Daily Note     Today's date: 2025  Patient name: Kari Erazo  : 1970  MRN: [id number]  Referring provider: Zully Reynaga MD  Dx:   Encounter Diagnosis     ICD-10-CM    1. Acute left ankle pain  M25.572       2. Chronic left hip pain  M25.552     G89.29                        Subjective: Patient reports feeling \"much better\". L sidelying is feeling a lot better. She has stopped using tape on L foot.       Objective: See treatment diary below    Hip Passive Range of Motion:   Flexion:   L 105 deg      Extension:   L 5 deg            ER @ 90 deg:  L 60 deg    R 60 deg   IR @ 90 deg:  L 5 deg    R 20 deg     Passive Range of Motion:   Dorsiflexion:   L 10 deg     Great Toe Extension: L 60 deg      Palpation: neg TTP head of 5th met    Assessment: Patient has responded well to home program to make improvements in pain, functional capacity, and hip/ankle ROM. Patient continues to demonstrate hip IR @ 90 and hip flexion ROM deficit. This is addressed by addition of rep hip ext w/ IR bias in semi loaded position. Patient demonstrates positive response to this. I advised patient to remain consistent with HEP and follow up in 2-3 weeks to track progress with hip ROM.        Plan: Continue per plan of care.            Pertinent Findings:      POC End Date: 25                                                                                          Test / Measure  2025     FOTO (Predicted 60) 41   Hip flexion PROM 100 deg; ERP   Hip IR @ 90 deg PROM 0 deg; ERP           Precautions: HTN, DMII, scleradoma, kidney disease      Manuals            Hip PROM  TB                                     Objective measures  10 min           Neuro Re-Ed                                                                                           Education HEP and POC HEP updates           Ther Ex             ALEX HEP            Sidelying clamshell HEP GTB x 20           Reverse clamshell HEP x20         "   Gastroc stretch HEP Wedge st 5 x :10           Seated heel raise HEP C/tennis ball x 20           Standing heel raise HEP 10           Bike  5'           Rep hip ext w/ IR bias   20x; HEP                                    Ther Activity                                       Gait Training                                       Modalities

## 2025-05-22 NOTE — PROGRESS NOTES
"Daily Note     Today's date: 2025  Patient name: Kari Erazo  : 1970  MRN: [id number]  Referring provider: Zully Reynaga MD  Dx:   Encounter Diagnosis     ICD-10-CM    1. Acute scleroderma renal crisis (HCC)  N28.0     M34.9                      Subjective: \"I had to change my medications and that has it off\".     Objective: See treatment diary below      Assessment: Tolerated treatment well. Continued with current exercise plan. Patient is most concerned about thumb flexion as this limits their ability to complete work tasks. Tissue density feels as though it is decreasing in the dorsal forearm.    Plan: Continue per plan of care.  Progress treatment as tolerated.       Precautions: Raynaud's, Acute scleroderma     Manuals    IASTM STM 15' 16' 16' 16' 16' 16 16 16 STM 16' STM 10'   MEM                                       Neuro Re-Ed                                                                                                        Ther Ex             HEP             Forearm PROM 5 6 5' 5' 5' 5 5 5 5 5   Wrist PROM 8 8 8' 8' 8' 8 8 8 8 8   Digit PROM 8 8 8' 8' 8' 8 8 8 8 8   Thumb PROM 2 2 2' 2' 2' 2 2 2 2 2   Dex balls             Wrist Maze x5    3x b/l 3x bl 3x bl X b/l X b/l x5   Digiflex        R iso x10    G comp x20 R iso x10    G comp x20 R iso x10    G comp x20   Power web        R center and rim x20 R center and rim x20 R center and rim x20   Ext web        Y x 20 Y x 20 Y x 20   Flex bar        R x 20 F/E R x 20 F/E R x 20 F/E                             Ther Activity             Keypegs   1/2 board each hand 1/2 board each finger  x1 x1 x1 x1 x1   [place] sort     1/2 each hand opp        [employer]                                       Modalities             MHP             Paraffin w/ MH 5'  8' 5' 5' 5' 5' 5' 5' 5'        "

## 2025-05-23 LAB — HAPTOGLOB SERPL-MCNC: 91 MG/DL (ref 33–346)

## 2025-05-27 ENCOUNTER — PATIENT MESSAGE (OUTPATIENT)
Age: 55
End: 2025-05-27

## 2025-05-28 ENCOUNTER — RESULTS FOLLOW-UP (OUTPATIENT)
Dept: LABOR AND DELIVERY | Facility: HOSPITAL | Age: 55
End: 2025-05-28

## 2025-05-28 DIAGNOSIS — Z12.31 ENCOUNTER FOR SCREENING MAMMOGRAM FOR MALIGNANT NEOPLASM OF BREAST: Primary | ICD-10-CM

## 2025-05-28 NOTE — PROGRESS NOTES
Rheumatology Follow-up Visit  Name: Kari Erazo      : 1970      MRN: [id number]  Encounter Provider: Bety Pacheco MD  Encounter Date: 6/3/2025   Encounter department: [facility] [address]  :  Assessment & Plan  Systemic sclerosis (HCC)  55-year-old female who presents for follow-up of diffuse systemic sclerosis with manifestations including renal crisis, Raynaud's, sclerodactyly, telangiectasias, and mild ILD changes on CT.  She was initially started on mycophenolate but was unable to tolerate higher doses due to GI distress so this was transitioned to Myfortic.  She has tolerated the Myfortic much better but continues to have some worsening of her skin thickening with extension up to above the elbow as well as thickening on the chest, neck, and face.  Additionally, notes some pain and increased stiffness primarily in the MCPs affecting the right hand.  Discussed with patient regarding addition of therapy to Myfortic to  address the skin thickening as well as some of the other symptoms including joint pain and mild ILD changes.  We had previously discussed regarding IVIG but considering her current renal function as well as the time this would require to get infused, I do not think this is the best option currently.  We did discuss use of Benlysta which has been used to treat both skin manifestations as well as is under investigation for ILD.  Patient was agreeable to this.  I will plan to review this with you [facility] team and let patient know.  In the meantime, we will plan to continue with Myfortic 720 mg twice daily with continued lab monitoring.  She continues to follow with nephrology for management of her renal dysfunction.  She will be following up with pulmonary medicine in the future with updated PFTs.  For now, we will plan to see her back in about 2 months.  Orders:    Hepatic function panel; Future    Raynaud's disease without gangrene         Gastroesophageal reflux  disease with esophagitis without hemorrhage         ILD (interstitial lung disease) (HCC)         Long-term use of immunosuppressant medication         High risk medication use             History of Present Illness   HPI  Kari Erazo is a 55 y.o. female who presents for follow-up.    Interval History:  Patient reports she is tolerating the Myfortic better than the mycophenolate.  She increased up to 2 tablets twice a day and so far no major GI issues.  Continuing with OT.  Some areas of skin thickening on the forearm seem slightly improved but other areas on upper arm chest face seem a little worse.  Having some pain around the MCPs primarily on the right hand.  She is back to work -this week first week of full days.  Feeling fatigued and often needing to nap when she gets home.  Back on torsemide as needed which is helping swelling.    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  She also reported new onset Raynaud's over the preceding 2 to 3 years. Initial exam showed sclerodactyly up to the MCPs of the bilateral hands with periungual erythema and nailfold capillary changes.  She also had noticeable periorbital edema with swelling of the bilateral feet.  Muscle strength was 5 out of 5 throughout.  She reported a prior diagnosis of PsA, but never required specific immunosuppressing treatment.  Initial differential included systemic sclerosis versus myositis.  Workup was significant for high titer JAYESH 1: 1280 and nucleolar pattern and positive RNA polymerase III at 175; myomarker with SSA 52Kd 46, +LAC; negative RF, CCP, SSA, SSB, RNP, Chavez, SCL 70 centromere, Kassie 1, Beta-2, cardiolipin; normal CK, aldolase.  Shortly after her initial visit, patient was found to have acute kidney injury as well as increasing proteinuria with accelerated hypertension.  She was  "admitted to the hospital due to concern for scleroderma renal crisis likely precipitated by previous steroid use.  Additional workup showed evidence of TMA.  She underwent kidney biopsy with results c/w SRC.  She was started on captopril for treatment with improvement in her blood pressure.  Nifedipine which she had been on prior to admission was also increased to help with Raynaud's symptoms.  She underwent TTE which showed abnormal diastolic dysfunction with a small pericardial effusion and without evidence of pulmonary hypertension.  She did undergo MRI of the thigh to evaluate for myositis which was negative.  A high-resolution CT was performed which showed nonspecific mild bibasilar juxtapleural reticulation and mild subtle scattered groundglass density in both lungs. Baseline PFTs showed mild restrictive lung disease.     Started on Cellcept in Feb 2025, but due to persistent GI symptoms we switched to Myfortic in late May 2025.      Review of Systems  Complete ROS conducted as per HPI.   +shortness of breath primarly on exertion  +fatigue  +raynaud's  +edema    Objective   /78 (BP Location: Left arm, Patient Position: Sitting, Cuff Size: Standard)   Pulse 105   Temp 98.4 °F (36.9 °C) (Tympanic)   Ht 5' 4\" (1.626 m)   Wt 82.6 kg (182 lb)   SpO2 96%   BMI 31.24 kg/m²      Physical Exam  Physical Exam  Constitutional: well appearing, no acute distress  HEENT: normocephalic, sclera clear,+teleangiectasia on lips  Neck: supple, no palpable cervical adenopathy  CV: regular rate and rhythm  Pulm: normal respiratory effort, comfortable on room air  Skin: Bilateral sclerodactyly of both hands up to and extending just past posterior elbow, thickening of skin on chest and around neck, diminished oral aperture, few telangiectasias on the hands  Extremities: warm and well perfused, +edema to about mid shin  MSK: No significant tenderness on palpation      Labs and Imaging  I have personally reviewed pertinent " labs and imaging.

## 2025-05-29 ENCOUNTER — OFFICE VISIT (OUTPATIENT)
Dept: PHYSICAL THERAPY | Facility: CLINIC | Age: 55
End: 2025-05-29
Payer: COMMERCIAL

## 2025-05-29 ENCOUNTER — EVALUATION (OUTPATIENT)
Dept: OCCUPATIONAL THERAPY | Facility: CLINIC | Age: 55
End: 2025-05-29
Payer: COMMERCIAL

## 2025-05-29 DIAGNOSIS — G89.29 CHRONIC LEFT HIP PAIN: ICD-10-CM

## 2025-05-29 DIAGNOSIS — M34.9 ACUTE SCLERODERMA RENAL CRISIS (HCC): Primary | ICD-10-CM

## 2025-05-29 DIAGNOSIS — N28.0 ACUTE SCLERODERMA RENAL CRISIS (HCC): Primary | ICD-10-CM

## 2025-05-29 DIAGNOSIS — M25.552 CHRONIC LEFT HIP PAIN: ICD-10-CM

## 2025-05-29 DIAGNOSIS — M25.572 ACUTE LEFT ANKLE PAIN: Primary | ICD-10-CM

## 2025-05-29 PROCEDURE — 97140 MANUAL THERAPY 1/> REGIONS: CPT

## 2025-05-29 PROCEDURE — 97140 MANUAL THERAPY 1/> REGIONS: CPT | Performed by: PHYSICAL THERAPIST

## 2025-05-29 PROCEDURE — 97110 THERAPEUTIC EXERCISES: CPT

## 2025-05-29 PROCEDURE — 97112 NEUROMUSCULAR REEDUCATION: CPT | Performed by: PHYSICAL THERAPIST

## 2025-05-29 NOTE — PROGRESS NOTES
PT Discharge    Today's date: 2025  Patient name: Kari Erazo  : 1970  MRN: [id number]  Referring provider: Zully Reynaga MD  Dx:   Encounter Diagnosis     ICD-10-CM    1. Acute left ankle pain  M25.572       2. Chronic left hip pain  M25.552     G89.29                          Subjective: Patient has been compliant with hip flexor stretching.       Objective: See treatment diary below    Hip Passive Range of Motion:   Flexion:   L 110 deg      Extension:   L 5 deg            ER @ 90 deg:  L 60 deg    R 60 deg   IR @ 90 deg:  L 10 deg    R 20 deg     Passive Range of Motion:   Dorsiflexion:   L 10 deg     Great Toe Extension: L 60 deg      Palpation: neg TTP head of 5th met    Assessment: PROM hip flexion and IR @ 90 are improved from last visit. There is further improvement after joint mobilization. I discussed how this indicates that ROM has the capacity to improve and will take more time stretching to make those improvements. Patient was receptive to this discussion. I will not schedule any future appt at this time. Patient may follow up as needed in future.        Plan: D/C to HEP.            Pertinent Findings:      POC End Date: 25                                                                                          Test / Measure  2025     FOTO (Predicted 60) 41   Hip flexion PROM 100 deg; ERP   Hip IR @ 90 deg PROM 0 deg; ERP           Precautions: HTN, DMII, scleradoma, kidney disease      Manuals          Hip PROM  TB           Hip lateral glide    TB; gr IV                      Objective measures  10 min           Neuro Re-Ed                                                                                           Education HEP and POC HEP updates  D/c HEP         Ther Ex             ALEX HEP            Sidelying clamshell HEP GTB x 20           Reverse clamshell HEP x20           Gastroc stretch HEP Wedge st 5 x :10           Seated heel raise HEP  C/tennis ball x 20           Standing heel raise HEP 10           Bike  5'           Rep hip ext w/ IR bias   20x; HEP 20x                                   Ther Activity                                       Gait Training                                       Modalities

## 2025-05-29 NOTE — PROGRESS NOTES
OT Evaluation     Today's date: 2025  Patient name: Kari Erazo  : 1970  MRN: [id number]  Referring provider: Zully Reynaga MD  Dx:   Encounter Diagnosis     ICD-10-CM    1. Acute scleroderma renal crisis (HCC)  N28.0     M34.9                      Assessment  Impairments: abnormal or restricted ROM, abnormal movement, activity intolerance, impaired physical strength, lacks appropriate home exercise program, pain with function and weight-bearing intolerance  Symptom irritability: low    Assessment details: Patient presents to OP OT hand therapy services secondary to Acute scleroderma. Patient symptoms began around 6 months ago and resulted in a admission to the hospital. Patient presents with significant tightness of the skin and muscles of the bilateral hands and forearms. Patient displays significantly decreased wrist and digit flexion. Patient is unable to form composite fists bilaterally. Opposition is limited bilaterally with worse symptoms in the R. Wrist AROM is decreased bilaterally in all planes. Forearm AROM is WNL. Patient displays significant edema in both hands. Pain is reported to be moderate.  and pinch strength is significantly decreased bilaterally secondary to decreased ROM. Fine motor coordination is impaired bilaterally likely due to decreased dexterity caused by the developing tightness. Patient reports paresthesia bilaterally in the dorsal forearms and hands. Kent-Kat monofilament testing displayed slightly decreased light touch sensation in the bilateral thumbs. Patient would benefit from OP OT hand therapy services to increase ROM, increase strength, and improve overall fine motor coordination to facilitate a full return to work and independence with ADLs. See below for a more detailed assessment.      (Re-evaluation 25) Patient presents to re-evaluation after consistently attending OP OT hand therapy services. Patient continues to present with significant  tightness of the skin and muscles of the bilateral hands and forearms, but it is subjectively improving in overall density. Patient digit ROM has displayed mild improvement, with the biggest limitation in MCP flexion due to skin tightness on the dorsal hand. Wrist extension has increased, but flexion has remained consistent with previous measurements. Edema has decreased in the dorsal hand.  and pinch strength displayed the biggest improvement bilaterally. Fine motor coordination has become more difficult though, as displayed by decreased 9 hole peg test times. Patient would benefit from continued OP OT hand therapy services to increase ROM, increase strength, and improve overall fine motor coordination so assist them in returning to all ADLs/IADLs and work tasks. See below for a more detailed assessment.     Goals  STG:    Patient will increase wrist ROM in all planes by an arch of motion of >30 degrees in 4 weeks- Progressing    Patient will display increased digit AROM by at least >30 degrees of PORRAS per digit in 4 weeks- Progressing    Patient will report decreased pain by 1-2 grades in the wrist during functional movement in 4 weeks- MET    Patient will decrease edema at the wrist crease by 1-3 mm in 4 weeks- MET    Patient will decrease 9 hole peg test time by > 30 seconds bilaterally in 4 weeks- Progressing        LTG:    Patient will display ROM WFL in the wrist in 8 weeks or discharge    Patient will display digit ROM WFL in 8 weeks or discharge    Patient will report resolution of pain in the wrist during all activities in 8 weeks    Patient will display resolution of edema at the wrist crease in 8 weeks or discharge    Patient will display improved fine motor coordination as evident by a decreased 9 hole peg test by > 1 min in 4 weeks    Patient will increase FOTO score by >20 points in 8 weeks or discharge    STG:    Patient will increase digit flexion by a PORRAS of > 40 degrees in 4 weeks    Patient  "will increase 9 hole peg test time by >20 seconds in 4 weeks        Plan  Patient would benefit from: custom splinting and OT eval  Referral necessary: No  Planned modality interventions: ultrasound, thermotherapy: paraffin bath, thermotherapy: hydrocollator packs and TENS    Planned therapy interventions: IASTM, joint mobilization, manual therapy, massage, orthotic fitting/training, patient education, strengthening, stretching, therapeutic activities, therapeutic exercise, home exercise program, functional ROM exercises, coordination and ADL retraining    Frequency: 2x week  Duration in weeks: 10  Plan of Care beginning date: 2025  Plan of Care expiration date: 2025  Treatment plan discussed with: patient  Plan details: Patient would benefit from continued skilled OP OT hand therapy services 2x per week for 8 weeks to increase ROM and return to full functional status.         Subjective Evaluation    History of Present Illness  Mechanism of injury: Subjective:  \"I'm back to work on light duty and it's going well\". \"I notice a difference\". \"My hands are stronger\". 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           Recurrent probem    Quality of life: good    Patient Goals  Patient goals for therapy: decreased edema, decreased pain, increased motion, increased strength, return to work and return to sport/leisure activities    Pain  Current pain ratin  At best pain ratin  At worst pain rating: 3  Location: B/l hands and forearms  Quality: tight  Progression: improved    Social Support    Employment status: working  Hand dominance: right    Treatments  Previous treatment: medication  Current treatment: medication and occupational therapy        Objective     Observations     Left Wrist/Hand   Positive for edema.     Right Wrist/Hand   Positive for edema.     Neurological Testing     Sensation     Wrist/Hand   Left   Intact: light touch  Diminished: light touch    Right   Intact: light " touch  Diminished: light touch    Comments   Right light touch: 1st digit bilaterally 3.61 all other digits WNL    Active Range of Motion     Left Wrist   Wrist flexion: 60 degrees   Wrist extension: 60 degrees   Radial deviation: 16 degrees   Ulnar deviation: 25 degrees     Right Wrist   Wrist flexion: 45 degrees   Wrist extension: 65 degrees   Radial deviation: 10 degrees   Ulnar deviation: 30 degrees     Left Thumb   Flexion     MP: 36 degrees    DIP: 62 degrees  Palmar Abduction     CMC: 40 degrees  Radial abduction    CMC: 36 degrees    Opposition: L:  Opposition to 5th digit    Right Thumb   Flexion     MP: 38    DIP: 50  Palmar Abduction    CMC: 40  Radial Abduction    CMC: 40  Opposition: R:  Opposition to lateral aspect of 4th digit    Left Digits    Flexion   Index     MCP: 52    PIP: 70    DIP: 50  Middle     MCP: 52    PIP: 78    DIP: 62  Ring     MCP: 56    PIP: 82    DIP: 60  Little     MCP: 60    PIP: 66    DIP: 48    Right Digits   Flexion   Index     MCP: 60    PIP: 62    DIP: 48  Middle     MCP: 60    PIP: 68    DIP: 58  Ring     MCP: 60    PIP: 74    DIP: 62  Little     MCP: 48    PIP: 52    DIP: 56    Additional Active Range of Motion Details  R:  Extensor tightness 30 degrees flexon    L:   Extensor tightness 20 degrees flexion  R:  MF 3.5 cm from DPC    L:  MF 2 cm from DPC    Strength/Myotome Testing     Left Wrist/Hand      (2nd hand position)     Trial 1: 39.6    Thumb Strength  Key/Lateral Pinch     Trial 1: 15  Tip/Two-Point Pinch     Trial 1: 11  Palmar/Three-Point Pinch     Trial 1: 11    Right Wrist/Hand      (2nd hand position)     Trial 1: 31.7    Thumb Strength   Key/Lateral Pinch     Trial 1: 10  Tip/Two-Point Pinch     Trial 1: 10  Palmar/Three-Point Pinch     Trial 1: 9    Tests     Additional Tests Details  9 hole peg:  R: 56  L: 24    [place] Peg Time for 10 sets:  R: 3:02  L: 2:30     Swelling     Left Wrist/Hand   Circumference MCP: 18.8 cm  Circumference wrist: 16  cm    Right Wrist/Hand   Circumference MCP: 18.7 cm  Circumference wrist: 16.2 cm             Precautions: Raynaud's, Acute scleroderma     Manuals 5/22 5/29 4/7 4/9 4/14 4/17 4/21 4/23 4/30 5/7   IASTM STM 15' STM 15 16' 16' 16' 16 16 16 STM 16' STM 10'   MEM             Re-evaluation  Completed                        Neuro Re-Ed                                                                                                        Ther Ex             HEP             Forearm PROM 5 5 5' 5' 5' 5 5 5 5 5   Wrist PROM 8 8 8' 8' 8' 8 8 8 8 8   Digit PROM 8 8 8' 8' 8' 8 8 8 8 8   Thumb PROM 2 2 2' 2' 2' 2 2 2 2 2   Dex balls             Wrist Maze x5    3x b/l 3x bl 3x bl X b/l X b/l x5   Digiflex        R iso x10    G comp x20 R iso x10    G comp x20 R iso x10    G comp x20   Power web        R center and rim x20 R center and rim x20 R center and rim x20   Ext web        Y x 20 Y x 20 Y x 20   Flex bar        R x 20 F/E R x 20 F/E R x 20 F/E                             Ther Activity             Keypegs   1/2 board each hand 1/2 board each finger  x1 x1 x1 x1 x1   [place] sort     1/2 each hand opp        P[employer]ue                                       Modalities             MHP             Paraffin w/ MH 5' 5' 8' 5' 5' 5' 5' 5' 5' 5'

## 2025-06-02 ENCOUNTER — TELEPHONE (OUTPATIENT)
Age: 55
End: 2025-06-02

## 2025-06-03 ENCOUNTER — OFFICE VISIT (OUTPATIENT)
Age: 55
End: 2025-06-03
Payer: COMMERCIAL

## 2025-06-03 VITALS
HEIGHT: 64 IN | OXYGEN SATURATION: 96 % | TEMPERATURE: 98.4 F | BODY MASS INDEX: 31.07 KG/M2 | WEIGHT: 182 LBS | SYSTOLIC BLOOD PRESSURE: 152 MMHG | DIASTOLIC BLOOD PRESSURE: 78 MMHG | HEART RATE: 105 BPM

## 2025-06-03 DIAGNOSIS — Z79.899 HIGH RISK MEDICATION USE: ICD-10-CM

## 2025-06-03 DIAGNOSIS — Z79.60 LONG-TERM USE OF IMMUNOSUPPRESSANT MEDICATION: ICD-10-CM

## 2025-06-03 DIAGNOSIS — K21.00 GASTROESOPHAGEAL REFLUX DISEASE WITH ESOPHAGITIS WITHOUT HEMORRHAGE: ICD-10-CM

## 2025-06-03 DIAGNOSIS — J84.9 ILD (INTERSTITIAL LUNG DISEASE) (HCC): ICD-10-CM

## 2025-06-03 DIAGNOSIS — I73.00 RAYNAUD'S DISEASE WITHOUT GANGRENE: ICD-10-CM

## 2025-06-03 DIAGNOSIS — M34.9 SYSTEMIC SCLEROSIS (HCC): Primary | ICD-10-CM

## 2025-06-03 PROCEDURE — 99214 OFFICE O/P EST MOD 30 MIN: CPT | Performed by: STUDENT IN AN ORGANIZED HEALTH CARE EDUCATION/TRAINING PROGRAM

## 2025-06-03 RX ORDER — TORSEMIDE 10 MG/1
TABLET ORAL
COMMUNITY
Start: 2025-05-29

## 2025-06-03 NOTE — PATIENT INSTRUCTIONS
Belimumab (Benlysta)    Belimumab is an immunosuppressant drug approved by the FDA for the treatment of lupus and lupus nephritis in children and adults. Belimumab is used in combination with other lupus drugs such as hydroxychloroquine and steroids. Belimumab treats people with mild or moderate lupus affecting the blood, skin, joints, and kidneys. It works against a protein that triggers certain cells in the immune system to attack different parts of the body.    How To Take It  Belimumab can be given as an infusion or injection. When given by infusion, the medication is administered through an IV that is inserted into a vein. The infusion is given in a healthcare facility and lasts about one hour. The dose is based on your weight. Infusions are every two weeks for the first 3 doses and every four weeks, thereafter. Belimumab may take 12 weeks to take effect, but it can take longer in some patients.  Belimumab is also available as an autoinjector (i.e. self-injection). This means you can give the medication to yourself by injecting it under the skin once a week. The autoinjectors are preloaded with 200 mg of medication. The injection dose is based on the treatment of lupus or lupus nephritis.    Side Effects  Common side effects of belimumab include fever, diarrhea, nausea, muscle aches, headache, trouble sleeping, and infections, such as colds, bronchitis, and urinary tract infections. You may also experience redness, itching, or swelling at the site of your injection. Having a foreign object, such as a needle, put into your veins (infusion) can also be a source of infection.  Belimumab can lower the number of white blood cells in the blood of some people. It can also cause you to feel down and have thoughts of harming yourself, especially if you have a history of depression.  To know if belimumab is causing side effects, keep track of how long the symptoms last and whether they come back after your next infusion  or injection. If so, discuss this with your rheumatology provider.    Tell Your Rheumatology Provider  Before taking belimumab, tell your rheumatology provider if you plan to become pregnant. It is unknown if belimumab is safe for pregnant women to take. You should not try to become pregnant or breastfeed while taking this drug. If you do become pregnant, immediately tell your doctor.  Tell your rheumatology provider if you have an infection, a history of cancer, or a history of depression. Belimumab can increase the risk of getting cancer, infections, and depression. Make sure your doctor knows if you have any concerns or new symptoms while taking belimumab.  Many vaccines, such as the flu shot, pneumonia shot, and new shingles vaccine (Shingrix), are safe while taking belimumab; but others, such as live vaccines (Zostrix), are not. Talk to your doctor about getting vaccines before starting belimumab.    Updated February 2024 by Jesika Perez DO, FACR, and reviewed by the [employer] College [employer] Committee on Communications and Marketing.

## 2025-06-03 NOTE — ASSESSMENT & PLAN NOTE
55-year-old female who presents for follow-up of diffuse systemic sclerosis with manifestations including renal crisis, Raynaud's, sclerodactyly, telangiectasias, and mild ILD changes on CT.  She was initially started on mycophenolate but was unable to tolerate higher doses due to GI distress so this was transitioned to Myfortic.  She has tolerated the Myfortic much better but continues to have some worsening of her skin thickening with extension up to above the elbow as well as thickening on the chest, neck, and face.  Additionally, notes some pain and increased stiffness primarily in the MCPs affecting the right hand.  Discussed with patient regarding addition of therapy to Myfortic to  address the skin thickening as well as some of the other symptoms including joint pain and mild ILD changes.  We had previously discussed regarding IVIG but considering her current renal function as well as the time this would require to get infused, I do not think this is the best option currently.  We did discuss use of Benlysta which has been used to treat both skin manifestations as well as is under investigation for ILD.  Patient was agreeable to this.  I will plan to review this with you Mickey team and let patient know.  In the meantime, we will plan to continue with Myfortic 720 mg twice daily with continued lab monitoring.  She continues to follow with nephrology for management of her renal dysfunction.  She will be following up with pulmonary medicine in the future with updated PFTs.  For now, we will plan to see her back in about 2 months.  Orders:    Hepatic function panel; Future

## 2025-06-04 ENCOUNTER — APPOINTMENT (OUTPATIENT)
Dept: LAB | Facility: CLINIC | Age: 55
End: 2025-06-04
Attending: INTERNAL MEDICINE
Payer: COMMERCIAL

## 2025-06-04 DIAGNOSIS — M34.9 ACUTE SCLERODERMA RENAL CRISIS (HCC): ICD-10-CM

## 2025-06-04 DIAGNOSIS — M34.89: ICD-10-CM

## 2025-06-04 DIAGNOSIS — N18.9 ANEMIA IN CHRONIC KIDNEY DISEASE, UNSPECIFIED CKD STAGE: ICD-10-CM

## 2025-06-04 DIAGNOSIS — N08: ICD-10-CM

## 2025-06-04 DIAGNOSIS — N28.0 ACUTE SCLERODERMA RENAL CRISIS (HCC): ICD-10-CM

## 2025-06-04 DIAGNOSIS — D63.1 ANEMIA IN CHRONIC KIDNEY DISEASE, UNSPECIFIED CKD STAGE: ICD-10-CM

## 2025-06-04 DIAGNOSIS — D63.8 ANEMIA IN OTHER CHRONIC DISEASES CLASSIFIED ELSEWHERE: ICD-10-CM

## 2025-06-04 LAB
ALBUMIN SERPL BCG-MCNC: 4.2 G/DL (ref 3.5–5)
ANION GAP SERPL CALCULATED.3IONS-SCNC: 9 MMOL/L (ref 4–13)
BASOPHILS # BLD AUTO: 0.04 THOUSANDS/ÂΜL (ref 0–0.1)
BASOPHILS NFR BLD AUTO: 1 % (ref 0–1)
BUN SERPL-MCNC: 41 MG/DL (ref 5–25)
CALCIUM SERPL-MCNC: 9.3 MG/DL (ref 8.4–10.2)
CHLORIDE SERPL-SCNC: 99 MMOL/L (ref 96–108)
CO2 SERPL-SCNC: 25 MMOL/L (ref 21–32)
CREAT SERPL-MCNC: 2.8 MG/DL (ref 0.6–1.3)
CREAT UR-MCNC: 55.7 MG/DL
EOSINOPHIL # BLD AUTO: 0.16 THOUSAND/ÂΜL (ref 0–0.61)
EOSINOPHIL NFR BLD AUTO: 2 % (ref 0–6)
ERYTHROCYTE [DISTWIDTH] IN BLOOD BY AUTOMATED COUNT: 15.6 % (ref 11.6–15.1)
GFR SERPL CREATININE-BSD FRML MDRD: 18 ML/MIN/1.73SQ M
GLUCOSE SERPL-MCNC: 110 MG/DL (ref 65–140)
HCT VFR BLD AUTO: 32.7 % (ref 34.8–46.1)
HGB BLD-MCNC: 10.2 G/DL (ref 11.5–15.4)
IMM GRANULOCYTES # BLD AUTO: 0.04 THOUSAND/UL (ref 0–0.2)
IMM GRANULOCYTES NFR BLD AUTO: 1 % (ref 0–2)
LDH SERPL-CCNC: 250 U/L (ref 140–271)
LYMPHOCYTES # BLD AUTO: 0.9 THOUSANDS/ÂΜL (ref 0.6–4.47)
LYMPHOCYTES NFR BLD AUTO: 11 % (ref 14–44)
MCH RBC QN AUTO: 29 PG (ref 26.8–34.3)
MCHC RBC AUTO-ENTMCNC: 31.2 G/DL (ref 31.4–37.4)
MCV RBC AUTO: 93 FL (ref 82–98)
MICROALBUMIN UR-MCNC: 46.1 MG/L
MICROALBUMIN/CREAT 24H UR: 83 MG/G CREATININE (ref 0–30)
MONOCYTES # BLD AUTO: 0.8 THOUSAND/ÂΜL (ref 0.17–1.22)
MONOCYTES NFR BLD AUTO: 10 % (ref 4–12)
NEUTROPHILS # BLD AUTO: 6.27 THOUSANDS/ÂΜL (ref 1.85–7.62)
NEUTS SEG NFR BLD AUTO: 75 % (ref 43–75)
NRBC BLD AUTO-RTO: 0 /100 WBCS
PHOSPHATE SERPL-MCNC: 4.3 MG/DL (ref 2.7–4.5)
PLATELET # BLD AUTO: 368 THOUSANDS/UL (ref 149–390)
PMV BLD AUTO: 9 FL (ref 8.9–12.7)
POTASSIUM SERPL-SCNC: 4.4 MMOL/L (ref 3.5–5.3)
RBC # BLD AUTO: 3.52 MILLION/UL (ref 3.81–5.12)
SODIUM SERPL-SCNC: 133 MMOL/L (ref 135–147)
WBC # BLD AUTO: 8.21 THOUSAND/UL (ref 4.31–10.16)

## 2025-06-04 PROCEDURE — 80069 RENAL FUNCTION PANEL: CPT

## 2025-06-04 PROCEDURE — 83010 ASSAY OF HAPTOGLOBIN QUANT: CPT

## 2025-06-04 PROCEDURE — 36415 COLL VENOUS BLD VENIPUNCTURE: CPT

## 2025-06-04 PROCEDURE — 82570 ASSAY OF URINE CREATININE: CPT

## 2025-06-04 PROCEDURE — 82043 UR ALBUMIN QUANTITATIVE: CPT

## 2025-06-04 PROCEDURE — 83615 LACTATE (LD) (LDH) ENZYME: CPT

## 2025-06-04 PROCEDURE — 85025 COMPLETE CBC W/AUTO DIFF WBC: CPT

## 2025-06-05 ENCOUNTER — OFFICE VISIT (OUTPATIENT)
Dept: OCCUPATIONAL THERAPY | Facility: CLINIC | Age: 55
End: 2025-06-05
Payer: COMMERCIAL

## 2025-06-05 ENCOUNTER — APPOINTMENT (OUTPATIENT)
Dept: PHYSICAL THERAPY | Facility: CLINIC | Age: 55
End: 2025-06-05
Payer: COMMERCIAL

## 2025-06-05 ENCOUNTER — RESULTS FOLLOW-UP (OUTPATIENT)
Dept: NEPHROLOGY | Facility: CLINIC | Age: 55
End: 2025-06-05

## 2025-06-05 DIAGNOSIS — M34.9 ACUTE SCLERODERMA RENAL CRISIS (HCC): Primary | ICD-10-CM

## 2025-06-05 DIAGNOSIS — N28.0 ACUTE SCLERODERMA RENAL CRISIS (HCC): Primary | ICD-10-CM

## 2025-06-05 LAB — HAPTOGLOB SERPL-MCNC: 81 MG/DL (ref 33–346)

## 2025-06-05 PROCEDURE — 97110 THERAPEUTIC EXERCISES: CPT | Performed by: OCCUPATIONAL THERAPIST

## 2025-06-05 PROCEDURE — 97140 MANUAL THERAPY 1/> REGIONS: CPT | Performed by: OCCUPATIONAL THERAPIST

## 2025-06-05 NOTE — PROGRESS NOTES
"Daily Note     Today's date: 2025  Patient name: Kari Erazo  : 1970  MRN: [id number]  Referring provider: Zully Reynaga MD  Dx:   Encounter Diagnosis     ICD-10-CM    1. Acute scleroderma renal crisis (HCC)  N28.0     M34.9           Start Time: 1630  Stop Time: 1720  Total time in clinic (min): 50 minutes    Subjective: \"the right is worse than the left\", in terms of tightness and motion restriction.       Objective: See treatment diary below.       Assessment: Provided with information for MCP flexion splint from [employer]. It is likely that as her MCP's improve flexion, there will be less associated intrinsic tightness and IP flexion contracture. She may also benefit from edema gloves for comfort.       Plan: Continue per plan of care.      Precautions: Raynaud's, Acute scleroderma     Manuals  6   IASTM STM 15' STM 15 15' STM 16' 16' 16 16 16 STM 16' STM 10'   MEM             Re-evaluation  Completed                        Neuro Re-Ed                                                                                                        Ther Ex             HEP             Forearm PROM 5 5 5 5' 5' 5 5 5 5 5   Wrist PROM 8 8 8 8' 8' 8 8 8 8 8   Digit PROM 8 8 8 8' 8' 8 8 8 8 8   Thumb PROM 2 2  2' 2' 2 2 2 2 2   Opposition roll    15x          Wrist Maze x5    3x b/l 3x bl 3x bl X b/l X b/l x5   Digiflex        R iso x10    G comp x20 R iso x10    G comp x20 R iso x10    G comp x20   Power web        R center and rim x20 R center and rim x20 R center and rim x20   Ext web        Y x 20 Y x 20 Y x 20   Flex bar        R x 20 F/E R x 20 F/E R x 20 F/E                             Ther Activity             Keypegs   1/2 board each finger  1/2 board each finger 1/2 board each finger  x1 x1 x1 x1 x1   [place] sort     1/2 each hand opp        [employer]ue                                       Modalities             MHP             Paraffin w/ MH 5' 5' 5' 5' 5' 5' 5' 5' 5' " 5'           1 or 2

## 2025-06-09 DIAGNOSIS — M34.9 SYSTEMIC SCLEROSIS (HCC): ICD-10-CM

## 2025-06-09 RX ORDER — MYCOPHENOLIC ACID 180 MG/1
360 TABLET, DELAYED RELEASE ORAL 2 TIMES DAILY
Qty: 120 TABLET | Refills: 6 | Status: SHIPPED | OUTPATIENT
Start: 2025-06-09 | End: 2026-01-05

## 2025-06-09 NOTE — TELEPHONE ENCOUNTER
Medication: Myfortic    Dose/Frequency: two tablets, twice daily    Quantity: 120    Pharmacy: [facility]    Office:   [] PCP/Provider -   [x] Speciality/Provider - Dr. Pacheco    Does the patient have enough for 3 days?   [x] Yes   [] No - Send as HP to POD

## 2025-06-10 DIAGNOSIS — N28.0 ACUTE SCLERODERMA RENAL CRISIS (HCC): ICD-10-CM

## 2025-06-10 DIAGNOSIS — M34.9 ACUTE SCLERODERMA RENAL CRISIS (HCC): ICD-10-CM

## 2025-06-12 ENCOUNTER — OFFICE VISIT (OUTPATIENT)
Dept: OCCUPATIONAL THERAPY | Facility: CLINIC | Age: 55
End: 2025-06-12
Payer: COMMERCIAL

## 2025-06-12 DIAGNOSIS — N28.0 ACUTE SCLERODERMA RENAL CRISIS (HCC): Primary | ICD-10-CM

## 2025-06-12 DIAGNOSIS — M34.9 ACUTE SCLERODERMA RENAL CRISIS (HCC): Primary | ICD-10-CM

## 2025-06-12 PROCEDURE — 97140 MANUAL THERAPY 1/> REGIONS: CPT

## 2025-06-12 PROCEDURE — 97110 THERAPEUTIC EXERCISES: CPT

## 2025-06-12 NOTE — PROGRESS NOTES
"Daily Note     Today's date: 2025  Patient name: Kari Erazo  : 1970  MRN: [id number]  Referring provider: Zully Reynaga MD  Dx:   Encounter Diagnosis     ICD-10-CM    1. Acute scleroderma renal crisis (HCC)  N28.0     M34.9                      Subjective: \"It is much more swollen\".       Objective: See treatment diary below.       Assessment: She has purchased a MCP flexion splint and brought it today to be fit. Patient digit flexion was a bit tight on this date due to edema and tightness.       Insurance Billing Rule ReEval POC expires Auth Status Total   Visits  Start date  Expiration date PT/OT + Visit Limit? Co-Insurance Misc   Capital   25 N/A N/A 3/31 7/29/25 N/A                                                                       Plan: Continue per plan of care.      Precautions: Raynaud's, Acute scleroderma     Manuals  6 5   IASTM STM 15' STM 15 15' STM 15' 16' 16 16 16 STM 16' STM 10'   MEM             Re-evaluation  Completed                        Neuro Re-Ed                                                                                                        Ther Ex             HEP             Forearm PROM 5 5 5 5 5' 5 5 5 5 5   Wrist PROM 8 8 8 8 8' 8 8 8 8 8   Digit PROM 8 8 8 8 8' 8 8 8 8 8   Thumb PROM 2 2   2' 2 2 2 2 2   Opposition roll    15x 15x         Wrist Maze x5    3x b/l 3x bl 3x bl X b/l X b/l x5   Digiflex        R iso x10    G comp x20 R iso x10    G comp x20 R iso x10    G comp x20   Power web        R center and rim x20 R center and rim x20 R center and rim x20   Ext web        Y x 20 Y x 20 Y x 20   Flex bar        R x 20 F/E R x 20 F/E R x 20 F/E                             Ther Activity             Keypegs   1/2 board each finger  1/2 board each finger  1/2 board each finger  x1 x1 x1 x1 x1   [place] sort     1/2 each hand opp        [employer]ue                                       Modalities             MHP        "      Paraffin w/ MH 5' 5' 5' 5' 5' 5' 5' 5' 5' 5'

## 2025-06-17 ENCOUNTER — OFFICE VISIT (OUTPATIENT)
Dept: INTERNAL MEDICINE CLINIC | Facility: CLINIC | Age: 55
End: 2025-06-17
Payer: COMMERCIAL

## 2025-06-17 VITALS
DIASTOLIC BLOOD PRESSURE: 72 MMHG | SYSTOLIC BLOOD PRESSURE: 120 MMHG | RESPIRATION RATE: 14 BRPM | HEART RATE: 80 BPM | BODY MASS INDEX: 30.9 KG/M2 | WEIGHT: 181 LBS | HEIGHT: 64 IN | OXYGEN SATURATION: 100 % | TEMPERATURE: 97.6 F

## 2025-06-17 DIAGNOSIS — E78.01 FAMILIAL HYPERCHOLESTEROLEMIA: Chronic | ICD-10-CM

## 2025-06-17 DIAGNOSIS — M34.9 ACUTE SCLERODERMA RENAL CRISIS (HCC): ICD-10-CM

## 2025-06-17 DIAGNOSIS — E66.09 CLASS 1 OBESITY DUE TO EXCESS CALORIES WITH SERIOUS COMORBIDITY AND BODY MASS INDEX (BMI) OF 32.0 TO 32.9 IN ADULT: ICD-10-CM

## 2025-06-17 DIAGNOSIS — N28.0 ACUTE SCLERODERMA RENAL CRISIS (HCC): ICD-10-CM

## 2025-06-17 DIAGNOSIS — K21.9 GASTROESOPHAGEAL REFLUX DISEASE, UNSPECIFIED WHETHER ESOPHAGITIS PRESENT: ICD-10-CM

## 2025-06-17 DIAGNOSIS — D63.1 ANEMIA IN STAGE 4 CHRONIC KIDNEY DISEASE  (HCC): ICD-10-CM

## 2025-06-17 DIAGNOSIS — E53.8 VITAMIN B12 DEFICIENCY: ICD-10-CM

## 2025-06-17 DIAGNOSIS — E66.811 CLASS 1 OBESITY DUE TO EXCESS CALORIES WITH SERIOUS COMORBIDITY AND BODY MASS INDEX (BMI) OF 32.0 TO 32.9 IN ADULT: ICD-10-CM

## 2025-06-17 DIAGNOSIS — N18.4 ANEMIA IN STAGE 4 CHRONIC KIDNEY DISEASE  (HCC): ICD-10-CM

## 2025-06-17 DIAGNOSIS — M34.9 SYSTEMIC SCLEROSIS (HCC): ICD-10-CM

## 2025-06-17 DIAGNOSIS — E11.9 CONTROLLED TYPE 2 DIABETES MELLITUS WITHOUT COMPLICATION, WITHOUT LONG-TERM CURRENT USE OF INSULIN (HCC): Primary | Chronic | ICD-10-CM

## 2025-06-17 DIAGNOSIS — J00 ACUTE RHINITIS: ICD-10-CM

## 2025-06-17 DIAGNOSIS — Z00.00 ANNUAL PHYSICAL EXAM: ICD-10-CM

## 2025-06-17 DIAGNOSIS — I10 ACCELERATED HYPERTENSION: ICD-10-CM

## 2025-06-17 PROCEDURE — 99396 PREV VISIT EST AGE 40-64: CPT | Performed by: INTERNAL MEDICINE

## 2025-06-17 PROCEDURE — 99214 OFFICE O/P EST MOD 30 MIN: CPT | Performed by: INTERNAL MEDICINE

## 2025-06-17 RX ORDER — SODIUM BICARBONATE 325 MG/1
325 TABLET ORAL 2 TIMES DAILY
COMMUNITY
Start: 2025-06-08

## 2025-06-17 RX ORDER — FLUTICASONE PROPIONATE 50 MCG
1 SPRAY, SUSPENSION (ML) NASAL DAILY
Qty: 16 G | Refills: 1 | Status: SHIPPED | OUTPATIENT
Start: 2025-06-17

## 2025-06-17 RX ORDER — RAMIPRIL 10 MG/1
20 CAPSULE ORAL 2 TIMES DAILY
COMMUNITY
Start: 2025-03-19 | End: 2025-06-17 | Stop reason: SDUPTHER

## 2025-06-17 NOTE — PROGRESS NOTES
Diabetic Foot Exam    Patient's shoes and socks removed.    Right Foot/Ankle   Right Foot Inspection  Skin Exam: skin normal and skin intact. No dry skin, no warmth, no callus, no erythema, no maceration, no abnormal color, no pre-ulcer, no ulcer and no callus.     Toe Exam: ROM and strength within normal limits.     Sensory   Monofilament testing: intact    Vascular  Capillary refills: < 3 seconds  The right DP pulse is 2+. The right PT pulse is 2+.     Left Foot/Ankle  Left Foot Inspection  Skin Exam: skin normal and skin intact. No dry skin, no warmth, no erythema, no maceration, normal color, no pre-ulcer, no ulcer and no callus.     Toe Exam: ROM and strength within normal limits.     Sensory   Monofilament testing: intact    Vascular  Capillary refills: < 3 seconds  The left DP pulse is 2+.     Assign Risk Category  No deformity present  No loss of protective sensation  No weak pulses  Risk: 0        Name: Kari Erazo      : 1970      MRN: [id number]  Encounter Provider: Melissa Matthew MD  Encounter Date: 2025   Encounter department: [facility] INTERNAL MEDICINE  :  Assessment & Plan  Controlled type 2 diabetes mellitus without complication, without long-term current use of insulin (HCC)    Lab Results   Component Value Date    HGBA1C 6.6 (H) 2025     A1c slightly worse.  Diet controlled only, has been off medications since TITUS.  Briefly discussed starting medication again, restart Trulicity vs glimepiride vs insulin.  Orders:  •  Diabetic foot exam; Future    Systemic sclerosis (HCC)  On Myfortic 720 mg bid since over a month ago.  Followed by rheum.    Ongoing OT for hand symptoms.       Anemia in stage 4 chronic kidney disease  (HCC)  Lab Results   Component Value Date    EGFR 18 2025    EGFR 2025    EGFR 17 2025    CREATININE 2.80 (H) 2025    CREATININE 2.43 (H) 2025    CREATININE 2.89 (H) 2025     Recent Hgb 10.2.  S/p  Aranesp.  Per nephrology.       Acute scleroderma renal crisis (HCC)  As above.       Accelerated hypertension  BP stable.  Took torsemide 10 mg daily for a week only.  Now on ramipril 20 mg bid, still on doxazosin 2 mg bid and nifedipine 60 mg daily.  Per nephrology.       Familial hypercholesterolemia  TG worse.  On atorvastatin 10 mg. May stop Vascepa.       Gastroesophageal reflux disease, unspecified whether esophagitis present  Taking famotidine qHS.       Class 1 obesity due to excess calories with serious comorbidity and body mass index (BMI) of 32.0 to 32.9 in adult  Lost 10 lbs since 3 months ago.  Appetite has returned.       Vitamin B12 deficiency  Continue daily B12.       Acute rhinitis  Trial of saline / steroid nasal spray.  If cough does not improve, consider increasing famotidine.  Orders:  •  fluticasone (FLONASE) 50 mcg/act nasal spray; 1 spray into each nostril daily    Annual physical exam         Follow up in 3 months or as needed.     History of Present Illness   She has been doing all right since her last visit.  She has been working 4 hours daily.  She is having a more difficult time with procedures since she does not have the dexterity.  She still goes to occupational therapy.  Her right hand is more stiff and swollen compared to her left hand, she is right-handed.  She also noticed that her skin is more tight than before.  She took torsemide daily for about a week since she had increased swelling in her legs and felt her face was more puffy.  She reports this has improved, still has some puffiness around her eyes.    She feels her swallowing is sometimes different, usually with liquids.  It usually occurs with carbonated drinks but she does not drink this often.  She complains of a dry cough, worse at night or after working in the morning.  She reports occasional clear phlegm.  She denies any wheezing, shortness of breath, cough does not wake her up at night.  She has not used the  "albuterol inhaler.    She reports runny stools the last few weeks, not sure if it was the change to Myfortic or the sodium bicarb.      Review of Systems   Constitutional:  Positive for fatigue. Negative for appetite change.   HENT:  Positive for postnasal drip. Negative for congestion, ear pain, sinus pressure and sinus pain.    Eyes:  Negative for visual disturbance.   Respiratory:  Positive for cough. Negative for chest tightness, shortness of breath and wheezing.    Cardiovascular:  Negative for chest pain.   Gastrointestinal:  Positive for diarrhea. Negative for abdominal pain, constipation and nausea.   Genitourinary:  Negative for dysuria and frequency.   Musculoskeletal:  Positive for arthralgias. Negative for myalgias.   Skin:  Negative for rash and wound.   Neurological:  Positive for weakness. Negative for dizziness, numbness and headaches.   Psychiatric/Behavioral:  Negative for confusion. The patient is not nervous/anxious.        Objective   /72 (BP Location: Left arm, Patient Position: Sitting, Cuff Size: Large)   Pulse 80   Temp 97.6 °F (36.4 °C)   Resp 14   Ht 5' 4\" (1.626 m)   Wt 82.1 kg (181 lb)   SpO2 100%   BMI 31.07 kg/m²      Physical Exam  Vitals and nursing note reviewed.   Constitutional:       General: She is not in acute distress.     Appearance: She is well-developed.   HENT:      Head: Normocephalic and atraumatic.      Comments: Mild periorbital edema     Nose: Congestion present. No rhinorrhea.      Right Sinus: No maxillary sinus tenderness.      Left Sinus: No maxillary sinus tenderness.      Mouth/Throat:      Mouth: Mucous membranes are moist.     Eyes:      Pupils: Pupils are equal, round, and reactive to light.       Cardiovascular:      Rate and Rhythm: Normal rate and regular rhythm.      Pulses: no weak pulses.           Dorsalis pedis pulses are 2+ on the right side and 2+ on the left side.        Posterior tibial pulses are 2+ on the right side.      Heart " sounds: Normal heart sounds.   Pulmonary:      Effort: Pulmonary effort is normal.      Breath sounds: Normal breath sounds. No wheezing.   Abdominal:      General: Bowel sounds are normal.      Palpations: Abdomen is soft.     Musculoskeletal:         General: No swelling.      Right lower leg: No edema.      Left lower leg: No edema.   Feet:      Right foot:      Skin integrity: No ulcer, skin breakdown, erythema, warmth, callus or dry skin.      Left foot:      Skin integrity: No ulcer, skin breakdown, erythema, warmth, callus or dry skin.     Skin:     General: Skin is warm.      Findings: No rash.     Neurological:      General: No focal deficit present.      Mental Status: She is alert and oriented to person, place, and time.     Psychiatric:         Mood and Affect: Mood and affect normal. Mood is not anxious or depressed.         Behavior: Behavior normal.           Labs & imaging results reviewed with patient.

## 2025-06-17 NOTE — ASSESSMENT & PLAN NOTE
Lab Results   Component Value Date    EGFR 18 06/04/2025    EGFR 22 05/22/2025    EGFR 17 05/08/2025    CREATININE 2.80 (H) 06/04/2025    CREATININE 2.43 (H) 05/22/2025    CREATININE 2.89 (H) 05/08/2025     Recent Hgb 10.2.  S/p Aranesp.  Per nephrology.

## 2025-06-17 NOTE — ASSESSMENT & PLAN NOTE
Lab Results   Component Value Date    HGBA1C 6.6 (H) 05/22/2025     A1c slightly worse.  Diet controlled only, has been off medications since TITUS.  Briefly discussed starting medication again, restart Trulicity vs glimepiride vs insulin.  Orders:  •  Diabetic foot exam; Future

## 2025-06-17 NOTE — ASSESSMENT & PLAN NOTE
On Myfortic 720 mg bid since over a month ago.  Followed by rheum.    Ongoing OT for hand symptoms.

## 2025-06-17 NOTE — ASSESSMENT & PLAN NOTE
BP stable.  Took torsemide 10 mg daily for a week only.  Now on ramipril 20 mg bid, still on doxazosin 2 mg bid and nifedipine 60 mg daily.  Per nephrology.

## 2025-06-18 ENCOUNTER — RESULTS FOLLOW-UP (OUTPATIENT)
Dept: OTHER | Facility: HOSPITAL | Age: 55
End: 2025-06-18

## 2025-06-18 ENCOUNTER — APPOINTMENT (OUTPATIENT)
Dept: LAB | Facility: CLINIC | Age: 55
End: 2025-06-18
Attending: INTERNAL MEDICINE
Payer: COMMERCIAL

## 2025-06-18 DIAGNOSIS — M34.9 SYSTEMIC SCLEROSIS (HCC): ICD-10-CM

## 2025-06-18 DIAGNOSIS — M34.89: ICD-10-CM

## 2025-06-18 DIAGNOSIS — N08: ICD-10-CM

## 2025-06-18 LAB
ALBUMIN SERPL BCG-MCNC: 3.8 G/DL (ref 3.5–5)
ALP SERPL-CCNC: 35 U/L (ref 34–104)
ALT SERPL W P-5'-P-CCNC: 18 U/L (ref 7–52)
ANION GAP SERPL CALCULATED.3IONS-SCNC: 6 MMOL/L (ref 4–13)
AST SERPL W P-5'-P-CCNC: 21 U/L (ref 13–39)
BASOPHILS # BLD AUTO: 0.03 THOUSANDS/ÂΜL (ref 0–0.1)
BASOPHILS NFR BLD AUTO: 0 % (ref 0–1)
BILIRUB DIRECT SERPL-MCNC: 0.12 MG/DL (ref 0–0.2)
BILIRUB SERPL-MCNC: 0.47 MG/DL (ref 0.2–1)
BUN SERPL-MCNC: 33 MG/DL (ref 5–25)
CALCIUM SERPL-MCNC: 8.8 MG/DL (ref 8.4–10.2)
CHLORIDE SERPL-SCNC: 106 MMOL/L (ref 96–108)
CO2 SERPL-SCNC: 23 MMOL/L (ref 21–32)
CREAT SERPL-MCNC: 2.45 MG/DL (ref 0.6–1.3)
EOSINOPHIL # BLD AUTO: 0.24 THOUSAND/ÂΜL (ref 0–0.61)
EOSINOPHIL NFR BLD AUTO: 4 % (ref 0–6)
ERYTHROCYTE [DISTWIDTH] IN BLOOD BY AUTOMATED COUNT: 14.9 % (ref 11.6–15.1)
GFR SERPL CREATININE-BSD FRML MDRD: 21 ML/MIN/1.73SQ M
GLUCOSE SERPL-MCNC: 113 MG/DL (ref 65–140)
HCT VFR BLD AUTO: 29.7 % (ref 34.8–46.1)
HGB BLD-MCNC: 9.5 G/DL (ref 11.5–15.4)
IMM GRANULOCYTES # BLD AUTO: 0.03 THOUSAND/UL (ref 0–0.2)
IMM GRANULOCYTES NFR BLD AUTO: 0 % (ref 0–2)
LDH SERPL-CCNC: 204 U/L (ref 140–271)
LYMPHOCYTES # BLD AUTO: 0.76 THOUSANDS/ÂΜL (ref 0.6–4.47)
LYMPHOCYTES NFR BLD AUTO: 11 % (ref 14–44)
MCH RBC QN AUTO: 29.3 PG (ref 26.8–34.3)
MCHC RBC AUTO-ENTMCNC: 32 G/DL (ref 31.4–37.4)
MCV RBC AUTO: 92 FL (ref 82–98)
MONOCYTES # BLD AUTO: 0.54 THOUSAND/ÂΜL (ref 0.17–1.22)
MONOCYTES NFR BLD AUTO: 8 % (ref 4–12)
NEUTROPHILS # BLD AUTO: 5.13 THOUSANDS/ÂΜL (ref 1.85–7.62)
NEUTS SEG NFR BLD AUTO: 77 % (ref 43–75)
NRBC BLD AUTO-RTO: 0 /100 WBCS
PHOSPHATE SERPL-MCNC: 3.9 MG/DL (ref 2.7–4.5)
PLATELET # BLD AUTO: 284 THOUSANDS/UL (ref 149–390)
PMV BLD AUTO: 8.9 FL (ref 8.9–12.7)
POTASSIUM SERPL-SCNC: 4.4 MMOL/L (ref 3.5–5.3)
PROT SERPL-MCNC: 6.8 G/DL (ref 6.4–8.4)
RBC # BLD AUTO: 3.24 MILLION/UL (ref 3.81–5.12)
SODIUM SERPL-SCNC: 135 MMOL/L (ref 135–147)
WBC # BLD AUTO: 6.73 THOUSAND/UL (ref 4.31–10.16)

## 2025-06-18 PROCEDURE — 85025 COMPLETE CBC W/AUTO DIFF WBC: CPT

## 2025-06-18 PROCEDURE — 83615 LACTATE (LD) (LDH) ENZYME: CPT

## 2025-06-18 PROCEDURE — 80048 BASIC METABOLIC PNL TOTAL CA: CPT

## 2025-06-18 PROCEDURE — 83010 ASSAY OF HAPTOGLOBIN QUANT: CPT

## 2025-06-18 PROCEDURE — 80076 HEPATIC FUNCTION PANEL: CPT

## 2025-06-18 PROCEDURE — 36415 COLL VENOUS BLD VENIPUNCTURE: CPT

## 2025-06-18 PROCEDURE — 84100 ASSAY OF PHOSPHORUS: CPT

## 2025-06-19 ENCOUNTER — DOCUMENTATION (OUTPATIENT)
Dept: OTHER | Facility: HOSPITAL | Age: 55
End: 2025-06-19

## 2025-06-19 ENCOUNTER — OFFICE VISIT (OUTPATIENT)
Dept: OCCUPATIONAL THERAPY | Facility: CLINIC | Age: 55
End: 2025-06-19
Payer: COMMERCIAL

## 2025-06-19 DIAGNOSIS — M34.9 ACUTE SCLERODERMA RENAL CRISIS (HCC): Primary | ICD-10-CM

## 2025-06-19 DIAGNOSIS — N18.4 ANEMIA IN STAGE 4 CHRONIC KIDNEY DISEASE  (HCC): Primary | ICD-10-CM

## 2025-06-19 DIAGNOSIS — N28.0 ACUTE SCLERODERMA RENAL CRISIS (HCC): Primary | ICD-10-CM

## 2025-06-19 DIAGNOSIS — D63.1 ANEMIA IN STAGE 4 CHRONIC KIDNEY DISEASE  (HCC): Primary | ICD-10-CM

## 2025-06-19 LAB — HAPTOGLOB SERPL-MCNC: 73 MG/DL (ref 33–346)

## 2025-06-19 PROCEDURE — 97140 MANUAL THERAPY 1/> REGIONS: CPT

## 2025-06-19 PROCEDURE — 97110 THERAPEUTIC EXERCISES: CPT

## 2025-06-19 NOTE — PROGRESS NOTES
"Daily Note     Today's date: 2025  Patient name: Kari Erazo  : 1970  MRN: [id number]  Referring provider: Zully Reynaga MD  Dx:   Encounter Diagnosis     ICD-10-CM    1. Acute scleroderma renal crisis (HCC)  N28.0     M34.9                      Subjective: \"It is a little tight and tender today\".     Objective: See treatment diary below.       Assessment: Patient MCP flexion was tighter on this date compared to previous sessions likely due medication changes. Tissue tightness in the dorsal forearm felt less tight during palpation.       Insurance Billing Rule ReEval POC expires Auth Status Total   Visits  Start date  Expiration date PT/OT + Visit Limit? Co-Insurance Misc   Capital   25 N/A N/A 3/31 7/29/25 N/A                                                                       Plan: Continue per plan of care.      Precautions: Raynaud's, Acute scleroderma     Manuals    IASTM STM 15' STM 15 15' STM 15' 15' 16 16 16 STM 16' STM 10'   MEM             Re-evaluation  Completed                        Neuro Re-Ed                                                                                                        Ther Ex             HEP             Forearm PROM 5 5 5 5 5 5 5 5 5 5   Wrist PROM 8 8 8 8 8 8 8 8 8 8   Digit PROM 8 8 8 8  8 8 8 8 8   Thumb PROM 2 2    2 2 2 2 2   Opposition roll    15x 15x         Wrist Maze x5     3x bl 3x bl X b/l X b/l x5   Digiflex        R iso x10    G comp x20 R iso x10    G comp x20 R iso x10    G comp x20   Power web        R center and rim x20 R center and rim x20 R center and rim x20   Ext web        Y x 20 Y x 20 Y x 20   Flex bar        R x 20 F/E R x 20 F/E R x 20 F/E                             Ther Activity             Keypegs   1/2 board each finger  1/2 board each finger  1/2 board each finger x1 x1 x1 x1 x1   [place] sort             [employer]                                       Modalities           "   MHP             Paraffin w/ MH 5' 5' 5' 5' 5' 5' 5' 5' 5' 5'

## 2025-06-20 ENCOUNTER — HOSPITAL ENCOUNTER (OUTPATIENT)
Dept: INFUSION CENTER | Facility: CLINIC | Age: 55
End: 2025-06-20
Attending: INTERNAL MEDICINE
Payer: COMMERCIAL

## 2025-06-20 VITALS — DIASTOLIC BLOOD PRESSURE: 78 MMHG | SYSTOLIC BLOOD PRESSURE: 142 MMHG

## 2025-06-20 DIAGNOSIS — D63.1 ANEMIA IN STAGE 4 CHRONIC KIDNEY DISEASE  (HCC): Primary | ICD-10-CM

## 2025-06-20 DIAGNOSIS — N18.4 ANEMIA IN STAGE 4 CHRONIC KIDNEY DISEASE  (HCC): Primary | ICD-10-CM

## 2025-06-20 PROCEDURE — 96372 THER/PROPH/DIAG INJ SC/IM: CPT

## 2025-06-20 RX ADMIN — DARBEPOETIN ALFA 100 MCG: 100 INJECTION, SOLUTION INTRAVENOUS; SUBCUTANEOUS at 09:05

## 2025-06-20 NOTE — PROGRESS NOTES
Patient to [facility] for Aranesp: Offers no complaints at present time: Lab work ( 06/18/25 ) reviewed: Hgb - 9.5: Within parameters to treat: Injection given in Right UA without incident: No adverse reactions noted: Verified follow up appt with patient ( 07/21/25 ): AVS offered and declined

## 2025-07-02 ENCOUNTER — APPOINTMENT (OUTPATIENT)
Dept: LAB | Facility: CLINIC | Age: 55
End: 2025-07-02
Payer: COMMERCIAL

## 2025-07-02 DIAGNOSIS — N08: ICD-10-CM

## 2025-07-02 DIAGNOSIS — M34.9 SYSTEMIC SCLEROSIS (HCC): ICD-10-CM

## 2025-07-02 DIAGNOSIS — M34.89: ICD-10-CM

## 2025-07-02 LAB
ALBUMIN SERPL BCG-MCNC: 3.9 G/DL (ref 3.5–5)
ALP SERPL-CCNC: 38 U/L (ref 34–104)
ALT SERPL W P-5'-P-CCNC: 19 U/L (ref 7–52)
ANION GAP SERPL CALCULATED.3IONS-SCNC: 7 MMOL/L (ref 4–13)
AST SERPL W P-5'-P-CCNC: 18 U/L (ref 13–39)
BASOPHILS # BLD AUTO: 0.03 THOUSANDS/ÂΜL (ref 0–0.1)
BASOPHILS NFR BLD AUTO: 1 % (ref 0–1)
BILIRUB SERPL-MCNC: 0.43 MG/DL (ref 0.2–1)
BUN SERPL-MCNC: 40 MG/DL (ref 5–25)
CALCIUM SERPL-MCNC: 9.2 MG/DL (ref 8.4–10.2)
CHLORIDE SERPL-SCNC: 108 MMOL/L (ref 96–108)
CO2 SERPL-SCNC: 20 MMOL/L (ref 21–32)
CREAT SERPL-MCNC: 2.45 MG/DL (ref 0.6–1.3)
CREAT UR-MCNC: 80.1 MG/DL
CREAT UR-MCNC: 83.6 MG/DL
EOSINOPHIL # BLD AUTO: 0.25 THOUSAND/ÂΜL (ref 0–0.61)
EOSINOPHIL NFR BLD AUTO: 4 % (ref 0–6)
ERYTHROCYTE [DISTWIDTH] IN BLOOD BY AUTOMATED COUNT: 15.7 % (ref 11.6–15.1)
GFR SERPL CREATININE-BSD FRML MDRD: 21 ML/MIN/1.73SQ M
GLUCOSE P FAST SERPL-MCNC: 124 MG/DL (ref 65–99)
HCT VFR BLD AUTO: 33.8 % (ref 34.8–46.1)
HGB BLD-MCNC: 10.6 G/DL (ref 11.5–15.4)
IMM GRANULOCYTES # BLD AUTO: 0.04 THOUSAND/UL (ref 0–0.2)
IMM GRANULOCYTES NFR BLD AUTO: 1 % (ref 0–2)
LYMPHOCYTES # BLD AUTO: 0.83 THOUSANDS/ÂΜL (ref 0.6–4.47)
LYMPHOCYTES NFR BLD AUTO: 13 % (ref 14–44)
MCH RBC QN AUTO: 29.1 PG (ref 26.8–34.3)
MCHC RBC AUTO-ENTMCNC: 31.4 G/DL (ref 31.4–37.4)
MCV RBC AUTO: 93 FL (ref 82–98)
MICROALBUMIN UR-MCNC: 141.5 MG/L
MICROALBUMIN/CREAT 24H UR: 177 MG/G CREATININE (ref 0–30)
MONOCYTES # BLD AUTO: 0.48 THOUSAND/ÂΜL (ref 0.17–1.22)
MONOCYTES NFR BLD AUTO: 7 % (ref 4–12)
NEUTROPHILS # BLD AUTO: 4.99 THOUSANDS/ÂΜL (ref 1.85–7.62)
NEUTS SEG NFR BLD AUTO: 74 % (ref 43–75)
NRBC BLD AUTO-RTO: 0 /100 WBCS
PLATELET # BLD AUTO: 273 THOUSANDS/UL (ref 149–390)
PMV BLD AUTO: 8.8 FL (ref 8.9–12.7)
POTASSIUM SERPL-SCNC: 4.4 MMOL/L (ref 3.5–5.3)
PROT SERPL-MCNC: 6.8 G/DL (ref 6.4–8.4)
PROT UR-MCNC: 70.5 MG/DL
PROT/CREAT UR: 0.8 MG/G{CREAT}
RBC # BLD AUTO: 3.64 MILLION/UL (ref 3.81–5.12)
SODIUM SERPL-SCNC: 135 MMOL/L (ref 135–147)
WBC # BLD AUTO: 6.62 THOUSAND/UL (ref 4.31–10.16)

## 2025-07-02 PROCEDURE — 82570 ASSAY OF URINE CREATININE: CPT

## 2025-07-02 PROCEDURE — 36415 COLL VENOUS BLD VENIPUNCTURE: CPT

## 2025-07-02 PROCEDURE — 82043 UR ALBUMIN QUANTITATIVE: CPT

## 2025-07-02 PROCEDURE — 85025 COMPLETE CBC W/AUTO DIFF WBC: CPT

## 2025-07-02 PROCEDURE — 84156 ASSAY OF PROTEIN URINE: CPT

## 2025-07-02 PROCEDURE — 80053 COMPREHEN METABOLIC PANEL: CPT

## 2025-07-02 NOTE — PROGRESS NOTES
Name: Kari Erazo      : 1970      MRN: [id number]  Encounter Provider: Gavin Zazueta MD  Encounter Date: 7/3/2025   Encounter department: [facility] [place]  :  Assessment & Plan  Scleroderma with renal involvement  (HCC)  Baseline creatinine 0.6-0.7  Creatinine slightly higher than baseline at 0.9 in 2024  Admission for TITUS in 2025  Creatinine on admission 2.3 on 2025  Creatinine on discharge 2.88 2025 after initiation of ACE inhibitor and loop diuretic  Creatinine has been slowly improving, currently stabilizing around mid 2's, most recently 2.45 2025  Workup  RNA polymerase  (strongly positive)  Rheumatoid factor/CCP/anti-Jo1 antibody/antiscleroderma antibody negative  UA: 1+ protein, 30-50 WBC, 1-2 RBC  UACR 177 mg/g, UPCR 800 mg 2025  SPEP no monoclonal band, urine immunofixation with no monoclonal immunoglobulin, KL ratio 0.94  CT abdomen: No hydronephrosis, left renal cortical cyst 2.5 cm  JAYESH elevated with titer of 1218 2025, double-stranded DNA negative  LDH has now normalized after a peak of 355 in 2025  Haptoglobin has now normalized after it was undetectable in 2025  Hemolysis smear: No schistocytes or helmet cells  Direct Diana negative  Hep C antibody reactive, RNA not detected  Cryoglobulin detected , repeat - 2025  Urine eosinophil 0%  No hypocomplementemia  Kidney biopsy consistent with vascular microangiopathic changes:   Glomeruli show ischemic changes with wrinkling of capillary loops, 2 glomeruli show swollen endothelial cells and mesangial lysis, no fibrin thrombi or red blood cell fragments, no hypercellularity, segmental sclerosis, fibrinoid necrosis or crescent formation, glomerular basement membranes without holes, spikes with double contours on silver stain,   Severe interstitial fibrosis and tubular atrophy approximately 50 to 60% of cortex  Vessels display severe arterial intimal fibrosis as  well as mucoid intimal edema, swollen endothelial cells and fibrinoid necrosis, no fibrin thrombi or red blood cell fragments  Immunofluorescence negative for all stains in glomeruli, nuclear type staining tissue JAYESH pattern for IgG, kappa and lambda, kappa and lambda stain equally).      Plan  Maintain RAAS blockade with ramipril 20 mg twice daily  Overall hematologic parameters have improved and there is no evidence of thrombotic microangiopathy on kidney biopsy and in such no indication for Eculizumab  We will focus on treatment of hypertension primarily with ACE inhibitor in addition to other antihypertensives as below   Second opinion at [facility] with Dr. New Hinojosa  Plans noted for continuation of RAAS blockade and CellCept  No indication for renal replacement therapy  If an indication arises, will consider peritoneal dialysis  Continue to monitor renal function panel and CBC q. 4 weeks  Nephrology follow-up in 3 months      As far as treatment of scleroderma is concerned:  Notes reviewed from rheumatology  Current Rx: Myfortic per rheumatology recommendations   Ongoing follow-up with rheumatology at [facility] and [facility]  Hypertension secondary to other renal disorders  Renin 37/aldosterone 9.1/ARR 0.2 02/14/2025  Renin likely elevated in setting of RAAS blockade, was on losartan prior to admission  Metanephrine/normetanephrine within normal limits  Renal artery Doppler: No renal artery stenosis  Status: Blood pressure well-controlled at home  Volume status: Hypervolemic  Current Rx: Nifedipine 60 mg daily, ramipril 20 mg twice daily  Changes: Add torsemide 10 mg 3 times per week for leg swelling and facial swelling  Anemia in stage 4 chronic kidney disease  (HCC)  Baseline hemoglobin within normal limits  Hemoglobin level 12.2 on 02/12/2025 with progressive decline  Gio hemoglobin 8.8 on 03/21/2025  Most recent hemoglobin 10.6 07/02/2025  Continue Aranesp 100 mcg q.  monthly (hold for hemoglobin more than 10.5)  LDH has now normalized after a peak of 355 in 02/2025  Haptoglobin has now normalized after it was undetectable in 02/2025  Hemolysis smear: No schistocytes or helmet cells  Direct Diana negative  Continue to monitor CBC q. monthly  Type 2 diabetes mellitus without complication, without long-term current use of insulin ([facility])  Most recent HbA1c 6.6 05/2025  Metformin has been discontinued due to GFR less than 30 mL/min  Management per PCP  Stage 4 chronic kidney disease (HCC)  Management as above  Metabolic acidosis  Etiology: CKD  Current Rx: Sodium bicarbonate 325 mg twice daily  Changes: None      History of Present Illness   JANETT Erazo is a 55 y.o. female    Since last visit: She has ongoing intermittent leg swelling.  She has ongoing stiffness in hands.  She denies dyspnea.  She denies any trouble with urination.    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  She was noted to have very high blood pressure.  Given her history of undifferentiated connective tissue disease, psoriatic arthritis, Raynaud's phenomenon there was a concern for systemic sclerosis.  Extensive autoimmune and rheumatologic workup was completed.  RNA polymerase III antibody was positive and a kidney biopsy was performed.  Diagnosis of scleroderma renal crisis was made and she was treated with escalating doses of captopril and other antihypertensives.    History obtained from: patient    Review of Systems   Constitutional:  Negative for chills and fever.   HENT:  Negative for ear pain and sore throat.    Eyes:  Negative for pain and visual disturbance.   Respiratory:  Negative for cough and shortness of breath.    Cardiovascular:  Negative for chest pain and palpitations.   Gastrointestinal:  Negative for abdominal pain and vomiting.   Genitourinary:  Negative for dysuria and hematuria.   Musculoskeletal:  Negative  "for arthralgias and back pain.   Skin:  Negative for color change and rash.   Neurological:  Negative for seizures and syncope.   All other systems reviewed and are negative.    Past Medical History   Past Medical History[1]  Past Surgical History[2]  Family History[3]   reports that she has never smoked. She has never used smokeless tobacco. She reports that she does not drink alcohol and does not use drugs.  Current Outpatient Medications   Medication Instructions    albuterol (ProAir HFA) 90 mcg/act inhaler 2 puffs, Inhalation, Every 6 hours PRN    atorvastatin (LIPITOR) 10 mg, Oral, Daily    Calcium-Magnesium-Vitamin D - MG-MG-UNIT TB24 1 tablet, Daily    etonogestrel-ethinyl estradiol (NuvaRing) 0.12-0.015 MG/24HR vaginal ring INSERT 1 RING VAGINALLY LEAVE IN FOR 3 WEEKS REPLACE RING WITH NO WITHDRAWL BLEED    famotidine (PEPCID) 20 mg, Oral, Daily at bedtime    fluticasone (FLONASE) 50 mcg/act nasal spray 1 spray, Nasal, Daily    Icosapent Ethyl (VASCEPA) 1 g, Oral, 4 times daily    montelukast (SINGULAIR) 10 mg, Oral, Daily at bedtime    mycophenolic acid (MYFORTIC) 360 mg, Oral, 2 times daily    NIFEdipine ER (ADALAT CC) 60 mg, Oral, Daily    ramipril (ALTACE) 20 mg, 2 times daily    SF 5000 Plus 1.1 % CREA     sodium bicarbonate 325 mg, 2 times daily    torsemide (DEMADEX) 10 mg, 3 times weekly   Allergies[4]      Objective   /82 (BP Location: Left arm, Patient Position: Sitting, Cuff Size: Standard)   Pulse 91   Ht 5' 4\" (1.626 m)   Wt 80.3 kg (177 lb)   LMP  (LMP Unknown)   BMI 30.38 kg/m²      Physical Exam  Vitals and nursing note reviewed.   Constitutional:       General: She is not in acute distress.     Appearance: She is well-developed.   HENT:      Head: Normocephalic and atraumatic.     Eyes:      Conjunctiva/sclera: Conjunctivae normal.       Cardiovascular:      Rate and Rhythm: Normal rate and regular rhythm.      Pulses: Normal pulses.      Heart sounds: Normal heart " sounds. No murmur heard.  Pulmonary:      Effort: Pulmonary effort is normal. No respiratory distress.      Breath sounds: Normal breath sounds.   Abdominal:      Tenderness: There is no right CVA tenderness or left CVA tenderness.     Musculoskeletal:         General: No swelling.      Cervical back: Neck supple.      Right lower leg: Edema present.      Left lower leg: Edema present.     Skin:     General: Skin is warm and dry.      Capillary Refill: Capillary refill takes less than 2 seconds.     Neurological:      Mental Status: She is alert.     Psychiatric:         Mood and Affect: Mood normal.                [1]   Past Medical History:  Diagnosis Date    Allergic     latex, some tree nuts, seasonal    Anemia 2/15    Arthritis     psoriatic arthritis    Asthma     CPAP (continuous positive airway pressure) dependence     Diabetes mellitus (HCC)     gestational    Foot ulcer (HCC) 2/12/24    Gestational diabetes     Hypertension     patient reports    Irritable bowel syndrome     Obesity     PCOS (polycystic ovarian syndrome)     Sleep apnea     Varicella    [2]   Past Surgical History:  Procedure Laterality Date    COLONOSCOPY      IR BIOPSY KIDNEY RANDOM  02/21/2025    RENAL BIOPSY  2/21/2025    Microangiopathic thrombotic disease    TONSILLECTOMY      last assessed: 5/3/16   [3]   Family History  Problem Relation Name Age of Onset    Hypertension Mother Lis     Hyperlipidemia Mother Lis     Osteoarthritis Mother Lis     Hypertension Father race     Other Father race         low serum HDL    Hyperlipidemia Father race     Osteoarthritis Father race     Breast cancer Sister Yesica 49    Hypothyroidism Sister Yesica     No Known Problems Daughter      Breast cancer Maternal Grandmother Ah Radu 79    Stroke Maternal Grandfather Vines     No Known Problems Paternal Grandmother      No Known Problems Paternal Grandfather      No Known Problems Son      No Known Problems Paternal Aunt      No Known Problems  Paternal Aunt      No Known Problems Paternal Aunt     [4]   Allergies  Allergen Reactions    Ace Inhibitors Cough    Latex Hives     Per patient    Nuts - Food Allergy Hives    Shellfish-Derived Products - Food Allergy

## 2025-07-02 NOTE — ASSESSMENT & PLAN NOTE
Baseline hemoglobin within normal limits  Hemoglobin level 12.2 on 02/12/2025 with progressive decline  Gio hemoglobin 8.8 on 03/21/2025  Most recent hemoglobin 10.6 07/02/2025  Continue Aranesp 100 mcg q. monthly (hold for hemoglobin more than 10.5)  LDH has now normalized after a peak of 355 in 02/2025  Haptoglobin has now normalized after it was undetectable in 02/2025  Hemolysis smear: No schistocytes or helmet cells  Direct Diana negative  Continue to monitor CBC q. monthly

## 2025-07-02 NOTE — PROGRESS NOTES
"Daily Note     Today's date: 7/3/2025  Patient name: Kari Erazo  : 1970  MRN: [id number]  Referring provider: Zully Reynaga MD  Dx:   Encounter Diagnosis     ICD-10-CM    1. Acute scleroderma renal crisis (HCC)  N28.0     M34.9             Start Time: 1630  Stop Time: 1715  Total time in clinic (min): 45 minutes    Subjective: \"I think there is a little bit of a flare\"    Objective: See treatment diary below.       Assessment: She notes that the tightness is traveling further up her arm, and that the knuckles feel more stiff. Right forearm and hand tighter in general than the left. She is able to use the MCP flexion splints for about 15 mins at a time.       Plan: Continue per plan of care.      Precautions: Raynaud's, Acute scleroderma     Insurance Billing Rule ReEval POC expires Auth Status Total   Visits  Start date  Expiration date PT/OT + Visit Limit? Co-Insurance Misc   Capital   25 N/A N/A 3/31 7/29/25 N/A                                                             Manuals  6/5 6/12 6/19 7/3 4/21 4/23 4/30 5   IASTM STM 15' STM 15 15' STM 15' 15' STM 20' 16 16 STM 16' STM 10'   MEM             Re-evaluation  Completed                        Neuro Re-Ed                                                                                                        Ther Ex             HEP             Forearm PROM 5 5 5 5 5  5 5 5 5   Wrist PROM 8 8 8 8 8  8 8 8 8   Digit PROM 8 8 8 8  8' 8 8 8 8   Thumb PROM 2 2     2 2 2 2   Opposition roll    15x 15x         Wrist Maze x5     5x 3x bl X b/l X b/l x5   Digiflex        R iso x10    G comp x20 R iso x10    G comp x20 R iso x10    G comp x20   Power web        R center and rim x20 R center and rim x20 R center and rim x20   Ext web        Y x 20 Y x 20 Y x 20   Flex bar        R x 20 F/E R x 20 F/E R x 20 F/E                             Ther Activity             Keypegs   1/2 board each finger  1/2 board each finger  1/2 board each " finger 1/2 board each finger x1 x1 x1 x1   [place] sort             [employer]                                       Modalities             MHP             Paraffin w/ MH 5' 5' 5' 5' 5' 5' 5' 5' 5' 5'

## 2025-07-03 ENCOUNTER — OFFICE VISIT (OUTPATIENT)
Dept: NEPHROLOGY | Facility: CLINIC | Age: 55
End: 2025-07-03
Payer: COMMERCIAL

## 2025-07-03 ENCOUNTER — OFFICE VISIT (OUTPATIENT)
Dept: OCCUPATIONAL THERAPY | Facility: CLINIC | Age: 55
End: 2025-07-03
Payer: COMMERCIAL

## 2025-07-03 VITALS
HEART RATE: 91 BPM | HEIGHT: 64 IN | DIASTOLIC BLOOD PRESSURE: 82 MMHG | WEIGHT: 177 LBS | BODY MASS INDEX: 30.22 KG/M2 | SYSTOLIC BLOOD PRESSURE: 142 MMHG

## 2025-07-03 DIAGNOSIS — M34.9 ACUTE SCLERODERMA RENAL CRISIS (HCC): Primary | ICD-10-CM

## 2025-07-03 DIAGNOSIS — I15.1 HYPERTENSION SECONDARY TO OTHER RENAL DISORDERS: ICD-10-CM

## 2025-07-03 DIAGNOSIS — E11.9 TYPE 2 DIABETES MELLITUS WITHOUT COMPLICATION, WITHOUT LONG-TERM CURRENT USE OF INSULIN (HCC): ICD-10-CM

## 2025-07-03 DIAGNOSIS — N28.0 ACUTE SCLERODERMA RENAL CRISIS (HCC): Primary | ICD-10-CM

## 2025-07-03 DIAGNOSIS — N08: Primary | ICD-10-CM

## 2025-07-03 DIAGNOSIS — M34.89: Primary | ICD-10-CM

## 2025-07-03 DIAGNOSIS — N18.4 STAGE 4 CHRONIC KIDNEY DISEASE (HCC): ICD-10-CM

## 2025-07-03 DIAGNOSIS — E87.20 METABOLIC ACIDOSIS: ICD-10-CM

## 2025-07-03 DIAGNOSIS — N18.4 ANEMIA IN STAGE 4 CHRONIC KIDNEY DISEASE  (HCC): ICD-10-CM

## 2025-07-03 DIAGNOSIS — D63.1 ANEMIA IN STAGE 4 CHRONIC KIDNEY DISEASE  (HCC): ICD-10-CM

## 2025-07-03 PROCEDURE — 97110 THERAPEUTIC EXERCISES: CPT | Performed by: OCCUPATIONAL THERAPIST

## 2025-07-03 PROCEDURE — 97140 MANUAL THERAPY 1/> REGIONS: CPT | Performed by: OCCUPATIONAL THERAPIST

## 2025-07-03 PROCEDURE — 99214 OFFICE O/P EST MOD 30 MIN: CPT | Performed by: INTERNAL MEDICINE

## 2025-07-03 RX ORDER — TORSEMIDE 20 MG/1
10 TABLET ORAL 3 TIMES WEEKLY
COMMUNITY

## 2025-07-03 NOTE — PATIENT INSTRUCTIONS
Your kidney function is stable and slowly improved since discharge from the hospital.    Blood pressure is better controlled at home and would continue current medications.    Due to ongoing swelling in the legs and face, would recommend taking torsemide 10 mg 3 times per week.    We will continue Aranesp once a month with a hold parameter of hemoglobin of more than 10.5.    We will continue lab work every 4 weeks.

## 2025-07-10 ENCOUNTER — OFFICE VISIT (OUTPATIENT)
Dept: OCCUPATIONAL THERAPY | Facility: CLINIC | Age: 55
End: 2025-07-10
Payer: COMMERCIAL

## 2025-07-10 DIAGNOSIS — M34.9 ACUTE SCLERODERMA RENAL CRISIS (HCC): Primary | ICD-10-CM

## 2025-07-10 DIAGNOSIS — N28.0 ACUTE SCLERODERMA RENAL CRISIS (HCC): Primary | ICD-10-CM

## 2025-07-10 PROCEDURE — 97140 MANUAL THERAPY 1/> REGIONS: CPT

## 2025-07-10 PROCEDURE — 97110 THERAPEUTIC EXERCISES: CPT

## 2025-07-10 NOTE — PROGRESS NOTES
"Daily Note     Today's date: 7/10/2025  Patient name: Kari Erazo  : 1970  MRN: [id number]  Referring provider: Zully Reynaga MD  Dx:   Encounter Diagnosis     ICD-10-CM    1. Acute scleroderma renal crisis (HCC)  N28.0     M34.9                        Subjective: \"He seems to be improving\".     Objective: See treatment diary below.       Assessment: She has continued to use the MCP flexion splint. Edema visibly has decreased in the hand, wrists, and forearms.  Palpable less density in the R forearm.       Plan: Continue per plan of care.      Precautions: Raynaud's, Acute scleroderma     Insurance Billing Rule ReEval POC expires Auth Status Total   Visits  Start date  Expiration date PT/OT + Visit Limit? Co-Insurance Misc   Capital   25 N/A N/A 3/31 7/29/25 N/A                                                             Manuals 5/22 5/29 6/5 6/12 6/19 7/3 7/10 4/23 4/30 5/7   IASTM STM 15' STM 15 15' STM 15' 15' STM 20' STM 20' 16 STM 16' STM 10'   MEM             Re-evaluation  Completed                        Neuro Re-Ed                                                                                                        Ther Ex             HEP             Forearm PROM 5 5 5 5 5  5' 5 5 5   Wrist PROM 8 8 8 8 8  8' 8 8 8   Digit PROM 8 8 8 8  8' 8' 8 8 8   Thumb PROM 2 2      2 2 2   Opposition roll    15x 15x         Wrist Maze x5     5x 5x X b/l X b/l x5   Digiflex        R iso x10    G comp x20 R iso x10    G comp x20 R iso x10    G comp x20   Power web        R center and rim x20 R center and rim x20 R center and rim x20   Ext web        Y x 20 Y x 20 Y x 20   Flex bar        R x 20 F/E R x 20 F/E R x 20 F/E                             Ther Activity             Keypegs   1/2 board each finger  1/2 board each finger  1/2 board each finger 1/2 board each finger 1/2 board each hand x1 x1 x1   [place] sort             [place]                                       Modalities             MHP     "         Paraffin w/ MH 5' 5' 5' 5' 5' 5' 5' 5' 5' 5'

## 2025-07-16 DIAGNOSIS — N18.4 ANEMIA IN STAGE 4 CHRONIC KIDNEY DISEASE  (HCC): Primary | ICD-10-CM

## 2025-07-16 DIAGNOSIS — D63.1 ANEMIA IN STAGE 4 CHRONIC KIDNEY DISEASE  (HCC): Primary | ICD-10-CM

## 2025-07-21 ENCOUNTER — APPOINTMENT (OUTPATIENT)
Dept: LAB | Facility: CLINIC | Age: 55
End: 2025-07-21
Attending: INTERNAL MEDICINE
Payer: COMMERCIAL

## 2025-07-21 ENCOUNTER — HOSPITAL ENCOUNTER (OUTPATIENT)
Dept: INFUSION CENTER | Facility: CLINIC | Age: 55
Discharge: HOME | End: 2025-07-21
Attending: INTERNAL MEDICINE
Payer: COMMERCIAL

## 2025-07-21 VITALS — DIASTOLIC BLOOD PRESSURE: 70 MMHG | SYSTOLIC BLOOD PRESSURE: 128 MMHG

## 2025-07-21 DIAGNOSIS — D63.1 ANEMIA IN STAGE 4 CHRONIC KIDNEY DISEASE  (HCC): Primary | ICD-10-CM

## 2025-07-21 DIAGNOSIS — I15.1 HYPERTENSION SECONDARY TO OTHER RENAL DISORDERS: ICD-10-CM

## 2025-07-21 DIAGNOSIS — D63.1 ANEMIA IN STAGE 4 CHRONIC KIDNEY DISEASE  (HCC): ICD-10-CM

## 2025-07-21 DIAGNOSIS — N08: ICD-10-CM

## 2025-07-21 DIAGNOSIS — N18.4 STAGE 4 CHRONIC KIDNEY DISEASE (HCC): ICD-10-CM

## 2025-07-21 DIAGNOSIS — E87.20 METABOLIC ACIDOSIS: ICD-10-CM

## 2025-07-21 DIAGNOSIS — N18.4 ANEMIA IN STAGE 4 CHRONIC KIDNEY DISEASE  (HCC): Primary | ICD-10-CM

## 2025-07-21 DIAGNOSIS — N18.4 ANEMIA IN STAGE 4 CHRONIC KIDNEY DISEASE  (HCC): ICD-10-CM

## 2025-07-21 DIAGNOSIS — M34.89: ICD-10-CM

## 2025-07-21 DIAGNOSIS — E11.9 TYPE 2 DIABETES MELLITUS WITHOUT COMPLICATION, WITHOUT LONG-TERM CURRENT USE OF INSULIN (HCC): ICD-10-CM

## 2025-07-21 LAB
ALBUMIN SERPL BCG-MCNC: 3.8 G/DL (ref 3.5–5)
ANION GAP SERPL CALCULATED.3IONS-SCNC: 6 MMOL/L (ref 4–13)
BASOPHILS # BLD AUTO: 0.03 THOUSANDS/ÂΜL (ref 0–0.1)
BASOPHILS NFR BLD AUTO: 0 % (ref 0–1)
BUN SERPL-MCNC: 37 MG/DL (ref 5–25)
CALCIUM SERPL-MCNC: 8.9 MG/DL (ref 8.4–10.2)
CHLORIDE SERPL-SCNC: 106 MMOL/L (ref 96–108)
CO2 SERPL-SCNC: 23 MMOL/L (ref 21–32)
CREAT SERPL-MCNC: 2.35 MG/DL (ref 0.6–1.3)
EOSINOPHIL # BLD AUTO: 0.29 THOUSAND/ÂΜL (ref 0–0.61)
EOSINOPHIL NFR BLD AUTO: 4 % (ref 0–6)
ERYTHROCYTE [DISTWIDTH] IN BLOOD BY AUTOMATED COUNT: 14.4 % (ref 11.6–15.1)
GFR SERPL CREATININE-BSD FRML MDRD: 22 ML/MIN/1.73SQ M
GLUCOSE P FAST SERPL-MCNC: 114 MG/DL (ref 65–99)
HCT VFR BLD AUTO: 32.4 % (ref 34.8–46.1)
HGB BLD-MCNC: 10.2 G/DL (ref 11.5–15.4)
IMM GRANULOCYTES # BLD AUTO: 0.05 THOUSAND/UL (ref 0–0.2)
IMM GRANULOCYTES NFR BLD AUTO: 1 % (ref 0–2)
LDH SERPL-CCNC: 197 U/L (ref 140–271)
LYMPHOCYTES # BLD AUTO: 0.87 THOUSANDS/ÂΜL (ref 0.6–4.47)
LYMPHOCYTES NFR BLD AUTO: 13 % (ref 14–44)
MCH RBC QN AUTO: 29.1 PG (ref 26.8–34.3)
MCHC RBC AUTO-ENTMCNC: 31.5 G/DL (ref 31.4–37.4)
MCV RBC AUTO: 92 FL (ref 82–98)
MONOCYTES # BLD AUTO: 0.59 THOUSAND/ÂΜL (ref 0.17–1.22)
MONOCYTES NFR BLD AUTO: 9 % (ref 4–12)
NEUTROPHILS # BLD AUTO: 4.9 THOUSANDS/ÂΜL (ref 1.85–7.62)
NEUTS SEG NFR BLD AUTO: 73 % (ref 43–75)
NRBC BLD AUTO-RTO: 0 /100 WBCS
PHOSPHATE SERPL-MCNC: 3.5 MG/DL (ref 2.7–4.5)
PLATELET # BLD AUTO: 309 THOUSANDS/UL (ref 149–390)
PMV BLD AUTO: 8.8 FL (ref 8.9–12.7)
POTASSIUM SERPL-SCNC: 4.5 MMOL/L (ref 3.5–5.3)
RBC # BLD AUTO: 3.51 MILLION/UL (ref 3.81–5.12)
SODIUM SERPL-SCNC: 135 MMOL/L (ref 135–147)
WBC # BLD AUTO: 6.73 THOUSAND/UL (ref 4.31–10.16)

## 2025-07-21 PROCEDURE — 96372 THER/PROPH/DIAG INJ SC/IM: CPT

## 2025-07-21 PROCEDURE — 85025 COMPLETE CBC W/AUTO DIFF WBC: CPT

## 2025-07-21 PROCEDURE — 83615 LACTATE (LD) (LDH) ENZYME: CPT

## 2025-07-21 PROCEDURE — 36415 COLL VENOUS BLD VENIPUNCTURE: CPT

## 2025-07-21 PROCEDURE — 83010 ASSAY OF HAPTOGLOBIN QUANT: CPT

## 2025-07-21 PROCEDURE — 80069 RENAL FUNCTION PANEL: CPT

## 2025-07-21 RX ADMIN — DARBEPOETIN ALFA 100 MCG: 100 INJECTION, SOLUTION INTRAVENOUS; SUBCUTANEOUS at 15:48

## 2025-07-21 NOTE — PROGRESS NOTES
[facility]. Labs reviewed from today, 7/21- within parameters for treatment today. Aranesp administered into R arm- treatment tolerated without incident. Next appt confirmed with patient for 8/19 at 5pm at [facility]. AVS declined.

## 2025-07-22 LAB — HAPTOGLOB SERPL-MCNC: 101 MG/DL (ref 33–346)

## 2025-07-24 ENCOUNTER — OFFICE VISIT (OUTPATIENT)
Dept: OCCUPATIONAL THERAPY | Facility: CLINIC | Age: 55
End: 2025-07-24
Payer: COMMERCIAL

## 2025-07-24 DIAGNOSIS — N28.0 ACUTE SCLERODERMA RENAL CRISIS (HCC): Primary | ICD-10-CM

## 2025-07-24 DIAGNOSIS — M34.9 ACUTE SCLERODERMA RENAL CRISIS (HCC): Primary | ICD-10-CM

## 2025-07-24 PROCEDURE — 97110 THERAPEUTIC EXERCISES: CPT

## 2025-07-24 PROCEDURE — 97140 MANUAL THERAPY 1/> REGIONS: CPT

## 2025-07-24 NOTE — PROGRESS NOTES
"Daily Note     Today's date: 2025  Patient name: Kari Erazo  : 1970  MRN: [id number]  Referring provider: Zully Reynaga MD  Dx:   Encounter Diagnosis     ICD-10-CM    1. Acute scleroderma renal crisis (HCC)  N28.0     M34.9                        Subjective: \"He seems to be improving\".     Objective: See treatment diary below.       Assessment: She is palpably less dense in the L forearm compared to previous sessions. R EPB tightness is causing thumb pain at night. Updated patient HEP to include thumb stretches to target this tightness.       Plan: Continue per plan of care.      Precautions: Raynaud's, Acute scleroderma     Insurance Billing Rule ReEval POC expires Auth Status Total   Visits  Start date  Expiration date PT/OT + Visit Limit? Co-Insurance Misc   Capital   25 N/A N/A 3/31 7/29/25 N/A                                                             Manuals 5/22 5/29 6/5 6/12 6/19 7/3 7/10 7/24 4/30 5/7   IASTM STM 15' STM 15 15' STM 15' 15' STM 20' STM 20' STM 20' STM 16' STM 10'   MEM             Re-evaluation  Completed                        Neuro Re-Ed                                                                                                        Ther Ex             HEP        Finkelstein stretch     Forearm PROM 5 5 5 5 5  5' 5' 5 5   Wrist PROM 8 8 8 8 8  8' 8' 8 8   Digit PROM 8 8 8 8  8' 8' 8' 8 8   Thumb PROM 2 2       2 2   Opposition roll    15x 15x         Wrist Maze x5     5x 5x 5x X b/l x5   Digiflex         R iso x10    G comp x20 R iso x10    G comp x20   Power web         R center and rim x20 R center and rim x20   Ext web         Y x 20 Y x 20   Flex bar         R x 20 F/E R x 20 F/E                             Ther Activity             Keypegs   1/2 board each finger  1/2 board each finger  1/2 board each finger 1/2 board each finger 1/2 board each hand 1/2 board each hand x1 x1   [place] sort             [place]                                     "   Modalities             MHP             Paraffin w/ MH 5' 5' 5' 5' 5' 5' 5' 5' 5' 5'

## 2025-07-29 ENCOUNTER — OFFICE VISIT (OUTPATIENT)
Dept: GASTROENTEROLOGY | Facility: CLINIC | Age: 55
End: 2025-07-29
Payer: COMMERCIAL

## 2025-07-29 ENCOUNTER — TELEPHONE (OUTPATIENT)
Dept: GASTROENTEROLOGY | Facility: CLINIC | Age: 55
End: 2025-07-29

## 2025-07-29 VITALS
DIASTOLIC BLOOD PRESSURE: 70 MMHG | TEMPERATURE: 98.7 F | WEIGHT: 175 LBS | SYSTOLIC BLOOD PRESSURE: 120 MMHG | BODY MASS INDEX: 29.88 KG/M2 | HEIGHT: 64 IN

## 2025-07-29 DIAGNOSIS — K86.2 PANCREATIC CYST: ICD-10-CM

## 2025-07-29 DIAGNOSIS — A04.8 H. PYLORI INFECTION: Primary | ICD-10-CM

## 2025-07-29 DIAGNOSIS — D64.9 NORMOCYTIC ANEMIA: ICD-10-CM

## 2025-07-29 DIAGNOSIS — M34.9 SCLERODERMA (HCC): ICD-10-CM

## 2025-07-29 DIAGNOSIS — R05.3 CHRONIC COUGH: ICD-10-CM

## 2025-07-29 PROCEDURE — 99214 OFFICE O/P EST MOD 30 MIN: CPT | Performed by: PHYSICIAN ASSISTANT

## 2025-07-29 RX ORDER — OMEPRAZOLE 20 MG/1
20 CAPSULE, DELAYED RELEASE ORAL
Qty: 28 CAPSULE | Refills: 0 | Status: SHIPPED | OUTPATIENT
Start: 2025-07-29 | End: 2025-08-12

## 2025-07-29 RX ORDER — AMOXICILLIN 500 MG/1
500 CAPSULE ORAL EVERY 12 HOURS SCHEDULED
Qty: 28 CAPSULE | Refills: 0 | Status: SHIPPED | OUTPATIENT
Start: 2025-07-29 | End: 2025-08-12

## 2025-07-29 RX ORDER — CLARITHROMYCIN 250 MG/1
250 TABLET, FILM COATED ORAL EVERY 12 HOURS SCHEDULED
Qty: 28 TABLET | Refills: 0 | Status: SHIPPED | OUTPATIENT
Start: 2025-07-29 | End: 2025-08-12

## 2025-07-31 ENCOUNTER — OFFICE VISIT (OUTPATIENT)
Dept: OCCUPATIONAL THERAPY | Facility: CLINIC | Age: 55
End: 2025-07-31
Payer: COMMERCIAL

## 2025-07-31 DIAGNOSIS — N28.0 ACUTE SCLERODERMA RENAL CRISIS (HCC): Primary | ICD-10-CM

## 2025-07-31 DIAGNOSIS — M34.9 ACUTE SCLERODERMA RENAL CRISIS (HCC): Primary | ICD-10-CM

## 2025-07-31 PROCEDURE — 97110 THERAPEUTIC EXERCISES: CPT | Performed by: OCCUPATIONAL THERAPIST

## 2025-07-31 PROCEDURE — 97140 MANUAL THERAPY 1/> REGIONS: CPT | Performed by: OCCUPATIONAL THERAPIST

## 2025-08-05 DIAGNOSIS — N08: Primary | ICD-10-CM

## 2025-08-05 DIAGNOSIS — M34.89: Primary | ICD-10-CM

## 2025-08-05 DIAGNOSIS — I15.1 HYPERTENSION SECONDARY TO OTHER RENAL DISORDERS: ICD-10-CM

## 2025-08-05 RX ORDER — NIFEDIPINE 30 MG/1
30 TABLET, EXTENDED RELEASE ORAL DAILY
Qty: 90 TABLET | Refills: 3 | Status: SHIPPED | OUTPATIENT
Start: 2025-08-05 | End: 2025-11-03

## 2025-08-18 ENCOUNTER — OFFICE VISIT (OUTPATIENT)
Dept: INTERNAL MEDICINE CLINIC | Facility: CLINIC | Age: 55
End: 2025-08-18
Payer: COMMERCIAL

## 2025-08-18 ENCOUNTER — APPOINTMENT (OUTPATIENT)
Dept: LAB | Facility: CLINIC | Age: 55
End: 2025-08-18
Attending: INTERNAL MEDICINE
Payer: COMMERCIAL

## 2025-08-18 VITALS
BODY MASS INDEX: 28.85 KG/M2 | OXYGEN SATURATION: 99 % | TEMPERATURE: 98.4 F | HEIGHT: 64 IN | HEART RATE: 95 BPM | DIASTOLIC BLOOD PRESSURE: 90 MMHG | WEIGHT: 169 LBS | SYSTOLIC BLOOD PRESSURE: 142 MMHG

## 2025-08-18 DIAGNOSIS — U07.1 COVID-19: Primary | ICD-10-CM

## 2025-08-18 DIAGNOSIS — N28.0 ACUTE SCLERODERMA RENAL CRISIS (HCC): ICD-10-CM

## 2025-08-18 DIAGNOSIS — D63.1 ANEMIA IN STAGE 4 CHRONIC KIDNEY DISEASE  (HCC): ICD-10-CM

## 2025-08-18 DIAGNOSIS — U07.1 COVID-19: ICD-10-CM

## 2025-08-18 DIAGNOSIS — M34.9 ACUTE SCLERODERMA RENAL CRISIS (HCC): ICD-10-CM

## 2025-08-18 DIAGNOSIS — N18.4 ANEMIA IN STAGE 4 CHRONIC KIDNEY DISEASE  (HCC): ICD-10-CM

## 2025-08-18 LAB
ANION GAP SERPL CALCULATED.3IONS-SCNC: 9 MMOL/L (ref 4–13)
BASOPHILS # BLD AUTO: 0.03 THOUSANDS/ÂΜL (ref 0–0.1)
BASOPHILS NFR BLD AUTO: 0 % (ref 0–1)
BUN SERPL-MCNC: 33 MG/DL (ref 5–25)
CALCIUM SERPL-MCNC: 9 MG/DL (ref 8.4–10.2)
CHLORIDE SERPL-SCNC: 106 MMOL/L (ref 96–108)
CO2 SERPL-SCNC: 22 MMOL/L (ref 21–32)
CREAT SERPL-MCNC: 2.42 MG/DL (ref 0.6–1.3)
EOSINOPHIL # BLD AUTO: 0.1 THOUSAND/ÂΜL (ref 0–0.61)
EOSINOPHIL NFR BLD AUTO: 1 % (ref 0–6)
ERYTHROCYTE [DISTWIDTH] IN BLOOD BY AUTOMATED COUNT: 14.7 % (ref 11.6–15.1)
FERRITIN SERPL-MCNC: 142 NG/ML (ref 30–307)
GFR SERPL CREATININE-BSD FRML MDRD: 22 ML/MIN/1.73SQ M
GLUCOSE SERPL-MCNC: 127 MG/DL (ref 65–140)
HCT VFR BLD AUTO: 37.4 % (ref 34.8–46.1)
HGB BLD-MCNC: 11.8 G/DL (ref 11.5–15.4)
IMM GRANULOCYTES # BLD AUTO: 0.04 THOUSAND/UL (ref 0–0.2)
IMM GRANULOCYTES NFR BLD AUTO: 0 % (ref 0–2)
IRON SATN MFR SERPL: 9 % (ref 15–50)
IRON SERPL-MCNC: 26 UG/DL (ref 50–212)
LYMPHOCYTES # BLD AUTO: 0.58 THOUSANDS/ÂΜL (ref 0.6–4.47)
LYMPHOCYTES NFR BLD AUTO: 5 % (ref 14–44)
MCH RBC QN AUTO: 29 PG (ref 26.8–34.3)
MCHC RBC AUTO-ENTMCNC: 31.6 G/DL (ref 31.4–37.4)
MCV RBC AUTO: 92 FL (ref 82–98)
MONOCYTES # BLD AUTO: 0.96 THOUSAND/ÂΜL (ref 0.17–1.22)
MONOCYTES NFR BLD AUTO: 9 % (ref 4–12)
NEUTROPHILS # BLD AUTO: 8.99 THOUSANDS/ÂΜL (ref 1.85–7.62)
NEUTS SEG NFR BLD AUTO: 85 % (ref 43–75)
NRBC BLD AUTO-RTO: 0 /100 WBCS
PLATELET # BLD AUTO: 283 THOUSANDS/UL (ref 149–390)
PMV BLD AUTO: 8.9 FL (ref 8.9–12.7)
POTASSIUM SERPL-SCNC: 4.6 MMOL/L (ref 3.5–5.3)
RBC # BLD AUTO: 4.07 MILLION/UL (ref 3.81–5.12)
S PYO AG THROAT QL: NEGATIVE
SARS-COV-2 AG UPPER RESP QL IA: POSITIVE
SODIUM SERPL-SCNC: 137 MMOL/L (ref 135–147)
TIBC SERPL-MCNC: 281.4 UG/DL (ref 250–450)
TRANSFERRIN SERPL-MCNC: 201 MG/DL (ref 203–362)
UIBC SERPL-MCNC: 255 UG/DL (ref 155–355)
VALID CONTROL: ABNORMAL
WBC # BLD AUTO: 10.7 THOUSAND/UL (ref 4.31–10.16)

## 2025-08-18 PROCEDURE — 87880 STREP A ASSAY W/OPTIC: CPT | Performed by: NURSE PRACTITIONER

## 2025-08-18 PROCEDURE — 85025 COMPLETE CBC W/AUTO DIFF WBC: CPT

## 2025-08-18 PROCEDURE — 99214 OFFICE O/P EST MOD 30 MIN: CPT | Performed by: NURSE PRACTITIONER

## 2025-08-18 PROCEDURE — 83550 IRON BINDING TEST: CPT

## 2025-08-18 PROCEDURE — 36415 COLL VENOUS BLD VENIPUNCTURE: CPT

## 2025-08-18 PROCEDURE — 82728 ASSAY OF FERRITIN: CPT

## 2025-08-18 PROCEDURE — 80048 BASIC METABOLIC PNL TOTAL CA: CPT

## 2025-08-18 PROCEDURE — 87811 SARS-COV-2 COVID19 W/OPTIC: CPT | Performed by: NURSE PRACTITIONER

## 2025-08-18 PROCEDURE — 83540 ASSAY OF IRON: CPT

## 2025-08-19 ENCOUNTER — HOSPITAL ENCOUNTER (OUTPATIENT)
Dept: INFUSION CENTER | Facility: CLINIC | Age: 55
Discharge: HOME | End: 2025-08-19
Attending: INTERNAL MEDICINE

## 2025-08-19 RX ORDER — NIRMATRELVIR AND RITONAVIR 150-100 MG
KIT ORAL
Qty: 20 TABLET | Refills: 0 | Status: SHIPPED | OUTPATIENT
Start: 2025-08-19 | End: 2025-08-27

## 2025-08-20 ENCOUNTER — TELEPHONE (OUTPATIENT)
Age: 55
End: 2025-08-20